# Patient Record
Sex: FEMALE | Race: WHITE | NOT HISPANIC OR LATINO | Employment: OTHER | URBAN - METROPOLITAN AREA
[De-identification: names, ages, dates, MRNs, and addresses within clinical notes are randomized per-mention and may not be internally consistent; named-entity substitution may affect disease eponyms.]

---

## 2017-06-29 ENCOUNTER — APPOINTMENT (OUTPATIENT)
Dept: GENERAL RADIOLOGY | Facility: HOSPITAL | Age: 67
End: 2017-06-29

## 2017-06-29 ENCOUNTER — APPOINTMENT (OUTPATIENT)
Dept: CT IMAGING | Facility: HOSPITAL | Age: 67
End: 2017-06-29

## 2017-06-29 ENCOUNTER — ANESTHESIA (OUTPATIENT)
Dept: PERIOP | Facility: HOSPITAL | Age: 67
End: 2017-06-29

## 2017-06-29 ENCOUNTER — HOSPITAL ENCOUNTER (OUTPATIENT)
Facility: HOSPITAL | Age: 67
Setting detail: OBSERVATION
LOS: 1 days | Discharge: HOME | End: 2017-06-30
Attending: EMERGENCY MEDICINE | Admitting: INTERNAL MEDICINE

## 2017-06-29 ENCOUNTER — ANESTHESIA EVENT (OUTPATIENT)
Dept: PERIOP | Facility: HOSPITAL | Age: 67
End: 2017-06-29

## 2017-06-29 DIAGNOSIS — K57.30 DIVERTICULOSIS OF LARGE INTESTINE WITHOUT HEMORRHAGE: Primary | ICD-10-CM

## 2017-06-29 DIAGNOSIS — R52 INTRACTABLE PAIN: ICD-10-CM

## 2017-06-29 DIAGNOSIS — N20.1 LEFT URETERAL STONE: ICD-10-CM

## 2017-06-29 PROBLEM — K57.32 DIVERTICULITIS LARGE INTESTINE: Status: ACTIVE | Noted: 2017-06-29

## 2017-06-29 PROBLEM — K57.92 DIVERTICULITIS: Status: ACTIVE | Noted: 2017-06-29

## 2017-06-29 PROBLEM — N20.0 KIDNEY STONE ON LEFT SIDE: Status: ACTIVE | Noted: 2017-06-29

## 2017-06-29 PROBLEM — N13.30 HYDRONEPHROSIS, LEFT: Status: ACTIVE | Noted: 2017-06-29

## 2017-06-29 LAB
ALBUMIN SERPL-MCNC: 3.6 G/DL (ref 3.5–5.2)
ALBUMIN/GLOB SERPL: 1.6 G/DL
ALP SERPL-CCNC: 121 U/L (ref 39–117)
ALT SERPL W P-5'-P-CCNC: 10 U/L (ref 1–33)
ANION GAP SERPL CALCULATED.3IONS-SCNC: 12.3 MMOL/L
ANION GAP SERPL CALCULATED.3IONS-SCNC: 13.8 MMOL/L
AST SERPL-CCNC: 14 U/L (ref 1–32)
BASOPHILS # BLD AUTO: 0.01 10*3/MM3 (ref 0–0.2)
BASOPHILS # BLD AUTO: 0.01 10*3/MM3 (ref 0–0.2)
BASOPHILS NFR BLD AUTO: 0.1 % (ref 0–1.5)
BASOPHILS NFR BLD AUTO: 0.1 % (ref 0–1.5)
BILIRUB SERPL-MCNC: 0.4 MG/DL (ref 0.1–1.2)
BILIRUB UR QL STRIP: NEGATIVE
BUN BLD-MCNC: 10 MG/DL (ref 8–23)
BUN BLD-MCNC: 9 MG/DL (ref 8–23)
BUN/CREAT SERPL: 11 (ref 7–25)
BUN/CREAT SERPL: 15.9 (ref 7–25)
CALCIUM SPEC-SCNC: 8 MG/DL (ref 8.6–10.5)
CALCIUM SPEC-SCNC: 8.5 MG/DL (ref 8.6–10.5)
CHLORIDE SERPL-SCNC: 103 MMOL/L (ref 98–107)
CHLORIDE SERPL-SCNC: 109 MMOL/L (ref 98–107)
CLARITY UR: CLEAR
CO2 SERPL-SCNC: 20.2 MMOL/L (ref 22–29)
CO2 SERPL-SCNC: 22.7 MMOL/L (ref 22–29)
COLOR UR: YELLOW
CREAT BLD-MCNC: 0.63 MG/DL (ref 0.57–1)
CREAT BLD-MCNC: 0.82 MG/DL (ref 0.57–1)
DEPRECATED RDW RBC AUTO: 43.5 FL (ref 37–54)
DEPRECATED RDW RBC AUTO: 43.8 FL (ref 37–54)
EOSINOPHIL # BLD AUTO: 0 10*3/MM3 (ref 0–0.7)
EOSINOPHIL # BLD AUTO: 0.06 10*3/MM3 (ref 0–0.7)
EOSINOPHIL NFR BLD AUTO: 0 % (ref 0.3–6.2)
EOSINOPHIL NFR BLD AUTO: 0.6 % (ref 0.3–6.2)
ERYTHROCYTE [DISTWIDTH] IN BLOOD BY AUTOMATED COUNT: 13 % (ref 11.7–13)
ERYTHROCYTE [DISTWIDTH] IN BLOOD BY AUTOMATED COUNT: 13.1 % (ref 11.7–13)
GFR SERPL CREATININE-BSD FRML MDRD: 69 ML/MIN/1.73
GFR SERPL CREATININE-BSD FRML MDRD: 94 ML/MIN/1.73
GLOBULIN UR ELPH-MCNC: 2.2 GM/DL
GLUCOSE BLD-MCNC: 134 MG/DL (ref 65–99)
GLUCOSE BLD-MCNC: 169 MG/DL (ref 65–99)
GLUCOSE BLDC GLUCOMTR-MCNC: 134 MG/DL (ref 70–130)
GLUCOSE UR STRIP-MCNC: NEGATIVE MG/DL
HCT VFR BLD AUTO: 39.9 % (ref 35.6–45.5)
HCT VFR BLD AUTO: 41.4 % (ref 35.6–45.5)
HGB BLD-MCNC: 13.2 G/DL (ref 11.9–15.5)
HGB BLD-MCNC: 13.5 G/DL (ref 11.9–15.5)
HGB UR QL STRIP.AUTO: NEGATIVE
HOLD SPECIMEN: NORMAL
HOLD SPECIMEN: NORMAL
IMM GRANULOCYTES # BLD: 0 10*3/MM3 (ref 0–0.03)
IMM GRANULOCYTES # BLD: 0 10*3/MM3 (ref 0–0.03)
IMM GRANULOCYTES NFR BLD: 0 % (ref 0–0.5)
IMM GRANULOCYTES NFR BLD: 0 % (ref 0–0.5)
KETONES UR QL STRIP: ABNORMAL
LEUKOCYTE ESTERASE UR QL STRIP.AUTO: NEGATIVE
LIPASE SERPL-CCNC: 27 U/L (ref 13–60)
LYMPHOCYTES # BLD AUTO: 0.41 10*3/MM3 (ref 0.9–4.8)
LYMPHOCYTES # BLD AUTO: 1.21 10*3/MM3 (ref 0.9–4.8)
LYMPHOCYTES NFR BLD AUTO: 11.5 % (ref 19.6–45.3)
LYMPHOCYTES NFR BLD AUTO: 4.5 % (ref 19.6–45.3)
MCH RBC QN AUTO: 30 PG (ref 26.9–32)
MCH RBC QN AUTO: 30.1 PG (ref 26.9–32)
MCHC RBC AUTO-ENTMCNC: 32.6 G/DL (ref 32.4–36.3)
MCHC RBC AUTO-ENTMCNC: 33.1 G/DL (ref 32.4–36.3)
MCV RBC AUTO: 90.9 FL (ref 80.5–98.2)
MCV RBC AUTO: 92 FL (ref 80.5–98.2)
MONOCYTES # BLD AUTO: 0.2 10*3/MM3 (ref 0.2–1.2)
MONOCYTES # BLD AUTO: 0.37 10*3/MM3 (ref 0.2–1.2)
MONOCYTES NFR BLD AUTO: 2.2 % (ref 5–12)
MONOCYTES NFR BLD AUTO: 3.5 % (ref 5–12)
NEUTROPHILS # BLD AUTO: 8.49 10*3/MM3 (ref 1.9–8.1)
NEUTROPHILS # BLD AUTO: 8.91 10*3/MM3 (ref 1.9–8.1)
NEUTROPHILS NFR BLD AUTO: 84.3 % (ref 42.7–76)
NEUTROPHILS NFR BLD AUTO: 93.2 % (ref 42.7–76)
NITRITE UR QL STRIP: NEGATIVE
PH UR STRIP.AUTO: 7 [PH] (ref 5–8)
PLATELET # BLD AUTO: 208 10*3/MM3 (ref 140–500)
PLATELET # BLD AUTO: 246 10*3/MM3 (ref 140–500)
PMV BLD AUTO: 10.2 FL (ref 6–12)
PMV BLD AUTO: 9.8 FL (ref 6–12)
POTASSIUM BLD-SCNC: 3.5 MMOL/L (ref 3.5–5.2)
POTASSIUM BLD-SCNC: 4 MMOL/L (ref 3.5–5.2)
PROT SERPL-MCNC: 5.8 G/DL (ref 6–8.5)
PROT UR QL STRIP: NEGATIVE
RBC # BLD AUTO: 4.39 10*6/MM3 (ref 3.9–5.2)
RBC # BLD AUTO: 4.5 10*6/MM3 (ref 3.9–5.2)
SODIUM BLD-SCNC: 138 MMOL/L (ref 136–145)
SODIUM BLD-SCNC: 143 MMOL/L (ref 136–145)
SP GR UR STRIP: 1.01 (ref 1–1.03)
UROBILINOGEN UR QL STRIP: ABNORMAL
WBC NRBC COR # BLD: 10.56 10*3/MM3 (ref 4.5–10.7)
WBC NRBC COR # BLD: 9.11 10*3/MM3 (ref 4.5–10.7)
WHOLE BLOOD HOLD SPECIMEN: NORMAL
WHOLE BLOOD HOLD SPECIMEN: NORMAL

## 2017-06-29 PROCEDURE — 25010000002 MORPHINE PER 10 MG: Performed by: INTERNAL MEDICINE

## 2017-06-29 PROCEDURE — 25010000002 PROPOFOL 10 MG/ML EMULSION: Performed by: ANESTHESIOLOGY

## 2017-06-29 PROCEDURE — G0378 HOSPITAL OBSERVATION PER HR: HCPCS

## 2017-06-29 PROCEDURE — 96376 TX/PRO/DX INJ SAME DRUG ADON: CPT

## 2017-06-29 PROCEDURE — 25010000002 MIDAZOLAM PER 1 MG: Performed by: ANESTHESIOLOGY

## 2017-06-29 PROCEDURE — 25010000002 PIPERACILLIN SOD-TAZOBACTAM PER 1 G: Performed by: EMERGENCY MEDICINE

## 2017-06-29 PROCEDURE — 82962 GLUCOSE BLOOD TEST: CPT

## 2017-06-29 PROCEDURE — 25010000002 HYDROMORPHONE PER 4 MG: Performed by: EMERGENCY MEDICINE

## 2017-06-29 PROCEDURE — 85025 COMPLETE CBC W/AUTO DIFF WBC: CPT | Performed by: UROLOGY

## 2017-06-29 PROCEDURE — 81003 URINALYSIS AUTO W/O SCOPE: CPT | Performed by: EMERGENCY MEDICINE

## 2017-06-29 PROCEDURE — 85025 COMPLETE CBC W/AUTO DIFF WBC: CPT | Performed by: EMERGENCY MEDICINE

## 2017-06-29 PROCEDURE — 99284 EMERGENCY DEPT VISIT MOD MDM: CPT

## 2017-06-29 PROCEDURE — 25010000002 DEXAMETHASONE PER 1 MG: Performed by: NURSE ANESTHETIST, CERTIFIED REGISTERED

## 2017-06-29 PROCEDURE — 96365 THER/PROPH/DIAG IV INF INIT: CPT

## 2017-06-29 PROCEDURE — 96361 HYDRATE IV INFUSION ADD-ON: CPT

## 2017-06-29 PROCEDURE — 80053 COMPREHEN METABOLIC PANEL: CPT | Performed by: EMERGENCY MEDICINE

## 2017-06-29 PROCEDURE — 25010000002 ONDANSETRON PER 1 MG: Performed by: ANESTHESIOLOGY

## 2017-06-29 PROCEDURE — 25010000002 FENTANYL CITRATE (PF) 100 MCG/2ML SOLUTION: Performed by: ANESTHESIOLOGY

## 2017-06-29 PROCEDURE — 74176 CT ABD & PELVIS W/O CONTRAST: CPT

## 2017-06-29 PROCEDURE — 36415 COLL VENOUS BLD VENIPUNCTURE: CPT

## 2017-06-29 PROCEDURE — 25010000002 PROMETHAZINE PER 50 MG: Performed by: ANESTHESIOLOGY

## 2017-06-29 PROCEDURE — 83690 ASSAY OF LIPASE: CPT | Performed by: EMERGENCY MEDICINE

## 2017-06-29 PROCEDURE — C1758 CATHETER, URETERAL: HCPCS | Performed by: UROLOGY

## 2017-06-29 PROCEDURE — 96375 TX/PRO/DX INJ NEW DRUG ADDON: CPT

## 2017-06-29 PROCEDURE — 82360 CALCULUS ASSAY QUANT: CPT | Performed by: UROLOGY

## 2017-06-29 PROCEDURE — 25010000002 LEVOFLOXACIN PER 250 MG: Performed by: INTERNAL MEDICINE

## 2017-06-29 PROCEDURE — 25010000002 MORPHINE PER 10 MG: Performed by: EMERGENCY MEDICINE

## 2017-06-29 PROCEDURE — 74420 UROGRAPHY RTRGR +-KUB: CPT

## 2017-06-29 PROCEDURE — 0 IOTHALAMATE 60 % SOLUTION: Performed by: UROLOGY

## 2017-06-29 PROCEDURE — C1769 GUIDE WIRE: HCPCS | Performed by: UROLOGY

## 2017-06-29 PROCEDURE — C2617 STENT, NON-COR, TEM W/O DEL: HCPCS | Performed by: UROLOGY

## 2017-06-29 PROCEDURE — 25010000002 ONDANSETRON PER 1 MG: Performed by: EMERGENCY MEDICINE

## 2017-06-29 PROCEDURE — 94799 UNLISTED PULMONARY SVC/PX: CPT

## 2017-06-29 DEVICE — URETERAL STENT
Type: IMPLANTABLE DEVICE | Status: FUNCTIONAL
Brand: CONTOUR™

## 2017-06-29 RX ORDER — DEXAMETHASONE SODIUM PHOSPHATE 10 MG/ML
INJECTION INTRAMUSCULAR; INTRAVENOUS AS NEEDED
Status: DISCONTINUED | OUTPATIENT
Start: 2017-06-29 | End: 2017-06-29 | Stop reason: SURG

## 2017-06-29 RX ORDER — HYDROMORPHONE HYDROCHLORIDE 1 MG/ML
0.5 INJECTION, SOLUTION INTRAMUSCULAR; INTRAVENOUS; SUBCUTANEOUS ONCE
Status: COMPLETED | OUTPATIENT
Start: 2017-06-29 | End: 2017-06-29

## 2017-06-29 RX ORDER — LEVOFLOXACIN 5 MG/ML
750 INJECTION, SOLUTION INTRAVENOUS EVERY 24 HOURS
Status: DISCONTINUED | OUTPATIENT
Start: 2017-06-29 | End: 2017-06-30

## 2017-06-29 RX ORDER — ONDANSETRON 2 MG/ML
4 INJECTION INTRAMUSCULAR; INTRAVENOUS ONCE AS NEEDED
Status: COMPLETED | OUTPATIENT
Start: 2017-06-29 | End: 2017-06-29

## 2017-06-29 RX ORDER — MONTELUKAST SODIUM 10 MG/1
10 TABLET ORAL NIGHTLY
Status: DISCONTINUED | OUTPATIENT
Start: 2017-06-29 | End: 2017-06-30 | Stop reason: HOSPADM

## 2017-06-29 RX ORDER — SODIUM CHLORIDE 0.9 % (FLUSH) 0.9 %
1-10 SYRINGE (ML) INJECTION AS NEEDED
Status: DISCONTINUED | OUTPATIENT
Start: 2017-06-29 | End: 2017-06-29 | Stop reason: HOSPADM

## 2017-06-29 RX ORDER — FAMOTIDINE 10 MG/ML
20 INJECTION, SOLUTION INTRAVENOUS ONCE
Status: COMPLETED | OUTPATIENT
Start: 2017-06-29 | End: 2017-06-29

## 2017-06-29 RX ORDER — HYDROCODONE BITARTRATE AND ACETAMINOPHEN 7.5; 325 MG/1; MG/1
1 TABLET ORAL EVERY 6 HOURS PRN
Status: DISCONTINUED | OUTPATIENT
Start: 2017-06-29 | End: 2017-06-30 | Stop reason: HOSPADM

## 2017-06-29 RX ORDER — SODIUM CHLORIDE 9 MG/ML
100 INJECTION, SOLUTION INTRAVENOUS CONTINUOUS
Status: DISCONTINUED | OUTPATIENT
Start: 2017-06-29 | End: 2017-06-30

## 2017-06-29 RX ORDER — PHENAZOPYRIDINE HYDROCHLORIDE 200 MG/1
200 TABLET, FILM COATED ORAL
Status: DISCONTINUED | OUTPATIENT
Start: 2017-06-29 | End: 2017-06-30 | Stop reason: HOSPADM

## 2017-06-29 RX ORDER — MAGNESIUM HYDROXIDE 1200 MG/15ML
LIQUID ORAL AS NEEDED
Status: DISCONTINUED | OUTPATIENT
Start: 2017-06-29 | End: 2017-06-29 | Stop reason: HOSPADM

## 2017-06-29 RX ORDER — EPHEDRINE SULFATE 50 MG/ML
5 INJECTION, SOLUTION INTRAVENOUS ONCE AS NEEDED
Status: DISCONTINUED | OUTPATIENT
Start: 2017-06-29 | End: 2017-06-29 | Stop reason: HOSPADM

## 2017-06-29 RX ORDER — LEVOTHYROXINE SODIUM 112 UG/1
112 TABLET ORAL EVERY MORNING
Status: DISCONTINUED | OUTPATIENT
Start: 2017-06-30 | End: 2017-06-29

## 2017-06-29 RX ORDER — FLUMAZENIL 0.1 MG/ML
0.2 INJECTION INTRAVENOUS AS NEEDED
Status: DISCONTINUED | OUTPATIENT
Start: 2017-06-29 | End: 2017-06-29 | Stop reason: HOSPADM

## 2017-06-29 RX ORDER — SODIUM CHLORIDE, SODIUM LACTATE, POTASSIUM CHLORIDE, CALCIUM CHLORIDE 600; 310; 30; 20 MG/100ML; MG/100ML; MG/100ML; MG/100ML
125 INJECTION, SOLUTION INTRAVENOUS CONTINUOUS
Status: DISCONTINUED | OUTPATIENT
Start: 2017-06-29 | End: 2017-06-30

## 2017-06-29 RX ORDER — FENTANYL CITRATE 50 UG/ML
INJECTION, SOLUTION INTRAMUSCULAR; INTRAVENOUS AS NEEDED
Status: DISCONTINUED | OUTPATIENT
Start: 2017-06-29 | End: 2017-06-29 | Stop reason: SURG

## 2017-06-29 RX ORDER — MIDAZOLAM HYDROCHLORIDE 1 MG/ML
1 INJECTION INTRAMUSCULAR; INTRAVENOUS
Status: DISCONTINUED | OUTPATIENT
Start: 2017-06-29 | End: 2017-06-29 | Stop reason: HOSPADM

## 2017-06-29 RX ORDER — OXYCODONE AND ACETAMINOPHEN 7.5; 325 MG/1; MG/1
1 TABLET ORAL ONCE AS NEEDED
Status: DISCONTINUED | OUTPATIENT
Start: 2017-06-29 | End: 2017-06-29 | Stop reason: HOSPADM

## 2017-06-29 RX ORDER — MORPHINE SULFATE 2 MG/ML
2 INJECTION, SOLUTION INTRAMUSCULAR; INTRAVENOUS ONCE
Status: COMPLETED | OUTPATIENT
Start: 2017-06-29 | End: 2017-06-29

## 2017-06-29 RX ORDER — MIDAZOLAM HYDROCHLORIDE 1 MG/ML
2 INJECTION INTRAMUSCULAR; INTRAVENOUS
Status: DISCONTINUED | OUTPATIENT
Start: 2017-06-29 | End: 2017-06-29 | Stop reason: HOSPADM

## 2017-06-29 RX ORDER — DOCUSATE SODIUM 100 MG/1
100 CAPSULE, LIQUID FILLED ORAL 2 TIMES DAILY
Status: DISCONTINUED | OUTPATIENT
Start: 2017-06-29 | End: 2017-06-30 | Stop reason: HOSPADM

## 2017-06-29 RX ORDER — CETIRIZINE HYDROCHLORIDE 10 MG/1
10 TABLET ORAL DAILY
COMMUNITY
End: 2022-04-19

## 2017-06-29 RX ORDER — HYDROCODONE BITARTRATE AND ACETAMINOPHEN 7.5; 325 MG/1; MG/1
1 TABLET ORAL ONCE AS NEEDED
Status: DISCONTINUED | OUTPATIENT
Start: 2017-06-29 | End: 2017-06-29 | Stop reason: HOSPADM

## 2017-06-29 RX ORDER — MORPHINE SULFATE 2 MG/ML
1 INJECTION, SOLUTION INTRAMUSCULAR; INTRAVENOUS
Status: DISCONTINUED | OUTPATIENT
Start: 2017-06-29 | End: 2017-06-30

## 2017-06-29 RX ORDER — SODIUM CHLORIDE 0.9 % (FLUSH) 0.9 %
1-10 SYRINGE (ML) INJECTION AS NEEDED
Status: DISCONTINUED | OUTPATIENT
Start: 2017-06-29 | End: 2017-06-30 | Stop reason: HOSPADM

## 2017-06-29 RX ORDER — SODIUM CHLORIDE, SODIUM LACTATE, POTASSIUM CHLORIDE, CALCIUM CHLORIDE 600; 310; 30; 20 MG/100ML; MG/100ML; MG/100ML; MG/100ML
9 INJECTION, SOLUTION INTRAVENOUS CONTINUOUS
Status: DISCONTINUED | OUTPATIENT
Start: 2017-06-29 | End: 2017-06-29

## 2017-06-29 RX ORDER — ONDANSETRON 2 MG/ML
4 INJECTION INTRAMUSCULAR; INTRAVENOUS ONCE
Status: COMPLETED | OUTPATIENT
Start: 2017-06-29 | End: 2017-06-29

## 2017-06-29 RX ORDER — DIPHENHYDRAMINE HYDROCHLORIDE 50 MG/ML
12.5 INJECTION INTRAMUSCULAR; INTRAVENOUS
Status: DISCONTINUED | OUTPATIENT
Start: 2017-06-29 | End: 2017-06-29 | Stop reason: HOSPADM

## 2017-06-29 RX ORDER — NALOXONE HCL 0.4 MG/ML
0.2 VIAL (ML) INJECTION AS NEEDED
Status: DISCONTINUED | OUTPATIENT
Start: 2017-06-29 | End: 2017-06-29 | Stop reason: HOSPADM

## 2017-06-29 RX ORDER — HYDRALAZINE HYDROCHLORIDE 20 MG/ML
5 INJECTION INTRAMUSCULAR; INTRAVENOUS
Status: DISCONTINUED | OUTPATIENT
Start: 2017-06-29 | End: 2017-06-29 | Stop reason: HOSPADM

## 2017-06-29 RX ORDER — SODIUM CHLORIDE 0.9 % (FLUSH) 0.9 %
10 SYRINGE (ML) INJECTION AS NEEDED
Status: DISCONTINUED | OUTPATIENT
Start: 2017-06-29 | End: 2017-06-30 | Stop reason: HOSPADM

## 2017-06-29 RX ORDER — LIDOCAINE HYDROCHLORIDE 20 MG/ML
INJECTION, SOLUTION INFILTRATION; PERINEURAL AS NEEDED
Status: DISCONTINUED | OUTPATIENT
Start: 2017-06-29 | End: 2017-06-29 | Stop reason: SURG

## 2017-06-29 RX ORDER — PROMETHAZINE HYDROCHLORIDE 25 MG/1
12.5 TABLET ORAL ONCE AS NEEDED
Status: DISCONTINUED | OUTPATIENT
Start: 2017-06-29 | End: 2017-06-29 | Stop reason: HOSPADM

## 2017-06-29 RX ORDER — LEVOTHYROXINE SODIUM 88 UG/1
88 TABLET ORAL
Status: DISCONTINUED | OUTPATIENT
Start: 2017-06-30 | End: 2017-06-30 | Stop reason: HOSPADM

## 2017-06-29 RX ORDER — PROMETHAZINE HYDROCHLORIDE 25 MG/ML
12.5 INJECTION, SOLUTION INTRAMUSCULAR; INTRAVENOUS ONCE AS NEEDED
Status: COMPLETED | OUTPATIENT
Start: 2017-06-29 | End: 2017-06-29

## 2017-06-29 RX ORDER — PROMETHAZINE HYDROCHLORIDE 25 MG/1
25 TABLET ORAL ONCE AS NEEDED
Status: COMPLETED | OUTPATIENT
Start: 2017-06-29 | End: 2017-06-29

## 2017-06-29 RX ORDER — SODIUM CHLORIDE 9 MG/ML
125 INJECTION, SOLUTION INTRAVENOUS CONTINUOUS
Status: DISCONTINUED | OUTPATIENT
Start: 2017-06-29 | End: 2017-06-29

## 2017-06-29 RX ORDER — PROPOFOL 10 MG/ML
VIAL (ML) INTRAVENOUS AS NEEDED
Status: DISCONTINUED | OUTPATIENT
Start: 2017-06-29 | End: 2017-06-29 | Stop reason: SURG

## 2017-06-29 RX ORDER — ATROPA BELLADONNA AND OPIUM 16.2; 6 MG/1; MG/1
SUPPOSITORY RECTAL AS NEEDED
Status: DISCONTINUED | OUTPATIENT
Start: 2017-06-29 | End: 2017-06-29 | Stop reason: HOSPADM

## 2017-06-29 RX ORDER — PANTOPRAZOLE SODIUM 40 MG/10ML
40 INJECTION, POWDER, LYOPHILIZED, FOR SOLUTION INTRAVENOUS ONCE
Status: DISCONTINUED | OUTPATIENT
Start: 2017-06-29 | End: 2017-06-29 | Stop reason: RX

## 2017-06-29 RX ORDER — FENTANYL CITRATE 50 UG/ML
50 INJECTION, SOLUTION INTRAMUSCULAR; INTRAVENOUS
Status: DISCONTINUED | OUTPATIENT
Start: 2017-06-29 | End: 2017-06-29 | Stop reason: HOSPADM

## 2017-06-29 RX ORDER — PROMETHAZINE HYDROCHLORIDE 25 MG/1
25 SUPPOSITORY RECTAL ONCE AS NEEDED
Status: COMPLETED | OUTPATIENT
Start: 2017-06-29 | End: 2017-06-29

## 2017-06-29 RX ORDER — LABETALOL HYDROCHLORIDE 5 MG/ML
5 INJECTION, SOLUTION INTRAVENOUS
Status: DISCONTINUED | OUTPATIENT
Start: 2017-06-29 | End: 2017-06-29 | Stop reason: HOSPADM

## 2017-06-29 RX ORDER — HYDROMORPHONE HYDROCHLORIDE 1 MG/ML
0.5 INJECTION, SOLUTION INTRAMUSCULAR; INTRAVENOUS; SUBCUTANEOUS
Status: DISCONTINUED | OUTPATIENT
Start: 2017-06-29 | End: 2017-06-29 | Stop reason: HOSPADM

## 2017-06-29 RX ADMIN — MORPHINE SULFATE 1 MG: 2 INJECTION, SOLUTION INTRAMUSCULAR; INTRAVENOUS at 23:09

## 2017-06-29 RX ADMIN — SODIUM CHLORIDE 1000 ML: 9 INJECTION, SOLUTION INTRAVENOUS at 10:31

## 2017-06-29 RX ADMIN — PROMETHAZINE HYDROCHLORIDE 12.5 MG: 25 INJECTION INTRAMUSCULAR; INTRAVENOUS at 17:29

## 2017-06-29 RX ADMIN — METRONIDAZOLE 500 MG: 500 INJECTION, SOLUTION INTRAVENOUS at 17:12

## 2017-06-29 RX ADMIN — DEXAMETHASONE SODIUM PHOSPHATE 8 MG: 10 INJECTION INTRAMUSCULAR; INTRAVENOUS at 16:55

## 2017-06-29 RX ADMIN — ONDANSETRON 4 MG: 2 INJECTION INTRAMUSCULAR; INTRAVENOUS at 17:29

## 2017-06-29 RX ADMIN — SODIUM CHLORIDE, POTASSIUM CHLORIDE, SODIUM LACTATE AND CALCIUM CHLORIDE: 600; 310; 30; 20 INJECTION, SOLUTION INTRAVENOUS at 16:45

## 2017-06-29 RX ADMIN — LEVOFLOXACIN 750 MG: 5 INJECTION, SOLUTION INTRAVENOUS at 16:10

## 2017-06-29 RX ADMIN — MONTELUKAST 10 MG: 10 TABLET, FILM COATED ORAL at 22:51

## 2017-06-29 RX ADMIN — DOCUSATE SODIUM 100 MG: 100 CAPSULE, LIQUID FILLED ORAL at 22:51

## 2017-06-29 RX ADMIN — FENTANYL CITRATE 50 MCG: 50 INJECTION INTRAMUSCULAR; INTRAVENOUS at 16:51

## 2017-06-29 RX ADMIN — MORPHINE SULFATE 1 MG: 2 INJECTION, SOLUTION INTRAMUSCULAR; INTRAVENOUS at 15:25

## 2017-06-29 RX ADMIN — LIDOCAINE HYDROCHLORIDE 50 MG: 20 INJECTION, SOLUTION INFILTRATION; PERINEURAL at 16:52

## 2017-06-29 RX ADMIN — PHENAZOPYRIDINE HYDROCHLORIDE 200 MG: 200 TABLET ORAL at 22:50

## 2017-06-29 RX ADMIN — TAZOBACTAM SODIUM AND PIPERACILLIN SODIUM 4.5 G: 500; 4 INJECTION, SOLUTION INTRAVENOUS at 13:18

## 2017-06-29 RX ADMIN — FENTANYL CITRATE 25 MCG: 50 INJECTION, SOLUTION INTRAMUSCULAR; INTRAVENOUS at 16:35

## 2017-06-29 RX ADMIN — MORPHINE SULFATE 2 MG: 2 INJECTION, SOLUTION INTRAMUSCULAR; INTRAVENOUS at 10:31

## 2017-06-29 RX ADMIN — ONDANSETRON 4 MG: 2 INJECTION INTRAMUSCULAR; INTRAVENOUS at 10:31

## 2017-06-29 RX ADMIN — MIDAZOLAM 1 MG: 1 INJECTION INTRAMUSCULAR; INTRAVENOUS at 16:35

## 2017-06-29 RX ADMIN — PROPOFOL 150 MG: 10 INJECTION, EMULSION INTRAVENOUS at 16:50

## 2017-06-29 RX ADMIN — HYDROMORPHONE HYDROCHLORIDE 1 MG: 1 INJECTION, SOLUTION INTRAMUSCULAR; INTRAVENOUS; SUBCUTANEOUS at 13:18

## 2017-06-29 RX ADMIN — FAMOTIDINE 20 MG: 10 INJECTION, SOLUTION INTRAVENOUS at 16:31

## 2017-06-29 RX ADMIN — METRONIDAZOLE 500 MG: 500 INJECTION, SOLUTION INTRAVENOUS at 22:50

## 2017-06-29 RX ADMIN — SODIUM CHLORIDE, POTASSIUM CHLORIDE, SODIUM LACTATE AND CALCIUM CHLORIDE 125 ML/HR: 600; 310; 30; 20 INJECTION, SOLUTION INTRAVENOUS at 17:52

## 2017-06-29 RX ADMIN — HYDROMORPHONE HYDROCHLORIDE 0.5 MG: 1 INJECTION, SOLUTION INTRAMUSCULAR; INTRAVENOUS; SUBCUTANEOUS at 11:20

## 2017-06-29 RX ADMIN — SODIUM CHLORIDE 100 ML/HR: 9 INJECTION, SOLUTION INTRAVENOUS at 15:06

## 2017-06-29 RX ADMIN — SODIUM CHLORIDE, POTASSIUM CHLORIDE, SODIUM LACTATE AND CALCIUM CHLORIDE 9 ML/HR: 600; 310; 30; 20 INJECTION, SOLUTION INTRAVENOUS at 16:24

## 2017-06-29 RX ADMIN — MORPHINE SULFATE 1 MG: 2 INJECTION, SOLUTION INTRAMUSCULAR; INTRAVENOUS at 20:57

## 2017-06-29 RX ADMIN — SODIUM CHLORIDE 125 ML/HR: 9 INJECTION, SOLUTION INTRAVENOUS at 11:21

## 2017-06-29 NOTE — ANESTHESIA PROCEDURE NOTES
Airway  Urgency: elective      General Information and Staff    Patient location during procedure: OR  Anesthesiologist: CELIO TOHMAS    Indications and Patient Condition  Indications for airway management: airway protection    Preoxygenated: yes  Mask difficulty assessment: 1 - vent by mask    Final Airway Details  Final airway type: supraglottic airway      Successful airway: classic  Size 4    Number of attempts at approach: 1    Additional Comments  LMA inserted with ease

## 2017-06-29 NOTE — ANESTHESIA POSTPROCEDURE EVALUATION
Patient: Sara Gary    Procedure Summary     Date Anesthesia Start Anesthesia Stop Room / Location    06/29/17 9500 172  [facility] / [facility]       Procedure Diagnosis Surgeon Provider    CYSTOSCOPY LEFT STENT INSERTION & STONE REMOVAL (Left ) No diagnosis on file. MD Michael Hahn MD          Anesthesia Type: general  Last vitals  /87 (06/29/17 1810)    Temp 37 °C (98.6 °F) (06/29/17 1810)    Pulse 68 (06/29/17 1810)   Resp 12 (06/29/17 1810)    SpO2 96 % (06/29/17 1810)      Post Anesthesia Care and Evaluation    Patient location during evaluation: PACU  Patient participation: complete - patient participated  Level of consciousness: awake  Pain management: adequate  Airway patency: patent  Anesthetic complications: No anesthetic complications    Cardiovascular status: acceptable  Respiratory status: acceptable  Hydration status: acceptable

## 2017-06-29 NOTE — ANESTHESIA PREPROCEDURE EVALUATION
Anesthesia Evaluation     Patient summary reviewed and Nursing notes reviewed          Airway   Mallampati: II  no difficulty expected  Dental - normal exam     Pulmonary - negative pulmonary ROS    breath sounds clear to auscultation  Cardiovascular     ECG reviewed  Rhythm: regular  Rate: normal    (+) hypertension,       Neuro/Psych- negative ROS  GI/Hepatic/Renal/Endo    (+)  hiatal hernia,     Musculoskeletal (-) negative ROS    Abdominal    Substance History - negative use     OB/GYN negative ob/gyn ROS         Other                                        Anesthesia Plan    ASA 2     general     intravenous induction     Conclusions  This is a negative electrocardiographic stress test.  This was a negative echocardiographic stress test.  Global left ventricular wall motion and contractility are within normal  limits.  Abnormal left ventricular diastolic function is observed.  There is mild to moderate aortic regurgitation.   The calculated aortic regurgitation pressure half-time is 470 msec.   The right ventricular systolic pressure is calculated at 26 mmHg.      Measurements

## 2017-06-30 ENCOUNTER — HOSPITAL ENCOUNTER (EMERGENCY)
Facility: HOSPITAL | Age: 67
Discharge: HOME | End: 2017-06-30
Attending: EMERGENCY MEDICINE | Admitting: EMERGENCY MEDICINE

## 2017-06-30 ENCOUNTER — APPOINTMENT (OUTPATIENT)
Dept: GENERAL RADIOLOGY | Facility: HOSPITAL | Age: 67
End: 2017-06-30

## 2017-06-30 VITALS
HEIGHT: 69 IN | SYSTOLIC BLOOD PRESSURE: 143 MMHG | HEART RATE: 54 BPM | RESPIRATION RATE: 18 BRPM | OXYGEN SATURATION: 95 % | WEIGHT: 150 LBS | BODY MASS INDEX: 22.22 KG/M2 | DIASTOLIC BLOOD PRESSURE: 72 MMHG | TEMPERATURE: 98.8 F

## 2017-06-30 VITALS
WEIGHT: 152.13 LBS | OXYGEN SATURATION: 97 % | BODY MASS INDEX: 22.53 KG/M2 | SYSTOLIC BLOOD PRESSURE: 124 MMHG | TEMPERATURE: 98.5 F | RESPIRATION RATE: 14 BRPM | HEIGHT: 69 IN | DIASTOLIC BLOOD PRESSURE: 67 MMHG | HEART RATE: 60 BPM

## 2017-06-30 DIAGNOSIS — K57.32 DIVERTICULITIS OF LARGE INTESTINE WITHOUT PERFORATION OR ABSCESS WITHOUT BLEEDING: Primary | ICD-10-CM

## 2017-06-30 DIAGNOSIS — N20.1 LEFT URETERAL STONE: ICD-10-CM

## 2017-06-30 LAB
ALBUMIN SERPL-MCNC: 3.8 G/DL (ref 3.5–5.2)
ALBUMIN/GLOB SERPL: 1.3 G/DL
ALP SERPL-CCNC: 118 U/L (ref 39–117)
ALT SERPL W P-5'-P-CCNC: 7 U/L (ref 1–33)
ANION GAP SERPL CALCULATED.3IONS-SCNC: 12.4 MMOL/L
ANION GAP SERPL CALCULATED.3IONS-SCNC: 9.2 MMOL/L
AST SERPL-CCNC: 13 U/L (ref 1–32)
BACTERIA UR QL AUTO: ABNORMAL /HPF
BASOPHILS # BLD AUTO: 0 10*3/MM3 (ref 0–0.2)
BASOPHILS # BLD AUTO: 0.02 10*3/MM3 (ref 0–0.2)
BASOPHILS NFR BLD AUTO: 0 % (ref 0–1.5)
BASOPHILS NFR BLD AUTO: 0.3 % (ref 0–1.5)
BILIRUB SERPL-MCNC: 0.3 MG/DL (ref 0.1–1.2)
BILIRUB UR QL STRIP: NEGATIVE
BUN BLD-MCNC: 15 MG/DL (ref 8–23)
BUN BLD-MCNC: 9 MG/DL (ref 8–23)
BUN/CREAT SERPL: 12 (ref 7–25)
BUN/CREAT SERPL: 16.9 (ref 7–25)
CALCIUM SPEC-SCNC: 8.4 MG/DL (ref 8.6–10.5)
CALCIUM SPEC-SCNC: 8.7 MG/DL (ref 8.6–10.5)
CHLORIDE SERPL-SCNC: 102 MMOL/L (ref 98–107)
CHLORIDE SERPL-SCNC: 103 MMOL/L (ref 98–107)
CLARITY UR: ABNORMAL
CO2 SERPL-SCNC: 21.6 MMOL/L (ref 22–29)
CO2 SERPL-SCNC: 26.8 MMOL/L (ref 22–29)
COLOR UR: ABNORMAL
CREAT BLD-MCNC: 0.75 MG/DL (ref 0.57–1)
CREAT BLD-MCNC: 0.89 MG/DL (ref 0.57–1)
DEPRECATED RDW RBC AUTO: 44 FL (ref 37–54)
DEPRECATED RDW RBC AUTO: 44.7 FL (ref 37–54)
EOSINOPHIL # BLD AUTO: 0 10*3/MM3 (ref 0–0.7)
EOSINOPHIL # BLD AUTO: 0.08 10*3/MM3 (ref 0–0.7)
EOSINOPHIL NFR BLD AUTO: 0 % (ref 0.3–6.2)
EOSINOPHIL NFR BLD AUTO: 1 % (ref 0.3–6.2)
ERYTHROCYTE [DISTWIDTH] IN BLOOD BY AUTOMATED COUNT: 13 % (ref 11.7–13)
ERYTHROCYTE [DISTWIDTH] IN BLOOD BY AUTOMATED COUNT: 13.1 % (ref 11.7–13)
GFR SERPL CREATININE-BSD FRML MDRD: 63 ML/MIN/1.73
GFR SERPL CREATININE-BSD FRML MDRD: 77 ML/MIN/1.73
GLOBULIN UR ELPH-MCNC: 2.9 GM/DL
GLUCOSE BLD-MCNC: 102 MG/DL (ref 65–99)
GLUCOSE BLD-MCNC: 118 MG/DL (ref 65–99)
GLUCOSE UR STRIP-MCNC: NEGATIVE MG/DL
HCT VFR BLD AUTO: 36.4 % (ref 35.6–45.5)
HCT VFR BLD AUTO: 38.8 % (ref 35.6–45.5)
HGB BLD-MCNC: 11.6 G/DL (ref 11.9–15.5)
HGB BLD-MCNC: 12.5 G/DL (ref 11.9–15.5)
HGB UR QL STRIP.AUTO: ABNORMAL
HYALINE CASTS UR QL AUTO: ABNORMAL /LPF
IMM GRANULOCYTES # BLD: 0.02 10*3/MM3 (ref 0–0.03)
IMM GRANULOCYTES # BLD: 0.02 10*3/MM3 (ref 0–0.03)
IMM GRANULOCYTES NFR BLD: 0.3 % (ref 0–0.5)
IMM GRANULOCYTES NFR BLD: 0.3 % (ref 0–0.5)
KETONES UR QL STRIP: NEGATIVE
LEUKOCYTE ESTERASE UR QL STRIP.AUTO: ABNORMAL
LIPASE SERPL-CCNC: 37 U/L (ref 13–60)
LYMPHOCYTES # BLD AUTO: 0.93 10*3/MM3 (ref 0.9–4.8)
LYMPHOCYTES # BLD AUTO: 1.71 10*3/MM3 (ref 0.9–4.8)
LYMPHOCYTES NFR BLD AUTO: 13 % (ref 19.6–45.3)
LYMPHOCYTES NFR BLD AUTO: 21.9 % (ref 19.6–45.3)
MCH RBC QN AUTO: 29.7 PG (ref 26.9–32)
MCH RBC QN AUTO: 30.1 PG (ref 26.9–32)
MCHC RBC AUTO-ENTMCNC: 31.9 G/DL (ref 32.4–36.3)
MCHC RBC AUTO-ENTMCNC: 32.2 G/DL (ref 32.4–36.3)
MCV RBC AUTO: 93.1 FL (ref 80.5–98.2)
MCV RBC AUTO: 93.5 FL (ref 80.5–98.2)
MONOCYTES # BLD AUTO: 0.47 10*3/MM3 (ref 0.2–1.2)
MONOCYTES # BLD AUTO: 0.61 10*3/MM3 (ref 0.2–1.2)
MONOCYTES NFR BLD AUTO: 6.6 % (ref 5–12)
MONOCYTES NFR BLD AUTO: 7.8 % (ref 5–12)
NEUTROPHILS # BLD AUTO: 5.38 10*3/MM3 (ref 1.9–8.1)
NEUTROPHILS # BLD AUTO: 5.75 10*3/MM3 (ref 1.9–8.1)
NEUTROPHILS NFR BLD AUTO: 68.7 % (ref 42.7–76)
NEUTROPHILS NFR BLD AUTO: 80.1 % (ref 42.7–76)
NITRITE UR QL STRIP: POSITIVE
PH UR STRIP.AUTO: 5.5 [PH] (ref 5–8)
PLATELET # BLD AUTO: 195 10*3/MM3 (ref 140–500)
PLATELET # BLD AUTO: 203 10*3/MM3 (ref 140–500)
PMV BLD AUTO: 10.1 FL (ref 6–12)
PMV BLD AUTO: 10.3 FL (ref 6–12)
POTASSIUM BLD-SCNC: 3.8 MMOL/L (ref 3.5–5.2)
POTASSIUM BLD-SCNC: 4.4 MMOL/L (ref 3.5–5.2)
PROT SERPL-MCNC: 6.7 G/DL (ref 6–8.5)
PROT UR QL STRIP: ABNORMAL
RBC # BLD AUTO: 3.91 10*6/MM3 (ref 3.9–5.2)
RBC # BLD AUTO: 4.15 10*6/MM3 (ref 3.9–5.2)
RBC # UR: ABNORMAL /HPF
REF LAB TEST METHOD: ABNORMAL
SODIUM BLD-SCNC: 137 MMOL/L (ref 136–145)
SODIUM BLD-SCNC: 138 MMOL/L (ref 136–145)
SP GR UR STRIP: 1.01 (ref 1–1.03)
SQUAMOUS #/AREA URNS HPF: ABNORMAL /HPF
UROBILINOGEN UR QL STRIP: ABNORMAL
WBC NRBC COR # BLD: 7.17 10*3/MM3 (ref 4.5–10.7)
WBC NRBC COR # BLD: 7.82 10*3/MM3 (ref 4.5–10.7)
WBC UR QL AUTO: ABNORMAL /HPF

## 2017-06-30 PROCEDURE — 85025 COMPLETE CBC W/AUTO DIFF WBC: CPT | Performed by: EMERGENCY MEDICINE

## 2017-06-30 PROCEDURE — G0378 HOSPITAL OBSERVATION PER HR: HCPCS

## 2017-06-30 PROCEDURE — 87086 URINE CULTURE/COLONY COUNT: CPT | Performed by: EMERGENCY MEDICINE

## 2017-06-30 PROCEDURE — 85025 COMPLETE CBC W/AUTO DIFF WBC: CPT | Performed by: UROLOGY

## 2017-06-30 PROCEDURE — 80053 COMPREHEN METABOLIC PANEL: CPT | Performed by: UROLOGY

## 2017-06-30 PROCEDURE — 74022 RADEX COMPL AQT ABD SERIES: CPT

## 2017-06-30 PROCEDURE — 96360 HYDRATION IV INFUSION INIT: CPT

## 2017-06-30 PROCEDURE — 81001 URINALYSIS AUTO W/SCOPE: CPT | Performed by: EMERGENCY MEDICINE

## 2017-06-30 PROCEDURE — 83690 ASSAY OF LIPASE: CPT | Performed by: EMERGENCY MEDICINE

## 2017-06-30 PROCEDURE — 99283 EMERGENCY DEPT VISIT LOW MDM: CPT

## 2017-06-30 RX ORDER — TRAMADOL HYDROCHLORIDE 50 MG/1
50 TABLET ORAL EVERY 6 HOURS PRN
Qty: 15 TABLET | Refills: 0 | Status: SHIPPED | OUTPATIENT
Start: 2017-06-30 | End: 2017-07-05

## 2017-06-30 RX ORDER — METRONIDAZOLE 500 MG/1
500 TABLET ORAL EVERY 8 HOURS SCHEDULED
Qty: 27 TABLET | Refills: 0 | Status: SHIPPED | OUTPATIENT
Start: 2017-06-30 | End: 2017-07-09

## 2017-06-30 RX ORDER — PANTOPRAZOLE SODIUM 40 MG/1
40 TABLET, DELAYED RELEASE ORAL DAILY
Qty: 30 TABLET | Refills: 1 | Status: SHIPPED | OUTPATIENT
Start: 2017-06-30 | End: 2018-04-05 | Stop reason: ALTCHOICE

## 2017-06-30 RX ORDER — LEVOFLOXACIN 750 MG/1
750 TABLET ORAL EVERY 24 HOURS
Status: DISCONTINUED | OUTPATIENT
Start: 2017-06-30 | End: 2017-06-30 | Stop reason: HOSPADM

## 2017-06-30 RX ORDER — HYDROCODONE BITARTRATE AND ACETAMINOPHEN 7.5; 325 MG/1; MG/1
1 TABLET ORAL EVERY 6 HOURS PRN
Qty: 15 TABLET | Refills: 0 | Status: SHIPPED | OUTPATIENT
Start: 2017-06-30 | End: 2017-06-30 | Stop reason: HOSPADM

## 2017-06-30 RX ORDER — LEVOFLOXACIN 750 MG/1
750 TABLET ORAL EVERY 24 HOURS
Qty: 9 TABLET | Refills: 0 | Status: SHIPPED | OUTPATIENT
Start: 2017-06-30 | End: 2018-04-05 | Stop reason: ALTCHOICE

## 2017-06-30 RX ORDER — METRONIDAZOLE 500 MG/1
500 TABLET ORAL EVERY 8 HOURS SCHEDULED
Status: DISCONTINUED | OUTPATIENT
Start: 2017-06-30 | End: 2017-06-30 | Stop reason: HOSPADM

## 2017-06-30 RX ORDER — PANTOPRAZOLE SODIUM 40 MG/1
40 TABLET, DELAYED RELEASE ORAL
Status: DISCONTINUED | OUTPATIENT
Start: 2017-06-30 | End: 2017-06-30 | Stop reason: HOSPADM

## 2017-06-30 RX ORDER — TRAMADOL HYDROCHLORIDE 50 MG/1
50 TABLET ORAL EVERY 6 HOURS PRN
Status: DISCONTINUED | OUTPATIENT
Start: 2017-06-30 | End: 2017-06-30 | Stop reason: HOSPADM

## 2017-06-30 RX ORDER — ONDANSETRON 4 MG/1
4 TABLET, FILM COATED ORAL EVERY 6 HOURS
Qty: 12 TABLET | Refills: 0 | Status: SHIPPED | OUTPATIENT
Start: 2017-06-30 | End: 2018-04-05 | Stop reason: ALTCHOICE

## 2017-06-30 RX ORDER — PHENAZOPYRIDINE HYDROCHLORIDE 200 MG/1
200 TABLET, FILM COATED ORAL
Qty: 60 TABLET | Refills: 0 | Status: SHIPPED | OUTPATIENT
Start: 2017-06-30 | End: 2018-04-05 | Stop reason: ALTCHOICE

## 2017-06-30 RX ADMIN — PANTOPRAZOLE SODIUM 40 MG: 40 TABLET, DELAYED RELEASE ORAL at 09:59

## 2017-06-30 RX ADMIN — HYDROCODONE BITARTRATE AND ACETAMINOPHEN 1 TABLET: 7.5; 325 TABLET ORAL at 10:27

## 2017-06-30 RX ADMIN — PHENAZOPYRIDINE HYDROCHLORIDE 200 MG: 200 TABLET ORAL at 12:49

## 2017-06-30 RX ADMIN — SODIUM CHLORIDE, POTASSIUM CHLORIDE, SODIUM LACTATE AND CALCIUM CHLORIDE 125 ML/HR: 600; 310; 30; 20 INJECTION, SOLUTION INTRAVENOUS at 02:55

## 2017-06-30 RX ADMIN — SODIUM CHLORIDE 500 ML: 9 INJECTION, SOLUTION INTRAVENOUS at 22:03

## 2017-06-30 RX ADMIN — LEVOTHYROXINE SODIUM 88 MCG: 88 TABLET ORAL at 06:16

## 2017-06-30 RX ADMIN — DOCUSATE SODIUM 100 MG: 100 CAPSULE, LIQUID FILLED ORAL at 09:53

## 2017-06-30 RX ADMIN — SERTRALINE 50 MG: 50 TABLET, FILM COATED ORAL at 09:54

## 2017-06-30 RX ADMIN — TRAMADOL HYDROCHLORIDE 50 MG: 50 TABLET, FILM COATED ORAL at 12:49

## 2017-06-30 RX ADMIN — METRONIDAZOLE 500 MG: 500 INJECTION, SOLUTION INTRAVENOUS at 06:16

## 2017-06-30 RX ADMIN — PHENAZOPYRIDINE HYDROCHLORIDE 200 MG: 200 TABLET ORAL at 09:53

## 2017-07-01 NOTE — ED TRIAGE NOTES
PT D/C FROM HOSPITAL TODAY AFTER KIDNEY STONE REMOVAL AND DIVERTICULITIS, PT REPORTS NAUSEA, DIARRHEA AND DIAPHORESIS SINCE BEING HOME

## 2017-07-01 NOTE — ED PROVIDER NOTES
" EMERGENCY DEPARTMENT ENCOUNTER    CHIEF COMPLAINT  Chief Complaint: diarrhea  History given by: pt  History limited by: none  Room Number: 23/23  PMD: Augie Mishra MD      HPI:  Pt is a 66 y.o. female w/ h/o GERD, hypothyroidism and hiatal hernia who presents after being discharged earlier today following renal stone removal. She states she felt fine in the afternoon and was able to eat and  her prescriptions, but upon returning home, she experienced diarrhea and \"clammy\" hands. She expresses concern for exacerbation of her infection. She also c/o dysuria and states she experienced N/V and cough yesterday but not today. Pt has been taking flagyl and levaquin.   Hospitalized yesterday for Stone and Uncomplicated Diverticulitis and DC'd today.       Duration:  Since earlier today  Onset: gradual  Timing: constant  Intensity/Severity: moderate  Progression: unchanged  Associated Symptoms: \"clammy\" hands, dysuria  Aggravating Factors: none stated  Alleviating Factors: none stated  Previous Episodes: Pt was previously admitted for renal stone removal on 06/29/17  Treatment before arrival: Pt has been taking flagyl and levaquin    PAST MEDICAL HISTORY  Active Ambulatory Problems     Diagnosis Date Noted   • Diverticulosis of large intestine without hemorrhage 06/29/2017   • Diverticulitis large intestine 06/29/2017   • Kidney stone on left side 06/29/2017   • Hydronephrosis, left 06/29/2017   • Diverticulitis 06/29/2017     Resolved Ambulatory Problems     Diagnosis Date Noted   • No Resolved Ambulatory Problems     Past Medical History:   Diagnosis Date   • Depression    • Diverticulitis    • Environmental allergies    • Hypertension    • Kidney stone        PAST SURGICAL HISTORY  Past Surgical History:   Procedure Laterality Date   • CYSTOSCOPY BLADDER STONE LITHOTRIPSY     • CYSTOSCOPY W/ URETERAL STENT PLACEMENT Left 6/29/2017    Procedure: CYSTOSCOPY LEFT STENT INSERTION & STONE REMOVAL;  Surgeon: Raheem VIZCAINO" "MD Rigoberto;  Location: [facility] OR;  Service:        FAMILY HISTORY  History reviewed. No pertinent family history.    SOCIAL HISTORY  Social History     Social History   • Marital status:      Spouse name: N/A   • Number of children: N/A   • Years of education: N/A     Occupational History   • Not on file.     Social History Main Topics   • Smoking status: Never Smoker   • Smokeless tobacco: Not on file   • Alcohol use No   • Drug use: Not on file   • Sexual activity: Not on file     Other Topics Concern   • Not on file     Social History Narrative   • No narrative on file       ALLERGIES  Statins and Sulfa antibiotics    REVIEW OF SYSTEMS  Review of Systems   Constitutional: Positive for diaphoresis (\"clammy\" hands). Negative for fever.   HENT: Negative for sore throat.    Eyes: Negative.    Respiratory: Negative for cough and shortness of breath.    Cardiovascular: Negative for chest pain.   Gastrointestinal: Positive for diarrhea. Negative for abdominal pain and vomiting.   Genitourinary: Positive for dysuria.   Musculoskeletal: Negative for neck pain.   Skin: Negative for rash.   Allergic/Immunologic: Negative.    Neurological: Negative for weakness, numbness and headaches.   Hematological: Negative.    Psychiatric/Behavioral: Negative.    All other systems reviewed and are negative.      PHYSICAL EXAM  ED Triage Vitals   Temp Heart Rate Resp BP SpO2   06/30/17 2021 06/30/17 2021 06/30/17 2021 06/30/17 2021 06/30/17 2021   98.8 °F (37.1 °C) 84 16 157/76 95 %      Temp src Heart Rate Source Patient Position BP Location FiO2 (%)   06/30/17 2021 -- -- -- --   Tympanic           Physical Exam   Constitutional: She is oriented to person, place, and time and well-developed, well-nourished, and in no distress. No distress.   HENT:   Head: Normocephalic and atraumatic.   Eyes: EOM are normal. Pupils are equal, round, and reactive to light.   Neck: Normal range of motion. Neck supple.   Cardiovascular: " Normal rate, regular rhythm and normal heart sounds.    Pulmonary/Chest: Effort normal and breath sounds normal. No respiratory distress.   Abdominal: Soft. She exhibits no mass. There is tenderness (diffuse). There is no rebound and no guarding.   Musculoskeletal: Normal range of motion. She exhibits no edema.   Neurological: She is alert and oriented to person, place, and time. She has normal sensation and normal strength. No cranial nerve deficit.   Skin: Skin is warm and dry. No rash noted.   Psychiatric: Mood and affect normal.   Nursing note and vitals reviewed.      LAB RESULTS  Lab Results (last 24 hours)     Procedure Component Value Units Date/Time    CBC & Differential [[id number]] Collected:  06/30/17 0558    Specimen:  Blood Updated:  06/30/17 0621    Narrative:       The following orders were created for panel order CBC & Differential.  Procedure                               Abnormality         Status                     ---------                               -----------         ------                     CBC Auto Differential[[id number]]        Abnormal            Final result                 Please view results for these tests on the individual orders.    Basic Metabolic Panel [[id number]]  (Abnormal) Collected:  06/30/17 0558    Specimen:  Blood Updated:  06/30/17 0645     Glucose 118 (H) mg/dL      BUN 9 mg/dL      Creatinine 0.75 mg/dL      Sodium 137 mmol/L      Potassium 4.4 mmol/L      Chloride 103 mmol/L      CO2 21.6 (L) mmol/L      Calcium 8.4 (L) mg/dL      eGFR Non African Amer 77 mL/min/1.73      BUN/Creatinine Ratio 12.0     Anion Gap 12.4 mmol/L     Narrative:       GFR Normal >60  Chronic Kidney Disease <60  Kidney Failure <15    CBC Auto Differential [[id number]]  (Abnormal) Collected:  06/30/17 0558    Specimen:  Blood Updated:  06/30/17 0621     WBC 7.17 10*3/mm3      RBC 3.91 10*6/mm3      Hemoglobin 11.6 (L) g/dL      Hematocrit 36.4 %      MCV 93.1 fL      MCH 29.7 pg      MCHC  31.9 (L) g/dL      RDW 13.0 %      RDW-SD 44.0 fl      MPV 10.1 fL      Platelets 195 10*3/mm3      Neutrophil % 80.1 (H) %      Lymphocyte % 13.0 (L) %      Monocyte % 6.6 %      Eosinophil % 0.0 (L) %      Basophil % 0.0 %      Immature Grans % 0.3 %      Neutrophils, Absolute 5.75 10*3/mm3      Lymphocytes, Absolute 0.93 10*3/mm3      Monocytes, Absolute 0.47 10*3/mm3      Eosinophils, Absolute 0.00 10*3/mm3      Basophils, Absolute 0.00 10*3/mm3      Immature Grans, Absolute 0.02 10*3/mm3     Urinalysis With / Culture If Indicated [[id number]]  (Abnormal) Collected:  06/30/17 2157    Specimen:  Urine from Urine, Clean Catch Updated:  06/30/17 2211     Color, UA Orange (A)      Any Substance that causes an abnormal urine color can alter the accuracy of the chemical reactions.        Appearance, UA Cloudy (A)     pH, UA 5.5     Specific Gravity, UA 1.011     Glucose, UA Negative     Ketones, UA Negative     Bilirubin, UA Negative     Blood, UA Large (3+) (A)     Protein, UA 30 mg/dL (1+) (A)     Leuk Esterase, UA Large (3+) (A)     Nitrite, UA Positive (A)     Urobilinogen, UA 1.0 E.U./dL    Urinalysis, Microscopic Only [[id number]]  (Abnormal) Collected:  06/30/17 2157    Specimen:  Urine from Urine, Clean Catch Updated:  06/30/17 2217     RBC, UA 21-30 (A) /HPF      WBC, UA 21-30 (A) /HPF      Bacteria, UA 2+ (A) /HPF      Squamous Epithelial Cells, UA 3-6 (A) /HPF      Hyaline Casts, UA 3-6 /LPF      Methodology Manual Light Microscopy    Urine Culture [[id number]] Collected:  06/30/17 2157    Specimen:  Urine from Urine, Clean Catch Updated:  06/30/17 2209    CBC & Differential [[id number]] Collected:  06/30/17 2202    Specimen:  Blood Updated:  06/30/17 2228    Narrative:       The following orders were created for panel order CBC & Differential.  Procedure                               Abnormality         Status                     ---------                               -----------         ------                      CBC Auto Differential[[id number]]        Abnormal            Final result                 Please view results for these tests on the individual orders.    Comprehensive Metabolic Panel [[id number]]  (Abnormal) Collected:  06/30/17 2202    Specimen:  Blood Updated:  06/30/17 2242     Glucose 102 (H) mg/dL      BUN 15 mg/dL      Creatinine 0.89 mg/dL      Sodium 138 mmol/L      Potassium 3.8 mmol/L      Chloride 102 mmol/L      CO2 26.8 mmol/L      Calcium 8.7 mg/dL      Total Protein 6.7 g/dL      Albumin 3.80 g/dL      ALT (SGPT) 7 U/L      AST (SGOT) 13 U/L      Alkaline Phosphatase 118 (H) U/L      Total Bilirubin 0.3 mg/dL      eGFR Non African Amer 63 mL/min/1.73      Globulin 2.9 gm/dL      A/G Ratio 1.3 g/dL      BUN/Creatinine Ratio 16.9     Anion Gap 9.2 mmol/L     Lipase [[id number]]  (Normal) Collected:  06/30/17 2202    Specimen:  Blood Updated:  06/30/17 2233     Lipase 37 U/L     CBC Auto Differential [[id number]]  (Abnormal) Collected:  06/30/17 2202    Specimen:  Blood Updated:  06/30/17 2228     WBC 7.82 10*3/mm3      RBC 4.15 10*6/mm3      Hemoglobin 12.5 g/dL      Hematocrit 38.8 %      MCV 93.5 fL      MCH 30.1 pg      MCHC 32.2 (L) g/dL      RDW 13.1 (H) %      RDW-SD 44.7 fl      MPV 10.3 fL      Platelets 203 10*3/mm3      Neutrophil % 68.7 %      Lymphocyte % 21.9 %      Monocyte % 7.8 %      Eosinophil % 1.0 %      Basophil % 0.3 %      Immature Grans % 0.3 %      Neutrophils, Absolute 5.38 10*3/mm3      Lymphocytes, Absolute 1.71 10*3/mm3      Monocytes, Absolute 0.61 10*3/mm3      Eosinophils, Absolute 0.08 10*3/mm3      Basophils, Absolute 0.02 10*3/mm3      Immature Grans, Absolute 0.02 10*3/mm3           I ordered the above labs and reviewed the results    RADIOLOGY  XR Abdomen 2 View With Chest 1 View   Preliminary Result   1.  Possible opacity in the left lung base may be infiltrate given   patient's history. Clinical correlation is recommended.   2.  No acute findings in the abdomen  and pelvis.                       I ordered the above noted radiological studies. Interpreted by radiologist. Reviewed by me in PACS.       PROCEDURES  Procedures      PROGRESS AND CONSULTS  ED Course   2108: Ordered CBC, lipase, UA, CMP and XR Abd w/ Chest for further evaluation.     2304: Discussed patient's case with Dr. Santiago, hospitalist, including concerns regarding pt's history, lab and radiology results. He believes pt is cleared to go home and f/u with out-patient.     2308: Discussed unremarkable radiology results which confirms proper placement of stents and unremarkable lab results. Offered to contact home health to monitor pt and she agreed. I will discharge pt home. Pt understands and agrees with the plan. All questions answered.      MEDICAL DECISION MAKING  Results were reviewed/discussed with the patient and they were also made aware of online access. Pt also made aware that some labs, such as cultures, will not be resulted during ER visit and follow up with PMD is necessary.     MDM  Number of Diagnoses or Management Options     Amount and/or Complexity of Data Reviewed  Clinical lab tests: ordered and reviewed (Labs NAD)  Tests in the radiology section of CPT®: ordered and reviewed (XR Abd w/ Chest: Possible opacity in L lung base, otherwise NAD)  Decide to obtain previous medical records or to obtain history from someone other than the patient: yes  Review and summarize past medical records: yes (Diverticulitis and L renal stone were revealed on last visit 06/29/17. She had a stent placed by urology. Sent home on [place] and Levaquin. )  Independent visualization of images, tracings, or specimens: yes    Patient Progress  Patient progress: stable         DIAGNOSIS  Final diagnoses:   Diverticulitis of large intestine without perforation or abscess without bleeding   Left ureteral stone removal with stent placement.       DISPOSITION  DISCHARGE    Patient discharged in stable  condition.    Reviewed implications of results, diagnosis, meds, responsibility to follow up, warning signs and symptoms of possible worsening, potential complications and reasons to return to ER, including new or worsening sx.    Patient/Family voiced understanding of above instructions.    Discussed plan for discharge, as there is no emergent indication for admission.  Pt/family is agreeable and understands need for follow up and repeat testing.  Pt is aware that discharge does not mean that nothing is wrong but it indicates no emergency is present that requires admission and they must continue care with follow-up as given below or physician of their choice.     FOLLOW-UP  No follow-up provider specified.       Medication List      Notice     No changes were made to your prescriptions during this visit.            Latest Documented Vital Signs:  As of 11:13 PM  BP- 157/76 HR- 84 Temp- 98.8 °F (37.1 °C) (Tympanic) O2 sat- 96%    --  Documentation assistance provided by vincent Ch for Dr. Salinas.  Information recorded by the scribe was done at my direction and has been verified and validated by me.         Dorina Ch  06/30/17 9789       Rayo Salinas MD  06/30/17 0721

## 2017-07-01 NOTE — ED NOTES
Pt released this afternoon from hospital. Went home and began to feel worse. C/O of diarrhea and headache. Pt states she is urinating (on pyridium).      Brittany Chau RN  06/30/17 2031

## 2017-07-01 NOTE — NURSING NOTE
Long discussion with pt re:  Stent placement. Educational materials given to pt, verbalized understanding. Reviewed pts previous discharged medications. Ana Lilia Hdez RN

## 2017-07-02 LAB — BACTERIA SPEC AEROBE CULT: NO GROWTH

## 2017-07-05 ENCOUNTER — APPOINTMENT (OUTPATIENT)
Dept: CT IMAGING | Facility: HOSPITAL | Age: 67
End: 2017-07-05

## 2017-07-05 ENCOUNTER — HOSPITAL ENCOUNTER (EMERGENCY)
Facility: HOSPITAL | Age: 67
Discharge: HOME | End: 2017-07-05
Attending: EMERGENCY MEDICINE | Admitting: EMERGENCY MEDICINE

## 2017-07-05 ENCOUNTER — APPOINTMENT (OUTPATIENT)
Dept: GENERAL RADIOLOGY | Facility: HOSPITAL | Age: 67
End: 2017-07-05
Attending: EMERGENCY MEDICINE

## 2017-07-05 VITALS
WEIGHT: 151 LBS | SYSTOLIC BLOOD PRESSURE: 123 MMHG | HEART RATE: 57 BPM | OXYGEN SATURATION: 99 % | DIASTOLIC BLOOD PRESSURE: 74 MMHG | RESPIRATION RATE: 18 BRPM | TEMPERATURE: 97.5 F | BODY MASS INDEX: 22.36 KG/M2 | HEIGHT: 69 IN

## 2017-07-05 DIAGNOSIS — K57.32 DIVERTICULITIS OF LARGE INTESTINE WITHOUT PERFORATION OR ABSCESS WITHOUT BLEEDING: ICD-10-CM

## 2017-07-05 DIAGNOSIS — N13.4 HYDROURETER, LEFT: Primary | ICD-10-CM

## 2017-07-05 DIAGNOSIS — R11.0 NAUSEA: ICD-10-CM

## 2017-07-05 LAB
ALBUMIN SERPL-MCNC: 4.2 G/DL (ref 3.5–5.2)
ALBUMIN/GLOB SERPL: 1.5 G/DL
ALP SERPL-CCNC: 124 U/L (ref 39–117)
ALT SERPL W P-5'-P-CCNC: 12 U/L (ref 1–33)
ANION GAP SERPL CALCULATED.3IONS-SCNC: 14.2 MMOL/L
AST SERPL-CCNC: 20 U/L (ref 1–32)
BACTERIA UR QL AUTO: ABNORMAL /HPF
BASOPHILS # BLD AUTO: 0.02 10*3/MM3 (ref 0–0.2)
BASOPHILS NFR BLD AUTO: 0.3 % (ref 0–1.5)
BILIRUB SERPL-MCNC: 0.3 MG/DL (ref 0.1–1.2)
BILIRUB UR QL STRIP: NEGATIVE
BUN BLD-MCNC: 14 MG/DL (ref 8–23)
BUN/CREAT SERPL: 14.9 (ref 7–25)
CALCIUM SPEC-SCNC: 9.3 MG/DL (ref 8.6–10.5)
CHLORIDE SERPL-SCNC: 101 MMOL/L (ref 98–107)
CLARITY UR: ABNORMAL
CO2 SERPL-SCNC: 25.8 MMOL/L (ref 22–29)
COLOR UR: ABNORMAL
CREAT BLD-MCNC: 0.94 MG/DL (ref 0.57–1)
DEPRECATED RDW RBC AUTO: 42 FL (ref 37–54)
EOSINOPHIL # BLD AUTO: 0.2 10*3/MM3 (ref 0–0.7)
EOSINOPHIL NFR BLD AUTO: 2.7 % (ref 0.3–6.2)
ERYTHROCYTE [DISTWIDTH] IN BLOOD BY AUTOMATED COUNT: 12.9 % (ref 11.7–13)
GFR SERPL CREATININE-BSD FRML MDRD: 59 ML/MIN/1.73
GLOBULIN UR ELPH-MCNC: 2.8 GM/DL
GLUCOSE BLD-MCNC: 120 MG/DL (ref 65–99)
GLUCOSE UR STRIP-MCNC: NEGATIVE MG/DL
HCT VFR BLD AUTO: 39.7 % (ref 35.6–45.5)
HGB BLD-MCNC: 13.2 G/DL (ref 11.9–15.5)
HGB UR QL STRIP.AUTO: ABNORMAL
HYALINE CASTS UR QL AUTO: ABNORMAL /LPF
IMM GRANULOCYTES # BLD: 0.02 10*3/MM3 (ref 0–0.03)
IMM GRANULOCYTES NFR BLD: 0.3 % (ref 0–0.5)
KETONES UR QL STRIP: ABNORMAL
LEUKOCYTE ESTERASE UR QL STRIP.AUTO: ABNORMAL
LYMPHOCYTES # BLD AUTO: 1.21 10*3/MM3 (ref 0.9–4.8)
LYMPHOCYTES NFR BLD AUTO: 16.2 % (ref 19.6–45.3)
MCH RBC QN AUTO: 29.6 PG (ref 26.9–32)
MCHC RBC AUTO-ENTMCNC: 33.2 G/DL (ref 32.4–36.3)
MCV RBC AUTO: 89 FL (ref 80.5–98.2)
MONOCYTES # BLD AUTO: 0.58 10*3/MM3 (ref 0.2–1.2)
MONOCYTES NFR BLD AUTO: 7.8 % (ref 5–12)
NEUTROPHILS # BLD AUTO: 5.43 10*3/MM3 (ref 1.9–8.1)
NEUTROPHILS NFR BLD AUTO: 72.7 % (ref 42.7–76)
NITRITE UR QL STRIP: POSITIVE
PH UR STRIP.AUTO: 5.5 [PH] (ref 5–8)
PLATELET # BLD AUTO: 268 10*3/MM3 (ref 140–500)
PMV BLD AUTO: 10.2 FL (ref 6–12)
POTASSIUM BLD-SCNC: 3.5 MMOL/L (ref 3.5–5.2)
PROCALCITONIN SERPL-MCNC: 0.05 NG/ML (ref 0.1–0.25)
PROT SERPL-MCNC: 7 G/DL (ref 6–8.5)
PROT UR QL STRIP: NEGATIVE
RBC # BLD AUTO: 4.46 10*6/MM3 (ref 3.9–5.2)
RBC # UR: ABNORMAL /HPF
REF LAB TEST METHOD: ABNORMAL
SODIUM BLD-SCNC: 141 MMOL/L (ref 136–145)
SP GR UR STRIP: 1.01 (ref 1–1.03)
SQUAMOUS #/AREA URNS HPF: ABNORMAL /HPF
UROBILINOGEN UR QL STRIP: ABNORMAL
WBC NRBC COR # BLD: 7.46 10*3/MM3 (ref 4.5–10.7)
WBC UR QL AUTO: ABNORMAL /HPF

## 2017-07-05 PROCEDURE — 80053 COMPREHEN METABOLIC PANEL: CPT | Performed by: EMERGENCY MEDICINE

## 2017-07-05 PROCEDURE — 96361 HYDRATE IV INFUSION ADD-ON: CPT

## 2017-07-05 PROCEDURE — 96374 THER/PROPH/DIAG INJ IV PUSH: CPT

## 2017-07-05 PROCEDURE — 81001 URINALYSIS AUTO W/SCOPE: CPT | Performed by: EMERGENCY MEDICINE

## 2017-07-05 PROCEDURE — 85025 COMPLETE CBC W/AUTO DIFF WBC: CPT | Performed by: EMERGENCY MEDICINE

## 2017-07-05 PROCEDURE — 84145 PROCALCITONIN (PCT): CPT | Performed by: EMERGENCY MEDICINE

## 2017-07-05 PROCEDURE — 87086 URINE CULTURE/COLONY COUNT: CPT | Performed by: EMERGENCY MEDICINE

## 2017-07-05 PROCEDURE — 0 IOPAMIDOL 61 % SOLUTION: Performed by: EMERGENCY MEDICINE

## 2017-07-05 PROCEDURE — 99284 EMERGENCY DEPT VISIT MOD MDM: CPT

## 2017-07-05 PROCEDURE — 25010000002 ONDANSETRON PER 1 MG: Performed by: EMERGENCY MEDICINE

## 2017-07-05 PROCEDURE — 71020 HC CHEST PA AND LATERAL: CPT

## 2017-07-05 PROCEDURE — 74177 CT ABD & PELVIS W/CONTRAST: CPT

## 2017-07-05 RX ORDER — TRAMADOL HYDROCHLORIDE 50 MG/1
50 TABLET ORAL EVERY 6 HOURS PRN
Qty: 15 TABLET | Refills: 0 | Status: SHIPPED | OUTPATIENT
Start: 2017-07-05 | End: 2017-07-15

## 2017-07-05 RX ORDER — TAMSULOSIN HYDROCHLORIDE 0.4 MG/1
1 CAPSULE ORAL NIGHTLY
COMMUNITY
End: 2018-04-05 | Stop reason: ALTCHOICE

## 2017-07-05 RX ORDER — ONDANSETRON 2 MG/ML
4 INJECTION INTRAMUSCULAR; INTRAVENOUS ONCE
Status: COMPLETED | OUTPATIENT
Start: 2017-07-05 | End: 2017-07-05

## 2017-07-05 RX ADMIN — IOPAMIDOL 85 ML: 612 INJECTION, SOLUTION INTRAVENOUS at 10:42

## 2017-07-05 RX ADMIN — ONDANSETRON 4 MG: 2 INJECTION INTRAMUSCULAR; INTRAVENOUS at 09:19

## 2017-07-05 RX ADMIN — SODIUM CHLORIDE 500 ML: 9 INJECTION, SOLUTION INTRAVENOUS at 09:19

## 2017-07-05 NOTE — ED TRIAGE NOTES
Seen here last week for kidney stone, also diagnosed with diverticulitis, is on antibiotics, had basket retrieval for kidney stone also, states still having severe nausea

## 2017-07-05 NOTE — ED PROVIDER NOTES
EMERGENCY DEPARTMENT ENCOUNTER    CHIEF COMPLAINT  Chief Complaint: abd pain  History given by: pt  History limited by: none  Room Number: 32/32  PMD: Augie Mishra MD      HPI:  Pt is a 66 y.o. female who presents on advisement of her GI complaining of intermittent LLQ abd pain for the past 2 days. Pt also c/o nausea and states she hasn't eaten well recently, decreased urinary volume, and chills. Pt denies cough, SOA, vomiting, diarrhea. Pt states that her stent was removed 2 days ago. Pt is currently taking levaquin and flagyl.     Duration:  2 days  Onset: gradual   Timing: intermittent   Location: abd  Radiation: none stated   Quality: pain  Intensity/Severity: moderate   Progression: currently reduced   Associated Symptoms: nausea, lowered PO intake, decreased urinary volume, chills  Aggravating Factors: none stated   Alleviating Factors: none stated   Previous Episodes: none stated   Treatment before arrival: levaquin and flagyl    PAST MEDICAL HISTORY  Active Ambulatory Problems     Diagnosis Date Noted   • Diverticulosis of large intestine without hemorrhage 06/29/2017   • Diverticulitis large intestine 06/29/2017   • Kidney stone on left side 06/29/2017   • Hydronephrosis, left 06/29/2017   • Diverticulitis 06/29/2017     Resolved Ambulatory Problems     Diagnosis Date Noted   • No Resolved Ambulatory Problems     Past Medical History:   Diagnosis Date   • Depression    • Diverticulitis    • Environmental allergies    • Hypertension    • Kidney stone        PAST SURGICAL HISTORY  Past Surgical History:   Procedure Laterality Date   • CYSTOSCOPY BLADDER STONE LITHOTRIPSY     • CYSTOSCOPY W/ URETERAL STENT PLACEMENT Left 6/29/2017    Procedure: CYSTOSCOPY LEFT STENT INSERTION & STONE REMOVAL;  Surgeon: Raheem Franklin MD;  Location: [facility];  Service:        FAMILY HISTORY  History reviewed. No pertinent family history.    SOCIAL HISTORY  Social History     Social History   • Marital status:       Spouse name: N/A   • Number of children: N/A   • Years of education: N/A     Occupational History   • Not on file.     Social History Main Topics   • Smoking status: Never Smoker   • Smokeless tobacco: Not on file   • Alcohol use No   • Drug use: Defer   • Sexual activity: Not on file     Other Topics Concern   • Not on file     Social History Narrative   • No narrative on file       ALLERGIES  Statins and Sulfa antibiotics    REVIEW OF SYSTEMS  Review of Systems   Constitutional: Positive for appetite change (lowered PO intake) and chills. Negative for fever.   HENT: Negative for sore throat.    Eyes: Negative.    Respiratory: Negative for cough and shortness of breath.    Cardiovascular: Negative for chest pain.   Gastrointestinal: Positive for abdominal pain (llq) and nausea. Negative for diarrhea and vomiting.   Genitourinary: Negative for dysuria.   Musculoskeletal: Negative for neck pain.   Skin: Negative for rash.   Allergic/Immunologic: Negative.    Neurological: Negative for weakness, numbness and headaches.   Hematological: Negative.    Psychiatric/Behavioral: Negative.    All other systems reviewed and are negative.      PHYSICAL EXAM  ED Triage Vitals   Temp Heart Rate Resp BP SpO2   07/05/17 0822 07/05/17 0822 07/05/17 0822 07/05/17 0831 07/05/17 0822   97.2 °F (36.2 °C) 65 16 164/73 95 %      Temp src Heart Rate Source Patient Position BP Location FiO2 (%)   07/05/17 0822 07/05/17 0822 07/05/17 0831 -- --   Tympanic Monitor Sitting         Physical Exam   Constitutional: She is oriented to person, place, and time and well-developed, well-nourished, and in no distress. No distress.   HENT:   Head: Normocephalic and atraumatic.   Eyes: EOM are normal. Pupils are equal, round, and reactive to light.   Neck: Normal range of motion. Neck supple.   Cardiovascular: Normal rate, regular rhythm and normal heart sounds.    Pulmonary/Chest: Effort normal and breath sounds normal. No respiratory distress.    Abdominal: Soft. There is tenderness (non-localized). There is no rebound and no guarding.   Musculoskeletal: Normal range of motion. She exhibits no edema.   Neurological: She is alert and oriented to person, place, and time. She has normal sensation and normal strength.   Skin: Skin is warm and dry. No rash noted.   Psychiatric: Mood and affect normal.   Nursing note and vitals reviewed.      LAB RESULTS  Lab Results (last 24 hours)     Procedure Component Value Units Date/Time    CBC & Differential [[id number]] Collected:  07/05/17 0917    Specimen:  Blood Updated:  07/05/17 0946    Narrative:       The following orders were created for panel order CBC & Differential.  Procedure                               Abnormality         Status                     ---------                               -----------         ------                     CBC Auto Differential[[id number]]        Abnormal            Final result                 Please view results for these tests on the individual orders.    Comprehensive Metabolic Panel [[id number]]  (Abnormal) Collected:  07/05/17 0917    Specimen:  Blood Updated:  07/05/17 1023     Glucose 120 (H) mg/dL      BUN 14 mg/dL      Creatinine 0.94 mg/dL      Sodium 141 mmol/L      Potassium 3.5 mmol/L      Chloride 101 mmol/L      CO2 25.8 mmol/L      Calcium 9.3 mg/dL      Total Protein 7.0 g/dL      Albumin 4.20 g/dL      ALT (SGPT) 12 U/L      AST (SGOT) 20 U/L      Alkaline Phosphatase 124 (H) U/L      Total Bilirubin 0.3 mg/dL      eGFR Non African Amer 59 (L) mL/min/1.73      Globulin 2.8 gm/dL      A/G Ratio 1.5 g/dL      BUN/Creatinine Ratio 14.9     Anion Gap 14.2 mmol/L     Procalcitonin [[id number]]  (Abnormal) Collected:  07/05/17 0917    Specimen:  Blood Updated:  07/05/17 1028     Procalcitonin 0.05 (L) ng/mL     Narrative:       As a Marker for Sepsis (Non-Neonates):   1. <0.5 ng/mL represents a low risk of severe sepsis and/or septic shock.  1. >2 ng/mL represents a  "high risk of severe sepsis and/or septic shock.    As a Marker for Lower Respiratory Tract Infections that require antibiotic therapy:  PCT on Admission     Antibiotic Therapy             6-12 Hrs later  > 0.5                Strongly Recommended            >0.25 - <0.5         Recommended  0.1 - 0.25           Discouraged                   Remeasure/reassess PCT  <0.1                 Strongly Discouraged          Remeasure/reassess PCT      As 28 day mortality risk marker: \"Change in Procalcitonin Result\" (> 80 % or <=80 %) if Day 0 (or Day 1) and Day 4 values are available. Refer to http://[email or web address] Thingspct-calculator.com/   Change in PCT <=80 %   A decrease of PCT levels below or equal to 80 % defines a positive change in PCT test result representing a higher risk for 28-day all-cause mortality of patients diagnosed with severe sepsis or septic shock.  Change in PCT > 80 %   A decrease of PCT levels of more than 80 % defines a negative change in PCT result representing a lower risk for 28-day all-cause mortality of patients diagnosed with severe sepsis or septic shock.                Urinalysis With / Culture If Indicated [[id number]]  (Abnormal) Collected:  07/05/17 0917    Specimen:  Urine from Urine, Clean Catch Updated:  07/05/17 0943     Color, UA Orange (A)      Any Substance that causes an abnormal urine color can alter the accuracy of the chemical reactions.        Appearance, UA Cloudy (A)     pH, UA 5.5     Specific Gravity, UA 1.015     Glucose, UA Negative     Ketones, UA Trace (A)     Bilirubin, UA Negative     Blood, UA Small (1+) (A)     Protein, UA Negative     Leuk Esterase, UA Moderate (2+) (A)     Nitrite, UA Positive (A)     Urobilinogen, UA 1.0 E.U./dL    CBC Auto Differential [[id number]]  (Abnormal) Collected:  07/05/17 0917    Specimen:  Blood Updated:  07/05/17 0946     WBC 7.46 10*3/mm3      RBC 4.46 10*6/mm3      Hemoglobin 13.2 g/dL      Hematocrit 39.7 %      MCV 89.0 fL      MCH 29.6 pg  "     MCHC 33.2 g/dL      RDW 12.9 %      RDW-SD 42.0 fl      MPV 10.2 fL      Platelets 268 10*3/mm3      Neutrophil % 72.7 %      Lymphocyte % 16.2 (L) %      Monocyte % 7.8 %      Eosinophil % 2.7 %      Basophil % 0.3 %      Immature Grans % 0.3 %      Neutrophils, Absolute 5.43 10*3/mm3      Lymphocytes, Absolute 1.21 10*3/mm3      Monocytes, Absolute 0.58 10*3/mm3      Eosinophils, Absolute 0.20 10*3/mm3      Basophils, Absolute 0.02 10*3/mm3      Immature Grans, Absolute 0.02 10*3/mm3     Urinalysis, Microscopic Only [[id number]]  (Abnormal) Collected:  07/05/17 0917    Specimen:  Urine from Urine, Clean Catch Updated:  07/05/17 0953     RBC, UA 31-50 (A) /HPF      WBC, UA 21-30 (A) /HPF      Bacteria, UA Trace (A) /HPF      Squamous Epithelial Cells, UA 0-2 /HPF      Hyaline Casts, UA 0-2 /LPF      Methodology Manual Light Microscopy    Urine Culture [[id number]] Collected:  07/05/17 0917    Specimen:  Urine from Urine, Clean Catch Updated:  07/05/17 0935          I ordered the above labs and reviewed the results    RADIOLOGY  CT Abdomen Pelvis With Contrast   Final Result   1. Soft tissue density seen within the most distal left ureter that may   represent intraluminal clot and/or residual concretions. This is causing   mild to moderate left hydroureteronephrosis. Mild urothelial thickening   throughout the course of the left ureter is likely reactive. Urology   consult is recommended.   2. Colonic diverticulosis.       These findings were discussed with Dr. Rayo Salinas telephone at 11:00   AM and 07/05/2017       This report was finalized on 7/5/2017 11:14 AM by Dr. Simon Briceno MD.          XR Chest 2 View   Final Result   The lungs are well-expanded and appear free of acute infiltrates. There  are no pleural effusions. The heart is normal in size. Stigmata of  previous coronary artery bypass procedure are noted.        I ordered the above noted radiological studies. Interpreted by radiologist.  Discussed with radiologist (Dr. Briceno). Reviewed by me in PACS.       PROCEDURES  Procedures      PROGRESS AND CONSULTS  ED Course   0906  Ordered labs, CXR, and CT abd/pel for further evaluation, IV fluids for hydration, and zofran for nausea.   0908  Ordered bladder scan for further evaluation.  0926  As per the RN, the pt has 172cc of urine after urinating.    1058  Received a call from Dr. Rodriguez and discussed pt's case. Dr. Rodriguez described the pt's CT abd/pel with findings of a soft tissue density in the L distal ureter that is causing hydroureter.   1100  Ordered consult to urology  1112  Received a call from Dr. Santiago and discussed pt's case. Dr. Santiago stated that the pt's CT changes are due to stent removal, and should clear up, and to culture the pt's urine and continue her antibiotics pending the urine culture.  1118  Rechecked pt, who is resting comfortably. Discussed the pt's CT findings of no diverticulitis and a soft tissue swelling in her ureter. Plan to d/c the pt with pain and anti-nausea meds. Pt understands and agrees with the plan, and all questions were answered.     MEDICAL DECISION MAKING  Results were reviewed/discussed with the patient and they were also made aware of online access. Pt also made aware that some labs, such as cultures, will not be resulted during ER visit and follow up with PMD is necessary.     MDM  Number of Diagnoses or Management Options     Amount and/or Complexity of Data Reviewed  Clinical lab tests: ordered and reviewed (RBC U 31-50, WBC U 21-30, Bacteria U: trace, GFR 59)  Tests in the radiology section of CPT®: ordered and reviewed (CT abd/pel: soft tissue density in the L distal ureter that is causing hydroureter. CXR: negative)  Discussion of test results with the performing providers: yes (Dr. Rodriguez, Dr. Santiago)  Decide to obtain previous medical records or to obtain history from someone other than the patient: yes (Pt hospitalized on June 29 for  diverticulitis and L kidney stone. Pt had a stent placed that same day, and was sent home. )  Review and summarize past medical records: yes  Discuss the patient with other providers: yes    Patient Progress  Patient progress: stable         DIAGNOSIS  Final diagnoses:   Hydroureter, left from recent Stent and Stone removal   Nausea   Diverticulitis of large intestine without perforation or abscess without bleeding now resolved       DISPOSITION  DISCHARGE    Patient discharged in stable condition.    Reviewed implications of results, diagnosis, meds, responsibility to follow up, warning signs and symptoms of possible worsening, potential complications and reasons to return to ER.    Patient/Family voiced understanding of above instructions.    Discussed plan for discharge, as there is no emergent indication for admission.  Pt/family is agreeable and understands need for follow up and repeat testing.  Pt is aware that discharge does not mean that nothing is wrong but it indicates no emergency is present that requires admission and they must continue care with follow-up as given below or physician of their choice.     FOLLOW-UP  Augie Mishra MD  [address] [id number] [phone]    Schedule an appointment as soon as possible for a visit  If symptoms worsen    Raheem Franklin MD  [address] [facility] [id number] [phone]    Schedule an appointment as soon as possible for a visit           Medication List      Stop          metroNIDAZOLE 500 MG tablet   Commonly known as:  FLAGYL       ondansetron 4 MG tablet   Commonly known as:  ZOFRAN               Latest Documented Vital Signs:  As of 11:24 AM  BP- 133/68 HR- 59 Temp- 97.2 °F (36.2 °C) (Tympanic) O2 sat- 96%    --  Documentation assistance provided by vincent Coreas for Dr. Hernandez.  Information recorded by the vincent was done at my direction and has been verified and validated by me.     Taj Coreas  07/05/17  1124       Rayo Salinas MD  07/05/17 8624

## 2017-07-05 NOTE — ED NOTES
Pt reports dx with diverticulitis and kidney stones Thursday, had stent placed on left ureter. Pt reports pain and nausea still present, when called GI dr was told to come to ed for further eval. Pt reports pain in llq and left flank. Pt states last bm was 6/30/2017.     Avani Lee RN  07/05/17 0829

## 2017-07-07 LAB — BACTERIA SPEC AEROBE CULT: NO GROWTH

## 2017-07-13 LAB
CA PHOS CRY STONE QL IR: 7 %
COLOR STONE: NORMAL
COM CRY STONE QL IR: 93 %
COMPN STONE: NORMAL
Lab: NORMAL
Lab: NORMAL
NIDUS STONE QL: NORMAL
PATH REPORT.COMMENTS IMP SPEC: NORMAL
SIZE STONE: NORMAL MM
WT STONE: 33.9 MG

## 2018-04-05 ENCOUNTER — OFFICE VISIT (OUTPATIENT)
Dept: CARDIOLOGY | Facility: CLINIC | Age: 68
End: 2018-04-05

## 2018-04-05 VITALS
BODY MASS INDEX: 23.48 KG/M2 | DIASTOLIC BLOOD PRESSURE: 83 MMHG | SYSTOLIC BLOOD PRESSURE: 151 MMHG | HEART RATE: 52 BPM | WEIGHT: 159 LBS

## 2018-04-05 DIAGNOSIS — R94.31 ABNORMAL EKG: ICD-10-CM

## 2018-04-05 DIAGNOSIS — I10 ESSENTIAL HYPERTENSION: Primary | ICD-10-CM

## 2018-04-05 PROCEDURE — 99204 OFFICE O/P NEW MOD 45 MIN: CPT | Performed by: INTERNAL MEDICINE

## 2018-04-05 PROCEDURE — 93000 ELECTROCARDIOGRAM COMPLETE: CPT | Performed by: INTERNAL MEDICINE

## 2018-04-05 NOTE — PROGRESS NOTES
Subjective:        CC  ASD REPAIR IN 97  STRONG F/H  FEELING WK     Sara Gary is a 67 y.o. female who  Here for the establishment of care as well as cardiac evaluation as the patient has been feeling weak mild-to-moderate in intensity intermittent gradual in onset and gradually worsening over the several months worse with exertion and better with rest    Patient also has a very strong family history for atherosclerosis and patient had ASD repair 1997    Past Medical History:   Diagnosis Date   • Depression    • Diverticulitis    • Environmental allergies    • Hypertension    • Kidney stone     reports that she has never smoked. She does not have any smokeless tobacco history on file. Drug use questions deferred to the physician. She reports that she does not drink alcohol.History reviewed. No pertinent family history.     Review of Systems  Constitutional: No wt loss, fever   Gastrointestinal: No nausea , abdominal pain  Behavioral/Psych: No insomnia or anxiety   Cardiovascular ----positive for Weak. All other systems reviewed and are negative    Objective:       Physical Exam             Physical Exam  /83   Pulse 52   Wt 72.1 kg (159 lb)   BMI 23.48 kg/m²     General appearance: NAD, conversant   Eyes: anicteric sclerae, moist conjunctivae; no lid-lag; PERRLA   HENT: Atraumatic; oropharynx clear with moist mucous membranes and no mucosal ulcerations;  normal hard and soft palate   Neck: Trachea midline; FROM, supple, no thyromegaly or lymphadenopathy   Lungs: CTA, with normal respiratory effort and no intercostal retractions   CV: S1-S2 regular, no murmurs, no rub, no gallop   Abdomen: Soft, non-tender; no masses or HSM   Extremities: No peripheral edema or extremity lymphadenopathy  Skin: Normal temperature, turgor and texture; no rash, ulcers or subcutaneous nodules   Psych: Appropriate affect, alert and oriented to person, place and time           Cardiographics  @  ECG 12 Lead  Date/Time:  4/5/2018 12:33 PM  Performed by: KEVON MCCANN  Authorized by: KEVON MCCANN   Comparison: compared with previous ECG   Similar to previous ECG  Rhythm: sinus rhythm  Clinical impression: non-specific ECG            Echocardiogram:     Imaging  Chest x-ray: not done     Lab Review   not applicable       Current Outpatient Prescriptions:   •  cetirizine (zyrTEC) 10 MG tablet, Take 10 mg by mouth Daily., Disp: , Rfl:   •  levothyroxine (SYNTHROID) 88 MCG tablet, TAKE ONE TABLET BY MOUTH DAILY, Disp: , Rfl:   •  montelukast (SINGULAIR) 10 MG tablet, Take 10 mg by mouth every night., Disp: , Rfl:   •  sertraline (ZOLOFT) 50 MG tablet, Take 50 mg by mouth daily., Disp: , Rfl:         Assessment:      No diagnosis found.    Patient Active Problem List   Diagnosis   • Diverticulosis of large intestine without hemorrhage   • Diverticulitis large intestine   • Kidney stone on left side   • Hydronephrosis, left   • Diverticulitis         Plan:          1. Essential hypertension  Blood pressure under control  - ECG 12 Lead  - Stress Test With Myocardial Perfusion One Day  - Adult Transthoracic Echo Complete W/ Cont if Necessary Per Protocol; Future  - Comprehensive Metabolic Panel  - Lipid Panel  - Thyroid Panel With TSH  - CBC & Differential  - CBC Auto Differential    2. Abnormal EKG  Considering the patient's symptoms as well as clinical situation and  EKG findings, along with cardiac risk factors, ischemic workup is necessary to rule out ischemic cardiomyopathy, stress induced arrhythmias, and functional capacity for diagnosis as well as prognostic consideration    Considering patient's medical condition as well as the risk factors, patient will require echocardiogram for further evaluation for the LV function, four-chamber evaluation, including the pressures, valvular function and  pericardial disease and pericardial effusion    - Stress Test With Myocardial Perfusion One Day  - Adult Transthoracic Echo  Complete W/ Cont if Necessary Per Protocol; Future  - Comprehensive Metabolic Panel  - Lipid Panel  - Thyroid Panel With TSH  - CBC & Differential  - CBC Auto Differential      STRESS CARDIOLYTE, ECHO, FLP, CMP, TSH    ADD ZIAC 5/6.25 MG PO DAILY    SEE US 2-4 WKS    Pros and cons of ASA in primary and secondary prevention of CAD has been discussed.  Risks of bleeding and other possible side effects have been discussed, Diff advantages and disadvantages of 325 vs 81  Mg of ASA were discussed, at this stage it has been recommended to start ASA 81 mg /day       Counseling was given to Sara Gary for the following topics:  risk factor reductions, prognosis and risks and benefits of treatment options .       SMOKING COUNSELING:    Counseling given: Not Answered      .  EMR Dragon/Transcription disclaimer:   Much of this encounter note is an electronic transcription/translation of spoken language to printed text. The electronic translation of spoken language may permit erroneous, or at times, nonsensical words or phrases to be inadvertently transcribed; Although I have reviewed the note for such errors, some may still exist.

## 2018-04-06 RX ORDER — BISOPROLOL FUMARATE AND HYDROCHLOROTHIAZIDE 5; 6.25 MG/1; MG/1
1 TABLET ORAL DAILY
Qty: 30 TABLET | Refills: 5 | Status: SHIPPED | OUTPATIENT
Start: 2018-04-06 | End: 2018-05-03 | Stop reason: SDUPTHER

## 2018-04-09 ENCOUNTER — TELEPHONE (OUTPATIENT)
Dept: URGENT CARE | Facility: CLINIC | Age: 68
End: 2018-04-09

## 2018-04-12 ENCOUNTER — TELEPHONE (OUTPATIENT)
Dept: URGENT CARE | Facility: CLINIC | Age: 68
End: 2018-04-12

## 2018-04-12 DIAGNOSIS — N39.0 UTI (URINARY TRACT INFECTION), UNCOMPLICATED: Primary | ICD-10-CM

## 2018-04-12 RX ORDER — DOXYCYCLINE HYCLATE 100 MG/1
CAPSULE ORAL
Qty: 14 CAPSULE | Refills: 0 | Status: SHIPPED | OUTPATIENT
Start: 2018-04-12 | End: 2018-05-03

## 2018-04-12 NOTE — TELEPHONE ENCOUNTER
D/w pt.  Informed re C&S results; sx persist; tolerates doxy; does not tolerated cipro, sulfa.  P - dc amoxil; doxy 100 #14; pcp prn.

## 2018-04-13 ENCOUNTER — HOSPITAL ENCOUNTER (OUTPATIENT)
Dept: CARDIOLOGY | Facility: HOSPITAL | Age: 68
Discharge: HOME | End: 2018-04-13

## 2018-04-13 ENCOUNTER — HOSPITAL ENCOUNTER (OUTPATIENT)
Dept: CARDIOLOGY | Facility: HOSPITAL | Age: 68
Discharge: HOME | End: 2018-04-13
Attending: INTERNAL MEDICINE | Admitting: INTERNAL MEDICINE

## 2018-04-13 DIAGNOSIS — I10 ESSENTIAL HYPERTENSION: ICD-10-CM

## 2018-04-13 DIAGNOSIS — R94.31 ABNORMAL EKG: ICD-10-CM

## 2018-04-13 LAB
ALBUMIN SERPL-MCNC: 4 G/DL (ref 3.5–5.2)
ALBUMIN/GLOB SERPL: 1.5 G/DL
ALP SERPL-CCNC: 144 U/L (ref 39–117)
ALT SERPL W P-5'-P-CCNC: 15 U/L (ref 1–33)
ANION GAP SERPL CALCULATED.3IONS-SCNC: 12.5 MMOL/L
AST SERPL-CCNC: 17 U/L (ref 1–32)
BASOPHILS # BLD AUTO: 0.02 10*3/MM3 (ref 0–0.2)
BASOPHILS NFR BLD AUTO: 0.5 % (ref 0–1.5)
BILIRUB SERPL-MCNC: 0.4 MG/DL (ref 0.1–1.2)
BUN BLD-MCNC: 11 MG/DL (ref 8–23)
BUN/CREAT SERPL: 12.6 (ref 7–25)
CALCIUM SPEC-SCNC: 8.8 MG/DL (ref 8.6–10.5)
CHLORIDE SERPL-SCNC: 103 MMOL/L (ref 98–107)
CHOLEST SERPL-MCNC: 241 MG/DL (ref 0–200)
CO2 SERPL-SCNC: 26.5 MMOL/L (ref 22–29)
CREAT BLD-MCNC: 0.87 MG/DL (ref 0.57–1)
DEPRECATED RDW RBC AUTO: 45.1 FL (ref 37–54)
EOSINOPHIL # BLD AUTO: 0.23 10*3/MM3 (ref 0–0.7)
EOSINOPHIL NFR BLD AUTO: 5.2 % (ref 0.3–6.2)
ERYTHROCYTE [DISTWIDTH] IN BLOOD BY AUTOMATED COUNT: 12.8 % (ref 11.7–13)
GFR SERPL CREATININE-BSD FRML MDRD: 65 ML/MIN/1.73
GLOBULIN UR ELPH-MCNC: 2.7 GM/DL
GLUCOSE BLD-MCNC: 92 MG/DL (ref 65–99)
HCT VFR BLD AUTO: 41.7 % (ref 35.6–45.5)
HDLC SERPL-MCNC: 76 MG/DL (ref 40–60)
HGB BLD-MCNC: 12.9 G/DL (ref 11.9–15.5)
IMM GRANULOCYTES # BLD: 0 10*3/MM3 (ref 0–0.03)
IMM GRANULOCYTES NFR BLD: 0 % (ref 0–0.5)
LDLC SERPL CALC-MCNC: 147 MG/DL (ref 0–100)
LDLC/HDLC SERPL: 1.93 {RATIO}
LYMPHOCYTES # BLD AUTO: 1.43 10*3/MM3 (ref 0.9–4.8)
LYMPHOCYTES NFR BLD AUTO: 32.3 % (ref 19.6–45.3)
MCH RBC QN AUTO: 29.7 PG (ref 26.9–32)
MCHC RBC AUTO-ENTMCNC: 30.9 G/DL (ref 32.4–36.3)
MCV RBC AUTO: 95.9 FL (ref 80.5–98.2)
MONOCYTES # BLD AUTO: 0.3 10*3/MM3 (ref 0.2–1.2)
MONOCYTES NFR BLD AUTO: 6.8 % (ref 5–12)
NEUTROPHILS # BLD AUTO: 2.45 10*3/MM3 (ref 1.9–8.1)
NEUTROPHILS NFR BLD AUTO: 55.2 % (ref 42.7–76)
PLATELET # BLD AUTO: 260 10*3/MM3 (ref 140–500)
PMV BLD AUTO: 10.8 FL (ref 6–12)
POTASSIUM BLD-SCNC: 4.7 MMOL/L (ref 3.5–5.2)
PROT SERPL-MCNC: 6.7 G/DL (ref 6–8.5)
RBC # BLD AUTO: 4.35 10*6/MM3 (ref 3.9–5.2)
SODIUM BLD-SCNC: 142 MMOL/L (ref 136–145)
T-UPTAKE NFR SERPL: 1.16 TBI (ref 0.8–1.3)
T4 SERPL-MCNC: 6.2 MCG/DL (ref 4.5–11.7)
TRIGL SERPL-MCNC: 92 MG/DL (ref 0–150)
TSH SERPL DL<=0.05 MIU/L-ACNC: 2.56 MIU/ML (ref 0.27–4.2)
VLDLC SERPL-MCNC: 18.4 MG/DL (ref 5–40)
WBC NRBC COR # BLD: 4.43 10*3/MM3 (ref 4.5–10.7)

## 2018-04-13 PROCEDURE — 84479 ASSAY OF THYROID (T3 OR T4): CPT | Performed by: INTERNAL MEDICINE

## 2018-04-13 PROCEDURE — 84443 ASSAY THYROID STIM HORMONE: CPT | Performed by: INTERNAL MEDICINE

## 2018-04-13 PROCEDURE — 36415 COLL VENOUS BLD VENIPUNCTURE: CPT | Performed by: INTERNAL MEDICINE

## 2018-04-13 PROCEDURE — 85025 COMPLETE CBC W/AUTO DIFF WBC: CPT | Performed by: INTERNAL MEDICINE

## 2018-04-13 PROCEDURE — 80061 LIPID PANEL: CPT | Performed by: INTERNAL MEDICINE

## 2018-04-13 PROCEDURE — 93306 TTE W/DOPPLER COMPLETE: CPT | Performed by: INTERNAL MEDICINE

## 2018-04-13 PROCEDURE — 80053 COMPREHEN METABOLIC PANEL: CPT | Performed by: INTERNAL MEDICINE

## 2018-04-13 PROCEDURE — 93306 TTE W/DOPPLER COMPLETE: CPT

## 2018-04-13 PROCEDURE — 84436 ASSAY OF TOTAL THYROXINE: CPT | Performed by: INTERNAL MEDICINE

## 2018-04-16 LAB
AORTIC DIMENSIONLESS INDEX: 0.8 (DI)
BH CV ECHO MEAS - ACS: 2.1 CM
BH CV ECHO MEAS - AI DEC SLOPE: 202 CM/SEC^2
BH CV ECHO MEAS - AI MAX PG: 94.5 MMHG
BH CV ECHO MEAS - AI MAX VEL: 486 CM/SEC
BH CV ECHO MEAS - AI P1/2T: 704.7 MSEC
BH CV ECHO MEAS - AO MAX PG (FULL): 1.9 MMHG
BH CV ECHO MEAS - AO MAX PG: 8.6 MMHG
BH CV ECHO MEAS - AO MEAN PG (FULL): 2 MMHG
BH CV ECHO MEAS - AO MEAN PG: 5 MMHG
BH CV ECHO MEAS - AO ROOT AREA (BSA CORRECTED): 1.9
BH CV ECHO MEAS - AO ROOT AREA: 9.6 CM^2
BH CV ECHO MEAS - AO ROOT DIAM: 3.5 CM
BH CV ECHO MEAS - AO V2 MAX: 147 CM/SEC
BH CV ECHO MEAS - AO V2 MEAN: 105 CM/SEC
BH CV ECHO MEAS - AO V2 VTI: 38 CM
BH CV ECHO MEAS - ASC AORTA: 3.4 CM
BH CV ECHO MEAS - AVA(I,A): 2.4 CM^2
BH CV ECHO MEAS - AVA(I,D): 2.4 CM^2
BH CV ECHO MEAS - AVA(V,A): 2.5 CM^2
BH CV ECHO MEAS - AVA(V,D): 2.5 CM^2
BH CV ECHO MEAS - BSA(HAYCOCK): 1.9 M^2
BH CV ECHO MEAS - BSA: 1.9 M^2
BH CV ECHO MEAS - BZI_BMI: 23.2 KILOGRAMS/M^2
BH CV ECHO MEAS - BZI_METRIC_HEIGHT: 175.3 CM
BH CV ECHO MEAS - BZI_METRIC_WEIGHT: 71.2 KG
BH CV ECHO MEAS - CONTRAST EF (2CH): 71.8 ML/M^2
BH CV ECHO MEAS - CONTRAST EF 4CH: 66.3 ML/M^2
BH CV ECHO MEAS - EDV(CUBED): 157.5 ML
BH CV ECHO MEAS - EDV(MOD-SP2): 78 ML
BH CV ECHO MEAS - EDV(MOD-SP4): 92 ML
BH CV ECHO MEAS - EDV(TEICH): 141.3 ML
BH CV ECHO MEAS - EF(CUBED): 70.4 %
BH CV ECHO MEAS - EF(MOD-SP2): 71.8 %
BH CV ECHO MEAS - EF(MOD-SP4): 66.3 %
BH CV ECHO MEAS - EF(TEICH): 61.5 %
BH CV ECHO MEAS - ESV(CUBED): 46.7 ML
BH CV ECHO MEAS - ESV(MOD-SP2): 22 ML
BH CV ECHO MEAS - ESV(MOD-SP4): 31 ML
BH CV ECHO MEAS - ESV(TEICH): 54.4 ML
BH CV ECHO MEAS - FS: 33.3 %
BH CV ECHO MEAS - IVS/LVPW: 0.82
BH CV ECHO MEAS - IVSD: 0.9 CM
BH CV ECHO MEAS - LAT PEAK E' VEL: 6 CM/SEC
BH CV ECHO MEAS - LV DIASTOLIC VOL/BSA (35-75): 49.4 ML/M^2
BH CV ECHO MEAS - LV MASS(C)D: 206.7 GRAMS
BH CV ECHO MEAS - LV MASS(C)DI: 110.9 GRAMS/M^2
BH CV ECHO MEAS - LV MAX PG: 6.8 MMHG
BH CV ECHO MEAS - LV MEAN PG: 3 MMHG
BH CV ECHO MEAS - LV SYSTOLIC VOL/BSA (12-30): 16.6 ML/M^2
BH CV ECHO MEAS - LV V1 MAX: 130 CM/SEC
BH CV ECHO MEAS - LV V1 MEAN: 78.1 CM/SEC
BH CV ECHO MEAS - LV V1 VTI: 31.8 CM
BH CV ECHO MEAS - LVIDD: 5.4 CM
BH CV ECHO MEAS - LVIDS: 3.6 CM
BH CV ECHO MEAS - LVLD AP2: 7.2 CM
BH CV ECHO MEAS - LVLD AP4: 7.5 CM
BH CV ECHO MEAS - LVLS AP2: 5 CM
BH CV ECHO MEAS - LVLS AP4: 5.8 CM
BH CV ECHO MEAS - LVOT AREA (M): 2.8 CM^2
BH CV ECHO MEAS - LVOT AREA: 2.8 CM^2
BH CV ECHO MEAS - LVOT DIAM: 1.9 CM
BH CV ECHO MEAS - LVPWD: 1.1 CM
BH CV ECHO MEAS - MED PEAK E' VEL: 5 CM/SEC
BH CV ECHO MEAS - MR MAX PG: 124.1 MMHG
BH CV ECHO MEAS - MR MAX VEL: 557 CM/SEC
BH CV ECHO MEAS - MV A DUR: 1.7 SEC
BH CV ECHO MEAS - MV A MAX VEL: 73.7 CM/SEC
BH CV ECHO MEAS - MV DEC SLOPE: 220 CM/SEC^2
BH CV ECHO MEAS - MV DEC TIME: 0.26 SEC
BH CV ECHO MEAS - MV E MAX VEL: 41.2 CM/SEC
BH CV ECHO MEAS - MV E/A: 0.56
BH CV ECHO MEAS - MV MAX PG: 3.1 MMHG
BH CV ECHO MEAS - MV MEAN PG: 1 MMHG
BH CV ECHO MEAS - MV P1/2T MAX VEL: 80.4 CM/SEC
BH CV ECHO MEAS - MV P1/2T: 107 MSEC
BH CV ECHO MEAS - MV V2 MAX: 88.4 CM/SEC
BH CV ECHO MEAS - MV V2 MEAN: 54.6 CM/SEC
BH CV ECHO MEAS - MV V2 VTI: 43.4 CM
BH CV ECHO MEAS - MVA P1/2T LCG: 2.7 CM^2
BH CV ECHO MEAS - MVA(P1/2T): 2.1 CM^2
BH CV ECHO MEAS - MVA(VTI): 2.1 CM^2
BH CV ECHO MEAS - PA ACC TIME: 0.08 SEC
BH CV ECHO MEAS - PA MAX PG (FULL): 1.4 MMHG
BH CV ECHO MEAS - PA MAX PG: 2.5 MMHG
BH CV ECHO MEAS - PA PR(ACCEL): 44.4 MMHG
BH CV ECHO MEAS - PA V2 MAX: 78.7 CM/SEC
BH CV ECHO MEAS - PULM A REVS DUR: 1.5 SEC
BH CV ECHO MEAS - PULM A REVS VEL: 37.6 CM/SEC
BH CV ECHO MEAS - PULM DIAS VEL: 37.6 CM/SEC
BH CV ECHO MEAS - PULM S/D: 1.8
BH CV ECHO MEAS - PULM SYS VEL: 66.7 CM/SEC
BH CV ECHO MEAS - PVA(V,A): 3 CM^2
BH CV ECHO MEAS - PVA(V,D): 3 CM^2
BH CV ECHO MEAS - QP/QS: 0.71
BH CV ECHO MEAS - RAP SYSTOLE: 3 MMHG
BH CV ECHO MEAS - RV MAX PG: 1.1 MMHG
BH CV ECHO MEAS - RV MEAN PG: 1 MMHG
BH CV ECHO MEAS - RV V1 MAX: 52.9 CM/SEC
BH CV ECHO MEAS - RV V1 MEAN: 35.4 CM/SEC
BH CV ECHO MEAS - RV V1 VTI: 14.1 CM
BH CV ECHO MEAS - RVOT AREA: 4.5 CM^2
BH CV ECHO MEAS - RVOT DIAM: 2.4 CM
BH CV ECHO MEAS - RVSP: 23 MMHG
BH CV ECHO MEAS - SI(AO): 196.2 ML/M^2
BH CV ECHO MEAS - SI(CUBED): 59.5 ML/M^2
BH CV ECHO MEAS - SI(LVOT): 48.4 ML/M^2
BH CV ECHO MEAS - SI(MOD-SP2): 30 ML/M^2
BH CV ECHO MEAS - SI(MOD-SP4): 32.7 ML/M^2
BH CV ECHO MEAS - SI(TEICH): 46.6 ML/M^2
BH CV ECHO MEAS - SV(AO): 365.6 ML
BH CV ECHO MEAS - SV(CUBED): 110.8 ML
BH CV ECHO MEAS - SV(LVOT): 90.2 ML
BH CV ECHO MEAS - SV(MOD-SP2): 56 ML
BH CV ECHO MEAS - SV(MOD-SP4): 61 ML
BH CV ECHO MEAS - SV(RVOT): 63.8 ML
BH CV ECHO MEAS - SV(TEICH): 86.9 ML
BH CV ECHO MEAS - TAPSE (>1.6): 1.8 CM2
BH CV ECHO MEAS - TR MAX VEL: 223 CM/SEC
BH CV VAS BP RIGHT ARM: NORMAL MMHG
BH CV XLRA - RV BASE: 3.3 CM
BH CV XLRA - TDI S': 10 CM/SEC
E/E' RATIO: 7.5
LEFT ATRIUM VOLUME INDEX: 32.8 ML/M2
MAXIMAL PREDICTED HEART RATE: 151 BPM
STRESS TARGET HR: 128 BPM

## 2018-04-26 ENCOUNTER — HOSPITAL ENCOUNTER (OUTPATIENT)
Dept: CARDIOLOGY | Facility: HOSPITAL | Age: 68
Discharge: HOME | End: 2018-04-26
Attending: INTERNAL MEDICINE

## 2018-04-26 VITALS
DIASTOLIC BLOOD PRESSURE: 71 MMHG | HEIGHT: 69 IN | WEIGHT: 157 LBS | HEART RATE: 45 BPM | RESPIRATION RATE: 18 BRPM | OXYGEN SATURATION: 96 % | BODY MASS INDEX: 23.25 KG/M2 | SYSTOLIC BLOOD PRESSURE: 124 MMHG

## 2018-04-26 PROCEDURE — 93018 CV STRESS TEST I&R ONLY: CPT | Performed by: INTERNAL MEDICINE

## 2018-04-26 PROCEDURE — 25010000002 REGADENOSON 0.4 MG/5ML SOLUTION: Performed by: INTERNAL MEDICINE

## 2018-04-26 PROCEDURE — 93017 CV STRESS TEST TRACING ONLY: CPT

## 2018-04-26 PROCEDURE — 78452 HT MUSCLE IMAGE SPECT MULT: CPT | Performed by: INTERNAL MEDICINE

## 2018-04-26 PROCEDURE — 93016 CV STRESS TEST SUPVJ ONLY: CPT | Performed by: INTERNAL MEDICINE

## 2018-04-26 PROCEDURE — 78452 HT MUSCLE IMAGE SPECT MULT: CPT

## 2018-04-26 PROCEDURE — A9500 TC99M SESTAMIBI: HCPCS | Performed by: INTERNAL MEDICINE

## 2018-04-26 PROCEDURE — 0 TECHNETIUM SESTAMIBI: Performed by: INTERNAL MEDICINE

## 2018-04-26 RX ADMIN — TECHNETIUM TC 99M SESTAMIBI 1 DOSE: 1 INJECTION INTRAVENOUS at 10:19

## 2018-04-26 RX ADMIN — TECHNETIUM TC 99M SESTAMIBI 1 DOSE: 1 INJECTION INTRAVENOUS at 07:42

## 2018-04-26 RX ADMIN — REGADENOSON 0.4 MG: 0.08 INJECTION, SOLUTION INTRAVENOUS at 10:19

## 2018-04-30 LAB
BH CV STRESS BP STAGE 1: NORMAL
BH CV STRESS DURATION MIN STAGE 1: 3
BH CV STRESS DURATION MIN STAGE 2: 1
BH CV STRESS DURATION SEC STAGE 1: 0
BH CV STRESS DURATION SEC STAGE 2: 16
BH CV STRESS GRADE STAGE 1: 10
BH CV STRESS GRADE STAGE 2: 12
BH CV STRESS HR STAGE 1: 76
BH CV STRESS HR STAGE 2: 85
BH CV STRESS METS STAGE 1: 4.6
BH CV STRESS METS STAGE 2: 6.1
BH CV STRESS PROTOCOL 1: NORMAL
BH CV STRESS PROTOCOL 2 BP STAGE 1: NORMAL
BH CV STRESS PROTOCOL 2 COMMENTS STAGE 1: NORMAL
BH CV STRESS PROTOCOL 2 DOSE REGADENOSON STAGE 1: 0.4
BH CV STRESS PROTOCOL 2 DURATION MIN STAGE 1: 1
BH CV STRESS PROTOCOL 2 DURATION SEC STAGE 1: 39
BH CV STRESS PROTOCOL 2 HR STAGE 1: 83
BH CV STRESS PROTOCOL 2 STAGE 1: 1
BH CV STRESS PROTOCOL 2: NORMAL
BH CV STRESS RECOVERY BP: NORMAL MMHG
BH CV STRESS RECOVERY HR: 60 BPM
BH CV STRESS RECOVERY O2: 96 %
BH CV STRESS SPEED STAGE 1: 1.7
BH CV STRESS SPEED STAGE 2: 2.5
BH CV STRESS STAGE 1: 1
BH CV STRESS STAGE 2: 2
LV EF NUC BP: 63 %
MAXIMAL PREDICTED HEART RATE: 151 BPM
PERCENT MAX PREDICTED HR: 56.29 %
STRESS BASELINE BP: NORMAL MMHG
STRESS BASELINE HR: 45 BPM
STRESS O2 SAT REST: 96 %
STRESS PERCENT HR: 66 %
STRESS POST ESTIMATED WORKLOAD: 6.1 METS
STRESS POST EXERCISE DUR MIN: 5 MIN
STRESS POST EXERCISE DUR SEC: 55 SEC
STRESS POST PEAK BP: NORMAL MMHG
STRESS POST PEAK HR: 85 BPM
STRESS TARGET HR: 128 BPM

## 2018-05-03 ENCOUNTER — OFFICE VISIT (OUTPATIENT)
Dept: CARDIOLOGY | Facility: CLINIC | Age: 68
End: 2018-05-03

## 2018-05-03 VITALS
SYSTOLIC BLOOD PRESSURE: 137 MMHG | WEIGHT: 158 LBS | HEART RATE: 45 BPM | DIASTOLIC BLOOD PRESSURE: 79 MMHG | BODY MASS INDEX: 23.4 KG/M2 | HEIGHT: 69 IN

## 2018-05-03 DIAGNOSIS — Q21.10 RESIDUAL ASD (ATRIAL SEPTAL DEFECT) FOLLOWING REPAIR: Primary | ICD-10-CM

## 2018-05-03 PROCEDURE — 99212 OFFICE O/P EST SF 10 MIN: CPT | Performed by: INTERNAL MEDICINE

## 2018-05-03 RX ORDER — BISOPROLOL FUMARATE AND HYDROCHLOROTHIAZIDE 5; 6.25 MG/1; MG/1
1 TABLET ORAL DAILY
Qty: 90 TABLET | Refills: 3 | Status: SHIPPED | OUTPATIENT
Start: 2018-05-03 | End: 2018-07-19

## 2018-05-03 NOTE — PROGRESS NOTES
" Subjective:       Sara Gary is a 67 y.o. female who here for follow up    CC  Follow-up after the stress test; blood workup  HPI  67-year-old female with a history of the ASD repair here for the follow-up denies any chest pains or tightness in chest no heaviness or the pressure sensation     Problem List Items Addressed This Visit        Cardiovascular and Mediastinum    Residual ASD (atrial septal defect) following repair - Primary      Other Visit Diagnoses    None.       .    The following portions of the patient's history were reviewed and updated as appropriate: allergies, current medications, past family history, past medical history, past social history, past surgical history and problem list.    Past Medical History:   Diagnosis Date   • Depression    • Diverticulitis    • Environmental allergies    • Hypertension    • Kidney stone     hx of 4 stones    reports that she has never smoked. She has never used smokeless tobacco. She reports that she does not drink alcohol or use drugs.  Family History   Problem Relation Age of Onset   • Heart disease Mother    • Hypertension Mother    • Heart disease Father    • Hypertension Father        Review of Systems  Constitutional: No wt loss, fever, fatigue  Gastrointestinal: No nausea, abdominal pain  Behavioral/Psych: No insomnia or anxiety   Cardiovascular No chest pains or tightness in chest  Objective:       Physical Exam             Physical Exam  /79   Pulse (!) 45   Ht 175.3 cm (69\")   Wt 71.7 kg (158 lb)   BMI 23.33 kg/m²     General appearance: NAD, conversant   Eyes: anicteric sclerae, moist conjunctivae; no lid-lag; PERRLA   HENT: Atraumatic; oropharynx clear with moist mucous membranes and no mucosal ulcerations;  normal hard and soft palate   Neck: Trachea midline; FROM, supple, no thyromegaly or lymphadenopathy   Lungs: CTA, with normal respiratory effort and no intercostal retractions   CV: S1-S2 regular, no murmurs, no rub, no gallop "   Abdomen: Soft, non-tender; no masses or HSM   Extremities: No peripheral edema or extremity lymphadenopathy  Skin: Normal temperature, turgor and texture; no rash, ulcers or subcutaneous nodules   Psych: Appropriate affect, alert and oriented to person, place and time           Cardiographics  @Procedures    Echocardiogram:        Current Outpatient Prescriptions:   •  bisoprolol-hydrochlorothiazide (ZIAC) 5-6.25 MG per tablet, Take 1 tablet by mouth Daily., Disp: 30 tablet, Rfl: 5  •  cetirizine (zyrTEC) 10 MG tablet, Take 10 mg by mouth Daily., Disp: , Rfl:   •  levothyroxine (SYNTHROID) 88 MCG tablet, TAKE ONE TABLET BY MOUTH DAILY, Disp: , Rfl:   •  montelukast (SINGULAIR) 10 MG tablet, Take 10 mg by mouth every night., Disp: , Rfl:   •  sertraline (ZOLOFT) 50 MG tablet, Take 50 mg by mouth daily., Disp: , Rfl:   •  phenazopyridine (PYRIDIUM) 200 MG tablet, One tablet 3 x's a day as needed for symptoms, Disp: 10 tablet, Rfl: 0   Assessment:        Patient Active Problem List   Diagnosis   • Diverticulosis of large intestine without hemorrhage   • Diverticulitis large intestine   • Kidney stone on left side   • Hydronephrosis, left   • Diverticulitis     Interpretation Summary        · Findings consistent with an equivocal ECG stress test.  · Left ventricular ejection fraction is normal (Calculated EF = 63%).  · Myocardial perfusion imaging indicates a normal myocardial perfusion study with no evidence of ischemia.  · Impressions are consistent with a low risk study.  · Very small lateral wall defect can not be ruled out     Interpretation Summary     · Right ventricular cavity is mildly dilated.  · Left atrial cavity size is mild-to-moderately dilated.  · Moderate aortic valve regurgitation is present.  · Mild-to-moderate mitral valve regurgitation is present  · Mild to moderate tricuspid valve regurgitation is present.  · Calculated EF = 66.3%.  · There is no evidence of pericardial effusion.        Total  Cholesterol 0 - 200 mg/dL 241     Triglycerides 0 - 150 mg/dL 92    HDL Cholesterol 40 - 60 mg/dL 76     LDL Cholesterol  0 - 100 mg/dL 147     VLDL Cholesterol 5 - 40 mg/dL 18.4    LDL/HDL Ratio  1.93      TSH 0.270 - 4.200 mIU/mL 2.560    T Uptake 0.80 - 1.30 TBI 1.16    T4, Total 4.50 - 11.70 mcg/dL 6.20      Glucose 65 - 99 mg/dL 92    BUN 8 - 23 mg/dL 11    Creatinine 0.57 - 1.00 mg/dL 0.87    Sodium 136 - 145 mmol/L 142    Potassium 3.5 - 5.2 mmol/L 4.7    Chloride 98 - 107 mmol/L 103    CO2 22.0 - 29.0 mmol/L 26.5    Calcium 8.6 - 10.5 mg/dL 8.8    Total Protein 6.0 - 8.5 g/dL 6.7    Albumin 3.50 - 5.20 g/dL 4.00    ALT (SGPT) 1 - 33 U/L 15    AST (SGOT) 1 - 32 U/L 17    Alkaline Phosphatase 39 - 117 U/L 144     Total Bilirubin 0.1 - 1.2 mg/dL 0.4    eGFR Non African Amer >60 mL/min/1.73 65    Globulin gm/dL 2.7    A/G Ratio g/dL 1.5    BUN/Creatinine Ratio 7.0 - 25.0 12.6    Anion Gap mmol/L 12.5                Plan:            ICD-10-CM ICD-9-CM   1. Residual ASD (atrial septal defect) following repair Q21.1 745.5     1. Residual ASD (atrial septal defect) following repair  Workup has been negative so far       Alk phophate high , recheck in 1 month with pcp, has been high in [facility] for 1 yr    See in 6 month  COUNSELING:    Sara Gilliland was given to patient for the following topics: diagnostic results, risk factor reductions, impressions, risks and benefits of treatment options and importance of treatment compliance .       SMOKING COUNSELING:    Counseling given: Not Answered      EMR Dragon/Transcription disclaimer:   Much of this encounter note is an electronic transcription/translation of spoken language to printed text. The electronic translation of spoken language may permit erroneous, or at times, nonsensical words or phrases to be inadvertently transcribed; Although I have reviewed the note for such errors, some may still exist.

## 2018-05-11 PROBLEM — Q21.10 RESIDUAL ASD (ATRIAL SEPTAL DEFECT) FOLLOWING REPAIR: Status: ACTIVE | Noted: 2018-05-11

## 2018-05-23 ENCOUNTER — TELEPHONE (OUTPATIENT)
Dept: CARDIOLOGY | Facility: CLINIC | Age: 68
End: 2018-05-23

## 2018-05-23 NOTE — TELEPHONE ENCOUNTER
Per Dr Mehreen haro to reduce Ziac 5/6.25 mg to half dose daily    Called l/m with instructions and call back info

## 2018-05-30 ENCOUNTER — OFFICE (OUTPATIENT)
Dept: URBAN - METROPOLITAN AREA CLINIC 75 | Facility: CLINIC | Age: 68
End: 2018-05-30

## 2018-05-30 VITALS
SYSTOLIC BLOOD PRESSURE: 116 MMHG | HEART RATE: 82 BPM | DIASTOLIC BLOOD PRESSURE: 74 MMHG | HEIGHT: 69 IN | WEIGHT: 161 LBS

## 2018-05-30 DIAGNOSIS — Z86.010 PERSONAL HISTORY OF COLONIC POLYPS: ICD-10-CM

## 2018-05-30 DIAGNOSIS — K21.9 GASTRO-ESOPHAGEAL REFLUX DISEASE WITHOUT ESOPHAGITIS: ICD-10-CM

## 2018-05-30 DIAGNOSIS — K30 FUNCTIONAL DYSPEPSIA: ICD-10-CM

## 2018-05-30 PROCEDURE — 99202 OFFICE O/P NEW SF 15 MIN: CPT

## 2018-05-30 RX ORDER — RANITIDINE 300 MG/1
300 TABLET ORAL
Qty: 30 | Refills: 3 | Status: ACTIVE
Start: 2018-05-30

## 2018-06-27 ENCOUNTER — TELEPHONE (OUTPATIENT)
Dept: CARDIOLOGY | Facility: CLINIC | Age: 68
End: 2018-06-27

## 2018-06-27 ENCOUNTER — OFFICE (OUTPATIENT)
Dept: URBAN - METROPOLITAN AREA CLINIC 75 | Facility: CLINIC | Age: 68
End: 2018-06-27
Payer: MEDICAID

## 2018-06-27 VITALS
HEART RATE: 44 BPM | HEIGHT: 69 IN | WEIGHT: 161 LBS | DIASTOLIC BLOOD PRESSURE: 76 MMHG | SYSTOLIC BLOOD PRESSURE: 142 MMHG

## 2018-06-27 DIAGNOSIS — K21.9 GASTRO-ESOPHAGEAL REFLUX DISEASE WITHOUT ESOPHAGITIS: ICD-10-CM

## 2018-06-27 DIAGNOSIS — R00.1 BRADYCARDIA: Primary | ICD-10-CM

## 2018-06-27 DIAGNOSIS — K30 FUNCTIONAL DYSPEPSIA: ICD-10-CM

## 2018-06-27 DIAGNOSIS — Z86.010 PERSONAL HISTORY OF COLONIC POLYPS: ICD-10-CM

## 2018-06-27 DIAGNOSIS — R53.83 OTHER FATIGUE: ICD-10-CM

## 2018-06-27 PROCEDURE — 99214 OFFICE O/P EST MOD 30 MIN: CPT | Performed by: NURSE PRACTITIONER

## 2018-06-27 NOTE — TELEPHONE ENCOUNTER
Pt stopped by office states her HR was 44 at [facility] office and wanted to inform Dr BETH.  I informed pt Dr BETH is out of office until Monday and to check HR and BP when she gets home and we would discuss with Libby MARIEE

## 2018-06-30 RX ORDER — HYDROCHLOROTHIAZIDE 12.5 MG/1
12.5 CAPSULE, GELATIN COATED ORAL DAILY
Qty: 30 CAPSULE | Refills: 1 | Status: SHIPPED | OUTPATIENT
Start: 2018-06-30 | End: 2018-07-03 | Stop reason: ALTCHOICE

## 2018-06-30 RX ORDER — AMLODIPINE BESYLATE 5 MG/1
5 TABLET ORAL DAILY
Qty: 30 TABLET | Refills: 1 | Status: SHIPPED | OUTPATIENT
Start: 2018-06-30 | End: 2018-08-22 | Stop reason: SDUPTHER

## 2018-06-30 NOTE — TELEPHONE ENCOUNTER
06/30/18  Sara TRAMMELL Abdirahman  1950    Home Phone [phone]   Work Phone [phone]   Mobile [phone]     Ms. Gary called. Her BP today and yesterday have been 190s/90s with HR 50-60. She is currently not taking any medication for BP. Michelle sent in orders for 12.5mg HCTZ and 5mg amlodipine daily. I called Ms. Gary with these instructions/ RBV. She will keep follow-up appointment on 7/5/18.    Brittany VIZCAINO RN

## 2018-07-03 ENCOUNTER — TELEPHONE (OUTPATIENT)
Dept: CARDIOLOGY | Facility: CLINIC | Age: 68
End: 2018-07-03

## 2018-07-03 RX ORDER — HYDROCHLOROTHIAZIDE 12.5 MG/1
TABLET ORAL
Qty: 30 TABLET | Refills: 1 | Status: SHIPPED | OUTPATIENT
Start: 2018-07-03 | End: 2018-10-18 | Stop reason: SDUPTHER

## 2018-07-03 NOTE — TELEPHONE ENCOUNTER
JANELL MONAE PUT HER HCTZ 12.5 SHE SAYS IT IS TOO STRONG - WOULD LIKE TO JUST TAKE 6.25     [phone]  [phone]

## 2018-07-12 ENCOUNTER — OFFICE VISIT (OUTPATIENT)
Dept: CARDIOLOGY | Facility: CLINIC | Age: 68
End: 2018-07-12

## 2018-07-12 ENCOUNTER — HOSPITAL ENCOUNTER (OUTPATIENT)
Dept: CARDIOLOGY | Facility: HOSPITAL | Age: 68
Discharge: HOME | End: 2018-07-12
Admitting: INTERNAL MEDICINE

## 2018-07-12 VITALS
HEIGHT: 69 IN | WEIGHT: 156 LBS | BODY MASS INDEX: 23.11 KG/M2 | DIASTOLIC BLOOD PRESSURE: 71 MMHG | SYSTOLIC BLOOD PRESSURE: 108 MMHG | HEART RATE: 59 BPM

## 2018-07-12 DIAGNOSIS — I10 ESSENTIAL HYPERTENSION: ICD-10-CM

## 2018-07-12 DIAGNOSIS — R74.8 ELEVATED LIVER ENZYMES: Primary | ICD-10-CM

## 2018-07-12 DIAGNOSIS — Q21.10 RESIDUAL ASD (ATRIAL SEPTAL DEFECT) FOLLOWING REPAIR: ICD-10-CM

## 2018-07-12 LAB
ALBUMIN SERPL-MCNC: 4.3 G/DL (ref 3.5–5.2)
ALBUMIN/GLOB SERPL: 1.5 G/DL
ALP SERPL-CCNC: 120 U/L (ref 39–117)
ALT SERPL W P-5'-P-CCNC: 7 U/L (ref 1–33)
ANION GAP SERPL CALCULATED.3IONS-SCNC: 15.6 MMOL/L
AST SERPL-CCNC: 9 U/L (ref 1–32)
BILIRUB SERPL-MCNC: 0.4 MG/DL (ref 0.1–1.2)
BUN BLD-MCNC: 11 MG/DL (ref 8–23)
BUN/CREAT SERPL: 12.9 (ref 7–25)
CALCIUM SPEC-SCNC: 9 MG/DL (ref 8.6–10.5)
CHLORIDE SERPL-SCNC: 101 MMOL/L (ref 98–107)
CO2 SERPL-SCNC: 23.4 MMOL/L (ref 22–29)
CREAT BLD-MCNC: 0.85 MG/DL (ref 0.57–1)
GFR SERPL CREATININE-BSD FRML MDRD: 66 ML/MIN/1.73
GLOBULIN UR ELPH-MCNC: 2.8 GM/DL
GLUCOSE BLD-MCNC: 147 MG/DL (ref 65–99)
POTASSIUM BLD-SCNC: 4.2 MMOL/L (ref 3.5–5.2)
PROT SERPL-MCNC: 7.1 G/DL (ref 6–8.5)
SODIUM BLD-SCNC: 140 MMOL/L (ref 136–145)

## 2018-07-12 PROCEDURE — 80053 COMPREHEN METABOLIC PANEL: CPT | Performed by: INTERNAL MEDICINE

## 2018-07-12 PROCEDURE — 99213 OFFICE O/P EST LOW 20 MIN: CPT | Performed by: INTERNAL MEDICINE

## 2018-07-12 NOTE — PROGRESS NOTES
" Subjective:       Sara Gary is a 67 y.o. female who here for follow up    CC  FOLLOW UP AFTER HOLTER  HPI  67-year-old female with known history of elevated liver enzymes as well as benign essential arterial hypertension, ASD repair, here for the follow-up after the Holter monitor    Patient denies any chest pains or tightness in chest no heaviness with a pressure sensation     Problem List Items Addressed This Visit        Cardiovascular and Mediastinum    Residual ASD (atrial septal defect) following repair    Essential hypertension       Other    Elevated liver enzymes - Primary    Relevant Orders    Comprehensive Metabolic Panel (Completed)        .    The following portions of the patient's history were reviewed and updated as appropriate: allergies, current medications, past family history, past medical history, past social history, past surgical history and problem list.    Past Medical History:   Diagnosis Date   • Depression    • Diverticulitis    • Environmental allergies    • Hypertension    • Kidney stone     hx of 4 stones    reports that she has never smoked. She has never used smokeless tobacco. She reports that she does not drink alcohol or use drugs.  Family History   Problem Relation Age of Onset   • Heart disease Mother    • Hypertension Mother    • Heart disease Father    • Hypertension Father        Review of Systems  Constitutional: No wt loss, fever, fatigue  Gastrointestinal: No nausea, abdominal pain  Behavioral/Psych: No insomnia or anxiety   Cardiovascular No chest pains or tightness in chest  Objective:                 Physical Exam  /71 (BP Location: Left arm, Patient Position: Sitting)   Pulse 59   Ht 175.3 cm (69\")   Wt 70.8 kg (156 lb)   BMI 23.04 kg/m²     General appearance: NAD, conversant   Eyes: anicteric sclerae, moist conjunctivae; no lid-lag; PERRLA   HENT: Atraumatic; oropharynx clear with moist mucous membranes and no mucosal ulcerations;  normal hard and " soft palate   Neck: Trachea midline; FROM, supple, no thyromegaly or lymphadenopathy   Lungs: CTA, with normal respiratory effort and no intercostal retractions   CV: S1-S2 regular, no murmurs, no rub, no gallop   Abdomen: Soft, non-tender; no masses or HSM   Extremities: No peripheral edema or extremity lymphadenopathy  Skin: Normal temperature, turgor and texture; no rash, ulcers or subcutaneous nodules   Psych: Appropriate affect, alert and oriented to person, place and time           Cardiographics  @Procedures    Echocardiogram:        Current Outpatient Prescriptions:   •  amLODIPine (NORVASC) 5 MG tablet, Take 1 tablet by mouth Daily., Disp: 30 tablet, Rfl: 1  •  cetirizine (zyrTEC) 10 MG tablet, Take 10 mg by mouth Daily., Disp: , Rfl:   •  hydrochlorothiazide (HYDRODIURIL) 12.5 MG tablet, 1/2 tab (6.25 mg ) daily (Patient taking differently: Take 12.5 mg by mouth. One tab every other day), Disp: 30 tablet, Rfl: 1  •  levothyroxine (SYNTHROID) 88 MCG tablet, TAKE ONE TABLET BY MOUTH DAILY, Disp: , Rfl:   •  montelukast (SINGULAIR) 10 MG tablet, Take 10 mg by mouth every night., Disp: , Rfl:   •  sertraline (ZOLOFT) 50 MG tablet, Take 50 mg by mouth daily., Disp: , Rfl:   •  bisoprolol-hydrochlorothiazide (ZIAC) 5-6.25 MG per tablet, Take 1 tablet by mouth Daily., Disp: 90 tablet, Rfl: 3   Assessment:        Patient Active Problem List   Diagnosis   • Diverticulosis of large intestine without hemorrhage   • Diverticulitis large intestine   • Kidney stone on left side   • Hydronephrosis, left   • Diverticulitis   • Residual ASD (atrial septal defect) following repair               Plan:            ICD-10-CM ICD-9-CM   1. Elevated liver enzymes R74.8 790.5   2. Essential hypertension I10 401.9   3. Residual ASD (atrial septal defect) following repair Q21.1 745.5     1. Elevated liver enzymes  Repeat the blood test  - Comprehensive Metabolic Panel    2. Essential hypertension  Blood pressure under control    3.  Residual ASD (atrial septal defect) following repair  Follow-up conservatively       HIGH ALK PHOS    WILL REPEAT CMP    SEE IN 1 WK  COUNSELING:    Sara Gilliland was given to patient for the following topics: diagnostic results, risk factor reductions, impressions, risks and benefits of treatment options and importance of treatment compliance .       SMOKING COUNSELING:    Counseling given: Not Answered      EMR Dragon/Transcription disclaimer:   Much of this encounter note is an electronic transcription/translation of spoken language to printed text. The electronic translation of spoken language may permit erroneous, or at times, nonsensical words or phrases to be inadvertently transcribed; Although I have reviewed the note for such errors, some may still exist.

## 2018-07-19 ENCOUNTER — OFFICE VISIT (OUTPATIENT)
Dept: CARDIOLOGY | Facility: CLINIC | Age: 68
End: 2018-07-19

## 2018-07-19 VITALS
BODY MASS INDEX: 22.81 KG/M2 | WEIGHT: 154 LBS | HEART RATE: 60 BPM | DIASTOLIC BLOOD PRESSURE: 73 MMHG | HEIGHT: 69 IN | SYSTOLIC BLOOD PRESSURE: 113 MMHG

## 2018-07-19 DIAGNOSIS — K57.32 DIVERTICULITIS OF LARGE INTESTINE, UNSPECIFIED BLEEDING STATUS, UNSPECIFIED COMPLICATION STATUS: Primary | ICD-10-CM

## 2018-07-19 DIAGNOSIS — I10 ESSENTIAL HYPERTENSION: ICD-10-CM

## 2018-07-19 DIAGNOSIS — R10.9 STOMACH PAIN: ICD-10-CM

## 2018-07-19 PROCEDURE — 99212 OFFICE O/P EST SF 10 MIN: CPT | Performed by: INTERNAL MEDICINE

## 2018-07-25 PROBLEM — I10 ESSENTIAL HYPERTENSION: Status: ACTIVE | Noted: 2018-07-25

## 2018-07-25 PROBLEM — R74.8 ELEVATED LIVER ENZYMES: Status: ACTIVE | Noted: 2018-07-25

## 2018-08-22 RX ORDER — LEVOTHYROXINE SODIUM 88 UG/1
TABLET ORAL
COMMUNITY
Start: 2018-07-31 | End: 2022-04-19

## 2018-08-22 RX ORDER — NITROFURANTOIN 25; 75 MG/1; MG/1
100 CAPSULE ORAL
COMMUNITY
End: 2022-04-19

## 2018-08-22 RX ORDER — AMLODIPINE BESYLATE 5 MG/1
5 TABLET ORAL DAILY
Qty: 30 TABLET | Refills: 11 | Status: SHIPPED | OUTPATIENT
Start: 2018-08-22 | End: 2022-04-19

## 2018-10-18 RX ORDER — HYDROCHLOROTHIAZIDE 12.5 MG/1
TABLET ORAL
Qty: 45 TABLET | Refills: 1 | Status: SHIPPED | OUTPATIENT
Start: 2018-10-18 | End: 2018-12-19 | Stop reason: SDUPTHER

## 2018-11-02 ENCOUNTER — TELEPHONE (OUTPATIENT)
Dept: CARDIOLOGY | Facility: CLINIC | Age: 68
End: 2018-11-02

## 2018-11-02 NOTE — TELEPHONE ENCOUNTER
----- Message from Ema Lima sent at 11/1/2018 12:04 PM EDT -----  Regarding: meds  Contact: [phone]  Pt is taking norvasc and thinks she is getting dizzy spells from it.  Wanst to know if she can take at night

## 2018-11-02 NOTE — TELEPHONE ENCOUNTER
PER DR MCCANN, PT MAY TAKE NORVASC 2.5 (1/2 TABLET) AND SEE IF SYMPTOMS IMPROVE.    SW PT. SHE IS AWARE AND VERBALIZED UNDERSTANDING.

## 2018-11-30 ENCOUNTER — TELEPHONE (OUTPATIENT)
Dept: CARDIOLOGY | Facility: CLINIC | Age: 68
End: 2018-11-30

## 2018-12-19 RX ORDER — HYDROCHLOROTHIAZIDE 12.5 MG/1
TABLET ORAL
Qty: 45 TABLET | Refills: 2 | Status: SHIPPED | OUTPATIENT
Start: 2018-12-19 | End: 2022-04-19

## 2022-04-19 ENCOUNTER — HOSPITAL ENCOUNTER (OUTPATIENT)
Dept: CT IMAGING | Facility: HOSPITAL | Age: 72
Discharge: HOME | End: 2022-04-19

## 2022-04-19 ENCOUNTER — OFFICE VISIT (OUTPATIENT)
Dept: GASTROENTEROLOGY | Facility: CLINIC | Age: 72
End: 2022-04-19

## 2022-04-19 ENCOUNTER — LAB (OUTPATIENT)
Dept: LAB | Facility: HOSPITAL | Age: 72
End: 2022-04-19

## 2022-04-19 VITALS
HEART RATE: 72 BPM | OXYGEN SATURATION: 98 % | BODY MASS INDEX: 22.39 KG/M2 | TEMPERATURE: 98 F | SYSTOLIC BLOOD PRESSURE: 134 MMHG | DIASTOLIC BLOOD PRESSURE: 72 MMHG | WEIGHT: 151.2 LBS | HEIGHT: 69 IN

## 2022-04-19 DIAGNOSIS — R10.31 RIGHT LOWER QUADRANT ABDOMINAL PAIN: Primary | ICD-10-CM

## 2022-04-19 DIAGNOSIS — R10.31 RIGHT LOWER QUADRANT ABDOMINAL PAIN: ICD-10-CM

## 2022-04-19 DIAGNOSIS — R91.1 PULMONARY NODULE: Primary | ICD-10-CM

## 2022-04-19 DIAGNOSIS — R14.0 BLOATING: ICD-10-CM

## 2022-04-19 DIAGNOSIS — R10.32 LEFT LOWER QUADRANT ABDOMINAL PAIN: ICD-10-CM

## 2022-04-19 DIAGNOSIS — K59.00 CONSTIPATION, UNSPECIFIED CONSTIPATION TYPE: ICD-10-CM

## 2022-04-19 DIAGNOSIS — Z86.010 HISTORY OF COLON POLYPS: ICD-10-CM

## 2022-04-19 LAB
ALBUMIN SERPL-MCNC: 4.2 G/DL (ref 3.5–5.2)
ALBUMIN/GLOB SERPL: 1.8 G/DL
ALP SERPL-CCNC: 149 U/L (ref 39–117)
ALT SERPL W P-5'-P-CCNC: 8 U/L (ref 1–33)
ANION GAP SERPL CALCULATED.3IONS-SCNC: 9 MMOL/L (ref 5–15)
AST SERPL-CCNC: 12 U/L (ref 1–32)
BACTERIA UR QL AUTO: ABNORMAL /HPF
BASOPHILS # BLD AUTO: 0.02 10*3/MM3 (ref 0–0.2)
BASOPHILS NFR BLD AUTO: 0.5 % (ref 0–1.5)
BILIRUB SERPL-MCNC: 0.3 MG/DL (ref 0–1.2)
BILIRUB UR QL STRIP: NEGATIVE
BUN SERPL-MCNC: 12 MG/DL (ref 8–23)
BUN/CREAT SERPL: 16.7 (ref 7–25)
CALCIUM SPEC-SCNC: 9.6 MG/DL (ref 8.6–10.5)
CHLORIDE SERPL-SCNC: 105 MMOL/L (ref 98–107)
CLARITY UR: CLEAR
CO2 SERPL-SCNC: 26 MMOL/L (ref 22–29)
COD CRY URNS QL: ABNORMAL /HPF
COLOR UR: YELLOW
CREAT SERPL-MCNC: 0.72 MG/DL (ref 0.57–1)
DEPRECATED RDW RBC AUTO: 40.1 FL (ref 37–54)
EGFRCR SERPLBLD CKD-EPI 2021: 88.4 ML/MIN/1.73
EOSINOPHIL # BLD AUTO: 0.15 10*3/MM3 (ref 0–0.4)
EOSINOPHIL NFR BLD AUTO: 3.6 % (ref 0.3–6.2)
ERYTHROCYTE [DISTWIDTH] IN BLOOD BY AUTOMATED COUNT: 12.2 % (ref 12.3–15.4)
GLOBULIN UR ELPH-MCNC: 2.4 GM/DL
GLUCOSE SERPL-MCNC: 93 MG/DL (ref 65–99)
GLUCOSE UR STRIP-MCNC: NEGATIVE MG/DL
HCT VFR BLD AUTO: 37.6 % (ref 34–46.6)
HGB BLD-MCNC: 12.1 G/DL (ref 12–15.9)
HGB UR QL STRIP.AUTO: NEGATIVE
HYALINE CASTS UR QL AUTO: ABNORMAL /LPF
IMM GRANULOCYTES # BLD AUTO: 0 10*3/MM3 (ref 0–0.05)
IMM GRANULOCYTES NFR BLD AUTO: 0 % (ref 0–0.5)
KETONES UR QL STRIP: NEGATIVE
LEUKOCYTE ESTERASE UR QL STRIP.AUTO: ABNORMAL
LYMPHOCYTES # BLD AUTO: 1.26 10*3/MM3 (ref 0.7–3.1)
LYMPHOCYTES NFR BLD AUTO: 30.3 % (ref 19.6–45.3)
MCH RBC QN AUTO: 29.1 PG (ref 26.6–33)
MCHC RBC AUTO-ENTMCNC: 32.2 G/DL (ref 31.5–35.7)
MCV RBC AUTO: 90.4 FL (ref 79–97)
MONOCYTES # BLD AUTO: 0.28 10*3/MM3 (ref 0.1–0.9)
MONOCYTES NFR BLD AUTO: 6.7 % (ref 5–12)
MUCOUS THREADS URNS QL MICRO: ABNORMAL /HPF
NEUTROPHILS NFR BLD AUTO: 2.45 10*3/MM3 (ref 1.7–7)
NEUTROPHILS NFR BLD AUTO: 58.9 % (ref 42.7–76)
NITRITE UR QL STRIP: NEGATIVE
NRBC BLD AUTO-RTO: 0 /100 WBC (ref 0–0.2)
PH UR STRIP.AUTO: 6 [PH] (ref 5–8)
PLATELET # BLD AUTO: 250 10*3/MM3 (ref 140–450)
PMV BLD AUTO: 9 FL (ref 6–12)
POTASSIUM SERPL-SCNC: 4.4 MMOL/L (ref 3.5–5.2)
PROT SERPL-MCNC: 6.6 G/DL (ref 6–8.5)
PROT UR QL STRIP: NEGATIVE
RBC # BLD AUTO: 4.16 10*6/MM3 (ref 3.77–5.28)
RBC # UR STRIP: ABNORMAL /HPF
REF LAB TEST METHOD: ABNORMAL
SODIUM SERPL-SCNC: 140 MMOL/L (ref 136–145)
SP GR UR STRIP: 1.02 (ref 1–1.03)
SQUAMOUS #/AREA URNS HPF: ABNORMAL /HPF
UROBILINOGEN UR QL STRIP: ABNORMAL
WBC # UR STRIP: ABNORMAL /HPF
WBC NRBC COR # BLD: 4.16 10*3/MM3 (ref 3.4–10.8)

## 2022-04-19 PROCEDURE — 99203 OFFICE O/P NEW LOW 30 MIN: CPT | Performed by: NURSE PRACTITIONER

## 2022-04-19 PROCEDURE — 74177 CT ABD & PELVIS W/CONTRAST: CPT

## 2022-04-19 PROCEDURE — 81001 URINALYSIS AUTO W/SCOPE: CPT | Performed by: NURSE PRACTITIONER

## 2022-04-19 PROCEDURE — 80053 COMPREHEN METABOLIC PANEL: CPT | Performed by: NURSE PRACTITIONER

## 2022-04-19 PROCEDURE — 25010000002 IOPAMIDOL 61 % SOLUTION: Performed by: NURSE PRACTITIONER

## 2022-04-19 PROCEDURE — 36415 COLL VENOUS BLD VENIPUNCTURE: CPT | Performed by: NURSE PRACTITIONER

## 2022-04-19 PROCEDURE — 82565 ASSAY OF CREATININE: CPT

## 2022-04-19 PROCEDURE — 85025 COMPLETE CBC W/AUTO DIFF WBC: CPT | Performed by: NURSE PRACTITIONER

## 2022-04-19 PROCEDURE — 0 DIATRIZOATE MEGLUMINE & SODIUM PER 1 ML: Performed by: NURSE PRACTITIONER

## 2022-04-19 RX ORDER — SUMATRIPTAN 100 MG/1
TABLET, FILM COATED ORAL
COMMUNITY
Start: 2022-03-16

## 2022-04-19 RX ORDER — LIOTHYRONINE SODIUM 5 UG/1
TABLET ORAL
COMMUNITY
Start: 2022-02-05 | End: 2023-03-20 | Stop reason: SDUPTHER

## 2022-04-19 RX ORDER — LORATADINE 10 MG/1
10 TABLET ORAL DAILY
COMMUNITY
End: 2023-02-08

## 2022-04-19 RX ORDER — LEVOTHYROXINE SODIUM 75 MCG
TABLET ORAL
COMMUNITY
Start: 2022-02-24 | End: 2023-03-09 | Stop reason: SDUPTHER

## 2022-04-19 RX ORDER — AMOXICILLIN AND CLAVULANATE POTASSIUM 875; 125 MG/1; MG/1
1 TABLET, FILM COATED ORAL 2 TIMES DAILY
Qty: 20 TABLET | Refills: 0 | Status: SHIPPED | OUTPATIENT
Start: 2022-04-19 | End: 2022-04-29

## 2022-04-19 RX ORDER — SERTRALINE HYDROCHLORIDE 100 MG/1
TABLET, FILM COATED ORAL
COMMUNITY
End: 2022-04-19

## 2022-04-19 RX ORDER — BUSPIRONE HYDROCHLORIDE 10 MG/1
TABLET ORAL
COMMUNITY
Start: 2022-03-10

## 2022-04-19 RX ORDER — FAMOTIDINE 40 MG/1
TABLET, FILM COATED ORAL
COMMUNITY
Start: 2022-04-08

## 2022-04-19 RX ORDER — VALACYCLOVIR HYDROCHLORIDE 1 G/1
TABLET, FILM COATED ORAL
COMMUNITY
Start: 2022-02-05 | End: 2023-02-27

## 2022-04-19 RX ADMIN — DIATRIZOATE MEGLUMINE AND DIATRIZOATE SODIUM 30 ML: 660; 100 LIQUID ORAL; RECTAL at 11:00

## 2022-04-19 RX ADMIN — IOPAMIDOL 85 ML: 612 INJECTION, SOLUTION INTRAVENOUS at 12:06

## 2022-04-19 NOTE — PROGRESS NOTES
"Chief Complaint   Patient presents with   • Abdominal Pain         History of Present Illness  Patient is a 71-year-old female who presents today for evaluation.  Patient today with concerns about abdominal pain this began around 1 weeks ago.  Pain is located to the lower abdomen and described as a cramping sensation.  This is a new problem.  It has been associated with abdominal bloating and constipation.  She denies any heartburn, reflux, nausea, or vomiting.    She started eating prunes which has helped with the constipation and she has had improvement in abdominal pain since her bowels have been moving more regularly.  She denies any blood in the stool.    She was a previous patient of Dr. Rosario, last colonoscopy was 5 to 6 years ago.  She has a history of polyps and was due for colonoscopy in 2021.  Also with past history of diverticulitis.  She has had a hysterectomy and kidney stones.    She is currently visiting here from [place] but does have plans to move back to the area.     Result Review :           Vital Signs:   /72   Pulse 72   Temp 98 °F (36.7 °C)   Ht 175.3 cm (69\")   Wt 68.6 kg (151 lb 3.2 oz)   SpO2 98%   BMI 22.33 kg/m²     Body mass index is 22.33 kg/m².     Physical Exam  Vitals reviewed.   Constitutional:       General: She is not in acute distress.     Appearance: She is well-developed.   HENT:      Head: Normocephalic and atraumatic.   Pulmonary:      Effort: Pulmonary effort is normal. No respiratory distress.   Abdominal:      General: Abdomen is flat. Bowel sounds are normal. There is no distension.      Tenderness: There is abdominal tenderness in the right lower quadrant, suprapubic area and left lower quadrant.   Skin:     General: Skin is dry.      Coloration: Skin is not pale.   Neurological:      Mental Status: She is alert and oriented to person, place, and time.   Psychiatric:         Thought Content: Thought content normal.           Assessment and Plan  "   Diagnoses and all orders for this visit:    1. Right lower quadrant abdominal pain (Primary)  -     CBC & Differential  -     Comprehensive Metabolic Panel  -     Urinalysis With Culture If Indicated - Urine, Clean Catch  -     CT Abdomen Pelvis With Contrast; Future    2. Left lower quadrant abdominal pain  -     CBC & Differential  -     Comprehensive Metabolic Panel  -     Urinalysis With Culture If Indicated - Urine, Clean Catch  -     CT Abdomen Pelvis With Contrast; Future    3. Constipation, unspecified constipation type  -     CBC & Differential  -     Comprehensive Metabolic Panel  -     Urinalysis With Culture If Indicated - Urine, Clean Catch  -     CT Abdomen Pelvis With Contrast; Future    4. Bloating  -     CBC & Differential  -     Comprehensive Metabolic Panel  -     Urinalysis With Culture If Indicated - Urine, Clean Catch  -     CT Abdomen Pelvis With Contrast; Future    5. History of colon polyps         Discussion  Patient presents today for evaluation of abdominal pain.  This is a new problem.  Recommended CT scan for further evaluation, evaluate for any evidence of diverticulitis or obstructive etiology that could be contributing to her symptoms.  We will check lab work and urinalysis as well today.  She is due for colonoscopy for surveillance of polyps, patient declined to schedule today.  Instructed her to notify the office when she would like to proceed with this and we can arrange.          Follow Up   Return if symptoms worsen or fail to improve.    Patient Instructions   Check lab work today and schedule CT scan for further evaluation of symptoms.    Colonoscopy is recommended for surveillance of polyps.  Please notify the office if you would like to proceed with this evaluation and we can get this scheduled.

## 2022-04-19 NOTE — PATIENT INSTRUCTIONS
Check lab work today and schedule CT scan for further evaluation of symptoms.    Colonoscopy is recommended for surveillance of polyps.  Please notify the office if you would like to proceed with this evaluation and we can get this scheduled.

## 2022-04-20 ENCOUNTER — TELEPHONE (OUTPATIENT)
Dept: GASTROENTEROLOGY | Facility: CLINIC | Age: 72
End: 2022-04-20

## 2022-04-20 LAB — CREAT BLDA-MCNC: 0.7 MG/DL (ref 0.6–1.3)

## 2022-04-22 ENCOUNTER — TELEPHONE (OUTPATIENT)
Dept: GASTROENTEROLOGY | Facility: CLINIC | Age: 72
End: 2022-04-22

## 2022-04-22 NOTE — TELEPHONE ENCOUNTER
Patient was prescribed Augmentin this past Tuesday.  Is having severe diarrhea after starting RX.  Wonders if she needs something else sent in.

## 2022-04-22 NOTE — TELEPHONE ENCOUNTER
Diarrhea can be a side effect of the Augmentin.  If she feels she is unable to tolerate it, we could change to ciprofloxacin and metronidazole.  Please let me know if she feels she needs to change treatment and which pharmacy she would like to use if so.

## 2022-05-09 ENCOUNTER — TELEPHONE (OUTPATIENT)
Dept: GASTROENTEROLOGY | Facility: CLINIC | Age: 72
End: 2022-05-09

## 2022-05-09 NOTE — TELEPHONE ENCOUNTER
Patient called requesting some records be faxed to an out of state hospital prior to a procedure she's having done.  No answer.  Left message to call the office back.

## 2022-05-10 ENCOUNTER — TELEPHONE (OUTPATIENT)
Dept: GASTROENTEROLOGY | Facility: CLINIC | Age: 72
End: 2022-05-10

## 2022-05-10 ENCOUNTER — APPOINTMENT (OUTPATIENT)
Dept: CT IMAGING | Facility: HOSPITAL | Age: 72
End: 2022-05-10

## 2022-12-19 ENCOUNTER — TELEPHONE (OUTPATIENT)
Dept: GASTROENTEROLOGY | Facility: CLINIC | Age: 72
End: 2022-12-19

## 2022-12-19 NOTE — TELEPHONE ENCOUNTER
Patient has had lower abdominal pain, started this morning. She explained the pain to be right above pubic area from one side to the other.   She has been lifting heavy boxes due to moving back to [place].   She took ibuprofen for the pain she is having and it helped a lot.  She takes Pepcid 2x a day. She stated that GERD issues are under control with medication. She ate pizza yesterday.   She is wondering if the pizza could be causing her to have a flare of diverticulitis and wants to know if it is ok to take ibuprofen and wants to know if she needs  Antibx? Thank you

## 2022-12-19 NOTE — TELEPHONE ENCOUNTER
Called pt to make an appt per KP. Pt is in the process of getting insurance straightened out. Recently moved back to [place]. Pt would feel better coming in after insurance is completed to avoid having to pay copay , low income. Thank you

## 2023-01-31 ENCOUNTER — TRANSCRIBE ORDERS (OUTPATIENT)
Dept: ADMINISTRATIVE | Facility: HOSPITAL | Age: 73
End: 2023-01-31
Payer: MEDICAID

## 2023-01-31 DIAGNOSIS — Z78.0 MENOPAUSE: ICD-10-CM

## 2023-01-31 DIAGNOSIS — Z12.31 VISIT FOR SCREENING MAMMOGRAM: Primary | ICD-10-CM

## 2023-02-20 ENCOUNTER — OFFICE VISIT (OUTPATIENT)
Dept: ORTHOPEDIC SURGERY | Facility: CLINIC | Age: 73
End: 2023-02-20
Payer: MEDICARE

## 2023-02-20 VITALS — TEMPERATURE: 98 F | BODY MASS INDEX: 21.79 KG/M2 | WEIGHT: 147.1 LBS | HEIGHT: 69 IN

## 2023-02-20 DIAGNOSIS — G89.29 CHRONIC RIGHT SHOULDER PAIN: ICD-10-CM

## 2023-02-20 DIAGNOSIS — M75.51 SCAPULOTHORACIC BURSITIS OF RIGHT SHOULDER: ICD-10-CM

## 2023-02-20 DIAGNOSIS — M25.511 RIGHT SHOULDER PAIN, UNSPECIFIED CHRONICITY: Primary | ICD-10-CM

## 2023-02-20 DIAGNOSIS — M25.511 CHRONIC RIGHT SHOULDER PAIN: ICD-10-CM

## 2023-02-20 PROCEDURE — 99203 OFFICE O/P NEW LOW 30 MIN: CPT | Performed by: ORTHOPAEDIC SURGERY

## 2023-02-20 PROCEDURE — 73030 X-RAY EXAM OF SHOULDER: CPT | Performed by: ORTHOPAEDIC SURGERY

## 2023-02-20 NOTE — PROGRESS NOTES
"  Patient: Sara Gary    YOB: 1950    Medical Record Number: [id number]    Chief Complaints:  Right shoulder pain    History of Present Illness:     72 y.o. female patient who presents for her right shoulder.  This has been an issue for her for the last 10 months or so.  She does not recall any specific inciting event or factor although she did recently moved here from [place].  It sounds like her symptoms started before the move though.  Her pain is mild, 2-3 out of 10 and aching.  Localizes the pain to the back of her shoulder adjacent to the scapula.  She has tried rest, icing, heat and Tylenol all of which seem to help a little bit.  Symptoms are worse with standing and walking.  Denies any numbness, tingling or weakness.  Denies any shooting pain down the arm.  Denies any neck pain.    Allergies   Allergen Reactions   • Sulfamethoxazole-Trimethoprim Hives   • Statins GI Intolerance     Patient \"felt sick\"   • Sulfa Antibiotics Hives     Home Medications:      Current Outpatient Medications:   •  busPIRone (BUSPAR) 10 MG tablet, , Disp: , Rfl:   •  famotidine (PEPCID) 40 MG tablet, , Disp: , Rfl:   •  liothyronine (CYTOMEL) 5 MCG tablet, , Disp: , Rfl:   •  sertraline (ZOLOFT) 50 MG tablet, Take 50 mg by mouth daily., Disp: , Rfl:   •  SUMAtriptan (IMITREX) 100 MG tablet, , Disp: , Rfl:   •  Synthroid 75 MCG tablet, , Disp: , Rfl:   •  valACYclovir (VALTREX) 1000 MG tablet, , Disp: , Rfl:   •  Ventolin  (90 Base) MCG/ACT inhaler, , Disp: , Rfl:     Past Medical History:   Diagnosis Date   • Depression    • Diverticulitis    • Environmental allergies    • History of colon polyps    • Hypertension    • Kidney stone     hx of 4 stones       Past Surgical History:   Procedure Laterality Date   • APPENDECTOMY     • ASD REPAIR, SECUNDUM     • CATARACT EXTRACTION     • COLONOSCOPY     • CYSTOSCOPY BLADDER STONE LITHOTRIPSY     • CYSTOSCOPY W/ URETERAL STENT PLACEMENT Left 06/29/2017    " "Procedure: CYSTOSCOPY LEFT STENT INSERTION & STONE REMOVAL;  Surgeon: Raheem Franklin MD;  Location: [facility] MAIN OR;  Service:    • HYSTERECTOMY     • NEPHRECTOMY RADICAL     • THYROIDECTOMY         Social History     Occupational History   • Not on file   Tobacco Use   • Smoking status: Never   • Smokeless tobacco: Never   Vaping Use   • Vaping Use: Never used   Substance and Sexual Activity   • Alcohol use: No   • Drug use: No   • Sexual activity: Not on file      Social History     Social History Narrative   • Not on file       Family History   Problem Relation Age of Onset   • Heart disease Mother    • Hypertension Mother    • Heart disease Father    • Hypertension Father    • Colon cancer Neg Hx    • Colon polyps Neg Hx        Review of Systems:      Constitutional: Denies fever, shaking or chills   Eyes: Denies change in visual acuity   HEENT: Denies nasal congestion or sore throat   Respiratory: Denies cough or shortness of breath   Cardiovascular: Denies chest pain or edema  Endocrine: Denies tremors, palpitations, intolerance of heat or cold, polyuria, polydipsia.  GI: Denies abdominal pain, nausea, vomiting, bloody stools or diarrhea  : Denies frequency, urgency, incontinence, retention, or nocturia.  Musculoskeletal: Denies numbness, tingling or loss of motor function except as above  Integument: Denies rash, lesion or ulceration   Neurologic: Denies headache or focal weakness, deficits  Heme: Denies spontaneous or excessive bleeding, epistaxis, hematuria, melena, fatigue, enlarged or tender lymph nodes.      All other pertinent positives and negatives as noted above in HPI.    Physical Exam: 72 y.o. female    Vitals:    02/20/23 1518   Temp: 98 °F (36.7 °C)   Weight: 66.7 kg (147 lb 1.6 oz)   Height: 175.3 cm (69\")     General:  Patient is awake and alert.  Appears in no acute distress or discomfort.    Psych:  Affect and demeanor are appropriate.    Cardiovascular:  Regular rate and " rhythm.    Lungs:  Good chest expansion.  Breathing unlabored.    Lymph:  No palpable masses or adenopathy in the affected extremity    Neck: Skin is benign.  No tenderness.  Negative Spurling's to the right.    Extremities:  Right shoulder:  Skin is benign.  She appears to have slight winging of the right scapula on exam.  No other gross abnormalities noted.  No palpable masses or adenopathy.  No effusion.  Mild to moderate tenderness adjacent to the medial border extending to the subscapular bursa.  Full symmetric motion.  No instability.  Negative Neer, Santiago, Speed's, Yergason's, active compression and O'Briens maneuvers.  Good strength with forward flexion, resisted internal and external rotation, and resisted abduction.  Sensation is intact.  Brisk capillary refill in the fingers with good skin turgor.         Radiology:   AP, scapular Y, and axillary views of the right shoulder are ordered by myself and reviewed to evaluate the patient's complaint.  No comparison films are immediately available.  The x-rays show no obvious acute abnormalities, lesions, masses, significant degenerative changes, or other concerning findings.  The acromiohumeral interval is normal.  Glenoid version appears normal as well.    Assessment/Plan:  Right subscapular bursitis and scapular winging    We talked about her suspected diagnosis and the options.  I offered her an injection but she declined.  She would like to try the therapy as a starting point.  I gave her a referral.  I will have her follow-up with me in about 6 weeks if no better.  She is in agreement with that plan.    Jose Dove MD    02/20/2023    CC to Catherine Kolb APRN

## 2023-02-27 ENCOUNTER — OFFICE VISIT (OUTPATIENT)
Dept: CARDIOLOGY | Facility: CLINIC | Age: 73
End: 2023-02-27
Payer: MEDICARE

## 2023-02-27 VITALS
WEIGHT: 145 LBS | HEART RATE: 61 BPM | SYSTOLIC BLOOD PRESSURE: 155 MMHG | BODY MASS INDEX: 21.48 KG/M2 | HEIGHT: 69 IN | DIASTOLIC BLOOD PRESSURE: 81 MMHG

## 2023-02-27 DIAGNOSIS — I10 ESSENTIAL HYPERTENSION: ICD-10-CM

## 2023-02-27 DIAGNOSIS — Q21.10 RESIDUAL ASD (ATRIAL SEPTAL DEFECT) FOLLOWING REPAIR: ICD-10-CM

## 2023-02-27 DIAGNOSIS — I34.1 MVP (MITRAL VALVE PROLAPSE): ICD-10-CM

## 2023-02-27 DIAGNOSIS — R94.31 ABNORMAL ELECTROCARDIOGRAM: Primary | ICD-10-CM

## 2023-02-27 PROCEDURE — 93000 ELECTROCARDIOGRAM COMPLETE: CPT | Performed by: INTERNAL MEDICINE

## 2023-02-27 PROCEDURE — 99204 OFFICE O/P NEW MOD 45 MIN: CPT | Performed by: INTERNAL MEDICINE

## 2023-02-27 NOTE — PROGRESS NOTES
"Re-Establish Care    Subjective:        [facility]  Cardiology Consult Note    Patient Identification:  Name: Sara Gary  Age: 72 y.o.  Sex: female  :  1950  MRN: [id number]             CC  restablish   MVP  ABNORMAL EKG  BORDERLINE HTN  PAIN IN SHOULDER BLADE  AS D REPAIR 30 YRS AGO    History of Present Illness:   72-year-old female here for the reestablishment of the care last seen many years ago has abnormal EKG mitral valve prolapse complaining of the pain in the shoulder area    Patient had ASD repair 30 years ago    Comorbid cardiac risk factors:     Past Medical History:  Past Medical History:   Diagnosis Date   • Depression    • Diverticulitis    • Environmental allergies    • History of colon polyps    • Hypertension    • Kidney stone     hx of 4 stones     Past Surgical History:  Past Surgical History:   Procedure Laterality Date   • APPENDECTOMY     • ASD REPAIR, SECUNDUM     • CATARACT EXTRACTION     • COLONOSCOPY     • CYSTOSCOPY BLADDER STONE LITHOTRIPSY     • CYSTOSCOPY W/ URETERAL STENT PLACEMENT Left 2017    Procedure: CYSTOSCOPY LEFT STENT INSERTION & STONE REMOVAL;  Surgeon: Raheem Franklin MD;  Location: [facility];  Service:    • HYSTERECTOMY     • NEPHRECTOMY RADICAL     • THYROIDECTOMY        Allergies:  Allergies   Allergen Reactions   • Sulfamethoxazole-Trimethoprim Hives   • Statins GI Intolerance     Patient \"felt sick\"   • Sulfa Antibiotics Hives     Home Meds:  (Not in a hospital admission)    Current Meds:     Current Outpatient Medications:   •  busPIRone (BUSPAR) 10 MG tablet, , Disp: , Rfl:   •  famotidine (PEPCID) 40 MG tablet, , Disp: , Rfl:   •  liothyronine (CYTOMEL) 5 MCG tablet, , Disp: , Rfl:   •  sertraline (ZOLOFT) 50 MG tablet, Take 50 mg by mouth daily., Disp: , Rfl:   •  SUMAtriptan (IMITREX) 100 MG tablet, , Disp: , Rfl:   •  Synthroid 75 MCG tablet, , Disp: , Rfl:   •  Ventolin  (90 Base) MCG/ACT inhaler, , Disp: , Rfl: "   Social History:   Social History     Tobacco Use   • Smoking status: Never   • Smokeless tobacco: Never   Substance Use Topics   • Alcohol use: No      Family History:  Family History   Problem Relation Age of Onset   • Heart disease Mother    • Hypertension Mother    • Heart disease Father    • Hypertension Father    • Colon cancer Neg Hx    • Colon polyps Neg Hx         Review of Systems    Constitutional: No weakness,fatigue, fever, rigors, chills   Eyes: No vision changes, eye pain   ENT/oropharynx: No difficulty swallowing, sore throat, epistaxis, changes in hearing   Cardiovascular: No chest pain, chest tightness, palpitations, paroxysmal nocturnal dyspnea, orthopnea, diaphoresis, dizziness / syncopal episode   Respiratory: No shortness of breath, dyspnea on exertion, cough, wheezing hemoptysis   Gastrointestinal: No abdominal pain, nausea, vomiting, diarrhea, bloody stools   Genitourinary: No hematuria, dysuria   Neurological: No headache, tremors, numbness,  one-sided weakness    Musculoskeletal: No cramps, myalgias,  joint pain, joint swelling   Integument: No rash, edema           Constitutional:  Heart Rate:  [61] 61  BP: (155)/(81) 155/81    Physical Exam   General:  Appears in no acute distress  Eyes: PERTL,  HEENT:  No JVD. Thyroid not visibly enlarged. No mucosal pallor or cyanosis  Respiratory: Respirations regular and unlabored at rest. BBS with good air entry in all fields. No crackles, rubs or wheezes auscultated  Cardiovascular: S1S2 Regular rate and rhythm. No murmur, rub or gallop auscultated. No carotid bruits. DP/PT pulses    . No pretibial pitting edema  Gastrointestinal: Abdomen soft, flat, non tender. Bowel sounds present. No hepatosplenomegaly. No ascites  Musculoskeletal: MEHTA x4. No abnormal movements  Extremities: No digital clubbing or cyanosis  Skin: Color pink. Skin warm and dry to touch. No rashes  No xanthoma  Neuro: AAO x3 CN II-XII grossly intact            ECG 12  Lead    Date/Time: 2/27/2023 12:45 PM  Performed by: Maricarmen Verde MD  Authorized by: Maricarmen Verde MD   Comparison: compared with previous ECG   Similar to previous ECG  Rhythm: sinus rhythm  ST Flattening: all    Clinical impression: non-specific ECG                Cardiographics  ECG:     Telemetry:    Echocardiogram:     Imaging  Chest X-ray:     Lab Review               @[email or web address]@              Assessment:/ Recommendations / Plan:   Patient Active Problem List   Diagnosis   • Diverticulosis of large intestine without hemorrhage   • Diverticulitis large intestine   • Kidney stone on left side   • Hydronephrosis, left   • Diverticulitis   • Residual ASD (atrial septal defect) following repair   • Elevated liver enzymes   • Essential hypertension   • Abnormal electrocardiogram   • MVP (mitral valve prolapse)                    ICD-10-CM ICD-9-CM   1. Abnormal electrocardiogram  R94.31 794.31   2. Essential hypertension  I10 401.9   3. Residual ASD (atrial septal defect) following repair  Q21.10 745.5   4. MVP (mitral valve prolapse)  I34.1 424.0     1. Essential hypertension  Blood pressure under control  - Stress Test With Myocardial Perfusion One Day  - Adult Transthoracic Echo Complete W/ Cont if Necessary Per Protocol  - CBC & Differential  - Comprehensive Metabolic Panel  - Lipid Panel  - TSH  - Vitamin D 1,25 Dihydroxy    2. Residual ASD (atrial septal defect) following repair  Considering patient's medical condition as well as the risk factors, patient will require echocardiogram for further evaluation for the LV function, four-chamber evaluation, including the pressures, valvular function and  pericardial disease and pericardial effusion    - Stress Test With Myocardial Perfusion One Day  - Adult Transthoracic Echo Complete W/ Cont if Necessary Per Protocol  - CBC & Differential  - Comprehensive Metabolic Panel  - Lipid Panel  - TSH  - Vitamin D 1,25 Dihydroxy    3. Abnormal  electrocardiogram  Considering the patient's symptoms as well as clinical situation and  EKG findings, along with cardiac risk factors, ischemic workup is necessary to rule out ischemic cardiomyopathy, stress induced arrhythmias, and functional capacity for diagnosis as well as prognostic consideration    - Stress Test With Myocardial Perfusion One Day  - Adult Transthoracic Echo Complete W/ Cont if Necessary Per Protocol  - CBC & Differential  - Comprehensive Metabolic Panel  - Lipid Panel  - TSH  - Vitamin D 1,25 Dihydroxy    4. MVP (mitral valve prolapse)  Considering patient's medical condition as well as the risk factors, patient will require echocardiogram for further evaluation for the LV function, four-chamber evaluation, including the pressures, valvular function and  pericardial disease and pericardial effusion    - Stress Test With Myocardial Perfusion One Day  - Adult Transthoracic Echo Complete W/ Cont if Necessary Per Protocol  - CBC & Differential  - Comprehensive Metabolic Panel  - Lipid Panel  - TSH  - Vitamin D 1,25 Dihydroxy     WALKING KELSEY, ECHO  CBC, CMP, VITD, FLP, TSH    Labs/tests ordered for am      Maricarmen Verde MD  2/28/2023, 09:11 EST      EMR Dragon/Transcription:   Dictated utilizing Dragon dictation

## 2023-02-28 PROBLEM — R94.31 ABNORMAL ELECTROCARDIOGRAM: Status: ACTIVE | Noted: 2023-02-28

## 2023-02-28 PROBLEM — I34.1 MVP (MITRAL VALVE PROLAPSE): Status: ACTIVE | Noted: 2023-02-28

## 2023-03-01 ENCOUNTER — HOSPITAL ENCOUNTER (OUTPATIENT)
Dept: MAMMOGRAPHY | Facility: HOSPITAL | Age: 73
Discharge: HOME | End: 2023-03-01
Admitting: OBSTETRICS & GYNECOLOGY
Payer: MEDICARE

## 2023-03-01 ENCOUNTER — OFFICE VISIT (OUTPATIENT)
Dept: GASTROENTEROLOGY | Facility: CLINIC | Age: 73
End: 2023-03-01
Payer: MEDICARE

## 2023-03-01 VITALS
OXYGEN SATURATION: 97 % | SYSTOLIC BLOOD PRESSURE: 126 MMHG | WEIGHT: 146 LBS | DIASTOLIC BLOOD PRESSURE: 68 MMHG | BODY MASS INDEX: 21.62 KG/M2 | TEMPERATURE: 97 F | HEIGHT: 69 IN | HEART RATE: 70 BPM

## 2023-03-01 DIAGNOSIS — K57.90 DIVERTICULOSIS: Primary | Chronic | ICD-10-CM

## 2023-03-01 DIAGNOSIS — Z86.010 HX OF ADENOMATOUS COLONIC POLYPS: ICD-10-CM

## 2023-03-01 DIAGNOSIS — M25.511 CHRONIC RIGHT SHOULDER PAIN: ICD-10-CM

## 2023-03-01 DIAGNOSIS — Z87.19 HISTORY OF DIVERTICULITIS: ICD-10-CM

## 2023-03-01 DIAGNOSIS — Z12.11 COLON CANCER SCREENING: ICD-10-CM

## 2023-03-01 DIAGNOSIS — Z12.31 VISIT FOR SCREENING MAMMOGRAM: ICD-10-CM

## 2023-03-01 DIAGNOSIS — G89.29 CHRONIC RIGHT SHOULDER PAIN: ICD-10-CM

## 2023-03-01 DIAGNOSIS — K21.9 GASTROESOPHAGEAL REFLUX DISEASE, UNSPECIFIED WHETHER ESOPHAGITIS PRESENT: Chronic | ICD-10-CM

## 2023-03-01 PROCEDURE — 99214 OFFICE O/P EST MOD 30 MIN: CPT | Performed by: NURSE PRACTITIONER

## 2023-03-01 PROCEDURE — 77063 BREAST TOMOSYNTHESIS BI: CPT

## 2023-03-01 PROCEDURE — 77067 SCR MAMMO BI INCL CAD: CPT

## 2023-03-01 NOTE — PATIENT INSTRUCTIONS
For colon cancer screening due to your history of colon polyps we recommend a colonoscopy. Please let us know if you would like to schedule this procedure.     2.  For GERD, continue famotidine.     3.  For excess gas we recommend that you follow the low FODMAP diet to help isolate specific foods that could be contributing to symptoms.     4. Additionally for excess gas we have recommend use of a daily probiotic such as [employer] or its generic equivalent. This can be purchased OTC at your local grocery or pharmacy.     5.  Follow up in office in 3 months for reassessment of symptoms.

## 2023-03-01 NOTE — PROGRESS NOTES
Chief Complaint   Patient presents with   • Follow-up     Diverticulosis, history of diverticulitis, excess gas, GERD, polyps         History of Present Illness  72-year-old female presents the office today for suspected diverticulitis flare. She just recently moved back to [place].  Last colonoscopy was performed on 7/22/2016 revealing 3 colon polyps and diverticulosis.  Polyps were identified as tubular adenomas.    She reports that she had an episode of diverticulitis May 2022. CT scan was negative for diverticulitis. She was having lower quadrant abdominal pain. This is not her first episode. She is careful with her diet. She eats everything in moderation. She reports that she did follow with gastroenterology in [place]. She reports having a negative ColoGuard in 2021. She reports having regular daily BMs. She denies any rectal bleeding.     She reports a history of gas. She has tried the low FODMAP diet in the past with good results-especially if she is really careful with what she eats. She reports gas up into her shoulder blades. Gas is forceful at times. She reports being tested for SIBO multiple times and has been positive. She has taken Xifaxan in the past and was placed on a different course of therapy. She cannot identify any specific foods that contribute to symptoms. She does not a daily probiotic.     History of GERD and takes famotidine 20 mg daily. PPIs worsen symptoms. She states that since moving back to [place] she has had to user her inhaler due to the air quality. As long as she takes her medication symptoms are generally well controlled. She denies and nausea, vomiting, or dysphagia.     She reports pain right under her right shoulder blade. She has not tried PT yet, but plans to. She has not had her gallbladder worked up. Pain is constant. She describes the pain as a dull pain. Movement will worsen symptoms. Eating does not worsen the pain.     Review of Systems   Constitutional:  "Negative for fever and unexpected weight change.   HENT: Negative for trouble swallowing.    Cardiovascular: Negative for chest pain.   Gastrointestinal: Positive for abdominal pain. Negative for abdominal distention, anal bleeding, blood in stool, constipation, diarrhea, nausea, rectal pain and vomiting.      Result Review :         Office Visit with Basia Moore APRN (04/19/2022)  SCANNED - COLONOSCOPY (07/22/2016)  ENDOSCOPY, INT (07/22/2016)  SCANNED PATHOLOGY (07/22/2016)  CT Abdomen Pelvis With Contrast (05/18/2022 10:31)    Vital Signs:   /68   Pulse 70   Temp 97 °F (36.1 °C)   Ht 175.3 cm (69\")   Wt 66.2 kg (146 lb)   SpO2 97%   BMI 21.56 kg/m²     Body mass index is 21.56 kg/m².     Physical Exam  Vitals reviewed.   Constitutional:       Appearance: Normal appearance.   Pulmonary:      Effort: Pulmonary effort is normal. No respiratory distress.   Abdominal:      General: Abdomen is flat. Bowel sounds are normal. There is no distension.      Palpations: Abdomen is soft. There is no mass.      Tenderness: There is no abdominal tenderness. There is no guarding.   Musculoskeletal:         General: Normal range of motion.   Skin:     General: Skin is warm and dry.   Neurological:      General: No focal deficit present.      Mental Status: She is alert and oriented to person, place, and time.   Psychiatric:         Mood and Affect: Mood normal.         Behavior: Behavior normal.         Thought Content: Thought content normal.         Judgment: Judgment normal.       Assessment and Plan    Diagnoses and all orders for this visit:    1. Diverticulosis (Primary)    2. History of diverticulitis    3. Gastroesophageal reflux disease, unspecified whether esophagitis present    4. Hx of adenomatous colonic polyps    5. Colon cancer screening    6. Chronic right shoulder pain  -     NM HIDA SCAN WITH PHARMACOLOGICAL INTERVENTION; Future           Patient Instructions   1. For colon cancer screening due " to your history of colon polyps we recommend a colonoscopy. Please let us know if you would like to schedule this procedure.     2.  For GERD, continue famotidine.     3.  For excess gas we recommend that you follow the low FODMAP diet to help isolate specific foods that could be contributing to symptoms.     4. Additionally for excess gas we have recommend use of a daily probiotic such as [employer] or its generic equivalent. This can be purchased OTC at your local grocery or pharmacy.     5.  Follow up in office in 3 months for reassessment of symptoms.      Discussion:    For GERD, patient to continue famotidine.    For excess gas we recommend that she follow the low FODMAP diet and avoid specific foods that seem to be contributing to her symptoms.  We have also recommended use of a daily probiotic.  Patient is past due for her colonoscopy.  She did have a negative Cologuard in 2021.  However, she has had adenomatous colon polyps in the past and diverticulitis, and colonoscopy was recommended.  Patient defers at this time and states that she would like to think about it.  We will plan for office follow-up in 3 months for reassessment of symptoms, or sooner should symptoms worsen or fail to improve.  Patient verbalized understanding of above plan of care and is in agreement.  All questions answered and support provided.     EMR Dragon/Transcription Disclaimer:  This document has been Dictated utilizing Dragon dictation.

## 2023-03-03 ENCOUNTER — PATIENT ROUNDING (BHMG ONLY) (OUTPATIENT)
Dept: CARDIOLOGY | Facility: CLINIC | Age: 73
End: 2023-03-03
Payer: MEDICARE

## 2023-03-04 NOTE — PROGRESS NOTES
March 4, 2023    Hello, may I speak with Sara Gary?    My name is       I am  with [facility]OLOG[facility]  [facility] [facility] [address] [phone].    Before we get started may I verify your date of birth? 1950    I am calling to officially welcome you to our practice and ask about your recent visit. Is this a good time to talk?     Tell me about your visit with us. What things went well?         We're always looking for ways to make our patients' experiences even better. Do you have recommendations on ways we may improve?     Overall were you satisfied with your first visit to our practice?        I appreciate you taking the time to speak with me today. Is there anything else I can do for you?       Thank you, and have a great day.      
today

## 2023-03-06 ENCOUNTER — OFFICE VISIT (OUTPATIENT)
Dept: INTERNAL MEDICINE | Facility: CLINIC | Age: 73
End: 2023-03-06
Payer: MEDICARE

## 2023-03-06 VITALS
BODY MASS INDEX: 21.77 KG/M2 | WEIGHT: 147 LBS | OXYGEN SATURATION: 97 % | TEMPERATURE: 98 F | HEART RATE: 59 BPM | HEIGHT: 69 IN | DIASTOLIC BLOOD PRESSURE: 84 MMHG | SYSTOLIC BLOOD PRESSURE: 110 MMHG

## 2023-03-06 DIAGNOSIS — F41.9 ANXIETY AND DEPRESSION: Chronic | ICD-10-CM

## 2023-03-06 DIAGNOSIS — E78.2 MIXED HYPERLIPIDEMIA: Chronic | ICD-10-CM

## 2023-03-06 DIAGNOSIS — K57.30 DIVERTICULOSIS OF LARGE INTESTINE WITHOUT HEMORRHAGE: Chronic | ICD-10-CM

## 2023-03-06 DIAGNOSIS — Z76.89 ENCOUNTER TO ESTABLISH CARE: Primary | ICD-10-CM

## 2023-03-06 DIAGNOSIS — F32.A ANXIETY AND DEPRESSION: Chronic | ICD-10-CM

## 2023-03-06 DIAGNOSIS — Q21.10 RESIDUAL ASD (ATRIAL SEPTAL DEFECT) FOLLOWING REPAIR: Chronic | ICD-10-CM

## 2023-03-06 DIAGNOSIS — I34.1 MVP (MITRAL VALVE PROLAPSE): Chronic | ICD-10-CM

## 2023-03-06 DIAGNOSIS — K21.9 GASTROESOPHAGEAL REFLUX DISEASE WITHOUT ESOPHAGITIS: Chronic | ICD-10-CM

## 2023-03-06 DIAGNOSIS — I10 ESSENTIAL HYPERTENSION: Chronic | ICD-10-CM

## 2023-03-06 DIAGNOSIS — R73.03 PREDIABETES: Chronic | ICD-10-CM

## 2023-03-06 DIAGNOSIS — M25.50 MULTIPLE JOINT PAIN: Chronic | ICD-10-CM

## 2023-03-06 DIAGNOSIS — E03.8 OTHER SPECIFIED HYPOTHYROIDISM: Chronic | ICD-10-CM

## 2023-03-06 PROBLEM — K57.92 DIVERTICULITIS: Status: RESOLVED | Noted: 2017-06-29 | Resolved: 2023-03-06

## 2023-03-06 PROBLEM — N20.0 KIDNEY STONE ON LEFT SIDE: Status: RESOLVED | Noted: 2017-06-29 | Resolved: 2023-03-06

## 2023-03-06 PROBLEM — K57.32 DIVERTICULITIS LARGE INTESTINE: Status: RESOLVED | Noted: 2017-06-29 | Resolved: 2023-03-06

## 2023-03-06 PROCEDURE — 99214 OFFICE O/P EST MOD 30 MIN: CPT | Performed by: NURSE PRACTITIONER

## 2023-03-06 RX ORDER — ESTRADIOL 0.1 MG/G
CREAM VAGINAL
COMMUNITY
Start: 2023-03-03

## 2023-03-06 NOTE — PROGRESS NOTES
"Chief Complaint  Hypothyroidism    Subjective        Sara Gary presents to [facility]  History of Present Illness  This is a 73 y/o female presenting to office to establish care and chronic care management. Patient had been living in [place] since fall 2023. Patient currently lives alone. Patient does have adult children.     Patient continues with active lifestyle.     Denies any tobacco. Patient denies alcohol use.     Patient reports she has been suffering from joint pain upon waking in the morning. Reports she is following with orthopedic for right scapula pain. Patient is working on starting physical therapy. Patient reports she has hx of joint pain in her left shoulder, right shoulder, b/l hips. Patient reports she was previously dx with fibromyalgia in 2000. Patient reports she keeps active.    Patient follows with Dr. Verde-- hx of residual ASD following repair, HTN, MVP.     Continues following with GI for hx of diverticulosis.     Hx anxiety-- continues on zoloft QHS; using buspar PRN-- very sparingly. Reports mood overall stable.     Following with Dr. James for gynecology needs-- UTD with mammogram. Dexa scan scheduled. Patient is repeating pap smear.     Objective   Vital Signs:  /84 (BP Location: Left arm, Patient Position: Sitting, Cuff Size: Adult)   Pulse 59   Temp 98 °F (36.7 °C) (Infrared)   Ht 175.3 cm (69\")   Wt 66.7 kg (147 lb)   SpO2 97%   BMI 21.71 kg/m²   Estimated body mass index is 21.71 kg/m² as calculated from the following:    Height as of this encounter: 175.3 cm (69\").    Weight as of this encounter: 66.7 kg (147 lb).       BMI is within normal parameters. No other follow-up for BMI required.      Physical Exam  Constitutional:       Appearance: Normal appearance.   HENT:      Head: Normocephalic and atraumatic.      Right Ear: External ear normal.      Left Ear: External ear normal.   Eyes:      Conjunctiva/sclera: " Conjunctivae normal.      Pupils: Pupils are equal, round, and reactive to light.   Neck:      Vascular: No carotid bruit.   Cardiovascular:      Rate and Rhythm: Normal rate and regular rhythm.      Pulses: Normal pulses.      Heart sounds: Murmur heard.    Diastolic murmur is present with a grade of 1/4.    No friction rub. No gallop.   Pulmonary:      Effort: Pulmonary effort is normal. No respiratory distress.      Breath sounds: Normal breath sounds. No stridor. No wheezing, rhonchi or rales.   Musculoskeletal:      Cervical back: Normal range of motion and neck supple.      Comments: Multiple joint tenderness including b/l shoulders, hips   Neurological:      General: No focal deficit present.      Mental Status: She is alert and oriented to person, place, and time. Mental status is at baseline.   Psychiatric:         Mood and Affect: Mood normal.         Thought Content: Thought content normal.         Judgment: Judgment normal.        Result Review :  The following data was reviewed by: ALEJANDRO Miller on 03/06/2023:  Common labs    Common Labs 4/19/22 4/19/22 4/19/22    1115 1115 1120   Glucose  93    BUN  12    Creatinine  0.72 0.70   Sodium  140    Potassium  4.4    Chloride  105    Calcium  9.6    Albumin  4.20    Total Bilirubin  0.3    Alkaline Phosphatase  149 (A)    AST (SGOT)  12    ALT (SGPT)  8    WBC 4.16     Hemoglobin 12.1     Hematocrit 37.6     Platelets 250     (A) Abnormal value       Comments are available for some flowsheets but are not being displayed.                        Assessment and Plan   Diagnoses and all orders for this visit:    1. Encounter to establish care (Primary)    2. Essential hypertension  Assessment & Plan:  Hypertension is improving with lifestyle modifications.  Continue current treatment regimen.  Blood pressure will be reassessed at the next regular appointment.    Orders:  -     CBC & Differential  -     Comprehensive metabolic panel    3. Residual ASD  (atrial septal defect) following repair  Assessment & Plan:  Following with cardiology.   Stable.       4. MVP (mitral valve prolapse)  Assessment & Plan:  Following with cardiology.   Stable.       5. Multiple joint pain  Assessment & Plan:  Worsening  Lab work today to r/o reactive/autoimmune arthritis.       Orders:  -     AKI With / DsDNA, RNP, Sjogrens A / B, Smith  -     Rheumatoid Factor, Quant    6. Diverticulosis of large intestine without hemorrhage  Assessment & Plan:  Following with GI.   Stable.       7. Other specified hypothyroidism  Assessment & Plan:  Continues on cytomel and synthroid.   Hx thyroidectomy.       Orders:  -     T3, free  -     T4, free  -     TSH    8. Anxiety and depression  Assessment & Plan:  Patient's depression is recurrent and is moderate without psychosis. Their depression is currently in full remission and the condition is improving with treatment. This will be reassessed at the next regular appointment. F/U as described:patient will continue current medication therapy.      9. Prediabetes  -     Hemoglobin A1c    10. Gastroesophageal reflux disease without esophagitis  Assessment & Plan:  Continues on Pepcid.   Improving with adding extra dose.       11. Mixed hyperlipidemia  Assessment & Plan:  Lipid abnormalities are unchanged.  Nutritional counseling was provided.  Lipids will be reassessed in 6 months.    Orders:  -     Lipid panel           Follow Up   Return in about 6 months (around 9/6/2023) for Medicare Wellness.  Patient was given instructions and counseling regarding her condition or for health maintenance advice. Please see specific information pulled into the AVS if appropriate.

## 2023-03-07 LAB
ALBUMIN SERPL-MCNC: 4.5 G/DL (ref 3.5–5.2)
ALBUMIN/GLOB SERPL: 2 G/DL
ALP SERPL-CCNC: 169 U/L (ref 39–117)
ALT SERPL-CCNC: 11 U/L (ref 1–33)
ANA SER QL: NEGATIVE
AST SERPL-CCNC: 17 U/L (ref 1–32)
BASOPHILS # BLD AUTO: 0.03 10*3/MM3 (ref 0–0.2)
BASOPHILS NFR BLD AUTO: 0.6 % (ref 0–1.5)
BILIRUB SERPL-MCNC: 0.4 MG/DL (ref 0–1.2)
BUN SERPL-MCNC: 10 MG/DL (ref 8–23)
BUN/CREAT SERPL: 13.5 (ref 7–25)
CALCIUM SERPL-MCNC: 9.4 MG/DL (ref 8.6–10.5)
CHLORIDE SERPL-SCNC: 105 MMOL/L (ref 98–107)
CHOLEST SERPL-MCNC: 258 MG/DL (ref 0–200)
CO2 SERPL-SCNC: 27 MMOL/L (ref 22–29)
CREAT SERPL-MCNC: 0.74 MG/DL (ref 0.57–1)
EGFRCR SERPLBLD CKD-EPI 2021: 85 ML/MIN/1.73
EOSINOPHIL # BLD AUTO: 0.18 10*3/MM3 (ref 0–0.4)
EOSINOPHIL NFR BLD AUTO: 3.9 % (ref 0.3–6.2)
ERYTHROCYTE [DISTWIDTH] IN BLOOD BY AUTOMATED COUNT: 12.4 % (ref 12.3–15.4)
GLOBULIN SER CALC-MCNC: 2.2 GM/DL
GLUCOSE SERPL-MCNC: 95 MG/DL (ref 65–99)
HBA1C MFR BLD: 5.5 % (ref 4.8–5.6)
HCT VFR BLD AUTO: 37.6 % (ref 34–46.6)
HDLC SERPL-MCNC: 89 MG/DL (ref 40–60)
HGB BLD-MCNC: 12.9 G/DL (ref 12–15.9)
IMM GRANULOCYTES # BLD AUTO: 0.01 10*3/MM3 (ref 0–0.05)
IMM GRANULOCYTES NFR BLD AUTO: 0.2 % (ref 0–0.5)
LDLC SERPL CALC-MCNC: 154 MG/DL (ref 0–100)
LYMPHOCYTES # BLD AUTO: 1.58 10*3/MM3 (ref 0.7–3.1)
LYMPHOCYTES NFR BLD AUTO: 34.1 % (ref 19.6–45.3)
MCH RBC QN AUTO: 30.4 PG (ref 26.6–33)
MCHC RBC AUTO-ENTMCNC: 34.3 G/DL (ref 31.5–35.7)
MCV RBC AUTO: 88.5 FL (ref 79–97)
MONOCYTES # BLD AUTO: 0.28 10*3/MM3 (ref 0.1–0.9)
MONOCYTES NFR BLD AUTO: 6 % (ref 5–12)
NEUTROPHILS # BLD AUTO: 2.56 10*3/MM3 (ref 1.7–7)
NEUTROPHILS NFR BLD AUTO: 55.2 % (ref 42.7–76)
NRBC BLD AUTO-RTO: 0.2 /100 WBC (ref 0–0.2)
PLATELET # BLD AUTO: 247 10*3/MM3 (ref 140–450)
POTASSIUM SERPL-SCNC: 4.5 MMOL/L (ref 3.5–5.2)
PROT SERPL-MCNC: 6.7 G/DL (ref 6–8.5)
RBC # BLD AUTO: 4.25 10*6/MM3 (ref 3.77–5.28)
RHEUMATOID FACT SERPL-ACNC: <10 IU/ML
SODIUM SERPL-SCNC: 140 MMOL/L (ref 136–145)
T3FREE SERPL-MCNC: 2.5 PG/ML (ref 2–4.4)
T4 FREE SERPL-MCNC: 1.05 NG/DL (ref 0.93–1.7)
TRIGL SERPL-MCNC: 91 MG/DL (ref 0–150)
TSH SERPL DL<=0.005 MIU/L-ACNC: 0.59 UIU/ML (ref 0.27–4.2)
VLDLC SERPL CALC-MCNC: 15 MG/DL (ref 5–40)
WBC # BLD AUTO: 4.64 10*3/MM3 (ref 3.4–10.8)

## 2023-03-08 ENCOUNTER — TELEPHONE (OUTPATIENT)
Dept: GASTROENTEROLOGY | Facility: CLINIC | Age: 73
End: 2023-03-08
Payer: MEDICARE

## 2023-03-08 NOTE — TELEPHONE ENCOUNTER
Discussed recommendations and options with patient. Patient would like to first move forward with the ultrasound. Thank you. EL

## 2023-03-08 NOTE — TELEPHONE ENCOUNTER
Patient called with concerns regarding the HIDA scan. Patient reports that at office visit a CT scan was discussed prior to her getting HIDA scan. Patient would prefer to do something less invasive prior to HIDA scan. Please advise. EL

## 2023-03-09 ENCOUNTER — PATIENT ROUNDING (BHMG ONLY) (OUTPATIENT)
Dept: INTERNAL MEDICINE | Facility: CLINIC | Age: 73
End: 2023-03-09
Payer: MEDICARE

## 2023-03-09 ENCOUNTER — TELEPHONE (OUTPATIENT)
Dept: INTERNAL MEDICINE | Facility: CLINIC | Age: 73
End: 2023-03-09
Payer: MEDICARE

## 2023-03-09 DIAGNOSIS — Q21.10 RESIDUAL ASD (ATRIAL SEPTAL DEFECT) FOLLOWING REPAIR: Primary | ICD-10-CM

## 2023-03-09 DIAGNOSIS — I34.1 MVP (MITRAL VALVE PROLAPSE): ICD-10-CM

## 2023-03-09 RX ORDER — LEVOTHYROXINE SODIUM 75 MCG
75 TABLET ORAL DAILY
Qty: 90 TABLET | Refills: 1 | Status: SHIPPED | OUTPATIENT
Start: 2023-03-09

## 2023-03-09 NOTE — PROGRESS NOTES
Please let patient know--  Cholesterol still mildly elevated-- LDL at 154; goal under 100; recommend low sat fat/low refined carb diet and exercise as tolerated.   CMP stable; liver/kidney function normal  A1C normal  All Thyroid levels normal  AKI and RA factor negative-- most likely a combination of fibromyalgia and osteoarthritis-- recommend tylenol arthritis and voltaren gel topically (both OTC)  CBC normal

## 2023-03-09 NOTE — TELEPHONE ENCOUNTER
Caller: Sara Gary    Relationship: Self    Best call back number:  [phone]  What is the best time to reach you: ANY     Who are you requesting to speak with (clinical staff, provider,  specific staff member): CLINICAL STAFF         What was the call regarding: PATIENT WOULD LIKE A CALL BACK REGARDING HER LABS.  SHE IS OUT OF Synthroid 75 MCG tablet AND DOESN'T WANT TO ASK FOR A REFILL IF THE DOSE OF THE MEDICATION IS GOING TO CHANGE.  THE CURRENT MEDICATION DOESN'T HAVE ANY REFILLS     [facility] [id number] - [place], [place] - [facility] [address]  - [phone]  - [phone] FX      Do you require a callback: YES

## 2023-03-10 ENCOUNTER — HOSPITAL ENCOUNTER (OUTPATIENT)
Dept: PHYSICAL THERAPY | Facility: HOSPITAL | Age: 73
Setting detail: THERAPIES SERIES
Discharge: HOME | End: 2023-03-10
Payer: MEDICARE

## 2023-03-10 DIAGNOSIS — G89.29 CHRONIC SCAPULAR PAIN: ICD-10-CM

## 2023-03-10 DIAGNOSIS — M89.8X1 CHRONIC SCAPULAR PAIN: ICD-10-CM

## 2023-03-10 DIAGNOSIS — M25.511 CHRONIC RIGHT SHOULDER PAIN: Primary | ICD-10-CM

## 2023-03-10 DIAGNOSIS — G89.29 CHRONIC RIGHT SHOULDER PAIN: Primary | ICD-10-CM

## 2023-03-10 PROCEDURE — 97161 PT EVAL LOW COMPLEX 20 MIN: CPT

## 2023-03-10 PROCEDURE — 97110 THERAPEUTIC EXERCISES: CPT

## 2023-03-10 NOTE — TELEPHONE ENCOUNTER
Patient advised on lab results    Patient mentioned she just found out she has a heart murmer. Pt also requested to be referred to a female cardiologist

## 2023-03-10 NOTE — THERAPY EVALUATION
Outpatient Physical Therapy Ortho Initial Evaluation  [facility]     Patient Name: Sara Gary  : 1950  MRN: [id number]  Today's Date: 3/10/2023      Visit Date: 03/10/2023    Patient Active Problem List   Diagnosis   • Diverticulosis of large intestine without hemorrhage   • Hydronephrosis, left   • Residual ASD (atrial septal defect) following repair   • Elevated liver enzymes   • Essential hypertension   • Abnormal electrocardiogram   • MVP (mitral valve prolapse)   • Multiple joint pain   • Other specified hypothyroidism   • Anxiety and depression   • Gastroesophageal reflux disease without esophagitis   • Mixed hyperlipidemia   • Prediabetes        Past Medical History:   Diagnosis Date   • Depression    • Diverticulitis    • Environmental allergies    • History of colon polyps    • Hypertension    • Kidney stone     hx of 4 stones        Past Surgical History:   Procedure Laterality Date   • APPENDECTOMY     • ASD REPAIR, SECUNDUM     • CATARACT EXTRACTION     • COLONOSCOPY     • CYSTOSCOPY BLADDER STONE LITHOTRIPSY     • CYSTOSCOPY W/ URETERAL STENT PLACEMENT Left 2017    Procedure: CYSTOSCOPY LEFT STENT INSERTION & STONE REMOVAL;  Surgeon: Raheem Franklin MD;  Location: [facility];  Service:    • HYSTERECTOMY     • NEPHRECTOMY RADICAL     • THYROIDECTOMY     • UPPER GASTROINTESTINAL ENDOSCOPY         Visit Dx:     ICD-10-CM ICD-9-CM   1. Chronic right shoulder pain  M25.511 719.41    G89.29 338.29   2. Chronic scapular pain  M89.8X1 733.90    G89.29 338.29          Patient History     Row Name 03/10/23 1300             History    Chief Complaint Pain  -LB      Type of Pain Shoulder pain  -LB      Date Current Problem(s) Began 22  -LB      Brief Description of Current Complaint Pt states pain is behind her R shoulder blade. She moved here to [place] in November and pain was present 3 months prior to that. She states she was packing up to move to [place] but otherwise no obvious  injury. She states this was a stressful time in her life for various reasons. She states she was very isolated in [place] due to COVID. She states she is very active and loves moving her furniture around her home. She states now her scapula hurts severely with activities such as grocery lifting. She uses ice/heat and both help. She states pain is worse with lifting/using UE. She states she wakes up feeling poorly and is trying to get herself to a better place. She is R handed.  -LB      Patient/Caregiver Goals Return to prior level of function;Relieve pain;Know what to do to help the symptoms;Improve strength  -LB      Hand Dominance right-handed  -LB      Occupation/sports/leisure activities Pt does not work, enjoys moving furniture around.  -LB      Patient seeing anyone else for problem(s)? yes  -LB      How has patient tried to help current problem? avoiding lifting  -LB      What clinical tests have you had for this problem? X-ray  -LB      Results of Clinical Tests negative  -LB      History of Previous Related Injuries chronic pain  -LB         Pain     Pain Location Shoulder  -LB      Pain at Present 6  -LB      Pain at Best 0  -LB      Pain at Worst 9  -LB      Pain Frequency Intermittent  -LB      Pain Description Sharp;Stabbing;Aching  -LB      What Performance Factors Make the Current Problem(s) WORSE? lifting  -LB      What Performance Factors Make the Current Problem(s) BETTER? heat/ice, avoiding using RUE  -LB      Tolerance Time- Standing standing for inc time aggravates  -LB      Tolerance Time- Sitting no issue  -LB      Tolerance Time- Walking inc time aggravates  -LB      Tolerance Time- Lying no issue  -LB      Is your sleep disturbed? No  -LB      What position do you sleep in? Supine  -LB      Difficulties at work? pt does not work  -LB      Difficulties with ADL's? inc pain with lifting  -LB      Difficulties with recreational activities? unable to move furniture  -LB         Fall Risk Assessment     Any falls in the past year: No  -LB         Services    Prior Rehab/Home Health Experiences No  -LB      Are you currently receiving Home Health services No  -LB      Do you plan to receive Home Health services in the near future No  -LB         Daily Activities    Primary Language English  -LB      Pt Participated in POC and Goals Yes  -LB         Safety    Are you being hurt, hit, or frightened by anyone at home or in your life? No  -LB      Are you being neglected by a caregiver No  -LB      Have you had any of the following issues with N/A  -LB            User Key  (r) = Recorded By, (t) = Taken By, (c) = Cosigned By    Initials Name Provider Type    LB Nimo Caputo, PT Physical Therapist                 PT Ortho     Row Name 03/10/23 1300       Subjective Comments    Subjective Comments It is really hurting today. I lifted a heavy grocery bag before I came here.  -LB       Subjective Pain    Able to rate subjective pain? yes  -LB    Pre-Treatment Pain Level 6  -LB       Posture/Observations    Posture/Observations Comments inc thoracic kyphosis, forward head  -LB       Myotomal Screen- Upper Quarter Clearing    Shoulder flexion (C5) Bilateral:;4+ (Good +)  -LB    Elbow flexion/wrist extension (C6) Bilateral:;4+ (Good +)  -LB    Elbow extension/wrist flexion (C7) Bilateral:;4+ (Good +)  -LB     WNL  -LB       Cervical/Shoulder ROM Screen    Cervical flexion Normal  -LB    Cervical extension Impaired  dec; no change inc symptoms  -LB    Cervical lateral flexion Impaired  dec B; no change in symptoms  -LB    Cervical rotation Normal  -LB    Shoulder elevation  Normal  -LB       Special Tests/Palpation    Special Tests/Palpation --  trigger point/taut band along medial scapular border, rhomboid; middle trap- reproduces symptoms with palpation  -LB       Thoracic Accessory Motions    Pa glide- Middle thoracic Hypomobile  -LB    Pa glide- Lower thoracic Hypomobile  -LB       Scapular Special Tests    Scapular  Retraction Test (Scapular Dyskinesia) Negative  -LB    Scapular Position Test (Instability) Negative  -LB       Shoulder Girdle Palpation    Middle Trap Right:;Trigger point;Tender;Guarded/taut  -LB    Rhomboid Right:;Tender;Guarded/taut;Trigger point  -LB       General ROM    GENERAL ROM COMMENTS BUE WFL  -LB       MMT (Manual Muscle Testing)    General MMT Comments B ER strength 4-/5  -LB       Sensation    Sensation WNL? WNL  -LB       Upper Extremity Flexibility    Upper Trapezius Bilateral:;Moderately limited  -LB    Levator Scapula Bilateral:;Moderately limited  -LB    Pect Minor Bilateral:;Moderately limited  -LB    Pect Major Bilateral:;Moderately limited  -LB       Gait/Stairs (Locomotion)    [place] Level (Gait) independent  -LB          User Key  (r) = Recorded By, (t) = Taken By, (c) = Cosigned By    Initials Name Provider Type    Nimo Vázquez, PT Physical Therapist                            Therapy Education  Education Details: issued MED[place]: [id number], discussed need for controlled breathing with full expansion of rib cage, discussed serratus, trigger point, scapular mechanics, posture, pain related to stress/anxiety  Given: HEP, Mobility training, Symptoms/condition management, Posture/body mechanics, Pain management  Program: New  How Provided: Verbal, Demonstration, Written  Provided to: Patient  Level of Understanding: Teach back education performed, Demonstrated, Verbalized      PT OP Goals     Row Name 03/10/23 1400          PT Short Term Goals    STG Date to Achieve 03/24/23  -LB     STG 1 Pt will demonstrate understanding and compliance with initial HEP.  -LB     STG 1 Progress New  -LB     STG 2 Pt will demonstrate ability to perform appropriate diaphragmatic breathing with full lateral and A/P expansion in controlled manner.  -LB     STG 2 Progress New  -LB     STG 3 Pt will verbalize reduced intensity of stabbing pain in R medial scapular region by 50%.  -LB     STG 3 Progress  New  -LB        Long Term Goals    LTG Date to Achieve 04/09/23  -LB     LTG 1 Pt will report pain at worse dec from 9/10 to 5/10 or better in last 2 weeks.  -LB     LTG 1 Progress New  -LB     LTG 2 Pt will tolerate lifting 10# item without inc in R periscapular pain.  -LB     LTG 2 Progress New  -LB     LTG 3 Pt will demonstrate understanding of advanced HEP to allow continued management of condition.  -LB     LTG 3 Progress New  -LB        Time Calculation    PT Goal Re-Cert Due Date 06/08/23  -LB           User Key  (r) = Recorded By, (t) = Taken By, (c) = Cosigned By    Initials Name Provider Type    Nimo Vázquez, PT Physical Therapist                 PT Assessment/Plan     Row Name 03/10/23 1403          PT Assessment    Functional Limitations Limitations in functional capacity and performance;Performance in leisure activities;Limitation in home management;Limitations in community activities;Performance in self-care ADL  -LB     Impairments Impaired flexibility;Joint mobility;Muscle strength;Locomotion;Pain;Poor body mechanics;Posture;Range of motion;Sensation  -LB     Assessment Comments Pt is 72 y.o. female referred to outpatient physical therapy for evaluation and treatment of  evolving  right periscapular pain that is worsening in nature and began >6 months ago insidiously.  Patient presents with inc thoracic kyphosis, trigger point in rhomboid/middle trap, dec serratus anterior strength, altered breathing mechanics. PMHx consistent with fibromyalgia, ASD repair, asthma, anxiety. Personal factors affecting her care include stressful/anxiety ridden last several years, hobby of moving furniture, kyphotic posture. Pt demonstrates signs and symptoms  consistent with referring diagnosis.  Pt scored 61% disability on the Quick DASH. Pt is limited in their ability to participate in lifting, moving furniture, household chores. She will benefit from continued skilled PT services to address functional deficits.  Thank you for this referral.  -LB     Rehab Potential Good  -LB     Patient/caregiver participated in establishment of treatment plan and goals Yes  -LB     Patient would benefit from skilled therapy intervention Yes  -LB        PT Plan    PT Frequency 1x/week;2x/week  -LB     Predicted Duration of Therapy Intervention (PT) 6-8 visits  -LB     Planned CPT's? PT EVAL LOW COMPLEXITY: 11992;PT RE-EVAL: 28569;PT THER PROC EA 15 MIN: 79686;PT THER ACT EA 15 MIN: 40030;PT MANUAL THERAPY EA 15 MIN: 75716;PT NEUROMUSC RE-EDUCATION EA 15 MIN: 98710;PT SELF CARE/HOME MGMT/TRAIN EA 15: 47723  -LB     PT Plan Comments consider T spine PA, supine on foam roller, SB rollout, thread the needle, D2 PNF flexion, push up plus, STM to periscapular tissue, SL scapular clock, HA  -LB           User Key  (r) = Recorded By, (t) = Taken By, (c) = Cosigned By    Initials Name Provider Type    Nimo Vázquez, PT Physical Therapist                   OP Exercises     Row Name 03/10/23 1300             Subjective Comments    Subjective Comments It is really hurting today. I lifted a heavy grocery bag before I came here.  -LB         Subjective Pain    Able to rate subjective pain? yes  -LB      Pre-Treatment Pain Level 6  -LB         Total Minutes    79837 - PT Therapeutic Exercise Minutes 15  -LB         Exercise 1    Exercise Name 1 scapular retraction  -LB      Reps 1 10  -LB      Time 1 5  -LB         Exercise 2    Exercise Name 2 crossbody adduction  -LB      Reps 2 3  -LB      Time 2 20  -LB         Exercise 3    Exercise Name 3 SAP  -LB      Sets 3 2  -LB      Reps 3 10  -LB         Exercise 4    Exercise Name 4 SL upper trunk rotation  -LB      Reps 4 10  -LB      Time 4 each  -LB         Exercise 5    Exercise Name 5 HL diaphragmatic breathing  -LB      Sets 5 2  -LB      Reps 5 10  -LB      Additional Comments hands on belly;hands on lateral rib cage  -LB            User Key  (r) = Recorded By, (t) = Taken By, (c) = Cosigned By     Initials Name Provider Type    Nimo Vázquez, PT Physical Therapist                              Outcome Measure Options: Quick DASH  Quick DASH  Open a tight or new jar.: Mild Difficulty  Do heavy household chores (e.g., wash walls, wash floors): Unable  Carry a shopping bag or briefcase: Moderate Difficulty  Wash your back: Unable  Use a knife to cut food: No Difficulty  Recreational activities in which you take some force or impact through your arm, should or hand (e.g. golf, hammering, tennis, etc.): Unable  During the past week, to what extent has your arm, shoulder, or hand problem interfered with your normal social activites with family, friends, neighbors or groups?: Moderately  During the past week, were you limited in your work or other regular daily activities as a result of your arm, shoulder or hand problem?: Very limited  Arm, Shoulder, or hand pain: Severe  Tingling (pins and needles) in your arm, shoulder, or hand: Moderate  During the past week, how much difficulty have you had sleeping because of the pain in your arm, shoulder or hand?: Moderate Difficiculty  Number of Questions Answered: 11  Quick DASH Score: 61.36         Time Calculation:     Start Time: 1315  Stop Time: 1400  Time Calculation (min): 45 min  Total Timed Code Minutes- PT: 15 minute(s)  Timed Charges  91149 - PT Therapeutic Exercise Minutes: 15  Total Minutes  Timed Charges Total Minutes: 15   Total Minutes: 15     Therapy Charges for Today     Code Description Service Date Service Provider Modifiers Qty    56262294412 HC PT THER PROC EA 15 MIN 3/10/2023 Nimo Caputo, PT GP 1    33810948790 HC PT EVAL LOW COMPLEXITY 2 3/10/2023 Nimo Caputo, PT GP 1          PT G-Codes  Outcome Measure Options: Quick DASH  Quick DASH Score: 61.36         Nimo Caputo PT  3/10/2023

## 2023-03-13 NOTE — TELEPHONE ENCOUNTER
LVM. Advised referral placed for new cardiologist     Okay for hub to read:   Cardiologist referred to: Jacqui Ruffin MD  Address: [address] [place] [address]  Phone #: [phone]    Patient should hear from the cardiologist office to get scheduled in the next week

## 2023-03-16 ENCOUNTER — HOSPITAL ENCOUNTER (OUTPATIENT)
Dept: PHYSICAL THERAPY | Facility: HOSPITAL | Age: 73
Setting detail: THERAPIES SERIES
Discharge: HOME | End: 2023-03-16
Payer: MEDICARE

## 2023-03-16 DIAGNOSIS — G89.29 CHRONIC SCAPULAR PAIN: ICD-10-CM

## 2023-03-16 DIAGNOSIS — G89.29 CHRONIC RIGHT SHOULDER PAIN: Primary | ICD-10-CM

## 2023-03-16 DIAGNOSIS — M89.8X1 CHRONIC SCAPULAR PAIN: ICD-10-CM

## 2023-03-16 DIAGNOSIS — M25.511 CHRONIC RIGHT SHOULDER PAIN: Primary | ICD-10-CM

## 2023-03-16 PROCEDURE — 97140 MANUAL THERAPY 1/> REGIONS: CPT

## 2023-03-16 PROCEDURE — 97110 THERAPEUTIC EXERCISES: CPT

## 2023-03-16 NOTE — THERAPY TREATMENT NOTE
Outpatient Physical Therapy Ortho Treatment Note  [facility]     Patient Name: Sara Gary  : 1950  MRN: [id number]  Today's Date: 3/16/2023      Visit Date: 2023    Visit Dx:    ICD-10-CM ICD-9-CM   1. Chronic right shoulder pain  M25.511 719.41    G89.29 338.29   2. Chronic scapular pain  M89.8X1 733.90    G89.29 338.29       Patient Active Problem List   Diagnosis   • Diverticulosis of large intestine without hemorrhage   • Hydronephrosis, left   • Residual ASD (atrial septal defect) following repair   • Elevated liver enzymes   • Essential hypertension   • Abnormal electrocardiogram   • MVP (mitral valve prolapse)   • Multiple joint pain   • Other specified hypothyroidism   • Anxiety and depression   • Gastroesophageal reflux disease without esophagitis   • Mixed hyperlipidemia   • Prediabetes        Past Medical History:   Diagnosis Date   • Depression    • Diverticulitis    • Environmental allergies    • History of colon polyps    • Hypertension    • Kidney stone     hx of 4 stones        Past Surgical History:   Procedure Laterality Date   • APPENDECTOMY     • ASD REPAIR, SECUNDUM     • CATARACT EXTRACTION     • COLONOSCOPY     • CYSTOSCOPY BLADDER STONE LITHOTRIPSY     • CYSTOSCOPY W/ URETERAL STENT PLACEMENT Left 2017    Procedure: CYSTOSCOPY LEFT STENT INSERTION & STONE REMOVAL;  Surgeon: Raheem Franklin MD;  Location: [facility];  Service:    • HYSTERECTOMY     • NEPHRECTOMY RADICAL     • THYROIDECTOMY     • UPPER GASTROINTESTINAL ENDOSCOPY                          PT Assessment/Plan     Row Name 23 1425          PT Assessment    Assessment Comments Pt returns for initial follow up after evaluation reporting compliance with HEP. Pt reporting increased pain with walking and believes symptoms also provoked by stress. Added several exercises this date including HA, alt UE flex and SAP over noodle as well as stm to periscapular tissue, UT, levator and pec minor. Pt  seeking counceling for anxiety and discussed importance of posture on condition as she demonstrates UT activation at rest throughout appointment.  -CN        PT Plan    PT Plan Comments Assess response to added exercsies, consider SB roll out, thread the needle, D2 PNF flex, push up plus next visit.  -CN           User Key  (r) = Recorded By, (t) = Taken By, (c) = Cosigned By    Initials Name Provider Type    Lupe Ngo, PT Physical Therapist                   OP Exercises     Row Name 03/16/23 1400 03/16/23 1300          Subjective Comments    Subjective Comments It is feeling ok today. It feels like it is worse when I walk.  -CN --        Subjective Pain    Able to rate subjective pain? yes  -CN --     Pre-Treatment Pain Level 0  -CN --        Total Minutes    45615 - PT Therapeutic Exercise Minutes 28  -CN --     45051 - PT Manual Therapy Minutes -- 12  -CN        Exercise 1    Exercise Name 1 UBE  -CN --     Cueing 1 Verbal;Demo  -CN --     Time 1 4 min  -CN --        Exercise 2    Exercise Name 2 crossbody adduction  -CN --     Reps 2 3  -CN --     Time 2 20  -CN --        Exercise 3    Exercise Name 3 Push up plus  -CN --     Cueing 3 --  -CN --     Reps 3 --  -CN --     Additional Comments next visit  -CN --        Exercise 4    Exercise Name 4 SL upper trunk rotation  -CN --     Reps 4 10  -CN --     Time 4 each  -CN --        Exercise 6    Exercise Name 6 Supine over noodle: SAP, alt flex, scap squeeze, HA  -CN --     Cueing 6 Verbal;Demo  -CN --     Reps 6 10 ea  -CN --     Time 6 2#, YTB  -CN --           User Key  (r) = Recorded By, (t) = Taken By, (c) = Cosigned By    Initials Name Provider Type    Lupe Ngo, PT Physical Therapist                         Manual Rx (last 36 hours)     Manual Treatments     Row Name 03/16/23 1300             Total Minutes    67033 - PT Manual Therapy Minutes 12  -CN         Manual Rx 1    Manual Rx 1 Location periscapular stm, UT, levator  in L SLing; pec minor stm in supine  -CN            User Key  (r) = Recorded By, (t) = Taken By, (c) = Cosigned By    Initials Name Provider Type    Lupe Ngo, BREN Physical Therapist                 PT OP Goals     Row Name 03/16/23 1400          PT Short Term Goals    STG Date to Achieve 03/24/23  -CN     STG 1 Pt will demonstrate understanding and compliance with initial HEP.  -CN     STG 1 Progress Ongoing  -CN     STG 1 Progress Comments Compliant with current program, added several exercies today.  -CN     STG 2 Pt will demonstrate ability to perform appropriate diaphragmatic breathing with full lateral and A/P expansion in controlled manner.  -CN     STG 2 Progress Ongoing  -CN     STG 3 Pt will verbalize reduced intensity of stabbing pain in R medial scapular region by 50%.  -CN     STG 3 Progress Ongoing  -CN        Long Term Goals    LTG Date to Achieve 04/09/23  -CN     LTG 1 Pt will report pain at worse dec from 9/10 to 5/10 or better in last 2 weeks.  -CN     LTG 1 Progress Ongoing  -CN     LTG 2 Pt will tolerate lifting 10# item without inc in R periscapular pain.  -CN     LTG 2 Progress Ongoing  -CN     LTG 3 Pt will demonstrate understanding of advanced HEP to allow continued management of condition.  -CN     LTG 3 Progress Ongoing  -CN           User Key  (r) = Recorded By, (t) = Taken By, (c) = Cosigned By    Initials Name Provider Type    Lupe Ngo, BREN Physical Therapist                Therapy Education  Given: HEP, Mobility training, Symptoms/condition management, Posture/body mechanics, Pain management  Program: Reinforced  How Provided: Verbal, Demonstration, Written  Provided to: Patient  Level of Understanding: Teach back education performed, Demonstrated, Verbalized              Time Calculation:   Start Time: 1355  Stop Time: 1435  Time Calculation (min): 40 min  Timed Charges  79106 - PT Therapeutic Exercise Minutes: 28  81658 - PT Manual Therapy Minutes:  12  Total Minutes  Timed Charges Total Minutes: 28   Total Minutes: 28  Therapy Charges for Today     Code Description Service Date Service Provider Modifiers Qty    48294259509 HC PT THER PROC EA 15 MIN 3/16/2023 Lupe Denise, PT GP 2    29091091485 HC PT MANUAL THERAPY EA 15 MIN 3/16/2023 Lupe Denise, PT GP 1                    Lupe Denise, PT  3/16/2023

## 2023-03-18 ENCOUNTER — HOSPITAL ENCOUNTER (OUTPATIENT)
Dept: BONE DENSITY | Facility: HOSPITAL | Age: 73
Discharge: HOME | End: 2023-03-18
Admitting: OBSTETRICS & GYNECOLOGY
Payer: MEDICARE

## 2023-03-18 DIAGNOSIS — Z78.0 MENOPAUSE: ICD-10-CM

## 2023-03-18 PROCEDURE — 77080 DXA BONE DENSITY AXIAL: CPT

## 2023-03-20 DIAGNOSIS — E03.8 OTHER SPECIFIED HYPOTHYROIDISM: Primary | ICD-10-CM

## 2023-03-20 RX ORDER — LIOTHYRONINE SODIUM 5 UG/1
5 TABLET ORAL DAILY
Qty: 90 TABLET | Refills: 0 | Status: SHIPPED | OUTPATIENT
Start: 2023-03-20

## 2023-03-20 NOTE — TELEPHONE ENCOUNTER
Caller: Sara Gary    Relationship: Self    Best call back number:  [phone]  Requested Prescriptions:   Requested Prescriptions     Pending Prescriptions Disp Refills   • liothyronine (CYTOMEL) 5 MCG tablet          Pharmacy where request should be sent: [facility] [id number] - [place], [place] - [facility] [address] [facility]  - [phone]  - [phone] FX     Additional details provided by patient: NEEDS NEW PRESCRIPTION    Does the patient have less than a 3 day supply:  [] Yes  [] No    Would you like a call back once the refill request has been completed: [] Yes [x] No    If the office needs to give you a call back, can they leave a voicemail: [] Yes [x] No    Terrell Infante   03/20/23 14:14 EDT

## 2023-03-22 ENCOUNTER — HOSPITAL ENCOUNTER (OUTPATIENT)
Dept: CARDIOLOGY | Facility: HOSPITAL | Age: 73
Discharge: HOME | End: 2023-03-22
Payer: MEDICARE

## 2023-03-22 VITALS
WEIGHT: 147.05 LBS | DIASTOLIC BLOOD PRESSURE: 85 MMHG | HEIGHT: 69 IN | SYSTOLIC BLOOD PRESSURE: 145 MMHG | BODY MASS INDEX: 21.78 KG/M2

## 2023-03-22 LAB
ALBUMIN SERPL-MCNC: 4 G/DL (ref 3.5–5.2)
ALBUMIN/GLOB SERPL: 1.5 G/DL
ALP SERPL-CCNC: 155 U/L (ref 39–117)
ALT SERPL W P-5'-P-CCNC: 8 U/L (ref 1–33)
ANION GAP SERPL CALCULATED.3IONS-SCNC: 7.9 MMOL/L (ref 5–15)
AST SERPL-CCNC: 16 U/L (ref 1–32)
BASOPHILS # BLD AUTO: 0.03 10*3/MM3 (ref 0–0.2)
BASOPHILS NFR BLD AUTO: 0.6 % (ref 0–1.5)
BILIRUB SERPL-MCNC: 0.4 MG/DL (ref 0–1.2)
BUN SERPL-MCNC: 10 MG/DL (ref 8–23)
BUN/CREAT SERPL: 12.8 (ref 7–25)
CALCIUM SPEC-SCNC: 9.4 MG/DL (ref 8.6–10.5)
CHLORIDE SERPL-SCNC: 106 MMOL/L (ref 98–107)
CHOLEST SERPL-MCNC: 220 MG/DL (ref 0–200)
CO2 SERPL-SCNC: 27.1 MMOL/L (ref 22–29)
CREAT SERPL-MCNC: 0.78 MG/DL (ref 0.57–1)
DEPRECATED RDW RBC AUTO: 39 FL (ref 37–54)
EGFRCR SERPLBLD CKD-EPI 2021: 79.8 ML/MIN/1.73
EOSINOPHIL # BLD AUTO: 0.22 10*3/MM3 (ref 0–0.4)
EOSINOPHIL NFR BLD AUTO: 4.7 % (ref 0.3–6.2)
ERYTHROCYTE [DISTWIDTH] IN BLOOD BY AUTOMATED COUNT: 12.1 % (ref 12.3–15.4)
GLOBULIN UR ELPH-MCNC: 2.6 GM/DL
GLUCOSE SERPL-MCNC: 105 MG/DL (ref 65–99)
HCT VFR BLD AUTO: 37.8 % (ref 34–46.6)
HDLC SERPL-MCNC: 64 MG/DL (ref 40–60)
HGB BLD-MCNC: 12.5 G/DL (ref 12–15.9)
IMM GRANULOCYTES # BLD AUTO: 0.02 10*3/MM3 (ref 0–0.05)
IMM GRANULOCYTES NFR BLD AUTO: 0.4 % (ref 0–0.5)
LDLC SERPL CALC-MCNC: 136 MG/DL (ref 0–100)
LDLC/HDLC SERPL: 2.08 {RATIO}
LYMPHOCYTES # BLD AUTO: 1.29 10*3/MM3 (ref 0.7–3.1)
LYMPHOCYTES NFR BLD AUTO: 27.7 % (ref 19.6–45.3)
MCH RBC QN AUTO: 29.4 PG (ref 26.6–33)
MCHC RBC AUTO-ENTMCNC: 33.1 G/DL (ref 31.5–35.7)
MCV RBC AUTO: 88.9 FL (ref 79–97)
MONOCYTES # BLD AUTO: 0.3 10*3/MM3 (ref 0.1–0.9)
MONOCYTES NFR BLD AUTO: 6.4 % (ref 5–12)
NEUTROPHILS NFR BLD AUTO: 2.8 10*3/MM3 (ref 1.7–7)
NEUTROPHILS NFR BLD AUTO: 60.2 % (ref 42.7–76)
NRBC BLD AUTO-RTO: 0 /100 WBC (ref 0–0.2)
PLATELET # BLD AUTO: 262 10*3/MM3 (ref 140–450)
PMV BLD AUTO: 10.3 FL (ref 6–12)
POTASSIUM SERPL-SCNC: 4.4 MMOL/L (ref 3.5–5.2)
PROT SERPL-MCNC: 6.6 G/DL (ref 6–8.5)
RBC # BLD AUTO: 4.25 10*6/MM3 (ref 3.77–5.28)
SODIUM SERPL-SCNC: 141 MMOL/L (ref 136–145)
TRIGL SERPL-MCNC: 116 MG/DL (ref 0–150)
TSH SERPL DL<=0.05 MIU/L-ACNC: 0.77 UIU/ML (ref 0.27–4.2)
VLDLC SERPL-MCNC: 20 MG/DL (ref 5–40)
WBC NRBC COR # BLD: 4.66 10*3/MM3 (ref 3.4–10.8)

## 2023-03-22 PROCEDURE — 85025 COMPLETE CBC W/AUTO DIFF WBC: CPT | Performed by: INTERNAL MEDICINE

## 2023-03-22 PROCEDURE — 93306 TTE W/DOPPLER COMPLETE: CPT | Performed by: INTERNAL MEDICINE

## 2023-03-22 PROCEDURE — 93306 TTE W/DOPPLER COMPLETE: CPT

## 2023-03-22 PROCEDURE — 80061 LIPID PANEL: CPT | Performed by: INTERNAL MEDICINE

## 2023-03-22 PROCEDURE — 84443 ASSAY THYROID STIM HORMONE: CPT | Performed by: INTERNAL MEDICINE

## 2023-03-22 PROCEDURE — 36415 COLL VENOUS BLD VENIPUNCTURE: CPT | Performed by: INTERNAL MEDICINE

## 2023-03-22 PROCEDURE — 82652 VIT D 1 25-DIHYDROXY: CPT | Performed by: INTERNAL MEDICINE

## 2023-03-22 PROCEDURE — 80053 COMPREHEN METABOLIC PANEL: CPT | Performed by: INTERNAL MEDICINE

## 2023-03-23 ENCOUNTER — OFFICE VISIT (OUTPATIENT)
Dept: CARDIOLOGY | Facility: CLINIC | Age: 73
End: 2023-03-23
Payer: MEDICARE

## 2023-03-23 VITALS
HEART RATE: 64 BPM | WEIGHT: 147 LBS | BODY MASS INDEX: 21.77 KG/M2 | HEIGHT: 69 IN | SYSTOLIC BLOOD PRESSURE: 151 MMHG | DIASTOLIC BLOOD PRESSURE: 87 MMHG

## 2023-03-23 DIAGNOSIS — I34.1 MVP (MITRAL VALVE PROLAPSE): ICD-10-CM

## 2023-03-23 DIAGNOSIS — I10 ESSENTIAL HYPERTENSION: ICD-10-CM

## 2023-03-23 DIAGNOSIS — Q21.10 RESIDUAL ASD (ATRIAL SEPTAL DEFECT) FOLLOWING REPAIR: ICD-10-CM

## 2023-03-23 DIAGNOSIS — R94.31 ABNORMAL ELECTROCARDIOGRAM: ICD-10-CM

## 2023-03-23 DIAGNOSIS — R74.8 ELEVATED LIVER ENZYMES: Primary | ICD-10-CM

## 2023-03-23 LAB
AORTIC DIMENSIONLESS INDEX: 1 (DI)
BH CV ECHO MEAS - AI P1/2T: 832.2 MSEC
BH CV ECHO MEAS - AO MAX PG: 6.9 MMHG
BH CV ECHO MEAS - AO MEAN PG: 3.9 MMHG
BH CV ECHO MEAS - AO ROOT DIAM: 3.6 CM
BH CV ECHO MEAS - AO V2 MAX: 130.9 CM/SEC
BH CV ECHO MEAS - AO V2 VTI: 26.8 CM
BH CV ECHO MEAS - AVA(I,D): 3.5 CM2
BH CV ECHO MEAS - EDV(CUBED): 153.8 ML
BH CV ECHO MEAS - EDV(MOD-SP2): 80 ML
BH CV ECHO MEAS - EDV(MOD-SP4): 83 ML
BH CV ECHO MEAS - EF(MOD-BP): 61.1 %
BH CV ECHO MEAS - EF(MOD-SP2): 60 %
BH CV ECHO MEAS - EF(MOD-SP4): 60.2 %
BH CV ECHO MEAS - ESV(CUBED): 44.2 ML
BH CV ECHO MEAS - ESV(MOD-SP2): 32 ML
BH CV ECHO MEAS - ESV(MOD-SP4): 33 ML
BH CV ECHO MEAS - FS: 34 %
BH CV ECHO MEAS - IVS/LVPW: 1.08 CM
BH CV ECHO MEAS - IVSD: 0.92 CM
BH CV ECHO MEAS - LAT PEAK E' VEL: 8.5 CM/SEC
BH CV ECHO MEAS - LV DIASTOLIC VOL/BSA (35-75): 45.8 CM2
BH CV ECHO MEAS - LV MASS(C)D: 174.7 GRAMS
BH CV ECHO MEAS - LV MAX PG: 8 MMHG
BH CV ECHO MEAS - LV MEAN PG: 3.9 MMHG
BH CV ECHO MEAS - LV SYSTOLIC VOL/BSA (12-30): 18.2 CM2
BH CV ECHO MEAS - LV V1 MAX: 141 CM/SEC
BH CV ECHO MEAS - LV V1 VTI: 28.4 CM
BH CV ECHO MEAS - LVIDD: 5.4 CM
BH CV ECHO MEAS - LVIDS: 3.5 CM
BH CV ECHO MEAS - LVOT AREA: 3.3 CM2
BH CV ECHO MEAS - LVOT DIAM: 2.05 CM
BH CV ECHO MEAS - LVPWD: 0.85 CM
BH CV ECHO MEAS - MED PEAK E' VEL: 2.9 CM/SEC
BH CV ECHO MEAS - MR MAX PG: 103.1 MMHG
BH CV ECHO MEAS - MR MAX VEL: 507.7 CM/SEC
BH CV ECHO MEAS - MV A DUR: 0.11 SEC
BH CV ECHO MEAS - MV A MAX VEL: 68.9 CM/SEC
BH CV ECHO MEAS - MV DEC SLOPE: 304.6 CM/SEC2
BH CV ECHO MEAS - MV DEC TIME: 327 MSEC
BH CV ECHO MEAS - MV E MAX VEL: 28.6 CM/SEC
BH CV ECHO MEAS - MV E/A: 0.42
BH CV ECHO MEAS - MV MAX PG: 3.5 MMHG
BH CV ECHO MEAS - MV MEAN PG: 1.41 MMHG
BH CV ECHO MEAS - MV P1/2T: 69 MSEC
BH CV ECHO MEAS - MV V2 VTI: 25.8 CM
BH CV ECHO MEAS - MVA(P1/2T): 3.2 CM2
BH CV ECHO MEAS - MVA(VTI): 3.6 CM2
BH CV ECHO MEAS - PA ACC TIME: 0.11 SEC
BH CV ECHO MEAS - PA PR(ACCEL): 29.1 MMHG
BH CV ECHO MEAS - PA V2 MAX: 73.8 CM/SEC
BH CV ECHO MEAS - RAP SYSTOLE: 3 MMHG
BH CV ECHO MEAS - RV MAX PG: 1.31 MMHG
BH CV ECHO MEAS - RV V1 MAX: 57.2 CM/SEC
BH CV ECHO MEAS - RV V1 VTI: 14 CM
BH CV ECHO MEAS - RVSP: 26 MMHG
BH CV ECHO MEAS - SI(MOD-SP2): 26.5 ML/M2
BH CV ECHO MEAS - SI(MOD-SP4): 27.6 ML/M2
BH CV ECHO MEAS - SV(LVOT): 93.6 ML
BH CV ECHO MEAS - SV(MOD-SP2): 48 ML
BH CV ECHO MEAS - SV(MOD-SP4): 50 ML
BH CV ECHO MEAS - TAPSE (>1.6): 1.46 CM
BH CV ECHO MEAS - TR MAX PG: 23 MMHG
BH CV ECHO MEAS - TR MAX VEL: 239.8 CM/SEC
BH CV ECHO MEASUREMENTS AVERAGE E/E' RATIO: 5.02
BH CV XLRA - RV BASE: 3.5 CM
BH CV XLRA - RV LENGTH: 8 CM
BH CV XLRA - RV MID: 2.6 CM
BH CV XLRA - TDI S': 8.9 CM/SEC
LEFT ATRIUM VOLUME INDEX: 40.8 ML/M2
MAXIMAL PREDICTED HEART RATE: 146 BPM
STRESS TARGET HR: 124 BPM

## 2023-03-23 PROCEDURE — 99214 OFFICE O/P EST MOD 30 MIN: CPT | Performed by: INTERNAL MEDICINE

## 2023-03-23 PROCEDURE — 3079F DIAST BP 80-89 MM HG: CPT | Performed by: INTERNAL MEDICINE

## 2023-03-23 PROCEDURE — 3077F SYST BP >= 140 MM HG: CPT | Performed by: INTERNAL MEDICINE

## 2023-03-23 NOTE — PROGRESS NOTES
Stress test and echo results    Subjective:        Sara Gary is a 72 y.o. female who here for follow up    CC  Hypertension mitral valve prolapse  HPI  72-year-old female with hypertension and mitral valve prolapse here for the follow-up with no complaints of chest pain tightness heaviness or the pressure sensation     Problems Addressed this Visit        Cardiac and Vasculature    Residual ASD (atrial septal defect) following repair    Relevant Orders    Comprehensive Metabolic Panel    Essential hypertension    Abnormal electrocardiogram    MVP (mitral valve prolapse)       Gastrointestinal Abdominal     Elevated liver enzymes - Primary    Relevant Orders    Lipid Panel   Diagnoses       Codes Comments    Elevated liver enzymes    -  Primary ICD-10-CM: R74.8  ICD-9-CM: 790.5     Essential hypertension     ICD-10-CM: I10  ICD-9-CM: 401.9     Abnormal electrocardiogram     ICD-10-CM: R94.31  ICD-9-CM: 794.31     MVP (mitral valve prolapse)     ICD-10-CM: I34.1  ICD-9-CM: 424.0     Residual ASD (atrial septal defect) following repair     ICD-10-CM: Q21.10  ICD-9-CM: 745.5         .    The following portions of the patient's history were reviewed and updated as appropriate: allergies, current medications, past family history, past medical history, past social history, past surgical history and problem list.    Past Medical History:   Diagnosis Date   • Depression    • Diverticulitis    • Environmental allergies    • History of colon polyps    • Hypertension    • Kidney stone     hx of 4 stones     reports that she has never smoked. She has never used smokeless tobacco. She reports that she does not drink alcohol and does not use drugs.   Family History   Problem Relation Age of Onset   • Heart disease Mother    • Hypertension Mother    • Heart disease Father    • Hypertension Father    • Colon cancer Neg Hx    • Colon polyps Neg Hx    • Crohn's disease Neg Hx    • Irritable bowel syndrome Neg Hx    • Ulcerative  "colitis Neg Hx        Review of Systems  Constitutional: No wt loss, fever, fatigue  Gastrointestinal: No nausea, abdominal pain  Behavioral/Psych: No insomnia or anxiety   Cardiovascular no chest pains or tightness in the chest  Objective:       Physical Exam           Physical Exam  /87   Pulse 64   Ht 175.3 cm (69.02\")   Wt 66.7 kg (147 lb)   BMI 21.70 kg/m²     General appearance: No acute changes   Eyes: Sclerae conjunctivae normal, pupils reactive   HENT: Atraumatic; oropharynx clear with moist mucous membranes and no mucosal ulcerations;  Neck: Trachea midline; NECK, supple, no thyromegaly or lymphadenopathy   Lungs: Normal size and shape, normal breath sounds, equal distribution of air, no rales and rhonchi   CV: S1-S2 regular, no murmurs, no rub, no gallop   Abdomen: Soft, nontender; no masses , no abnormal abdominal sounds   Extremities: No deformity , normal color , no peripheral edema   Skin: Normal temperature, turgor and texture; no rash, ulcers  Psych: Appropriate affect, alert and oriented to person, place and time           Procedures      Echocardiogram:        Current Outpatient Medications:   •  busPIRone (BUSPAR) 10 MG tablet, , Disp: , Rfl:   •  estradiol (ESTRACE) 0.1 MG/GM vaginal cream, , Disp: , Rfl:   •  famotidine (PEPCID) 40 MG tablet, , Disp: , Rfl:   •  liothyronine (CYTOMEL) 5 MCG tablet, Take 1 tablet by mouth Daily., Disp: 90 tablet, Rfl: 0  •  sertraline (ZOLOFT) 50 MG tablet, Take 1 tablet by mouth Daily., Disp: , Rfl:   •  SUMAtriptan (IMITREX) 100 MG tablet, , Disp: , Rfl:   •  Synthroid 75 MCG tablet, Take 1 tablet by mouth Daily., Disp: 90 tablet, Rfl: 1  •  Ventolin  (90 Base) MCG/ACT inhaler, , Disp: , Rfl:    Assessment:        Patient Active Problem List   Diagnosis   • Diverticulosis of large intestine without hemorrhage   • Hydronephrosis, left   • Residual ASD (atrial septal defect) following repair   • Elevated liver enzymes   • Essential hypertension "   • Abnormal electrocardiogram   • MVP (mitral valve prolapse)   • Multiple joint pain   • Other specified hypothyroidism   • Anxiety and depression   • Gastroesophageal reflux disease without esophagitis   • Mixed hyperlipidemia   • Prediabetes      Notes            Component  Ref Range & Units 1 d ago  (3/22/23) 2 wk ago  (3/6/23) 4 yr ago  (6/14/18) 4 yr ago  (4/13/18) 7 yr ago  (4/20/15) 8 yr ago  (1/2/15) 8 yr ago  (12/19/14)   TSH  0.270 - 4.200 uIU/mL 0.767                Result Notes     1 HM Topic         Component  Ref Range & Units 1 d ago 2 wk ago 4 yr ago 8 yr ago   Total Cholesterol  0 - 200 mg/dL 220 High   258 High  CM  241 High   193 CM    Triglycerides  0 - 150 mg/dL 116  91  92  92 CM    HDL Cholesterol  40 - 60 mg/dL 64 High   89 High   76 High   67 High  CM    LDL Cholesterol   0 - 100 mg/dL 136 High            Glucose  65 - 99 mg/dL 105 High   95   93  147 High   92     BUN  8 - 23 mg/dL 10  10   12  11  11     Creatinine  0.57 - 1.00 mg/dL 0.78  0.74  0.70 R, CM  0.72  0.85  0.87  0.7 R    Sodium  136 - 145 mmol/L 141  140   140  140  142     Potassium  3.5 - 5.2 mmol/L 4.4  4.5   4.4  4.2  4.7     Chloride  98 - 107 mmol/L 106  105   105  101  103     CO2  22.0 - 29.0 mmol/L 27.1    26.0  23.4  26.5     Calcium  8.6 - 10.5 mg/dL 9.4  9.4   9.6  9.0  8.8     Total Protein  6.0 - 8.5 g/dL 6.6  6.7   6.6  7.1  6.7     Albumin  3.5 - 5.2 g/dL 4.0  4.5   4.20 R  4.30 R  4.00 R     ALT (SGPT)  1 - 33 U/L 8  11   8  7  15     AST (SGOT)  1 - 32 U/L 16  17   12  9  17     Alkaline Phosphatase  39 - 117 U/L 155 High   169 High    149 High   120 High   144 High      Total Bilirubin  0.0 - 1.2 mg/dL 0.4  0.4   0.3  0.4 R  0.4 R     Globulin  gm/dL 2.6            Range & Units 1 d ago  (3/22/23) 2 wk ago  (3/6/23) 11 mo ago  (4/19/22) 4 yr ago  (4/13/18) 5 yr ago  (7/5/17) 5 yr ago  (6/30/17) 5 yr ago  (6/30/17)   WBC  3.40 - 10.80 10*3/mm3 4.66  4.64  4.16  4.43 Low  R  7.46 R  7.82 R  7.17 R     RBC  3.77 - 5.28 10*6/mm3 4.25  4.25  4.16  4.35 R  4.46 R  4.15 R  3.91 R    Hemoglobin  12.0 - 15.9 g/dL 12.5  12.9  12.1  12.9 R  13.2 R  12.5 R  11.6 Low  R    Hematocrit  34.0 - 46.6 % 37.8  37.6  37.6  41.7 R  39.7 R  38.8 R  36.4 R    MCV  79.0 - 97.0 fL 88.9  88.5  90.4  95.9 R  89.0 R  93.5 R  93.1 R    MCH  26.6 - 33.0 pg 29.4  30.4  29.1  29.7 R  29.6 R  30.1 R  29.7 R    MCHC  31.5 - 35.7 g/dL 33.1  34.3  32.2  30.9 Low  R  33.2 R  32.2 Low  R  31.9 Low  R    RDW  12.3 - 15.4 % 12.1 Low   12.4  12.2 Low   12.8 R  12.9 R  13.1 High  R  13.0 R    RDW-SD  37.0 - 54.0 fl 39.0   40.1  45.1  42.0  44.7  44.0    MPV  6.0 - 12.0 fL 10.3   9.0  10.8  10.2  10.3  10.1    Platelets  140 - 450 10*3/mm3 262  247  250  260 R  268 R  203 R  195 R    Neutrophil %  42.7 - 76.0 % 60.2  55.2  58.9  55.2  72.7  68.7  80.1 High     Lymphocyte %  19.6 - 45.3 % 27.7                      Plan:            ICD-10-CM ICD-9-CM   1. Elevated liver enzymes  R74.8 790.5   2. Essential hypertension  I10 401.9   3. Abnormal electrocardiogram  R94.31 794.31   4. MVP (mitral valve prolapse)  I34.1 424.0   5. Residual ASD (atrial septal defect) following repair  Q21.10 745.5     1. Essential hypertension  Blood pressure under control    2. Abnormal electrocardiogram  No angina pectoris    3. MVP (mitral valve prolapse)  Stable    4. Residual ASD (atrial septal defect) following repair    - Comprehensive Metabolic Panel; Future    5. Elevated liver enzymes  Continue current treatment  - Lipid Panel; Future       High chol    Pt will decide when ready for chol  1 yr  COUNSELING:    Sara Gilliland was given to patient for the following topics: diagnostic results, risk factor reductions, impressions, risks and benefits of treatment options and importance of treatment compliance .       SMOKING COUNSELING:        Dictated using Dragon dictation

## 2023-03-24 ENCOUNTER — APPOINTMENT (OUTPATIENT)
Dept: PHYSICAL THERAPY | Facility: HOSPITAL | Age: 73
End: 2023-03-24
Payer: MEDICARE

## 2023-03-25 LAB — 1,25(OH)2D SERPL-MCNC: 55.2 PG/ML (ref 24.8–81.5)

## 2023-03-29 ENCOUNTER — HOSPITAL ENCOUNTER (OUTPATIENT)
Dept: PHYSICAL THERAPY | Facility: HOSPITAL | Age: 73
Setting detail: THERAPIES SERIES
Discharge: HOME | End: 2023-03-29
Payer: MEDICARE

## 2023-03-29 DIAGNOSIS — G89.29 CHRONIC SCAPULAR PAIN: ICD-10-CM

## 2023-03-29 DIAGNOSIS — G89.29 CHRONIC RIGHT SHOULDER PAIN: Primary | ICD-10-CM

## 2023-03-29 DIAGNOSIS — M25.511 CHRONIC RIGHT SHOULDER PAIN: Primary | ICD-10-CM

## 2023-03-29 DIAGNOSIS — M89.8X1 CHRONIC SCAPULAR PAIN: ICD-10-CM

## 2023-03-29 PROCEDURE — 97110 THERAPEUTIC EXERCISES: CPT

## 2023-03-29 PROCEDURE — 97140 MANUAL THERAPY 1/> REGIONS: CPT

## 2023-03-29 PROCEDURE — 97530 THERAPEUTIC ACTIVITIES: CPT

## 2023-03-29 NOTE — THERAPY TREATMENT NOTE
Outpatient Physical Therapy Ortho Treatment Note  [facility]     Patient Name: Sara Gary  : 1950  MRN: [id number]  Today's Date: 3/29/2023      Visit Date: 2023    Visit Dx:    ICD-10-CM ICD-9-CM   1. Chronic right shoulder pain  M25.511 719.41    G89.29 338.29   2. Chronic scapular pain  M89.8X1 733.90    G89.29 338.29       Patient Active Problem List   Diagnosis   • Diverticulosis of large intestine without hemorrhage   • Hydronephrosis, left   • Residual ASD (atrial septal defect) following repair   • Elevated liver enzymes   • Essential hypertension   • Abnormal electrocardiogram   • MVP (mitral valve prolapse)   • Multiple joint pain   • Other specified hypothyroidism   • Anxiety and depression   • Gastroesophageal reflux disease without esophagitis   • Mixed hyperlipidemia   • Prediabetes        Past Medical History:   Diagnosis Date   • Depression    • Diverticulitis    • Environmental allergies    • History of colon polyps    • Hypertension    • Kidney stone     hx of 4 stones        Past Surgical History:   Procedure Laterality Date   • APPENDECTOMY     • ASD REPAIR, SECUNDUM     • CATARACT EXTRACTION     • COLONOSCOPY     • CYSTOSCOPY BLADDER STONE LITHOTRIPSY     • CYSTOSCOPY W/ URETERAL STENT PLACEMENT Left 2017    Procedure: CYSTOSCOPY LEFT STENT INSERTION & STONE REMOVAL;  Surgeon: Raheem Franklin MD;  Location: [facility];  Service:    • HYSTERECTOMY     • NEPHRECTOMY RADICAL     • THYROIDECTOMY     • UPPER GASTROINTESTINAL ENDOSCOPY                          PT Assessment/Plan     Row Name 23 1200          PT Assessment    Assessment Comments Pt returns today with significantly improved pain following manual interventions at previous session. Continued manual with addition of thoracic PA. Pt continues to demo overall poor body awareness and paradoxical breathing. Demos poor ribcage and back mobility with breathing, tends to demo shallow/UT breathing  patterns. Trial of breathing in child's pose to work on back expansion during breathing with tactile cues throughout. Pt also reports hx of dizziness and vertigo, anticipate need for Vestibular PT after completing treatment for scapular pain.  -CC        PT Plan    PT Plan Comments Have pt get order for vestibular PT to schedule. Review 360 breathing with focus on back expansion, cont thoracic mobility over noodle  -CC           User Key  (r) = Recorded By, (t) = Taken By, (c) = Cosigned By    Initials Name Provider Type    CC Ruby Fox PT Physical Therapist                   OP Exercises     Row Name 03/29/23 1200             Total Minutes    13321 - PT Therapeutic Exercise Minutes 13  -CC      84336 - PT Therapeutic Activity Minutes 10  -CC      33961 - PT Manual Therapy Minutes 15  -CC         Exercise 1    Exercise Name 1 UBE  -CC      Cueing 1 Verbal;Demo  -CC      Time 1 4 min  -CC         Exercise 4    Exercise Name 4 SL upper trunk rotation  -CC      Cueing 4 Tactile  -CC      Reps 4 10  -CC      Time 4 each  -CC         Exercise 5    Exercise Name 5  breathing  -CC      Cueing 5 Tactile;Verbal  -CC      Reps 5 5 PPT to work on back mobility and positioning first, then breathing with cues for 360 expansion, then transitioned into R UE over head with breathing in HL to minimize UT compensation  -CC      Time 5 10min  -CC      Additional Comments pt tends to hinge at mid/lower tspine with breathing and difficulty with back expansion and getting back flat into table. Poor lumbo/pelvic awareness and tends to bulge at TA when cueing for PPT.  -CC         Exercise 7    Exercise Name 7 child's pose 360 breathing  -CC      Cueing 7 Verbal;Tactile  -CC      Reps 7 10 fwd, 3 lat ea side  -CC      Additional Comments to work on back expansion with breathing  -CC            User Key  (r) = Recorded By, (t) = Taken By, (c) = Cosigned By    Initials Name Provider Type    Ruby Damon, PT  Physical Therapist                         Manual Rx (last 36 hours)     Manual Treatments     Row Name 03/29/23 1200             Total Minutes    53083 - PT Manual Therapy Minutes 15  -CC         Manual Rx 1    Manual Rx 1 Location periscapular stm, UT, levator in L SLing  -CC         Manual Rx 2    Manual Rx 2 Location prone upper and mid thoracic PA  -CC            User Key  (r) = Recorded By, (t) = Taken By, (c) = Cosigned By    Initials Name Provider Type    CC Ruby Fxo, PT Physical Therapist                                   Time Calculation:   Start Time: 1153  Stop Time: 1231  Time Calculation (min): 38 min  Total Timed Code Minutes- PT: 38 minute(s)  Timed Charges  86498 - PT Therapeutic Exercise Minutes: 13  81895 - PT Manual Therapy Minutes: 15  18952 - PT Therapeutic Activity Minutes: 10  Total Minutes  Timed Charges Total Minutes: 38   Total Minutes: 38  Therapy Charges for Today     Code Description Service Date Service Provider Modifiers Qty    36240727926 HC PT THER PROC EA 15 MIN 3/29/2023 Ruby Fox, PT GP 1    96274366417 HC PT THERAPEUTIC ACT EA 15 MIN 3/29/2023 Ruby Fox, PT GP 1    15967789719 HC PT MANUAL THERAPY EA 15 MIN 3/29/2023 Ruby Fox, PT GP 1                    Ruby Fox PT  3/29/2023

## 2023-03-30 NOTE — TELEPHONE ENCOUNTER
Caller: Sara Gary    Relationship: Self    Best call back number: [phone]    Requested Prescriptions:   Requested Prescriptions     Pending Prescriptions Disp Refills   • sertraline (ZOLOFT) 50 MG tablet       Sig: Take 1 tablet by mouth Daily.        Pharmacy where request should be sent: [facility] [id number] [place] [address] AT [facility] [address]  - [phone]  - [phone] FX     Last office visit with prescribing clinician: 3/6/2023   Last telemedicine visit with prescribing clinician: 9/6/2023   Next office visit with prescribing clinician: 9/6/2023     Additional details provided by patient: HAS MORE THAN 3 DAYS LEFT.     Does the patient have less than a 3 day supply:  [] Yes  [x] No    Would you like a call back once the refill request has been completed: [] Yes [x] No    If the office needs to give you a call back, can they leave a voicemail: [] Yes [x] No    Kerri Dockery, SOHA   03/30/23 15:37 EDT

## 2023-04-05 ENCOUNTER — APPOINTMENT (OUTPATIENT)
Dept: PHYSICAL THERAPY | Facility: HOSPITAL | Age: 73
End: 2023-04-05
Payer: MEDICARE

## 2023-04-12 ENCOUNTER — APPOINTMENT (OUTPATIENT)
Dept: PHYSICAL THERAPY | Facility: HOSPITAL | Age: 73
End: 2023-04-12
Payer: MEDICARE

## 2023-04-19 ENCOUNTER — HOSPITAL ENCOUNTER (OUTPATIENT)
Dept: PHYSICAL THERAPY | Facility: HOSPITAL | Age: 73
Setting detail: THERAPIES SERIES
Discharge: HOME | End: 2023-04-19
Payer: MEDICARE

## 2023-04-19 DIAGNOSIS — M25.511 CHRONIC RIGHT SHOULDER PAIN: Primary | ICD-10-CM

## 2023-04-19 DIAGNOSIS — G89.29 CHRONIC RIGHT SHOULDER PAIN: Primary | ICD-10-CM

## 2023-04-19 DIAGNOSIS — M89.8X1 CHRONIC SCAPULAR PAIN: ICD-10-CM

## 2023-04-19 DIAGNOSIS — G89.29 CHRONIC SCAPULAR PAIN: ICD-10-CM

## 2023-04-19 PROCEDURE — 97110 THERAPEUTIC EXERCISES: CPT

## 2023-04-20 NOTE — THERAPY PROGRESS REPORT/RE-CERT
Outpatient Physical Therapy Ortho Progress Note  [facility]     Patient Name: Sara Gary  : 1950  MRN: [id number]  Today's Date: 2023      Visit Date: 2023    Patient Active Problem List   Diagnosis   • Diverticulosis of large intestine without hemorrhage   • Hydronephrosis, left   • Residual ASD (atrial septal defect) following repair   • Elevated liver enzymes   • Essential hypertension   • Abnormal electrocardiogram   • MVP (mitral valve prolapse)   • Multiple joint pain   • Other specified hypothyroidism   • Anxiety and depression   • Gastroesophageal reflux disease without esophagitis   • Mixed hyperlipidemia   • Prediabetes        Past Medical History:   Diagnosis Date   • Depression    • Diverticulitis    • Environmental allergies    • History of colon polyps    • Hypertension    • Kidney stone     hx of 4 stones        Past Surgical History:   Procedure Laterality Date   • APPENDECTOMY     • ASD REPAIR, SECUNDUM     • CATARACT EXTRACTION     • COLONOSCOPY     • CYSTOSCOPY BLADDER STONE LITHOTRIPSY     • CYSTOSCOPY W/ URETERAL STENT PLACEMENT Left 2017    Procedure: CYSTOSCOPY LEFT STENT INSERTION & STONE REMOVAL;  Surgeon: Raheem Franklin MD;  Location: [facility];  Service:    • HYSTERECTOMY     • NEPHRECTOMY RADICAL     • THYROIDECTOMY     • UPPER GASTROINTESTINAL ENDOSCOPY         Visit Dx:     ICD-10-CM ICD-9-CM   1. Chronic right shoulder pain  M25.511 719.41    G89.29 338.29   2. Chronic scapular pain  M89.8X1 733.90    G89.29 338.29                             Therapy Education  Education Details: reviewed posture, use of scapular setting, thoracic mobility, dizziness/vestibular rehab  Given: Symptoms/condition management, HEP, Mobility training  Program: Reinforced  How Provided: Verbal  Provided to: Patient  Level of Understanding: Verbalized      PT OP Goals     Row Name 23 1300          PT Short Term Goals    STG Date to Achieve 23  -LB      STG 1 Pt will demonstrate understanding and compliance with initial HEP.  -LB     STG 1 Progress Partially Met  -LB     STG 1 Progress Comments Intermittent compliance.  -LB     STG 2 Pt will demonstrate ability to perform appropriate diaphragmatic breathing with full lateral and A/P expansion in controlled manner.  -LB     STG 2 Progress Partially Met  -LB     STG 2 Progress Comments Pt improving A/P expansion, lateral requires cuing.  -LB     STG 3 Pt will verbalize reduced intensity of stabbing pain in R medial scapular region by 50%.  -LB     STG 3 Progress Met  -LB        Long Term Goals    LTG Date to Achieve 04/09/23  -LB     LTG 1 Pt will report pain at worse dec from 9/10 to 5/10 or better in last 2 weeks.  -LB     LTG 1 Progress Met  -LB     LTG 2 Pt will tolerate lifting 10# item without inc in R periscapular pain.  -LB     LTG 2 Progress Met  -LB     LTG 2 Progress Comments Pt states pain is now triggered by anxiety/stress rather than lifting.  -LB     LTG 3 Pt will demonstrate understanding of advanced HEP to allow continued management of condition.  -LB     LTG 3 Progress Ongoing  -LB     LTG 3 Progress Comments Progressed today.  -LB           User Key  (r) = Recorded By, (t) = Taken By, (c) = Cosigned By    Initials Name Provider Type    Nimo Vázquez, PT Physical Therapist                 PT Assessment/Plan     Row Name 04/20/23 0627          PT Assessment    Functional Limitations Limitations in functional capacity and performance;Performance in leisure activities;Limitation in home management;Limitations in community activities  -LB     Impairments Impaired flexibility;Joint mobility;Muscle strength;Locomotion;Pain;Poor body mechanics;Posture;Sensation  -LB     Assessment Comments Pt has participated in 4 skilled PT sessions to address R periscapular pain. She has met or partially met 3/3 STGs. She has met 2/3 LTGs. She dionte periscapular pain > 3/10 in last 2 weeks. She states she feels pain  is triggered by stress rather than lifting at this point. Pt demonstrates full AROM BUE. She is intermittently compliant with HEP. Progression of exercises today limited by pt dizziness with transfer from sitting to supine. Pt states she has been dizzy intermittently for 50 years since labarynthitis in the 1970s. Pt requires max VCs for most exercises and demonstrates hypermobility of B hip/ankle joints. She remains limited in thoracic ROM and demonstrates inc thoracic kyphosis with dec scapular retraction unless cued. Pt remains appropriate for continued skilled PT services to address UE strength deficits, altered breathing mechanics, and dec thoracic ROM.  -LB     Rehab Potential Good  -LB     Patient/caregiver participated in establishment of treatment plan and goals Yes  -LB     Patient would benefit from skilled therapy intervention Yes  -LB        PT Plan    PT Frequency 1x/week  -LB     Predicted Duration of Therapy Intervention (PT) 2-4 visits  -LB     Planned CPT's? PT EVAL LOW COMPLEXITY: 06227;PT RE-EVAL: 83934;PT THER PROC EA 15 MIN: 20122;PT THER ACT EA 15 MIN: 50477;PT MANUAL THERAPY EA 15 MIN: 36708;PT SELF CARE/HOME MGMT/TRAIN EA 15: 05648  -LB     PT Plan Comments vestibular order?, continue to strength periscapular muscles, coordinate with breathing, consider thoracic extension over horizontal noodle  -LB           User Key  (r) = Recorded By, (t) = Taken By, (c) = Cosigned By    Initials Name Provider Type    Nimo Vázquez, PT Physical Therapist                   OP Exercises     Row Name 04/19/23 1300             Subjective Comments    Subjective Comments I am doing ok. I just feel kind of icky. I have been more dizzy and I get really cold at 4pm every day. I am feeling pretty off balance when i walk on steps etc.  -LB         Subjective Pain    Able to rate subjective pain? yes  -LB      Pre-Treatment Pain Level 0  -LB      Subjective Pain Comment I really feel much bettter.  -LB          Total Minutes    56000 - PT Therapeutic Exercise Minutes 40  -LB         Exercise 1    Exercise Name 1 pulleys for warm up  -LB      Cueing 1 Verbal;Demo  -LB      Reps 1 15  -LB      Time 1 --  -LB         Exercise 2    Exercise Name 2 crossbody adduction  -LB      Reps 2 3  -LB      Time 2 20  -LB         Exercise 3    Exercise Name 3 Push up plus  -LB      Reps 3 10  -LB      Time 3 max VCs and demo for form  -LB         Exercise 4    Exercise Name 4 held due to dizziness  -LB      Reps 4 10  -LB      Time 4 each  -LB         Exercise 5    Exercise Name 5 B flexion with tband  -LB      Reps 5 15  -LB      Time 5 RTB  -LB         Exercise 6    Exercise Name 6 Supine over noodle: SAP, alt flex, scap squeeze, HA  -LB      Cueing 6 Verbal;Demo  -LB      Sets 6 2  -LB      Reps 6 10 ea  -LB      Time 6 2#,RTB  -LB         Exercise 7    Exercise Name 7 levi pose with 360 breathing  -LB      Reps 7 10 breaths fwd position  -LB            User Key  (r) = Recorded By, (t) = Taken By, (c) = Cosigned By    Initials Name Provider Type    Nimo Vázquez, PT Physical Therapist                                        Time Calculation:     Start Time: 1315  Stop Time: 1400  Time Calculation (min): 45 min  Total Timed Code Minutes- PT: 40 minute(s)  Timed Charges  24902 - PT Therapeutic Exercise Minutes: 40  Total Minutes  Timed Charges Total Minutes: 40   Total Minutes: 40     Therapy Charges for Today     Code Description Service Date Service Provider Modifiers Qty    16757719511 HC PT THER PROC EA 15 MIN 4/19/2023 Nimo Caputo, PT GP 3                    Nimo Caputo PT  4/20/2023

## 2023-04-21 ENCOUNTER — OFFICE VISIT (OUTPATIENT)
Dept: INTERNAL MEDICINE | Facility: CLINIC | Age: 73
End: 2023-04-21
Payer: MEDICARE

## 2023-04-21 ENCOUNTER — TELEPHONE (OUTPATIENT)
Dept: INTERNAL MEDICINE | Facility: CLINIC | Age: 73
End: 2023-04-21

## 2023-04-21 VITALS
HEART RATE: 59 BPM | DIASTOLIC BLOOD PRESSURE: 70 MMHG | BODY MASS INDEX: 21.59 KG/M2 | OXYGEN SATURATION: 98 % | WEIGHT: 145.8 LBS | SYSTOLIC BLOOD PRESSURE: 124 MMHG | HEIGHT: 69 IN

## 2023-04-21 DIAGNOSIS — R42 VERTIGO: Chronic | ICD-10-CM

## 2023-04-21 DIAGNOSIS — J30.1 SEASONAL ALLERGIC RHINITIS DUE TO POLLEN: Chronic | ICD-10-CM

## 2023-04-21 DIAGNOSIS — L85.3 DRY SKIN DERMATITIS: Primary | Chronic | ICD-10-CM

## 2023-04-21 RX ORDER — NITROFURANTOIN MACROCRYSTALS 100 MG/1
CAPSULE ORAL
COMMUNITY
Start: 2023-03-31

## 2023-04-21 RX ORDER — MECLIZINE HYDROCHLORIDE 25 MG/1
25 TABLET ORAL 3 TIMES DAILY PRN
Qty: 60 TABLET | Refills: 1 | Status: SHIPPED | OUTPATIENT
Start: 2023-04-21

## 2023-04-21 RX ORDER — CYCLOSPORINE 0.5 MG/ML
EMULSION OPHTHALMIC
COMMUNITY
Start: 2023-04-17

## 2023-04-21 RX ORDER — CETIRIZINE HYDROCHLORIDE 10 MG/1
10 TABLET ORAL DAILY
Qty: 90 TABLET | Refills: 0 | Status: SHIPPED | OUTPATIENT
Start: 2023-04-21 | End: 2023-05-01

## 2023-04-21 NOTE — TELEPHONE ENCOUNTER
Pharmacy Name:  [facility] [id number] - [place], [place] - [address] AT [facility] [address] & [address] - [phone] [facility] [phone] FX (Pharmacy)    Pharmacy representative name: JOVAN    Pharmacy representative phone number: [phone]    What medication are you calling in regards to: ZYRTEC    What question does the pharmacy have: PHARMACY STATES THE DIRECTIONS SAY TO TO TAKE FOR 10 DAYS BUT QTY IS FOR 90 PILLS AND PHARMACY NEEDS TO KNOW IF THE PROVIDER WANTS TO TAKE OUT THE FOR 10 DAYS IN THE DIRECTIONS OR IF THEY ONLY SHOULD GIVE THE PATIENT 10 PILLS     Who is the provider that prescribed the medication: ALY KLINE    Additional notes: PLEASE CONTACT PHARMACY REGARDING THIS ASAP

## 2023-04-21 NOTE — PROGRESS NOTES
"Chief Complaint  Foreign Body in Skin, back itching , Herpes Zoster (/), Fibromyalgia, and Allergies    Subjective        Sara Gary presents to [facility]  History of Present Illness  This is a 73 y/o female presenting to office for complaints of recent rash to posterior back. Patient reports she is having pruritis. No evidence of blisters to back. Patient reports she has noticed more itching over the past 3 days. Reports it is hard to moisturize back due to living by herself.     Patient continues with PT on right shoulder. Patient reports she has hx of severe labyrinthitis. Patient reports she started having some dizziness after being on her back at PT. Patient reports struggle with vertigo symptoms since. Patient reports she is going to receive vestibular treatment.     Objective   Vital Signs:  /70 (BP Location: Left arm, Patient Position: Sitting, Cuff Size: Adult)   Pulse 59   Ht 175.3 cm (69\")   Wt 66.1 kg (145 lb 12.8 oz)   SpO2 98%   BMI 21.53 kg/m²   Estimated body mass index is 21.53 kg/m² as calculated from the following:    Height as of this encounter: 175.3 cm (69\").    Weight as of this encounter: 66.1 kg (145 lb 12.8 oz).       BMI is within normal parameters. No other follow-up for BMI required.      Physical Exam  Constitutional:       Appearance: Normal appearance.   HENT:      Head: Normocephalic and atraumatic.      Right Ear: Tympanic membrane, ear canal and external ear normal.      Left Ear: Tympanic membrane, ear canal and external ear normal.   Eyes:      Conjunctiva/sclera: Conjunctivae normal.      Pupils: Pupils are equal, round, and reactive to light.      Comments: +glasses   Cardiovascular:      Rate and Rhythm: Normal rate and regular rhythm.      Pulses: Normal pulses.      Heart sounds: Murmur heard.    Diastolic murmur is present with a grade of 1/4.    No friction rub. No gallop.   Pulmonary:      Effort: Pulmonary effort is " normal. No respiratory distress.      Breath sounds: Normal breath sounds. No stridor. No wheezing, rhonchi or rales.   Skin:     General: Skin is dry.      Capillary Refill: Capillary refill takes less than 2 seconds.      Findings: No erythema.      Comments: Dryness to upper back region   Neurological:      General: No focal deficit present.      Mental Status: She is alert and oriented to person, place, and time. Mental status is at baseline.   Psychiatric:         Mood and Affect: Mood normal.         Thought Content: Thought content normal.         Judgment: Judgment normal.        Result Review :  The following data was reviewed by: ALEJANDRO Miller on 04/21/2023:  Common labs        3/6/2023    12:26 3/22/2023    10:42   Common Labs   Glucose 95   105     BUN 10   10     Creatinine 0.74   0.78     Sodium 140   141     Potassium 4.5   4.4     Chloride 105   106     Calcium 9.4   9.4     Total Protein 6.7      Albumin 4.5   4.0     Total Bilirubin 0.4   0.4     Alkaline Phosphatase 169   155     AST (SGOT) 17   16     ALT (SGPT) 11   8     WBC 4.64   4.66     Hemoglobin 12.9   12.5     Hematocrit 37.6   37.8     Platelets 247   262     Total Cholesterol  220     Total Cholesterol 258      Triglycerides 91   116     HDL Cholesterol 89   64     LDL Cholesterol  154   136     Hemoglobin A1C 5.50                     Assessment and Plan   Diagnoses and all orders for this visit:    1. Dry skin dermatitis (Primary)  Assessment & Plan:  Recommend Aquaphor or gold bond moisturizer.   Recommended showering EOD.   Avoid harsh soaps.       2. Seasonal allergic rhinitis due to pollen  Assessment & Plan:  Zyrtec 10mg daily.   Flonase daily prn.     Orders:  -     cetirizine (zyrTEC) 10 MG tablet; Take 1 tablet by mouth Daily for 10 days.  Dispense: 90 tablet; Refill: 0    3. Vertigo  Assessment & Plan:  Meclizine PRN.   Continue with hydration.   Recommend vestibular tx with PT    Orders:  -     meclizine (ANTIVERT) 25  MG tablet; Take 1 tablet by mouth 3 (Three) Times a Day As Needed for Dizziness.  Dispense: 60 tablet; Refill: 1           Follow Up   Return for Next scheduled follow up 5/3/23.  Patient was given instructions and counseling regarding her condition or for health maintenance advice. Please see specific information pulled into the AVS if appropriate.

## 2023-04-28 ENCOUNTER — OFFICE VISIT (OUTPATIENT)
Dept: CARDIOLOGY | Facility: CLINIC | Age: 73
End: 2023-04-28
Payer: MEDICARE

## 2023-04-28 VITALS
HEART RATE: 65 BPM | HEIGHT: 69 IN | SYSTOLIC BLOOD PRESSURE: 132 MMHG | DIASTOLIC BLOOD PRESSURE: 78 MMHG | WEIGHT: 147 LBS | BODY MASS INDEX: 21.77 KG/M2

## 2023-04-28 DIAGNOSIS — R94.31 ABNORMAL ELECTROCARDIOGRAM (ECG) (EKG): ICD-10-CM

## 2023-04-28 DIAGNOSIS — Z87.74 H/O CONGENITAL ATRIAL SEPTAL DEFECT (ASD) REPAIR: Primary | ICD-10-CM

## 2023-04-28 NOTE — PROGRESS NOTES
Subjective:     Encounter Date:04/28/2023      Patient ID: Sara Gary is a 72 y.o. female.    Chief Complaint: Dyspnea  HPI:   This is a 72-year-old woman who presents for evaluation.  She has a history of remote surgical ASD repair in 1997, mild mitral valve prolapse.   She also has a history of depression/anxiety treated with BuSpar and Zoloft.  Since 2018 or so she has been complaining of dyspnea.  The dyspnea is constant with no worsening or alleviating factors.  She feels fatigued throughout the day.  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  She has had a pretty thorough work-up.  In 2018 she had a normal nuclear stress test.  She also had an echocardiogram at that time which showed mild aortic insufficiency and mitral valve prolapse.  She then moved to [place] and had some studies there as well, which she believes includes an echocardiogram.  In March 2023 her echocardiogram was repeated though no bubble study was performed to evaluate interatrial shunt, other than worsening left atrial enlargement there were no other changes.     Her parents both had AAA's but were smokers.  She is a non-smoker.    The following portions of the patient's history were reviewed and updated as appropriate: allergies, current medications, past family history, past medical history, past social history, past surgical history and problem list.     REVIEW OF SYSTEMS:   All systems reviewed.  Pertinent positives identified in HPI.  All other systems are negative.    Past Medical History:   Diagnosis Date   • Depression    • Diverticulitis    • Environmental allergies    • History of colon polyps    • Hypertension    • Kidney stone     hx of 4 stones       Family History   Problem Relation Age of Onset   • Heart disease Mother    • Hypertension Mother    • Heart disease Father    • Hypertension Father    • Colon cancer Neg Hx    •  "Colon polyps Neg Hx    • Crohn's disease Neg Hx    • Irritable bowel syndrome Neg Hx    • Ulcerative colitis Neg Hx        Social History     Socioeconomic History   • Marital status: Single   Tobacco Use   • Smoking status: Never   • Smokeless tobacco: Never   Vaping Use   • Vaping Use: Never used   Substance and Sexual Activity   • Alcohol use: No   • Drug use: No       Allergies   Allergen Reactions   • Sulfamethoxazole-Trimethoprim Hives   • Statins GI Intolerance     Patient \"felt sick\"   • Sulfa Antibiotics Hives       Past Surgical History:   Procedure Laterality Date   • APPENDECTOMY     • ASD REPAIR, SECUNDUM     • CATARACT EXTRACTION     • COLONOSCOPY     • CYSTOSCOPY BLADDER STONE LITHOTRIPSY     • CYSTOSCOPY W/ URETERAL STENT PLACEMENT Left 06/29/2017    Procedure: CYSTOSCOPY LEFT STENT INSERTION & STONE REMOVAL;  Surgeon: Raheem Franklin MD;  Location: [facility];  Service:    • HYSTERECTOMY     • NEPHRECTOMY RADICAL     • THYROIDECTOMY     • UPPER GASTROINTESTINAL ENDOSCOPY         Procedures       Objective:         PHYSICAL EXAM:  GEN: VSS, no distress,   Eyes: normal sclera, normal lids and lashes  HENT: moist mucus membranes,   Respiratory: CTAB, no rales or wheezes  CV: RRR, 2 out of 6 systolic murmur worsened with duration, +2 DP and 2+ carotid pulses b/l  GI: NABS, soft,  Nontender, nondistended  MSK: no edema, no scoliosis or kyphosis  Skin: no rash, warm, dry  Heme/Lymph: no bruising or bleeding  Psych: organized thought, normal behavior and affect  Neuro: Cranial nerves grossly intact, Alert and Oriented x 3.         Assessment:          Diagnosis Plan   1. H/O congenital atrial septal defect (ASD) repair  Adult Transthoracic Echo Limited W/ Cont if Necessary Per Protocol      2. Abnormal electrocardiogram (ECG) (EKG)  Adult Transthoracic Echo Limited W/ Cont if Necessary Per Protocol             Plan:       1.  Dyspnea and fatigue: Very vague symptoms that have been essentially " unchanged for the last 3 to 5 years.  Difficult to say that this is cardiac in origin.  She had a normal nuclear stress test in 2018.  Her echocardiogram between 2018 and 2023 has not changed substantially other than some worsening of her left atrial dilation.  The aortic insufficiency is mild by pressure half-time measurement.  Her heart she does not have obvious mitral regurgitation associated with the mitral valve prolapse.  We will get limited echo images with bubble study to assess her ASD repair.  Could consider a ROSINA in the future to further evaluate the mitral valve given the worsened left atrial size.  Her EKG is chronically abnormal with T wave inversions in 1 and aVL, I do not think she repeat needs repeat stress testing at this time  I would consider pulmonary referral and chest imaging, however it seems more to me that this may be a depression anxiety issue.    Amy, thank you very much for referring this kind patient to me. Please call me with any questions or concerns. I will see the patient again in the office in 6 months.         Jacqui Ruffin MD  04/28/23  [facility]    Outpatient Encounter Medications as of 4/28/2023   Medication Sig Dispense Refill   • busPIRone (BUSPAR) 10 MG tablet      • cetirizine (zyrTEC) 10 MG tablet Take 1 tablet by mouth Daily for 10 days. 90 tablet 0   • estradiol (ESTRACE) 0.1 MG/GM vaginal cream      • famotidine (PEPCID) 40 MG tablet      • liothyronine (CYTOMEL) 5 MCG tablet Take 1 tablet by mouth Daily. 90 tablet 0   • meclizine (ANTIVERT) 25 MG tablet Take 1 tablet by mouth 3 (Three) Times a Day As Needed for Dizziness. 60 tablet 1   • nitrofurantoin (MACRODANTIN) 100 MG capsule      • Restasis 0.05 % ophthalmic emulsion      • sertraline (ZOLOFT) 50 MG tablet Take 1 tablet by mouth Daily. 90 tablet 2   • SUMAtriptan (IMITREX) 100 MG tablet      • Synthroid 75 MCG tablet Take 1 tablet by mouth Daily. 90 tablet 1   • Ventolin  (90 Base)  MCG/ACT inhaler        No facility-administered encounter medications on file as of 4/28/2023.

## 2023-05-03 ENCOUNTER — OFFICE VISIT (OUTPATIENT)
Dept: INTERNAL MEDICINE | Facility: CLINIC | Age: 73
End: 2023-05-03
Payer: MEDICARE

## 2023-05-03 VITALS
WEIGHT: 146.4 LBS | SYSTOLIC BLOOD PRESSURE: 127 MMHG | HEART RATE: 77 BPM | OXYGEN SATURATION: 100 % | BODY MASS INDEX: 21.68 KG/M2 | DIASTOLIC BLOOD PRESSURE: 81 MMHG | HEIGHT: 69 IN

## 2023-05-03 DIAGNOSIS — R69 CHRONIC ILLNESS: Primary | ICD-10-CM

## 2023-05-03 NOTE — ASSESSMENT & PLAN NOTE
We discussed paperwork in office; paperwork filled out; will have collaborate MD to review and sign.

## 2023-05-03 NOTE — PROGRESS NOTES
"Chief Complaint  Chronic illness    Subjective        Sara Gary presents to [facility]  History of Present Illness  This is a 73 y/o female presenting to office for f/u with chronic conditions regarding disability exceptions for student loans. Patient has hx of open heart surgery, anxiety, ongoing hypothyroidism, fibromyalgia, and multiple joint pain. Patient reports she is unable to lift more than 5 lbs and has difficulty standing for prolonged periods of times. Patient is currently retired and cannot work. Reports overall her health conditions have worsened over the years and she is requesting paperwork to be filled out to exempt from student loan due to her chronic illnesses.     Objective   Vital Signs:  /81 (BP Location: Left arm, Patient Position: Sitting, Cuff Size: Adult)   Pulse 77   Ht 175.3 cm (69\")   Wt 66.4 kg (146 lb 6.4 oz)   SpO2 100%   BMI 21.62 kg/m²   Estimated body mass index is 21.62 kg/m² as calculated from the following:    Height as of this encounter: 175.3 cm (69\").    Weight as of this encounter: 66.4 kg (146 lb 6.4 oz).       BMI is within normal parameters. No other follow-up for BMI required.      Physical Exam  Constitutional:       Appearance: Normal appearance.   HENT:      Head: Normocephalic and atraumatic.      Right Ear: External ear normal.      Left Ear: External ear normal.   Eyes:      Conjunctiva/sclera: Conjunctivae normal.      Pupils: Pupils are equal, round, and reactive to light.      Comments: +glasses   Pulmonary:      Effort: Pulmonary effort is normal.   Musculoskeletal:         General: Tenderness present.      Cervical back: Normal range of motion and neck supple.      Comments: Multiple joint pain   Skin:     General: Skin is warm and dry.      Capillary Refill: Capillary refill takes less than 2 seconds.   Neurological:      Mental Status: She is alert and oriented to person, place, and time. Mental status is at " baseline.      Motor: Weakness present.   Psychiatric:         Mood and Affect: Mood is anxious.         Thought Content: Thought content normal.        Result Review :  The following data was reviewed by: ALEJANDRO Miller on 05/03/2023:  Common labs        3/6/2023    12:26 3/22/2023    10:42   Common Labs   Glucose 95   105     BUN 10   10     Creatinine 0.74   0.78     Sodium 140   141     Potassium 4.5   4.4     Chloride 105   106     Calcium 9.4   9.4     Total Protein 6.7      Albumin 4.5   4.0     Total Bilirubin 0.4   0.4     Alkaline Phosphatase 169   155     AST (SGOT) 17   16     ALT (SGPT) 11   8     WBC 4.64   4.66     Hemoglobin 12.9   12.5     Hematocrit 37.6   37.8     Platelets 247   262     Total Cholesterol  220     Total Cholesterol 258      Triglycerides 91   116     HDL Cholesterol 89   64     LDL Cholesterol  154   136     Hemoglobin A1C 5.50                     Assessment and Plan   Diagnoses and all orders for this visit:    1. Chronic illness (Primary)  Assessment & Plan:  We discussed paperwork in office; paperwork filled out; will have collaborate MD to review and sign.                Follow Up   Return for Next scheduled follow up 9/6/23.  Patient was given instructions and counseling regarding her condition or for health maintenance advice. Please see specific information pulled into the AVS if appropriate.

## 2023-05-31 DIAGNOSIS — G43.819 OTHER MIGRAINE WITHOUT STATUS MIGRAINOSUS, INTRACTABLE: Primary | ICD-10-CM

## 2023-05-31 RX ORDER — SUMATRIPTAN 100 MG/1
TABLET, FILM COATED ORAL
Status: CANCELLED | OUTPATIENT
Start: 2023-05-31

## 2023-05-31 RX ORDER — SUMATRIPTAN 100 MG/1
TABLET, FILM COATED ORAL
Qty: 30 TABLET | Refills: 1 | Status: SHIPPED | OUTPATIENT
Start: 2023-05-31

## 2023-05-31 NOTE — TELEPHONE ENCOUNTER
Can we please clarify this with the patient? Who had previously prescribed this for her? [place] which had recommended tylenol for abortive therapy.

## 2023-05-31 NOTE — TELEPHONE ENCOUNTER
Caller: Sara Gary    Relationship: Self    Best call back number: [phone]    Requested Prescriptions:   Requested Prescriptions     Pending Prescriptions Disp Refills   • SUMAtriptan (IMITREX) 100 MG tablet          Pharmacy where request should be sent: [facility] [id number] [place] [id number] [facility] [address] [facility] [address]  - [phone]  - [phone] FX     Last office visit with prescribing clinician: 5/3/2023   Last telemedicine visit with prescribing clinician: Visit date not found   Next office visit with prescribing clinician: 9/6/2023     Additional details provided by patient: STATES SHE JUST HAS ONE TABLET LEFT    PATIENT STATES THAT SHE WAS ORIGINALLY PRESCRIBED BY DR NANCY LINO IN [facility]     TAKE ONE TABLET BY MOUTH ON ONSET OF MIGRAINE     Does the patient have less than a 3 day supply:  [] Yes  [x] No    If the office needs to give you a call back, can they leave a voicemail: [x] Yes [] No    Mio Romero Rep   05/31/23 09:31 EDT

## 2023-05-31 NOTE — TELEPHONE ENCOUNTER
Called patient and states she takes this med for her migraine and it seem with the weather and her allergies they migraines have been more frequent. States she cuts them in half and only gets 9 to a box so she takes them sparingly. The that she had was from 2022. She states this med really helps when needed.

## 2023-05-31 NOTE — TELEPHONE ENCOUNTER
Rx Refill Note  Requested Prescriptions     Pending Prescriptions Disp Refills   • SUMAtriptan (IMITREX) 100 MG tablet        Last office visit with prescribing clinician: 5/3/2023   Last telemedicine visit with prescribing clinician: Visit date not found   Next office visit with prescribing clinician: 9/6/2023     Giselle Ibrahim MA  05/31/23, 10:12 EDT

## 2023-06-05 ENCOUNTER — OFFICE VISIT (OUTPATIENT)
Dept: INTERNAL MEDICINE | Facility: CLINIC | Age: 73
End: 2023-06-05
Payer: MEDICARE

## 2023-06-05 VITALS
WEIGHT: 144.6 LBS | SYSTOLIC BLOOD PRESSURE: 122 MMHG | DIASTOLIC BLOOD PRESSURE: 84 MMHG | BODY MASS INDEX: 21.42 KG/M2 | HEART RATE: 61 BPM | HEIGHT: 69 IN | OXYGEN SATURATION: 97 %

## 2023-06-05 DIAGNOSIS — J02.9 SORE THROAT: Primary | ICD-10-CM

## 2023-06-05 LAB
EXPIRATION DATE: NORMAL
EXPIRATION DATE: NORMAL
INTERNAL CONTROL: NORMAL
INTERNAL CONTROL: NORMAL
Lab: NORMAL
Lab: NORMAL
S PYO AG THROAT QL: NEGATIVE
SARS-COV-2 AG UPPER RESP QL IA.RAPID: NOT DETECTED

## 2023-06-05 PROCEDURE — 1159F MED LIST DOCD IN RCRD: CPT | Performed by: NURSE PRACTITIONER

## 2023-06-05 PROCEDURE — 1160F RVW MEDS BY RX/DR IN RCRD: CPT | Performed by: NURSE PRACTITIONER

## 2023-06-05 PROCEDURE — 3074F SYST BP LT 130 MM HG: CPT | Performed by: NURSE PRACTITIONER

## 2023-06-05 PROCEDURE — 3079F DIAST BP 80-89 MM HG: CPT | Performed by: NURSE PRACTITIONER

## 2023-06-05 PROCEDURE — 87426 SARSCOV CORONAVIRUS AG IA: CPT | Performed by: NURSE PRACTITIONER

## 2023-06-05 PROCEDURE — 99213 OFFICE O/P EST LOW 20 MIN: CPT | Performed by: NURSE PRACTITIONER

## 2023-06-05 PROCEDURE — 87880 STREP A ASSAY W/OPTIC: CPT | Performed by: NURSE PRACTITIONER

## 2023-06-05 NOTE — PROGRESS NOTES
"        Chief Complaint  Sore Throat     Subjective:      History of Present Illness {CC  Problem List  Visit  Diagnosis   Encounters  Notes  Medications  Labs  Result Review Imaging  Media :23}     Sara Gary is a patient of Catherine Kolb APRN and presents to [facility] for     Woke up this am: irritated throat   No fever, chills, cough.     Been outside over the wk end.    Grandson: +strep  (she took care of him last on Friday)     Allergies: flonase     I have reviewed patient's medical history, any new submitted information provided by patient or medical assistant and updated medical record.      Objective:      Physical Exam  Constitutional:       Appearance: Normal appearance.   HENT:      Mouth/Throat:      Mouth: Mucous membranes are dry.      Pharynx: Oropharynx is clear. No posterior oropharyngeal erythema.   Eyes:      Conjunctiva/sclera: Conjunctivae normal.   Cardiovascular:      Rate and Rhythm: Normal rate.      Pulses: Normal pulses.   Pulmonary:      Effort: Pulmonary effort is normal. No respiratory distress.      Breath sounds: Normal breath sounds.   Neurological:      Mental Status: She is oriented to person, place, and time.      Result Review  Data Reviewed:{ Labs  Result Review  Imaging  Med Tab  Media :23}              POCT SARS-CoV-2 Antigen GUERDA (06/05/2023 12:00 PM): negative   POCT rapid strep A (06/05/2023 12:00 PM): negative       Vital Signs:   /84 (BP Location: Left arm, Patient Position: Sitting, Cuff Size: Adult)   Pulse 61   Ht 175.3 cm (69\")   Wt 65.6 kg (144 lb 9.6 oz)   SpO2 97%   BMI 21.35 kg/m²         Requested Prescriptions      No prescriptions requested or ordered in this encounter       Routine medications provided by this office will also be refilled via pharmacy request.       Current Outpatient Medications:     busPIRone (BUSPAR) 10 MG tablet, , Disp: , Rfl:     estradiol (ESTRACE) 0.1 MG/GM vaginal cream, " , Disp: , Rfl:     famotidine (PEPCID) 40 MG tablet, , Disp: , Rfl:     liothyronine (CYTOMEL) 5 MCG tablet, Take 1 tablet by mouth Daily., Disp: 90 tablet, Rfl: 0    meclizine (ANTIVERT) 25 MG tablet, Take 1 tablet by mouth 3 (Three) Times a Day As Needed for Dizziness., Disp: 60 tablet, Rfl: 1    Restasis 0.05 % ophthalmic emulsion, , Disp: , Rfl:     sertraline (ZOLOFT) 50 MG tablet, Take 1 tablet by mouth Daily., Disp: 90 tablet, Rfl: 2    SUMAtriptan (IMITREX) 100 MG tablet, Take one tablet at onset of headache. May repeat dose one time in 2 hours if headache not relieved., Disp: 30 tablet, Rfl: 1    Synthroid 75 MCG tablet, Take 1 tablet by mouth Daily., Disp: 90 tablet, Rfl: 1    Ventolin  (90 Base) MCG/ACT inhaler, , Disp: , Rfl:     cetirizine (zyrTEC) 10 MG tablet, Take 1 tablet by mouth Daily for 10 days., Disp: 90 tablet, Rfl: 0     Assessment and Plan:      Assessment and Plan {CC Problem List  Visit Diagnosis  ROS  Review (Popup)  Health Maintenance  Quality  BestPractice  Medications  SmartSets  SnapShot Encounters  Media :23}     Problem List Items Addressed This Visit    None  Visit Diagnoses       Sore throat    -  Primary    Relevant Orders    POCT rapid strep A    POCT SARS-CoV-2 Antigen GUERDA          ? Secondary to allergies.   Strep negative in office today.   APAP if needed for pain.     Follow Up {Instructions Charge Capture  Follow-up Communications :23}     Return if symptoms worsen or fail to improve.    Follow up with PCP as scheduled.     Patient was given instructions and counseling regarding her condition or for health maintenance advice. Please see specific information pulled into the AVS if appropriate.    Dragon disclaimer:   Much of this encounter note is an electronic transcription/translation of spoken language to printed text. The electronic translation of spoken language may permit erroneous, or at times, nonsensical words or phrases to be inadvertently  transcribed; Although I have reviewed the note for such errors, some may still exist.     Additional Patient Counseling:       Patient Instructions   Warm salt water gargle three times a day as needed.

## 2023-06-14 ENCOUNTER — OFFICE VISIT (OUTPATIENT)
Dept: GASTROENTEROLOGY | Facility: CLINIC | Age: 73
End: 2023-06-14
Payer: MEDICARE

## 2023-06-14 VITALS
WEIGHT: 145.3 LBS | HEIGHT: 69 IN | OXYGEN SATURATION: 98 % | DIASTOLIC BLOOD PRESSURE: 68 MMHG | TEMPERATURE: 97.9 F | SYSTOLIC BLOOD PRESSURE: 128 MMHG | BODY MASS INDEX: 21.52 KG/M2 | HEART RATE: 71 BPM

## 2023-06-14 DIAGNOSIS — R74.8 ELEVATED ALKALINE PHOSPHATASE LEVEL: Chronic | ICD-10-CM

## 2023-06-14 DIAGNOSIS — K57.90 DIVERTICULOSIS: Primary | Chronic | ICD-10-CM

## 2023-06-14 DIAGNOSIS — Z87.19 HISTORY OF DIVERTICULITIS: ICD-10-CM

## 2023-06-14 DIAGNOSIS — D12.6 ADENOMATOUS POLYP OF COLON, UNSPECIFIED PART OF COLON: ICD-10-CM

## 2023-06-14 DIAGNOSIS — K21.9 GASTROESOPHAGEAL REFLUX DISEASE, UNSPECIFIED WHETHER ESOPHAGITIS PRESENT: Chronic | ICD-10-CM

## 2023-06-14 DIAGNOSIS — K63.89 SMALL INTESTINAL BACTERIAL OVERGROWTH (SIBO): Chronic | ICD-10-CM

## 2023-06-14 DIAGNOSIS — Z12.11 COLON CANCER SCREENING: ICD-10-CM

## 2023-06-14 PROCEDURE — 3074F SYST BP LT 130 MM HG: CPT | Performed by: NURSE PRACTITIONER

## 2023-06-14 PROCEDURE — 1160F RVW MEDS BY RX/DR IN RCRD: CPT | Performed by: NURSE PRACTITIONER

## 2023-06-14 PROCEDURE — 99214 OFFICE O/P EST MOD 30 MIN: CPT | Performed by: NURSE PRACTITIONER

## 2023-06-14 PROCEDURE — 1159F MED LIST DOCD IN RCRD: CPT | Performed by: NURSE PRACTITIONER

## 2023-06-14 PROCEDURE — 3078F DIAST BP <80 MM HG: CPT | Performed by: NURSE PRACTITIONER

## 2023-06-14 NOTE — PROGRESS NOTES
Chief Complaint   Patient presents with    Follow-up     History of diverticulosis and diverticulitis, SIBO, GERD, excess gas, elevated alkaline phosphatase         History of Present Illness  72-year-old female presents the office today for follow-up.  She was last seen in office on 3/1/2023.  She has a history of colon polyps, diverticulosis, diverticulitis.  Last colonoscopy was performed July 2016 and she was noted to have a total of 3 colon polyps, all of which were tubular adenomas.  She underwent Cologuard in 2021 which she reports was negative.  Colonoscopy was ordered at her last office visit but not yet performed. Patient does not want to proceed with colonoscopy.     She has had an episode of diverticulitis May 2022, but CT scan was negative.  She does watch her diet closely. She reports that she is taking angelMD and states that she is having regular Bms and is very pleased. She is also taking Tumeric which she feels has significantly reduced inflammation. Denies any rectal bleeding.     She has a history of GERD and takes famotidine 20 mg twice daily with good control of symptoms. She denies any nausea, vomiting, or dysphagia. She does report dry mouth. She drinks fluid frequently.     She was reporting some excess gas at her last office visit we recommend use of a daily probiotic and adherence to the low FODMAP diet. She reports a significant history of SIBO, initially diagnosed by Dr. Murrell. She is having severe flatulence. She feels that she may have a recurrence of SIBO. She repots that her insurance company has covered Xifaxan in the past for SIBO.     She has a history of elevated alkaline phosphatase at 155. Level has been elevated for 5+ years. She reports a history of osteopenia.     Review of Systems   Constitutional:  Negative for fever and unexpected weight change.   HENT:  Negative for trouble swallowing.    Cardiovascular:  Negative for chest pain.   Gastrointestinal:   "Negative for abdominal distention, abdominal pain, anal bleeding, blood in stool, constipation, diarrhea, nausea, rectal pain and vomiting.       Result Review :       Office Visit with Dina Baugh APRN (03/01/2023)   SCANNED - COLONOSCOPY (07/22/2016)   SCANNED PATHOLOGY (07/22/2016)   Comprehensive Metabolic Panel (03/22/2023 10:42)   Vital Signs:   /68   Pulse 71   Temp 97.9 °F (36.6 °C)   Ht 175.3 cm (69\")   Wt 65.9 kg (145 lb 4.8 oz)   SpO2 98%   BMI 21.46 kg/m²     Body mass index is 21.46 kg/m².     Physical Exam  Vitals reviewed.   Constitutional:       Appearance: Normal appearance.   Pulmonary:      Effort: Pulmonary effort is normal. No respiratory distress.   Abdominal:      General: Abdomen is flat. Bowel sounds are normal. There is no distension.      Palpations: Abdomen is soft. There is no mass.      Tenderness: There is no abdominal tenderness. There is no guarding.   Musculoskeletal:         General: Normal range of motion.   Skin:     General: Skin is warm and dry.   Neurological:      General: No focal deficit present.      Mental Status: She is alert and oriented to person, place, and time.   Psychiatric:         Mood and Affect: Mood normal.         Behavior: Behavior normal.         Thought Content: Thought content normal.         Judgment: Judgment normal.     Assessment and Plan    Diagnoses and all orders for this visit:    1. Diverticulosis (Primary)    2. Small intestinal bacterial overgrowth (SIBO)  Comments:  recurrent    3. History of diverticulitis  -     Cancel: Breath Hydrogen Test; Future  -     Breath Hydrogen Test; Future    4. Gastroesophageal reflux disease, unspecified whether esophagitis present    5. Adenomatous polyp of colon, unspecified part of colon    6. Colon cancer screening    7. Elevated alkaline phosphatase level  -     Hepatic Function Panel  -     Gamma GT  -     Mitochondrial Antibodies, M2  -     Alkaline Phosphatase, Isoenzymes; Future     "       Patient Instructions   For further evaluation of SIBO we will order hydrogen breath testing. You will be contacted to schedule this test and we will contact you with results.     2.  For further evaluation of your elevated alkaline phosphatase level we will order some additional lab work and contact you with results once available.     3.  For GERD, continue famotidine 20 mg twice daily.    4.  Continue daily probiotic.    5.  Next follow up visit based upon testing results       Discussion:    Patient has had a history of SIBO and Xifaxan has worked well in the past.  We will order hydrogen breath testing to assess for SIBO.  Patient states that her insurance company has covered Xifaxan for this in the past and we will plan to treat with Xifaxan accordingly if positive.    For further evaluation of elevated alkaline phosphatase level we we will obtain some additional lab work for further work-up.  I suspect that this is secondary to osteopenia.    For GERD, patient to continue famotidine 20 mg once daily.    Patient to continue daily probiotic as this is worked very well to help regulate her bowel habits.  Next office follow-up pending results of hydrogen breath testing and lab work.  Patient verbalized understanding of above plan of care and is in agreement.  All questions answered and support provided.    EMR Dragon/Transcription Disclaimer:  This document has been Dictated utilizing Dragon dictation.

## 2023-06-14 NOTE — PATIENT INSTRUCTIONS
For further evaluation of SIBO we will order hydrogen breath testing. You will be contacted to schedule this test and we will contact you with results.     2.  For further evaluation of your elevated alkaline phosphatase level we will order some additional lab work and contact you with results once available.     3.  For GERD, continue famotidine 20 mg twice daily.    4.  Continue daily probiotic.    5.  Next follow up visit based upon testing results

## 2023-06-15 LAB
ALBUMIN SERPL-MCNC: 4.5 G/DL (ref 3.5–5.2)
ALP SERPL-CCNC: 149 U/L (ref 39–117)
ALT SERPL-CCNC: 9 U/L (ref 1–33)
AST SERPL-CCNC: 17 U/L (ref 1–32)
BILIRUB DIRECT SERPL-MCNC: <0.2 MG/DL (ref 0–0.3)
BILIRUB SERPL-MCNC: 0.4 MG/DL (ref 0–1.2)
GGT SERPL-CCNC: 10 U/L (ref 5–36)
MITOCHONDRIA M2 IGG SER-ACNC: <20 UNITS (ref 0–20)
PROT SERPL-MCNC: 6.8 G/DL (ref 6–8.5)

## 2023-06-19 RX ORDER — LIOTHYRONINE SODIUM 5 UG/1
TABLET ORAL
Qty: 90 TABLET | Refills: 0 | Status: SHIPPED | OUTPATIENT
Start: 2023-06-19

## 2023-07-24 ENCOUNTER — TELEPHONE (OUTPATIENT)
Dept: GASTROENTEROLOGY | Facility: CLINIC | Age: 73
End: 2023-07-24

## 2023-07-24 ENCOUNTER — TELEPHONE (OUTPATIENT)
Dept: INTERNAL MEDICINE | Facility: CLINIC | Age: 73
End: 2023-07-24
Payer: MEDICARE

## 2023-07-24 NOTE — TELEPHONE ENCOUNTER
Patient is asking for Xifaxan. She stated that she has had SIBO in the past and she thinks she has it again. She has bloating, gas, and constipation. Thank you

## 2023-07-24 NOTE — TELEPHONE ENCOUNTER
Caller: Sara Gary    Relationship: Self    Best call back number: [phone]     What medication are you requesting: XIFAXIN    What are your current symptoms: BLOATING, CONSTIPATION, GAS    How long have you been experiencing symptoms: COUPLE OF MONTHS    Have you had these symptoms before:    [x] Yes  [] No    Have you been treated for these symptoms before:   [x] Yes  [] No    If a prescription is needed, what is your preferred pharmacy and phone number: [facility] [id number] - [place] [address] AT [facility]  - [phone]  - [phone] FX     Additional notes: PATIENT STATED SHE HAS BEEN DIAGNOSED WITH IRRITABLE BOWEL SYNDROME AND SMALL INTESTINAL BACTERIAL OVERGROWTH, AND HAS FOUND THIS MEDICATION HELPS BEST TO TREAT THESE SYMPTOMS.    PATIENT IS REQUESTING THIS MEDICATION BE PRESCRIBED FOR HER.

## 2023-07-24 NOTE — TELEPHONE ENCOUNTER
Caller: Sara Gary    Relationship: Self    Best call back number: [phone]    Requested Prescriptions:   Requested Prescriptions      No prescriptions requested or ordered in this encounter    [facility]     Pharmacy where request should be sent:  [facility] [id number] [place] [address] AT [address] & [address] - [phone]  - [phone]     Last office visit with prescribing clinician: 6/14/2023   Last telemedicine visit with prescribing clinician: Visit date not found   Next office visit with prescribing clinician: Visit date not found     Additional details provided by patient: HAVING A LOT OF BLOATING AND GAS.      Does the patient have less than a 3 day supply:  [] Yes  [x] No    Would you like a call back once the refill request has been completed: [] Yes [x] No    If the office needs to give you a call back, can they leave a voicemail: [] Yes [x] No    Mio Colon Rep   07/24/23 13:44 EDT

## 2023-07-27 ENCOUNTER — TELEPHONE (OUTPATIENT)
Dept: GASTROENTEROLOGY | Facility: CLINIC | Age: 73
End: 2023-07-27
Payer: MEDICARE

## 2023-07-27 NOTE — TELEPHONE ENCOUNTER
Patient had a CMP done last 7/14 ordered by her PCP, it shows Alkaline Phosphatase = 134 U/L.     Should she still proceed with Alkaline Phosphatase Isoenzyme test ordered by you last 6/16 ?

## 2023-08-24 ENCOUNTER — OFFICE VISIT (OUTPATIENT)
Dept: INTERNAL MEDICINE | Facility: CLINIC | Age: 73
End: 2023-08-24
Payer: MEDICARE

## 2023-08-24 VITALS
OXYGEN SATURATION: 99 % | HEART RATE: 56 BPM | DIASTOLIC BLOOD PRESSURE: 80 MMHG | WEIGHT: 144 LBS | HEIGHT: 69 IN | BODY MASS INDEX: 21.33 KG/M2 | SYSTOLIC BLOOD PRESSURE: 130 MMHG

## 2023-08-24 DIAGNOSIS — F41.9 ANXIETY AND DEPRESSION: ICD-10-CM

## 2023-08-24 DIAGNOSIS — Z00.00 HEALTHCARE MAINTENANCE: Primary | ICD-10-CM

## 2023-08-24 DIAGNOSIS — F32.A ANXIETY AND DEPRESSION: ICD-10-CM

## 2023-08-24 NOTE — PROGRESS NOTES
"Chief Complaint  Shoulder Pain, Anxiety, and Stress    Subjective        Sara Gary presents to [facility]  History of Present Illness  This is a 71 y/o female presenting to office for questions concerning covid 19 booster. Patient reports she is also having a lot of stress and anxiety r/t her current living situation and her daughter's current lifestyle practices. Patient recently new psychiatric provider-- patient reports she was prescribed new medications. Patient reports she was unable to stay awake on olanzapine. Patient reports she was also given lexapro which she is already on zoloft. Denies SI.     Objective   Vital Signs:  /80 (BP Location: Left arm, Patient Position: Sitting, Cuff Size: Adult)   Pulse 56   Ht 175.3 cm (69\")   Wt 65.3 kg (144 lb)   SpO2 99%   BMI 21.27 kg/mý   Estimated body mass index is 21.27 kg/mý as calculated from the following:    Height as of this encounter: 175.3 cm (69\").    Weight as of this encounter: 65.3 kg (144 lb).       BMI is within normal parameters. No other follow-up for BMI required.      Physical Exam  Constitutional:       Appearance: Normal appearance.   HENT:      Head: Normocephalic and atraumatic.      Right Ear: External ear normal.      Left Ear: External ear normal.      Nose: Nose normal.      Mouth/Throat:      Mouth: Mucous membranes are moist.      Pharynx: Oropharynx is clear.   Eyes:      Conjunctiva/sclera: Conjunctivae normal.      Pupils: Pupils are equal, round, and reactive to light.      Comments: +glasses   Pulmonary:      Effort: Pulmonary effort is normal.   Musculoskeletal:      Cervical back: Normal range of motion and neck supple.   Skin:     General: Skin is warm and dry.      Capillary Refill: Capillary refill takes less than 2 seconds.   Neurological:      General: No focal deficit present.      Mental Status: She is alert and oriented to person, place, and time. Mental status is at baseline. "   Psychiatric:         Mood and Affect: Mood is anxious.         Speech: Speech normal.         Behavior: Behavior normal. Behavior is cooperative.         Thought Content: Thought content normal.         Cognition and Memory: Cognition and memory normal.      Result Review :  The following data was reviewed by: ALEJANDRO Miller on 08/24/2023:  Common labs          3/22/2023    10:42 6/14/2023    14:34 7/14/2023    14:53   Common Labs   Glucose 105   88    BUN 10   9    Creatinine 0.78   0.79    Sodium 141   143    Potassium 4.4   4.4    Chloride 106   108    Calcium 9.4   9.5    Total Protein  6.8  6.4    Albumin 4.0  4.5  4.3    Total Bilirubin 0.4  0.4  0.4    Alkaline Phosphatase 155  149  134    AST (SGOT) 16  17  15    ALT (SGPT) 8  9  7    WBC 4.66   5.11    Hemoglobin 12.5   12.6    Hematocrit 37.8   37.3    Platelets 262   263    Total Cholesterol 220      Triglycerides 116      HDL Cholesterol 64      LDL Cholesterol  136                     Assessment and Plan   Diagnoses and all orders for this visit:    1. Healthcare maintenance (Primary)  Assessment & Plan:  Recommended covid 19 booster when it releases at end of September.       2. Anxiety and depression  Assessment & Plan:  Increase zoloft to 50mg   Continue on buspar  Advised NOT to take lexapro and zyprexa  F/u in 6 weeks     Orders:  -     Ambulatory Referral to Behavioral Health             Follow Up   Return in about 6 weeks (around 10/5/2023) for Recheck anxiety and depression.  Patient was given instructions and counseling regarding her condition or for health maintenance advice. Please see specific information pulled into the AVS if appropriate.

## 2023-08-24 NOTE — ASSESSMENT & PLAN NOTE
Increase zoloft to 50mg   Continue on buspar  Advised NOT to take lexapro and zyprexa  F/u in 6 weeks

## 2023-08-30 ENCOUNTER — OFFICE VISIT (OUTPATIENT)
Dept: INTERNAL MEDICINE | Facility: CLINIC | Age: 73
End: 2023-08-30
Payer: MEDICARE

## 2023-08-30 VITALS
SYSTOLIC BLOOD PRESSURE: 120 MMHG | WEIGHT: 144.6 LBS | HEART RATE: 62 BPM | BODY MASS INDEX: 21.42 KG/M2 | OXYGEN SATURATION: 98 % | DIASTOLIC BLOOD PRESSURE: 72 MMHG | HEIGHT: 69 IN

## 2023-08-30 DIAGNOSIS — R42 VERTIGO: Primary | ICD-10-CM

## 2023-08-30 DIAGNOSIS — H61.21 IMPACTED CERUMEN OF RIGHT EAR: ICD-10-CM

## 2023-08-30 PROBLEM — K58.9 ADAPTIVE COLITIS: Status: ACTIVE | Noted: 2023-08-30

## 2023-08-30 PROBLEM — N30.00 ACUTE RECURRENT CYSTITIS: Status: ACTIVE | Noted: 2019-11-12

## 2023-08-30 PROBLEM — J30.9 ALLERGIC RHINITIS: Status: ACTIVE | Noted: 2019-05-06

## 2023-08-30 PROBLEM — L57.0 AK (ACTINIC KERATOSIS): Status: ACTIVE | Noted: 2022-08-24

## 2023-08-30 PROBLEM — G43.109 ACUTE ONSET AURA MIGRAINE: Status: ACTIVE | Noted: 2023-08-30

## 2023-08-30 NOTE — PROGRESS NOTES
"        Chief Complaint  Tinnitus     Subjective:      History of Present Illness {CC  Problem List  Visit  Diagnosis   Encounters  Notes  Medications  Labs  Result Review Imaging  Media :23}     Sara Gary is a patient of Catherine Kolb APRN and presents to [facility] for     Hx tinnitus: states for the last 2 evenings she has had loud sound in right ear.  No N/V - change in vision, CP, SOA  Hx recurrent cerumen.    She has been on antivert.  She has not been seen by ENT    Looks like last week: anxiety/depression: increased zoloft to 50 mg and continue buspar.  Advised not to take lexaro or zyprexa.   Progress Notes by Catherine Kolb APRN (08/24/2023 1:15 PM)     I have reviewed patient's medical history, any new submitted information provided by patient or medical assistant and updated medical record.      Objective:      Physical Exam  HENT:      Right Ear: There is impacted cerumen.   Eyes:      Extraocular Movements:      Right eye: No nystagmus.      Left eye: No nystagmus.      Conjunctiva/sclera: Conjunctivae normal.   Neck:      Vascular: No carotid bruit.   Neurological:      General: No focal deficit present.      Mental Status: She is oriented to person, place, and time.      Result Review  Data Reviewed:{ Labs  Result Review  Imaging  Med Tab  Media :23}                Vital Signs:   /72 (BP Location: Left arm, Patient Position: Sitting, Cuff Size: Adult)   Pulse 62   Ht 175.3 cm (69\")   Wt 65.6 kg (144 lb 9.6 oz)   SpO2 98%   BMI 21.35 kg/mý   Ear Cerumen Removal    Date/Time: 8/30/2023 12:55 PM  Performed by: Matti Rausch III, NP-C  Authorized by: Matti Rausch III, NP-C   Consent: Verbal consent obtained.  Risks and benefits: risks, benefits and alternatives were discussed  Consent given by: patient  Patient identity confirmed: verbally with patient  Ceruminolytics applied: Ceruminolytics applied prior to " the procedure.  Location details: right ear  Patient tolerance: patient tolerated the procedure well with no immediate complications  Comments: Cleared: TM wnl   Procedure type: instrumentation, irrigation        Requested Prescriptions      No prescriptions requested or ordered in this encounter       Routine medications provided by this office will also be refilled via pharmacy request.       Current Outpatient Medications:     busPIRone (BUSPAR) 10 MG tablet, , Disp: , Rfl:     cetirizine (zyrTEC) 10 MG tablet, Take 1 tablet by mouth Daily for 10 days., Disp: 90 tablet, Rfl: 0    estradiol (ESTRACE) 0.1 MG/GM vaginal cream, , Disp: , Rfl:     famotidine (PEPCID) 40 MG tablet, , Disp: , Rfl:     liothyronine (CYTOMEL) 5 MCG tablet, TAKE ONE TABLET BY MOUTH DAILY, Disp: 90 tablet, Rfl: 0    meclizine (ANTIVERT) 25 MG tablet, Take 1 tablet by mouth 3 (Three) Times a Day As Needed for Dizziness., Disp: 60 tablet, Rfl: 1    Restasis 0.05 % ophthalmic emulsion, , Disp: , Rfl:     sertraline (ZOLOFT) 50 MG tablet, Take 1 tablet by mouth Daily., Disp: 90 tablet, Rfl: 2    SUMAtriptan (IMITREX) 100 MG tablet, Take one tablet at onset of headache. May repeat dose one time in 2 hours if headache not relieved., Disp: 30 tablet, Rfl: 1    Synthroid 75 MCG tablet, Take 1 tablet by mouth Daily., Disp: 90 tablet, Rfl: 1    Ventolin  (90 Base) MCG/ACT inhaler, , Disp: , Rfl:      Assessment and Plan:      Assessment and Plan {CC Problem List  Visit Diagnosis  ROS  Review (Popup)  [facility] Maintenance  Quality  BestPractice  Medications  SmartSets  SnapShot Encounters  Media :23}     Problem List Items Addressed This Visit          ENT    Vertigo - Primary    Relevant Orders    Ambulatory Referral to ENT (Otolaryngology)     Other Visit Diagnoses       Impacted cerumen of right ear        Cleared            Follow Up {Instructions Charge Capture  Follow-up Communications :23}     Return if symptoms worsen or  fail to improve.    Follow up with PCP as scheduled.     Patient was given instructions and counseling regarding her condition or for health maintenance advice. Please see specific information pulled into the AVS if appropriate.    Dragon disclaimer:   Much of this encounter note is an electronic transcription/translation of spoken language to printed text. The electronic translation of spoken language may permit erroneous, or at times, nonsensical words or phrases to be inadvertently transcribed; Although I have reviewed the note for such errors, some may still exist.     Additional Patient Counseling:       There are no Patient Instructions on file for this visit.

## 2023-09-01 ENCOUNTER — OFFICE VISIT (OUTPATIENT)
Dept: INTERNAL MEDICINE | Facility: CLINIC | Age: 73
End: 2023-09-01
Payer: MEDICARE

## 2023-09-01 VITALS
SYSTOLIC BLOOD PRESSURE: 158 MMHG | WEIGHT: 143.2 LBS | BODY MASS INDEX: 21.21 KG/M2 | HEIGHT: 69 IN | TEMPERATURE: 97.4 F | OXYGEN SATURATION: 98 % | HEART RATE: 69 BPM | DIASTOLIC BLOOD PRESSURE: 84 MMHG

## 2023-09-01 DIAGNOSIS — R51.9 ACUTE NONINTRACTABLE HEADACHE, UNSPECIFIED HEADACHE TYPE: Primary | ICD-10-CM

## 2023-09-01 LAB
EXPIRATION DATE: NORMAL
INTERNAL CONTROL: NORMAL
Lab: NORMAL
SARS-COV-2 AG UPPER RESP QL IA.RAPID: NOT DETECTED

## 2023-09-01 PROCEDURE — 99213 OFFICE O/P EST LOW 20 MIN: CPT

## 2023-09-01 PROCEDURE — 3077F SYST BP >= 140 MM HG: CPT

## 2023-09-01 PROCEDURE — 3079F DIAST BP 80-89 MM HG: CPT

## 2023-09-01 PROCEDURE — 87426 SARSCOV CORONAVIRUS AG IA: CPT

## 2023-09-01 NOTE — PROGRESS NOTES
Chief Complaint  Headache, Dizziness, and Eye Problem     Subjective:      History of Present Illness {CC  Problem List  Visit  Diagnosis   Encounters  Notes  Medications  Labs  Result Review Imaging  Media :23}     Sara Gary presents to [facility] for:      History of Present Illness       Pt has had a headache since Monday. Pt saw Jt and got her ears irrigated. Pt has a hx of migraines. PT states she took Imitrex  with no relief. Pt states Thursday night she felt terrible and took Imitrex again. Pt started getting dizzy today and took meclizine. Pt states she is drinking water frequently. PT also complains of right neck swelling. PT states she has taken COVID home tests and been negative. PT denies auras, nausea, vomiting. PT states she is really worried about this and has been very anxious. PT states the ringing in the ears is still present. PE denies ear fullness.   Pt also states she had trouble catching her breath while eating yesterday.   Pt denies falling or hitting her head, but states her balance was off.       I have reviewed patient's medical history, any new submitted information provided by patient or medical assistant and updated medical record.      Objective:      Physical Exam  Vitals reviewed.   Constitutional:       General: She is not in acute distress.     Appearance: Normal appearance. She is not ill-appearing, toxic-appearing or diaphoretic.   HENT:      Head: Normocephalic.      Right Ear: Tympanic membrane, ear canal and external ear normal. There is no impacted cerumen.      Left Ear: Tympanic membrane, ear canal and external ear normal. There is no impacted cerumen.      Nose: Nose normal. No congestion.      Mouth/Throat:      Mouth: Mucous membranes are moist. Mucous membranes are dry.      Pharynx: Oropharynx is clear. No oropharyngeal exudate or posterior oropharyngeal erythema.   Eyes:      General:         Right eye: No  "discharge.         Left eye: No discharge.      Extraocular Movements: Extraocular movements intact.      Conjunctiva/sclera: Conjunctivae normal.      Pupils: Pupils are equal, round, and reactive to light.   Cardiovascular:      Rate and Rhythm: Normal rate and regular rhythm.      Pulses: Normal pulses.      Heart sounds: Normal heart sounds.   Pulmonary:      Effort: Pulmonary effort is normal.      Breath sounds: Normal breath sounds.   Skin:     General: Skin is warm and dry.      Capillary Refill: Capillary refill takes less than 2 seconds.   Neurological:      General: No focal deficit present.      Mental Status: She is alert.      GCS: GCS eye subscore is 4. GCS verbal subscore is 5. GCS motor subscore is 6.      Cranial Nerves: No cranial nerve deficit, dysarthria or facial asymmetry.      Motor: No weakness, tremor or abnormal muscle tone.      Gait: Gait and tandem walk normal.   Psychiatric:         Mood and Affect: Mood normal.         Behavior: Behavior normal.      Result Review  Data Reviewed:{ Labs  Result Review  Imaging  Med Tab  Media :23}                Vital Signs:   /84 (BP Location: Left arm, Patient Position: Sitting, Cuff Size: Adult)   Pulse 69   Temp 97.4 °F (36.3 °C) (Oral)   Ht 175.3 cm (69\")   Wt 65 kg (143 lb 3.2 oz)   SpO2 98%   BMI 21.15 kg/m²         Requested Prescriptions      No prescriptions requested or ordered in this encounter       Routine medications provided by this office will also be refilled via pharmacy request.       Current Outpatient Medications:     busPIRone (BUSPAR) 10 MG tablet, , Disp: , Rfl:     cetirizine (zyrTEC) 10 MG tablet, Take 1 tablet by mouth Daily for 10 days., Disp: 90 tablet, Rfl: 0    estradiol (ESTRACE) 0.1 MG/GM vaginal cream, , Disp: , Rfl:     famotidine (PEPCID) 40 MG tablet, , Disp: , Rfl:     liothyronine (CYTOMEL) 5 MCG tablet, TAKE ONE TABLET BY MOUTH DAILY, Disp: 90 tablet, Rfl: 0    meclizine (ANTIVERT) 25 MG tablet, " Take 1 tablet by mouth 3 (Three) Times a Day As Needed for Dizziness., Disp: 60 tablet, Rfl: 1    Restasis 0.05 % ophthalmic emulsion, , Disp: , Rfl:     sertraline (ZOLOFT) 50 MG tablet, Take 1 tablet by mouth Daily., Disp: 90 tablet, Rfl: 2    SUMAtriptan (IMITREX) 100 MG tablet, Take one tablet at onset of headache. May repeat dose one time in 2 hours if headache not relieved., Disp: 30 tablet, Rfl: 1    Synthroid 75 MCG tablet, Take 1 tablet by mouth Daily., Disp: 90 tablet, Rfl: 1    Ventolin  (90 Base) MCG/ACT inhaler, , Disp: , Rfl:      Assessment and Plan:      Assessment and Plan {CC Problem List  Visit Diagnosis  ROS  Review (Popup)  Health Maintenance  Quality  BestPractice  Medications  SmartSets  SnapShot Encounters  Media :23}     Problem List Items Addressed This Visit    None  Visit Diagnoses       Acute nonintractable headache, unspecified headache type    -  Primary    Relevant Orders    POCT SARS-CoV-2 Antigen GUERDA (Completed)    CBC w AUTO Differential (Completed)    Comprehensive Metabolic Panel (Completed)          Educated patient on results of COVID.  We will get CBC and CMP today to determine if there is any infectious process or if patient is dehydrated.  Educated patient to be seen in the emergency room if worsening headache occurs, weakness, confusion or persistent balance issues.  Patient educated to continue seeing neurologist and to find a different ophthalmologist.  Patient verbalized understanding is comfortable with the plan of care.    Follow Up {Instructions Charge Capture  Follow-up Communications :23}     Return if symptoms worsen or fail to improve.      Patient was given instructions and counseling regarding her condition or for health maintenance advice. Please see specific information pulled into the AVS if appropriate.    Dragon disclaimer:   Much of this encounter note is an electronic transcription/translation of spoken language to printed text. The  electronic translation of spoken language may permit erroneous, or at times, nonsensical words or phrases to be inadvertently transcribed; Although I have reviewed the note for such errors, some may still exist.     Additional Patient Counseling:       There are no Patient Instructions on file for this visit.

## 2023-09-02 LAB
ALBUMIN SERPL-MCNC: 4.6 G/DL (ref 3.5–5.2)
ALBUMIN/GLOB SERPL: 1.9 G/DL
ALP SERPL-CCNC: 152 U/L (ref 39–117)
ALT SERPL-CCNC: 10 U/L (ref 1–33)
AST SERPL-CCNC: 15 U/L (ref 1–32)
BASOPHILS # BLD AUTO: 0.03 10*3/MM3 (ref 0–0.2)
BASOPHILS NFR BLD AUTO: 0.7 % (ref 0–1.5)
BILIRUB SERPL-MCNC: 0.5 MG/DL (ref 0–1.2)
BUN SERPL-MCNC: 10 MG/DL (ref 8–23)
BUN/CREAT SERPL: 13.7 (ref 7–25)
CALCIUM SERPL-MCNC: 9.4 MG/DL (ref 8.6–10.5)
CHLORIDE SERPL-SCNC: 105 MMOL/L (ref 98–107)
CO2 SERPL-SCNC: 26.4 MMOL/L (ref 22–29)
CREAT SERPL-MCNC: 0.73 MG/DL (ref 0.57–1)
EGFRCR SERPLBLD CKD-EPI 2021: 86.4 ML/MIN/1.73
EOSINOPHIL # BLD AUTO: 0.11 10*3/MM3 (ref 0–0.4)
EOSINOPHIL NFR BLD AUTO: 2.5 % (ref 0.3–6.2)
ERYTHROCYTE [DISTWIDTH] IN BLOOD BY AUTOMATED COUNT: 12.7 % (ref 12.3–15.4)
GLOBULIN SER CALC-MCNC: 2.4 GM/DL
GLUCOSE SERPL-MCNC: 99 MG/DL (ref 65–99)
HCT VFR BLD AUTO: 39.7 % (ref 34–46.6)
HGB BLD-MCNC: 13.5 G/DL (ref 12–15.9)
IMM GRANULOCYTES # BLD AUTO: 0.02 10*3/MM3 (ref 0–0.05)
IMM GRANULOCYTES NFR BLD AUTO: 0.4 % (ref 0–0.5)
LYMPHOCYTES # BLD AUTO: 1.15 10*3/MM3 (ref 0.7–3.1)
LYMPHOCYTES NFR BLD AUTO: 25.7 % (ref 19.6–45.3)
MCH RBC QN AUTO: 29.5 PG (ref 26.6–33)
MCHC RBC AUTO-ENTMCNC: 34 G/DL (ref 31.5–35.7)
MCV RBC AUTO: 86.7 FL (ref 79–97)
MONOCYTES # BLD AUTO: 0.33 10*3/MM3 (ref 0.1–0.9)
MONOCYTES NFR BLD AUTO: 7.4 % (ref 5–12)
NEUTROPHILS # BLD AUTO: 2.84 10*3/MM3 (ref 1.7–7)
NEUTROPHILS NFR BLD AUTO: 63.3 % (ref 42.7–76)
NRBC BLD AUTO-RTO: 0 /100 WBC (ref 0–0.2)
PLATELET # BLD AUTO: 268 10*3/MM3 (ref 140–450)
POTASSIUM SERPL-SCNC: 4.1 MMOL/L (ref 3.5–5.2)
PROT SERPL-MCNC: 7 G/DL (ref 6–8.5)
RBC # BLD AUTO: 4.58 10*6/MM3 (ref 3.77–5.28)
SODIUM SERPL-SCNC: 141 MMOL/L (ref 136–145)
WBC # BLD AUTO: 4.48 10*3/MM3 (ref 3.4–10.8)

## 2023-09-06 ENCOUNTER — OFFICE VISIT (OUTPATIENT)
Dept: INTERNAL MEDICINE | Facility: CLINIC | Age: 73
End: 2023-09-06
Payer: MEDICARE

## 2023-09-06 VITALS
WEIGHT: 143 LBS | BODY MASS INDEX: 21.18 KG/M2 | HEIGHT: 69 IN | DIASTOLIC BLOOD PRESSURE: 80 MMHG | OXYGEN SATURATION: 95 % | SYSTOLIC BLOOD PRESSURE: 118 MMHG | HEART RATE: 59 BPM

## 2023-09-06 DIAGNOSIS — G43.809 OTHER MIGRAINE WITHOUT STATUS MIGRAINOSUS, NOT INTRACTABLE: Chronic | ICD-10-CM

## 2023-09-06 DIAGNOSIS — J30.1 SEASONAL ALLERGIC RHINITIS DUE TO POLLEN: ICD-10-CM

## 2023-09-06 DIAGNOSIS — F41.9 ANXIETY AND DEPRESSION: Chronic | ICD-10-CM

## 2023-09-06 DIAGNOSIS — Q21.10 RESIDUAL ASD (ATRIAL SEPTAL DEFECT) FOLLOWING REPAIR: Chronic | ICD-10-CM

## 2023-09-06 DIAGNOSIS — Z00.00 MEDICARE ANNUAL WELLNESS VISIT, SUBSEQUENT: Primary | ICD-10-CM

## 2023-09-06 DIAGNOSIS — F32.A ANXIETY AND DEPRESSION: Chronic | ICD-10-CM

## 2023-09-06 DIAGNOSIS — I34.1 MVP (MITRAL VALVE PROLAPSE): Chronic | ICD-10-CM

## 2023-09-06 DIAGNOSIS — M25.511 ACUTE PAIN OF RIGHT SHOULDER: ICD-10-CM

## 2023-09-06 PROBLEM — R94.31 ABNORMAL ELECTROCARDIOGRAM: Status: RESOLVED | Noted: 2023-02-28 | Resolved: 2023-09-06

## 2023-09-06 PROBLEM — G43.909 MIGRAINE: Status: ACTIVE | Noted: 2023-08-30

## 2023-09-06 PROBLEM — I10 ESSENTIAL HYPERTENSION: Status: RESOLVED | Noted: 2018-07-25 | Resolved: 2023-09-06

## 2023-09-06 PROBLEM — M25.50 MULTIPLE JOINT PAIN: Status: RESOLVED | Noted: 2023-03-06 | Resolved: 2023-09-06

## 2023-09-06 RX ORDER — HYDROXYZINE HYDROCHLORIDE 25 MG/1
25 TABLET, FILM COATED ORAL 3 TIMES DAILY PRN
Qty: 10 TABLET | Refills: 0 | Status: SHIPPED | OUTPATIENT
Start: 2023-09-06

## 2023-09-06 NOTE — ASSESSMENT & PLAN NOTE
Will get MRI brain to evaluate for week long migraine last week  Labs reviewed  Okay to use triptan PRN

## 2023-09-06 NOTE — PROGRESS NOTES
The ABCs of the Annual Wellness Visit  Subsequent Medicare Wellness Visit    Subjective    Sara Gary is a 72 y.o. female who presents for a Subsequent Medicare Wellness Visit.    The following portions of the patient's history were reviewed and   updated as appropriate: allergies, current medications, past family history, past medical history, past social history, past surgical history, and problem list.    Compared to one year ago, the patient feels her physical   health is the same.    Compared to one year ago, the patient feels her mental   health is the same.    Recent Hospitalizations:  She was not admitted to the hospital during the last year.       Current Medical Providers:  Patient Care Team:  Catherine Kolb APRN as PCP - General (Internal Medicine)  Dina Baugh APRN as Nurse Practitioner (Gastroenterology)  Dr. Ruffin-- cardiology  Dr. Segura-- OBGYN        Outpatient Medications Prior to Visit   Medication Sig Dispense Refill    busPIRone (BUSPAR) 10 MG tablet       estradiol (ESTRACE) 0.1 MG/GM vaginal cream       famotidine (PEPCID) 40 MG tablet       liothyronine (CYTOMEL) 5 MCG tablet TAKE ONE TABLET BY MOUTH DAILY 90 tablet 0    meclizine (ANTIVERT) 25 MG tablet Take 1 tablet by mouth 3 (Three) Times a Day As Needed for Dizziness. 60 tablet 1    Restasis 0.05 % ophthalmic emulsion       sertraline (ZOLOFT) 50 MG tablet Take 1 tablet by mouth Daily. 90 tablet 2    SUMAtriptan (IMITREX) 100 MG tablet Take one tablet at onset of headache. May repeat dose one time in 2 hours if headache not relieved. 30 tablet 1    Synthroid 75 MCG tablet Take 1 tablet by mouth Daily. 90 tablet 1    Ventolin  (90 Base) MCG/ACT inhaler       cetirizine (zyrTEC) 10 MG tablet Take 1 tablet by mouth Daily for 10 days. 90 tablet 0     No facility-administered medications prior to visit.       No opioid medication identified on active medication list. I have reviewed chart for other potential  high risk  "medication/s and harmful drug interactions in the elderly.        Aspirin is not on active medication list.  Aspirin use is not indicated based on review of current medical condition/s. Risk of harm outweighs potential benefits.  .    Patient Active Problem List   Diagnosis    Diverticulosis of large intestine without hemorrhage    Hydronephrosis, left    Residual ASD (atrial septal defect) following repair    Elevated liver enzymes    MVP (mitral valve prolapse)    Other specified hypothyroidism    Anxiety and depression    Gastroesophageal reflux disease without esophagitis    Mixed hyperlipidemia    Prediabetes    Dry skin dermatitis    Seasonal allergic rhinitis due to pollen    Vertigo    Chronic illness    Healthcare maintenance    Migraine    Acute recurrent cystitis    Adaptive colitis    AK (actinic keratosis)    Allergic rhinitis    Acute pain of right shoulder     Advance Care Planning   Advance Care Planning     Advance Directive is not on file.  ACP discussion was declined by the patient. Patient does not have an advance directive, information provided.     Objective    Vitals:    09/06/23 1448   BP: 118/80   BP Location: Left arm   Patient Position: Sitting   Cuff Size: Adult   Pulse: 59   SpO2: 95%   Weight: 64.9 kg (143 lb)   Height: 175.3 cm (69\")     Estimated body mass index is 21.12 kg/m² as calculated from the following:    Height as of this encounter: 175.3 cm (69\").    Weight as of this encounter: 64.9 kg (143 lb).    BMI is within normal parameters. No other follow-up for BMI required.      Does the patient have evidence of cognitive impairment? No          HEALTH RISK ASSESSMENT    Smoking Status:  Social History     Tobacco Use   Smoking Status Never   Smokeless Tobacco Never     Alcohol Consumption:  Social History     Substance and Sexual Activity   Alcohol Use No     Fall Risk Screen:    STEADI Fall Risk Assessment was completed, and patient is at LOW risk for falls.Assessment completed " on:2023    Depression Screenin/6/2023     2:55 PM   PHQ-2/PHQ-9 Depression Screening   Little Interest or Pleasure in Doing Things 0-->not at all   Feeling Down, Depressed or Hopeless 0-->not at all   PHQ-9: Brief Depression Severity Measure Score 0       Health Habits and Functional and Cognitive Screenin/6/2023     2:55 PM   Functional & Cognitive Status   Do you have difficulty preparing food and eating? No   Do you have difficulty bathing yourself, getting dressed or grooming yourself? No   Do you have difficulty using the toilet? No   Do you have difficulty moving around from place to place? No   Do you have trouble with steps or getting out of a bed or a chair? No   Current Diet Well Balanced Diet   Dental Exam Up to date   Eye Exam Up to date   Exercise (times per week) 3 times per week   Current Exercises Include Walking   Do you need help using the phone?  No   Are you deaf or do you have serious difficulty hearing?  No   Do you need help to go to places out of walking distance? No   Do you need help shopping? No   Do you need help preparing meals?  No   Do you need help with housework?  No   Do you need help with laundry? No   Do you need help taking your medications? No   Do you need help managing money? No   Do you ever drive or ride in a car without wearing a seat belt? No   Who do you live with? Alone   If you need help, do you have trouble finding someone available to you? No   Do you have difficulty concentrating, remembering or making decisions? No       Age-appropriate Screening Schedule:  Refer to the list below for future screening recommendations based on patient's age, sex and/or medical conditions. Orders for these recommended tests are listed in the plan section. The patient has been provided with a written plan.    Health Maintenance   Topic Date Due    COVID-19 Vaccine (6 - Pfizer series) 2023    ZOSTER VACCINE (1 of 2) 2024 (Originally 1998)     "INFLUENZA VACCINE  10/01/2023    LIPID PANEL  03/22/2024    ANNUAL WELLNESS VISIT  09/06/2024    TDAP/TD VACCINES (2 - Td or Tdap) 09/26/2024    MAMMOGRAM  03/01/2025    DXA SCAN  03/18/2025    COLORECTAL CANCER SCREENING  07/22/2026    Pneumococcal Vaccine 65+  Completed    HEPATITIS C SCREENING  Discontinued                  CMS Preventative Services Quick Reference  Risk Factors Identified During Encounter  Immunizations Discussed/Encouraged: COVID19  Dental Screening Recommended  Vision Screening Recommended  The above risks/problems have been discussed with the patient.  Pertinent information has been shared with the patient in the After Visit Summary.  An After Visit Summary and PPPS were made available to the patient.    Follow Up:   Next Medicare Wellness visit to be scheduled in 1 year.       Additional E&M Note during same encounter follows:  Patient has multiple medical problems which are significant and separately identifiable that require additional work above and beyond the Medicare Wellness Visit.      Chief Complaint  Medicare Wellness-subsequent    Subjective        HPI  Sara Gary is also being seen today for complaints of vision change. Patient reports she has had seen 2 different ophthalmology specialists who reports found nothing remarkable on vision exam. Reports that she is experiencing intermittent periods of blurred vision when doing focused activities such as reading. Reports she did have recent increased migraine instances; continues on triptan PRN.     Reports intermittent right shoulder pain. Reports the pain will occur after being on feet for a while. Patient reports she previously went to PT, and would like to return. Reports pain is worsened during the later parts of the day. Denies any weakness.          Objective   Vital Signs:  /80 (BP Location: Left arm, Patient Position: Sitting, Cuff Size: Adult)   Pulse 59   Ht 175.3 cm (69\")   Wt 64.9 kg (143 lb)   SpO2 95%   " BMI 21.12 kg/m²     Physical Exam  Constitutional:       Appearance: Normal appearance.   HENT:      Head: Normocephalic and atraumatic.      Right Ear: External ear normal.      Left Ear: External ear normal.      Nose: Nose normal.      Mouth/Throat:      Mouth: Mucous membranes are moist.      Pharynx: Oropharynx is clear.   Eyes:      Conjunctiva/sclera: Conjunctivae normal.      Pupils: Pupils are equal, round, and reactive to light.      Comments: +glasses   Cardiovascular:      Rate and Rhythm: Normal rate and regular rhythm.      Pulses: Normal pulses.      Heart sounds: Normal heart sounds. No murmur heard.    No friction rub. No gallop.   Pulmonary:      Effort: Pulmonary effort is normal. No respiratory distress.      Breath sounds: Normal breath sounds. No stridor. No wheezing, rhonchi or rales.   Musculoskeletal:         General: Tenderness present.      Right shoulder: Tenderness present. Decreased range of motion.      Cervical back: Normal range of motion and neck supple.   Skin:     General: Skin is warm and dry.   Neurological:      General: No focal deficit present.      Mental Status: She is alert and oriented to person, place, and time. Mental status is at baseline.   Psychiatric:         Mood and Affect: Mood normal.         Thought Content: Thought content normal.         Judgment: Judgment normal.        The following data was reviewed by: ALEJANDRO Miller on 09/06/2023:  Common labs          6/14/2023    14:34 7/14/2023    14:53 9/1/2023    12:54   Common Labs   Glucose  88  99    BUN  9  10    Creatinine  0.79  0.73    Sodium  143  141    Potassium  4.4  4.1    Chloride  108  105    Calcium  9.5  9.4    Total Protein 6.8  6.4  7.0    Albumin 4.5  4.3  4.6    Total Bilirubin 0.4  0.4  0.5    Alkaline Phosphatase 149  134  152    AST (SGOT) 17  15  15    ALT (SGPT) 9  7  10    WBC  5.11  4.48    Hemoglobin  12.6  13.5    Hematocrit  37.3  39.7    Platelets  263  268                  Assessment and Plan   Diagnoses and all orders for this visit:    1. Medicare annual wellness visit, subsequent (Primary)    2. Other migraine without status migrainosus, not intractable  Assessment & Plan:  Will get MRI brain to evaluate for week long migraine last week  Labs reviewed  Okay to use triptan PRN      Orders:  -     MRI Brain With & Without Contrast; Future  -     hydrOXYzine (ATARAX) 25 MG tablet; Take 1 tablet by mouth 3 (Three) Times a Day As Needed for Itching.  Dispense: 10 tablet; Refill: 0    3. Anxiety and depression  Assessment & Plan:  Continues on zoloft and buspar.   Denies SI      4. Residual ASD (atrial septal defect) following repair  Assessment & Plan:  Following with cardiology.   Stable.          5. MVP (mitral valve prolapse)  Assessment & Plan:  Following with cardiology.   Stable.         6. Seasonal allergic rhinitis due to pollen  Assessment & Plan:  Continues on zyrtec PRN      7. Acute pain of right shoulder  Assessment & Plan:  Ok to use tylenol PRN  PT referral placed    Orders:  -     Cancel: Ambulatory Referral to Physical Therapy Evaluate and treat; Heat, Electrotherapy; Ultrasound, Moist heat; Cross Fiber, Soft Tissue Mobilizaton; Stretching, ROM, Strengthening  -     Ambulatory Referral to Physical Therapy Evaluate and treat; Heat, Electrotherapy; Ultrasound, Moist heat; Cross Fiber, Soft Tissue Mobilizaton; Stretching, ROM, Strengthening             Follow Up   Return for Next scheduled follow up 9/22/23.  Patient was given instructions and counseling regarding her condition or for health maintenance advice. Please see specific information pulled into the AVS if appropriate.

## 2023-09-13 ENCOUNTER — TELEPHONE (OUTPATIENT)
Dept: INTERNAL MEDICINE | Facility: CLINIC | Age: 73
End: 2023-09-13

## 2023-09-13 NOTE — TELEPHONE ENCOUNTER
Caller: Sara Gary    Relationship: Self    Best call back number: [phone]     Who are you requesting to speak with (clinical staff, provider,  specific staff member): CLINICAL    What was the call regarding: PATIENT STATES THAT SHE GOT A MESSAGE TO SCHEDULE WITH AN ENT, BUT CANNOT REMEMBER WHY SHE WAS SUPPOSED TO SCHEDULE THAT APPOINTMENT. REQUESTS CALL BACK TO DISCUSS FURTHER

## 2023-09-22 ENCOUNTER — OFFICE VISIT (OUTPATIENT)
Dept: INTERNAL MEDICINE | Facility: CLINIC | Age: 73
End: 2023-09-22
Payer: MEDICARE

## 2023-09-22 VITALS
DIASTOLIC BLOOD PRESSURE: 82 MMHG | HEIGHT: 69 IN | BODY MASS INDEX: 21.39 KG/M2 | WEIGHT: 144.4 LBS | HEART RATE: 74 BPM | OXYGEN SATURATION: 97 % | SYSTOLIC BLOOD PRESSURE: 128 MMHG

## 2023-09-22 DIAGNOSIS — F32.A ANXIETY AND DEPRESSION: Primary | ICD-10-CM

## 2023-09-22 DIAGNOSIS — E03.8 OTHER SPECIFIED HYPOTHYROIDISM: ICD-10-CM

## 2023-09-22 DIAGNOSIS — F41.9 ANXIETY AND DEPRESSION: Primary | ICD-10-CM

## 2023-09-22 PROCEDURE — 99213 OFFICE O/P EST LOW 20 MIN: CPT | Performed by: NURSE PRACTITIONER

## 2023-09-22 PROCEDURE — 1160F RVW MEDS BY RX/DR IN RCRD: CPT | Performed by: NURSE PRACTITIONER

## 2023-09-22 PROCEDURE — 1159F MED LIST DOCD IN RCRD: CPT | Performed by: NURSE PRACTITIONER

## 2023-09-22 RX ORDER — LEVOTHYROXINE SODIUM 75 MCG
75 TABLET ORAL DAILY
Qty: 90 TABLET | Refills: 1 | Status: SHIPPED | OUTPATIENT
Start: 2023-09-22

## 2023-09-22 RX ORDER — LIOTHYRONINE SODIUM 5 UG/1
5 TABLET ORAL DAILY
Qty: 90 TABLET | Refills: 1 | Status: SHIPPED | OUTPATIENT
Start: 2023-09-22

## 2023-09-22 RX ORDER — SERTRALINE HYDROCHLORIDE 100 MG/1
100 TABLET, FILM COATED ORAL DAILY
COMMUNITY

## 2023-09-22 NOTE — PROGRESS NOTES
"Chief Complaint  Anxiety, Depression, and Follow-up    Subjective        Sara Gary presents to [facility]  History of Present Illness  This is a 71 y/o female presenting to office for f/u with anxiety and depression. Reports taking zoloft 100mg daily; using buspar 10 mg daily. Reports she is doing well. Reports mood has improved. Denies any SI. Patient is working on finding new place this weekend.    Objective   Vital Signs:  /82 (BP Location: Left arm, Patient Position: Sitting, Cuff Size: Adult)   Pulse 74   Ht 175.3 cm (69\")   Wt 65.5 kg (144 lb 6.4 oz)   SpO2 97%   BMI 21.32 kg/m²   Estimated body mass index is 21.32 kg/m² as calculated from the following:    Height as of this encounter: 175.3 cm (69\").    Weight as of this encounter: 65.5 kg (144 lb 6.4 oz).       BMI is within normal parameters. No other follow-up for BMI required.      Physical Exam  Constitutional:       Appearance: Normal appearance.   HENT:      Head: Normocephalic and atraumatic.      Right Ear: External ear normal.      Left Ear: External ear normal.   Eyes:      Conjunctiva/sclera: Conjunctivae normal.      Pupils: Pupils are equal, round, and reactive to light.      Comments: +glasses   Pulmonary:      Effort: Pulmonary effort is normal.   Musculoskeletal:      Cervical back: Normal range of motion and neck supple.   Skin:     General: Skin is warm and dry.   Neurological:      General: No focal deficit present.      Mental Status: She is alert and oriented to person, place, and time. Mental status is at baseline.   Psychiatric:         Mood and Affect: Mood normal.         Thought Content: Thought content normal.         Judgment: Judgment normal.      Result Review :  The following data was reviewed by: ALEJANDRO Miller on 09/22/2023:  Common labs          6/14/2023    14:34 7/14/2023    14:53 9/1/2023    12:54   Common Labs   Glucose  88  99    BUN  9  10    Creatinine  0.79  0.73  "   Sodium  143  141    Potassium  4.4  4.1    Chloride  108  105    Calcium  9.5  9.4    Total Protein 6.8  6.4  7.0    Albumin 4.5  4.3  4.6    Total Bilirubin 0.4  0.4  0.5    Alkaline Phosphatase 149  134  152    AST (SGOT) 17  15  15    ALT (SGPT) 9  7  10    WBC  5.11  4.48    Hemoglobin  12.6  13.5    Hematocrit  37.3  39.7    Platelets  263  268                   Assessment and Plan   Diagnoses and all orders for this visit:    1. Anxiety and depression (Primary)  Assessment & Plan:  Patient's depression is recurrent and is moderate without psychosis. Their depression is currently in full remission and the condition is improving with treatment. This will be reassessed at the next regular appointment. F/U as described:patient will continue current medication therapy.      2. Other specified hypothyroidism  -     liothyronine (CYTOMEL) 5 MCG tablet; Take 1 tablet by mouth Daily.  Dispense: 90 tablet; Refill: 1  -     Synthroid 75 MCG tablet; Take 1 tablet by mouth Daily.  Dispense: 90 tablet; Refill: 1             Follow Up   Return in about 6 months (around 3/22/2024) for Recheck chronic conditions.  Patient was given instructions and counseling regarding her condition or for health maintenance advice. Please see specific information pulled into the AVS if appropriate.

## 2023-10-02 ENCOUNTER — HOSPITAL ENCOUNTER (OUTPATIENT)
Dept: PHYSICAL THERAPY | Facility: HOSPITAL | Age: 73
Discharge: HOME | End: 2023-10-02
Admitting: NURSE PRACTITIONER
Payer: MEDICARE

## 2023-10-02 DIAGNOSIS — M25.511 CHRONIC RIGHT SHOULDER PAIN: Primary | ICD-10-CM

## 2023-10-02 DIAGNOSIS — M89.8X1 CHRONIC SCAPULAR PAIN: ICD-10-CM

## 2023-10-02 DIAGNOSIS — G89.29 CHRONIC RIGHT SHOULDER PAIN: Primary | ICD-10-CM

## 2023-10-02 DIAGNOSIS — G89.29 CHRONIC SCAPULAR PAIN: ICD-10-CM

## 2023-10-02 PROCEDURE — 97161 PT EVAL LOW COMPLEX 20 MIN: CPT

## 2023-10-02 NOTE — THERAPY EVALUATION
Outpatient Physical Therapy Ortho Initial Evaluation  [facility]     Patient Name: Sara Gary  : 1950  MRN: [id number]  Today's Date: 10/2/2023      Visit Date: 10/02/2023    Patient Active Problem List   Diagnosis    Diverticulosis of large intestine without hemorrhage    Hydronephrosis, left    Residual ASD (atrial septal defect) following repair    Elevated liver enzymes    MVP (mitral valve prolapse)    Other specified hypothyroidism    Anxiety and depression    Gastroesophageal reflux disease without esophagitis    Mixed hyperlipidemia    Prediabetes    Dry skin dermatitis    Seasonal allergic rhinitis due to pollen    Vertigo    Chronic illness    Healthcare maintenance    Migraine    Acute recurrent cystitis    Adaptive colitis    AK (actinic keratosis)    Allergic rhinitis    Acute pain of right shoulder        Past Medical History:   Diagnosis Date    Depression     Diverticulitis     Environmental allergies     History of colon polyps     Hypertension     Kidney stone     hx of 4 stones        Past Surgical History:   Procedure Laterality Date    APPENDECTOMY      ASD REPAIR, SECUNDUM      CATARACT EXTRACTION      COLONOSCOPY      CYSTOSCOPY BLADDER STONE LITHOTRIPSY      CYSTOSCOPY W/ URETERAL STENT PLACEMENT Left 2017    Procedure: CYSTOSCOPY LEFT STENT INSERTION & STONE REMOVAL;  Surgeon: Raheem Franklin MD;  Location: [facility] OR;  Service:     HYSTERECTOMY      NEPHRECTOMY RADICAL      THYROIDECTOMY      UPPER GASTROINTESTINAL ENDOSCOPY         Visit Dx:     ICD-10-CM ICD-9-CM   1. Chronic right shoulder pain  M25.511 719.41    G89.29 338.29   2. Chronic scapular pain  M89.8X1 733.90    G89.29 338.29          Patient History       Row Name 10/02/23 1500             History    Brief Description of Current Complaint Pt returns to PT with reports of R shoulder blade pain. Pain began last year, treated for same issue in the clinic in the spring. Pt reports exhaustion with  "standing extended periods of time. Stress also increases symptoms. Pt is no longer performing HEP from the spring. Pt currently moving out of apartment, but does not have place to move. Pt states symptoms increase with stress/anxiety and is highly active. No recent imaging.  -CN      Hand Dominance right-handed  -CN      What clinical tests have you had for this problem? X-ray  -CN      Results of Clinical Tests negative  -CN         Pain     Pain Location Shoulder  -CN      Pain at Present 5  -CN      Pain at Best 0  -CN      Pain at Worst 9  -CN      Pain Description --  \"it just hurts\"  -CN      What Performance Factors Make the Current Problem(s) WORSE? Standing, walking, reaching  -CN      What Performance Factors Make the Current Problem(s) BETTER? heat  -CN      Difficulties at work? Retired  -CN      Difficulties with ADL's? Able to perform ADLs, however have to take rest breaks to heat.  -CN         Fall Risk Assessment    Any falls in the past year: No  -CN         Daily Activities    Primary Language English  -CN      Are you able to read No  -CN      Are you able to write No  -CN      How does patient learn best? Listening;Reading;Demonstration  -CN      Barriers to learning None  -CN      Pt Participated in POC and Goals Yes  -CN         Safety    Are you being hurt, hit, or frightened by anyone at home or in your life? No  -CN      Are you being neglected by a caregiver No  -CN                User Key  (r) = Recorded By, (t) = Taken By, (c) = Cosigned By      Initials Name Provider Type    CN Lupe Denise, PT Physical Therapist                     PT Ortho       Row Name 10/02/23 1500       Posture/Observations    Alignment Options Thoracic kyphosis;Rounded shoulders  -CN    Thoracic Kyphosis Moderate  -CN    Rounded Shoulders Moderate  -CN    Posture/Observations Comments --  -CN       Myotomal Screen- Upper Quarter Clearing    Shoulder flexion (C5) Bilateral:;4+ (Good +)  -CN    Elbow " flexion/wrist extension (C6) Bilateral:;4+ (Good +)  -CN    Elbow extension/wrist flexion (C7) Bilateral:;4+ (Good +)  -CN       Cervical/Shoulder ROM Screen    Cervical flexion Normal  -CN    Cervical extension Impaired  dec; no change inc symptoms  -CN    Cervical lateral flexion Impaired  R dec with pain  -CN    Cervical rotation Normal  -CN    Shoulder elevation  --  -CN       Special Tests/Palpation    Special Tests/Palpation --  trigger point/taut band along medial scapular border, rhomboid; middle trap- reproduces symptoms with palpation  -CN       Thoracic Accessory Motions    Pa glide- Middle thoracic Hypomobile  -CN    Pa glide- Lower thoracic Hypomobile  -CN       Shoulder Girdle Palpation    Middle Trap Right:;Trigger point;Tender;Guarded/taut  -CN    Rhomboid Right:;Tender;Guarded/taut;Trigger point  -CN       Sensation    Sensation WNL? --  -CN       Upper Extremity Flexibility    Upper Trapezius Bilateral:;Moderately limited  -CN    Levator Scapula Bilateral:;Moderately limited  -CN    Pect Minor Bilateral:;Moderately limited  -CN    Pect Major Bilateral:;Moderately limited  -CN       Gait/Stairs (Locomotion)    Ridgeway Level (Gait) --  -CN              User Key  (r) = Recorded By, (t) = Taken By, (c) = Cosigned By      Initials Name Provider Type    Lupe Ngo, PT Physical Therapist                                Therapy Education  Education Details: Access Code [email or web address]  Given: Symptoms/condition management, HEP, Mobility training  Program: New  How Provided: Verbal, Demonstration, Written  Provided to: Patient  Level of Understanding: Verbalized, Teach back education performed, Demonstrated      PT OP Goals       Row Name 10/02/23 1600          PT Short Term Goals    STG Date to Achieve 03/24/23  -CN     STG 1 Pt will demonstrate understanding and compliance with initial HEP.  -CN     STG 1 Progress New  -CN     STG 2 Pt will demonstrate ability to perform appropriate  diaphragmatic breathing with full lateral and A/P expansion in controlled manner.  -CN     STG 2 Progress New  -CN     STG 2 Progress Comments Increased chest/UT activation at this time.  -CN     STG 3 Pt will verbalize reduced intensity of stabbing pain in R medial scapular region by 50%.  -CN     STG 3 Progress New  -CN        Long Term Goals    LTG Date to Achieve 04/09/23  -CN     LTG 1 Pt will report pain at worse dec from 9/10 to 5/10 or better in last 2 weeks.  -CN     LTG 1 Progress New  -CN     LTG 2 Pt will tolerate lifting 10# item without inc in R periscapular pain.  -CN     LTG 2 Progress New  -CN     LTG 3 Pt will demonstrate understanding of advanced HEP to allow continued management of condition.  -CN     LTG 3 Progress New  -CN               User Key  (r) = Recorded By, (t) = Taken By, (c) = Cosigned By      Initials Name Provider Type    Lupe Ngo, PT Physical Therapist                     PT Assessment/Plan       Row Name 10/02/23 4739          PT Assessment    Functional Limitations Limitations in functional capacity and performance;Performance in leisure activities;Limitation in home management;Limitations in community activities  -CN     Impairments Impaired flexibility;Joint mobility;Muscle strength;Locomotion;Pain;Poor body mechanics;Posture;Sensation  -CN     Assessment Comments 72 y.o. female referred to outpatient physical therapy for evaluation and treatment of right scapular pain.  Patient presents with kypohtic posture, decreased thoracic mobility, pain with cervical ROM, tenderness and trigger points in middle trap, upper trap and rhombiod. Pt's signs and symptoms are consistent with referring diagnosis.  Pertinent comorbidities and personal factors that may affect progress include, but are not limited to, active lifestyle, kyphosis, chronicity of issue, stress.  Pt scored 36% on the quick DASH where 0% is no disability and is in stable clinical condition. Pt would  benefit from skilled PT to address functional deficits and return to PLOF.  -CN     Please refer to paper survey for additional self-reported information No  -CN     Rehab Potential Good  -CN     Patient/caregiver participated in establishment of treatment plan and goals Yes  -CN     Patient would benefit from skilled therapy intervention Yes  -CN        PT Plan    PT Frequency 1x/week  -CN     Predicted Duration of Therapy Intervention (PT) 4-6 visits  -CN     Planned CPT's? PT EVAL LOW COMPLEXITY: 96286;PT RE-EVAL: 06214;PT THER PROC EA 15 MIN: 73689;PT THER ACT EA 15 MIN: 22078;PT MANUAL THERAPY EA 15 MIN: 65810;PT NEUROMUSC RE-EDUCATION EA 15 MIN: 43082;PT AQUATIC THERAPY EA 15 MIN: 17816;PT SELF CARE/HOME MGMT/TRAIN EA 15: 37748;PT HOT OR COLD PACK TREAT MCARE  -CN     PT Plan Comments Assess response to evaluation. Consider manual to periscapular tissues, exercises over noodle next visit. Emphasize importance of HEP.  -CN               User Key  (r) = Recorded By, (t) = Taken By, (c) = Cosigned By      Initials Name Provider Type    Lupe Ngo, PT Physical Therapist                       OP Exercises       Row Name 10/02/23 1500             Exercise 1    Exercise Name 1 Post shoulder rolls  -CN      Cueing 1 Verbal;Demo  -CN      Reps 1 15  -CN         Exercise 4    Exercise Name 4 SL rotation  -CN      Cueing 4 Verbal;Demo  -CN      Reps 4 10  -CN      Time 4 each  -CN         Exercise 5    Exercise Name 5 HL diaphragmatic breathing  -CN      Sets 5 2  -CN      Reps 5 10  -CN         Exercise 8    Exercise Name 8 Scap squeeze  -CN      Cueing 8 Verbal;Demo  -CN      Reps 8 15  -CN                User Key  (r) = Recorded By, (t) = Taken By, (c) = Cosigned By      Initials Name Provider Type    Lupe Ngo, PT Physical Therapist                                  Outcome Measure Options: Quick DASH  Quick DASH  Open a tight or new jar.: Mild Difficulty  Do heavy household chores  (e.g., wash walls, wash floors): Mild Difficulty  Carry a shopping bag or briefcase: Mild Difficulty  Wash your back: Unable  Use a knife to cut food: No Difficulty  Recreational activities in which you take some force or impact through your arm, should or hand (e.g. golf, hammering, tennis, etc.): Moderate Difficulty  During the past week, to what extent has your arm, shoulder, or hand problem interfered with your normal social activites with family, friends, neighbors or groups?: Moderately  During the past week, were you limited in your work or other regular daily activities as a result of your arm, shoulder or hand problem?: Moderately Limited  Arm, Shoulder, or hand pain: Severe  Tingling (pins and needles) in your arm, shoulder, or hand: None  During the past week, how much difficulty have you had sleeping because of the pain in your arm, shoulder or hand?: No difficulty  Number of Questions Answered: 11  Quick DASH Score: 36.36         Time Calculation:     Start Time: 1532  Stop Time: 1615  Time Calculation (min): 43 min     Therapy Charges for Today       Code Description Service Date Service Provider Modifiers Qty    [id number]  PT EVAL LOW COMPLEXITY 3 10/2/2023 Lupe Denise, PT GP 1            PT G-Codes  Outcome Measure Options: Quick DASH  Quick DASH Score: 36.36         Lupe Denise PT  10/2/2023

## 2023-10-03 ENCOUNTER — OFFICE VISIT (OUTPATIENT)
Dept: INTERNAL MEDICINE | Facility: CLINIC | Age: 73
End: 2023-10-03
Payer: MEDICARE

## 2023-10-03 ENCOUNTER — NURSE TRIAGE (OUTPATIENT)
Dept: CALL CENTER | Facility: HOSPITAL | Age: 73
End: 2023-10-03
Payer: MEDICARE

## 2023-10-03 VITALS
BODY MASS INDEX: 21.56 KG/M2 | OXYGEN SATURATION: 96 % | HEIGHT: 69 IN | HEART RATE: 72 BPM | WEIGHT: 145.6 LBS | DIASTOLIC BLOOD PRESSURE: 73 MMHG | SYSTOLIC BLOOD PRESSURE: 118 MMHG

## 2023-10-03 DIAGNOSIS — R30.0 DYSURIA: Primary | ICD-10-CM

## 2023-10-03 PROCEDURE — 1159F MED LIST DOCD IN RCRD: CPT

## 2023-10-03 PROCEDURE — 1160F RVW MEDS BY RX/DR IN RCRD: CPT

## 2023-10-03 PROCEDURE — 99213 OFFICE O/P EST LOW 20 MIN: CPT

## 2023-10-03 RX ORDER — NITROFURANTOIN 25; 75 MG/1; MG/1
100 CAPSULE ORAL 2 TIMES DAILY
Qty: 14 CAPSULE | Refills: 0 | Status: SHIPPED | OUTPATIENT
Start: 2023-10-03 | End: 2023-10-10

## 2023-10-03 NOTE — PROGRESS NOTES
"Chief Complaint  Urinary Tract Infection (Pressure Burning and Urgency   x's 2 days)    Subjective        Sara Gary presents to [facility]  History of Present Illness  72-year-old female presenting with UTI symptoms.  Patient of ALEJANDRO Miller.  Symptoms have started over the past couple days.  Symptoms include pressure with urination, burning with urination and urgency.  Has chronic back pain.  Has had kidney stones in the past.  Has been spending extended amount of time traveling looking for housing.  This is caused increased stress overall.  Denies fever, abdominal pain, blood in urine or malodorous urine.  Urinary Tract Infection       Objective   Vital Signs:  /73   Pulse 72   Ht 175.3 cm (69\")   Wt 66 kg (145 lb 9.6 oz)   SpO2 96%   BMI 21.50 kg/m²   Estimated body mass index is 21.5 kg/m² as calculated from the following:    Height as of this encounter: 175.3 cm (69\").    Weight as of this encounter: 66 kg (145 lb 9.6 oz).       BMI is within normal parameters. No other follow-up for BMI required.      Physical Exam  Vitals reviewed.   Constitutional:       Appearance: Normal appearance.   HENT:      Head: Normocephalic.   Musculoskeletal:         General: Normal range of motion.      Cervical back: Normal range of motion.   Skin:     General: Skin is warm and dry.      Capillary Refill: Capillary refill takes less than 2 seconds.   Neurological:      General: No focal deficit present.      Mental Status: She is alert and oriented to person, place, and time.   Psychiatric:         Mood and Affect: Mood normal.         Behavior: Behavior normal.         Thought Content: Thought content normal.         Judgment: Judgment normal.      Result Review :    Common labs          6/14/2023    14:34 7/14/2023    14:53 9/1/2023    12:54   Common Labs   Glucose  88  99    BUN  9  10    Creatinine  0.79  0.73    Sodium  143  141    Potassium  4.4  4.1    Chloride  108  " 105    Calcium  9.5  9.4    Total Protein 6.8  6.4  7.0    Albumin 4.5  4.3  4.6    Total Bilirubin 0.4  0.4  0.5    Alkaline Phosphatase 149  134  152    AST (SGOT) 17  15  15    ALT (SGPT) 9  7  10    WBC  5.11  4.48    Hemoglobin  12.6  13.5    Hematocrit  37.3  39.7    Platelets  263  268          Current Outpatient Medications on File Prior to Visit   Medication Sig Dispense Refill    busPIRone (BUSPAR) 10 MG tablet       estradiol (ESTRACE) 0.1 MG/GM vaginal cream       famotidine (PEPCID) 40 MG tablet       hydrOXYzine (ATARAX) 25 MG tablet Take 1 tablet by mouth 3 (Three) Times a Day As Needed for Itching. 10 tablet 0    liothyronine (CYTOMEL) 5 MCG tablet Take 1 tablet by mouth Daily. 90 tablet 1    meclizine (ANTIVERT) 25 MG tablet Take 1 tablet by mouth 3 (Three) Times a Day As Needed for Dizziness. 60 tablet 1    sertraline (ZOLOFT) 100 MG tablet Take 1 tablet by mouth Daily.      SUMAtriptan (IMITREX) 100 MG tablet Take one tablet at onset of headache. May repeat dose one time in 2 hours if headache not relieved. 30 tablet 1    Synthroid 75 MCG tablet Take 1 tablet by mouth Daily. 90 tablet 1    Ventolin  (90 Base) MCG/ACT inhaler       cetirizine (zyrTEC) 10 MG tablet Take 1 tablet by mouth Daily for 10 days. 90 tablet 0    [DISCONTINUED] Restasis 0.05 % ophthalmic emulsion        No current facility-administered medications on file prior to visit.              Assessment and Plan   Diagnoses and all orders for this visit:    1. Dysuria (Primary)  -     Urinalysis With Microscopic If Indicated (No Culture) - Urine, Clean Catch  -     Urine Culture - Urine, Urine, Clean Catch  -     nitrofurantoin, macrocrystal-monohydrate, (Macrobid) 100 MG capsule; Take 1 capsule by mouth 2 (Two) Times a Day for 7 days.  Dispense: 14 capsule; Refill: 0      Patient Instructions   Take Macrobid twice a day for 7 days. Stay hydrated with water.  Await for culture results to determine if new antibiotic is needed.  Follow-up as needed.          Follow Up   Return if symptoms worsen or fail to improve.  Patient was given instructions and counseling regarding her condition or for health maintenance advice. Please see specific information pulled into the AVS if appropriate.

## 2023-10-03 NOTE — TELEPHONE ENCOUNTER
"HUB-She thinks she has an UTI, She declined Er and triage.Calling back line. [id number]- Connected with the office.   Reason for Disposition   [1] Caller requests to speak ONLY to PCP AND [2] URGENT question    Additional Information   Negative: Lab calling with strep throat test results and triager can call in prescription   Negative: Lab calling with urinalysis test results and triager can call in prescription   Negative: Medication questions   Negative: Medication renewal and refill questions   Negative: Pre-operative or pre-procedural questions   Negative: ED call to PCP (i.e., primary care provider; doctor, NP, or PA)   Negative: Doctor (or NP/PA) call to PCP   Negative: Call about patient who is currently hospitalized   Negative: Lab or radiology calling with CRITICAL test results   Negative: [1] Follow-up call from patient regarding patient's clinical status AND [2] information urgent    Answer Assessment - Initial Assessment Questions  1. REASON FOR CALL or QUESTION: \"What is your reason for calling today?\" or \"How can I best  help you?\" or \"What question do you have that I can help answer?\"      Uti symptoms.   2. CALLER: Document the source of call. (e.g., laboratory, patient).      Patient.    Protocols used: PCP Call - No Triage-ADULT-    "

## 2023-10-03 NOTE — TELEPHONE ENCOUNTER
HUB-She thinks she has an UTI, She declined Er and triage.Calling back line. [id number]- Connected with the office.

## 2023-10-03 NOTE — PATIENT INSTRUCTIONS
Take Macrobid twice a day for 7 days. Stay hydrated with water.  Await for culture results to determine if new antibiotic is needed. Follow-up as needed.

## 2023-10-09 LAB
APPEARANCE UR: ABNORMAL
BACTERIA #/AREA URNS HPF: ABNORMAL /HPF
BACTERIA UR CULT: ABNORMAL
BACTERIA UR CULT: ABNORMAL
BILIRUB UR QL STRIP: ABNORMAL
CASTS URNS MICRO: ABNORMAL
COLOR UR: ABNORMAL
EPI CELLS #/AREA URNS HPF: ABNORMAL /HPF
GLUCOSE UR QL STRIP: NEGATIVE
HGB UR QL STRIP: ABNORMAL
KETONES UR QL STRIP: NEGATIVE
LEUKOCYTE ESTERASE UR QL STRIP: ABNORMAL
NITRITE UR QL STRIP: POSITIVE
OTHER ANTIBIOTIC SUSC ISLT: ABNORMAL
PH UR STRIP: 6 [PH] (ref 5–8)
PROT UR QL STRIP: ABNORMAL
RBC #/AREA URNS HPF: ABNORMAL /HPF
SP GR UR STRIP: 1.02 (ref 1–1.03)
UROBILINOGEN UR STRIP-MCNC: ABNORMAL MG/DL
WBC #/AREA URNS HPF: ABNORMAL /HPF

## 2023-10-17 NOTE — TELEPHONE ENCOUNTER
Caller: Sara aGry    Relationship: Self    Best call back number: [phone] (Mobile)     Requested Prescriptions:   Requested Prescriptions     Pending Prescriptions Disp Refills    busPIRone (BUSPAR) 10 MG tablet          Pharmacy where request should be sent: [facility] [id number] [place] [address]  - [phone]  - [phone] FX     Last office visit with prescribing clinician: 9/22/2023   Last telemedicine visit with prescribing clinician: Visit date not found   Next office visit with prescribing clinician: 3/22/2024     Additional details provided by patient: HAS A FEW LEFT.     Does the patient have less than a 3 day supply:   Yes  [] No[x]    Would you like a call back once the refill request has been completed: [x] Yes [] No    If the office needs to give you a call back, can they leave a voicemail: [x] Yes [] No    Mio Hines Rep   10/17/23 11:37 EDT

## 2023-10-18 RX ORDER — BUSPIRONE HYDROCHLORIDE 10 MG/1
10 TABLET ORAL DAILY
Qty: 90 TABLET | Refills: 1 | Status: SHIPPED | OUTPATIENT
Start: 2023-10-18

## 2023-10-24 ENCOUNTER — OFFICE VISIT (OUTPATIENT)
Dept: INTERNAL MEDICINE | Facility: CLINIC | Age: 73
End: 2023-10-24
Payer: MEDICARE

## 2023-10-24 VITALS
SYSTOLIC BLOOD PRESSURE: 120 MMHG | BODY MASS INDEX: 21.48 KG/M2 | DIASTOLIC BLOOD PRESSURE: 82 MMHG | WEIGHT: 145 LBS | HEART RATE: 60 BPM | HEIGHT: 69 IN | OXYGEN SATURATION: 99 %

## 2023-10-24 DIAGNOSIS — J06.9 UPPER RESPIRATORY TRACT INFECTION, UNSPECIFIED TYPE: Primary | ICD-10-CM

## 2023-10-24 LAB
EXPIRATION DATE: NORMAL
EXPIRATION DATE: NORMAL
FLUAV RNA RESP QL NAA+PROBE: NOT DETECTED
FLUBV RNA RESP QL NAA+PROBE: NOT DETECTED
INTERNAL CONTROL: NORMAL
INTERNAL CONTROL: NORMAL
Lab: NORMAL
Lab: NORMAL
S PYO AG THROAT QL: NEGATIVE
SARS-COV-2 RNA RESP QL NAA+PROBE: NOT DETECTED

## 2023-10-24 RX ORDER — CEFDINIR 300 MG/1
300 CAPSULE ORAL 2 TIMES DAILY
Qty: 14 CAPSULE | Refills: 0 | Status: SHIPPED | OUTPATIENT
Start: 2023-10-24

## 2023-10-24 RX ORDER — METHYLPREDNISOLONE 4 MG/1
TABLET ORAL
Qty: 21 EACH | Refills: 0 | Status: SHIPPED | OUTPATIENT
Start: 2023-10-24

## 2023-10-24 NOTE — PROGRESS NOTES
"Chief Complaint  Cough, Nasal Congestion, and Sore Throat    Subjective        Sara Gary presents to [facility]  History of Present Illness  This is a 71 y/o female presenting to office for f/u with cough, nasal congestion, sore throat. Reports symptoms started about 3-4 days ago. Denies n/v/d. Denies fever. Patient reports taking benadryl/advil PRN; also gargling with salt water and pushing fluids. Reports dry cough. Reports some bloody white discharge from right nose.     Objective   Vital Signs:  /82 (BP Location: Left arm, Patient Position: Sitting, Cuff Size: Adult)   Pulse 60   Ht 175.3 cm (69\")   Wt 65.8 kg (145 lb)   SpO2 99%   BMI 21.41 kg/m²   Estimated body mass index is 21.41 kg/m² as calculated from the following:    Height as of this encounter: 175.3 cm (69\").    Weight as of this encounter: 65.8 kg (145 lb).       BMI is within normal parameters. No other follow-up for BMI required.      Physical Exam  Constitutional:       Appearance: Normal appearance.   HENT:      Head: Normocephalic and atraumatic.      Right Ear: Hearing, tympanic membrane, ear canal and external ear normal.      Left Ear: Hearing, tympanic membrane, ear canal and external ear normal.      Nose: Congestion and rhinorrhea present.      Right Sinus: Frontal sinus tenderness present.      Mouth/Throat:      Mouth: Mucous membranes are moist.      Pharynx: Posterior oropharyngeal erythema present. No oropharyngeal exudate.   Eyes:      Conjunctiva/sclera: Conjunctivae normal.      Pupils: Pupils are equal, round, and reactive to light.   Cardiovascular:      Rate and Rhythm: Normal rate and regular rhythm.      Pulses: Normal pulses.      Heart sounds: Normal heart sounds. No murmur heard.     No gallop.   Pulmonary:      Effort: Pulmonary effort is normal. No respiratory distress.      Breath sounds: Normal breath sounds. No stridor. No wheezing, rhonchi or rales.   Skin:     General: " Skin is warm and dry.   Neurological:      General: No focal deficit present.      Mental Status: She is alert and oriented to person, place, and time. Mental status is at baseline.   Psychiatric:         Mood and Affect: Mood normal.         Thought Content: Thought content normal.         Judgment: Judgment normal.        Result Review :  The following data was reviewed by: ALEJANDRO Miller on 10/24/2023:  Common labs          6/14/2023    14:34 7/14/2023    14:53 9/1/2023    12:54   Common Labs   Glucose  88  99    BUN  9  10    Creatinine  0.79  0.73    Sodium  143  141    Potassium  4.4  4.1    Chloride  108  105    Calcium  9.5  9.4    Total Protein 6.8  6.4  7.0    Albumin 4.5  4.3  4.6    Total Bilirubin 0.4  0.4  0.5    Alkaline Phosphatase 149  134  152    AST (SGOT) 17  15  15    ALT (SGPT) 9  7  10    WBC  5.11  4.48    Hemoglobin  12.6  13.5    Hematocrit  37.3  39.7    Platelets  263  268      Covid-19 + Flu A&B AG, Veritor (10/24/2023 11:20 AM)  POCT rapid strep A (10/24/2023 11:20 AM)             Assessment and Plan   Diagnoses and all orders for this visit:    1. Upper respiratory tract infection, unspecified type (Primary)  Assessment & Plan:  Omnicef BID x 7 days  Medrol pack as ordered  Can use mucinex or OTC cough suppressant as needed  Continue with hydration/cough drops/rest PRN  Advised if experiencing SOA or CP to go to ER for acute evaluation    Orders:  -     Covid-19 + Flu A&B AG, Veritor  -     POCT rapid strep A  -     methylPREDNISolone (MEDROL) 4 MG dose pack; Take as directed on package instructions.  Dispense: 21 each; Refill: 0  -     cefdinir (OMNICEF) 300 MG capsule; Take 1 capsule by mouth 2 (Two) Times a Day.  Dispense: 14 capsule; Refill: 0             Follow Up   Return if symptoms worsen or fail to improve.  Patient was given instructions and counseling regarding her condition or for health maintenance advice. Please see specific information pulled into the AVS if  appropriate.

## 2023-10-24 NOTE — ASSESSMENT & PLAN NOTE
Omnicef BID x 7 days  Medrol pack as ordered  Can use mucinex or OTC cough suppressant as needed  Continue with hydration/cough drops/rest PRN  Advised if experiencing SOA or CP to go to ER for acute evaluation

## 2023-10-30 ENCOUNTER — TELEPHONE (OUTPATIENT)
Dept: INTERNAL MEDICINE | Facility: CLINIC | Age: 73
End: 2023-10-30

## 2023-10-30 ENCOUNTER — TELEPHONE (OUTPATIENT)
Dept: CARDIOLOGY | Facility: CLINIC | Age: 73
End: 2023-10-30

## 2023-10-30 NOTE — TELEPHONE ENCOUNTER
Caller: Sara Gary    Relationship to patient: Self    Best call back number: [phone]    Type of visit: FOLLOW UP     Requested date: 11.6.23-11.10.23     If rescheduling, when is the original appointment: 10.30.23     Additional notes: PT CALLED IN SICK, NEEDING TO R/S FROM 10.30.23. PUT PT DOWN FOR 11.2.23 BUT PT WAS HOPING FOR SOMETIME THE WEEK AFTER 11.6.23-11.10.23. PLEASE ADVISE IF PT CAN GET IN THEN.

## 2023-10-30 NOTE — TELEPHONE ENCOUNTER
Caller: Sara Gary    Relationship: Self    Best call back number:     [phone] (Mobile       What is the best time to reach you: ANYTIME    Who are you requesting to speak with (clinical staff, provider,  specific staff member): CLINIC    PATIENT WOULD LIKE A CALL BACK TO DISCUSS POSSIBLE NEED FOR FOLLOW UP.    SHE WAS SEEN IN CLINIC 10-24-23 AND HAS ONE MORE DAY OF ANTIBIOTIC LEFT.    SHE IS FEELING SOME BETTER BUT SHE CONTINUES TO HAVE CHEST CONGESTION AND COUGH.    PLEASE CALL TO ADVISE.      THANK YOU

## 2023-10-30 NOTE — TELEPHONE ENCOUNTER
The cough can linger up to 8-10 weeks. I would continue on mucinex OTC to help; continue with fluids, hydration, rest as needed.

## 2023-11-09 ENCOUNTER — TELEPHONE (OUTPATIENT)
Dept: CARDIOLOGY | Facility: CLINIC | Age: 73
End: 2023-11-09
Payer: MEDICARE

## 2023-11-09 ENCOUNTER — HOSPITAL ENCOUNTER (OUTPATIENT)
Dept: CARDIOLOGY | Facility: HOSPITAL | Age: 73
Discharge: HOME | End: 2023-11-09
Payer: MEDICARE

## 2023-11-09 ENCOUNTER — TELEPHONE (OUTPATIENT)
Dept: INTERNAL MEDICINE | Facility: CLINIC | Age: 73
End: 2023-11-09
Payer: MEDICARE

## 2023-11-09 VITALS
WEIGHT: 145 LBS | DIASTOLIC BLOOD PRESSURE: 68 MMHG | SYSTOLIC BLOOD PRESSURE: 142 MMHG | HEIGHT: 69 IN | HEART RATE: 64 BPM | BODY MASS INDEX: 21.48 KG/M2

## 2023-11-09 DIAGNOSIS — R94.31 ABNORMAL ELECTROCARDIOGRAM (ECG) (EKG): ICD-10-CM

## 2023-11-09 DIAGNOSIS — Z87.74 H/O CONGENITAL ATRIAL SEPTAL DEFECT (ASD) REPAIR: ICD-10-CM

## 2023-11-09 DIAGNOSIS — I35.1 NONRHEUMATIC AORTIC VALVE INSUFFICIENCY: Primary | ICD-10-CM

## 2023-11-09 LAB
ASCENDING AORTA: 4.1 CM
BH CV ECHO MEAS - ACS: 1.66 CM
BH CV ECHO MEAS - AI P1/2T: 443.8 MSEC
BH CV ECHO MEAS - AO ROOT DIAM: 3.7 CM
BH CV ECHO MEAS - EDV(CUBED): 110.6 ML
BH CV ECHO MEAS - EDV(MOD-SP2): 74 ML
BH CV ECHO MEAS - EDV(MOD-SP4): 73 ML
BH CV ECHO MEAS - EF(MOD-BP): 61.8 %
BH CV ECHO MEAS - EF(MOD-SP2): 62.2 %
BH CV ECHO MEAS - EF(MOD-SP4): 58.9 %
BH CV ECHO MEAS - ESV(CUBED): 27.9 ML
BH CV ECHO MEAS - ESV(MOD-SP2): 28 ML
BH CV ECHO MEAS - ESV(MOD-SP4): 30 ML
BH CV ECHO MEAS - FS: 36.8 %
BH CV ECHO MEAS - IVS/LVPW: 1 CM
BH CV ECHO MEAS - IVSD: 1 CM
BH CV ECHO MEAS - LV DIASTOLIC VOL/BSA (35-75): 40.5 CM2
BH CV ECHO MEAS - LV MASS(C)D: 170.2 GRAMS
BH CV ECHO MEAS - LV SYSTOLIC VOL/BSA (12-30): 16.7 CM2
BH CV ECHO MEAS - LVIDD: 4.8 CM
BH CV ECHO MEAS - LVIDS: 3 CM
BH CV ECHO MEAS - LVOT AREA: 3.2 CM2
BH CV ECHO MEAS - LVOT DIAM: 2.02 CM
BH CV ECHO MEAS - LVPWD: 1 CM
BH CV ECHO MEAS - MR MAX PG: 118.6 MMHG
BH CV ECHO MEAS - MR MAX VEL: 544.6 CM/SEC
BH CV ECHO MEAS - RAP SYSTOLE: 3 MMHG
BH CV ECHO MEAS - RVSP: 19.7 MMHG
BH CV ECHO MEAS - SI(MOD-SP2): 25.5 ML/M2
BH CV ECHO MEAS - SI(MOD-SP4): 23.9 ML/M2
BH CV ECHO MEAS - SUP REN AO DIAM: 1.4 CM
BH CV ECHO MEAS - SV(MOD-SP2): 46 ML
BH CV ECHO MEAS - SV(MOD-SP4): 43 ML
BH CV ECHO MEAS - TR MAX PG: 16.7 MMHG
BH CV ECHO MEAS - TR MAX VEL: 204.1 CM/SEC
BH CV ECHO SHUNT ASSESSMENT PERFORMED (HIDDEN SCRIPTING): 1
SINUS: 3.1 CM
STJ: 3.3 CM

## 2023-11-09 PROCEDURE — 93308 TTE F-UP OR LMTD: CPT

## 2023-11-09 PROCEDURE — 93325 DOPPLER ECHO COLOR FLOW MAPG: CPT

## 2023-11-09 PROCEDURE — 93321 DOPPLER ECHO F-UP/LMTD STD: CPT

## 2023-11-13 ENCOUNTER — OFFICE VISIT (OUTPATIENT)
Age: 73
End: 2023-11-13
Payer: MEDICARE

## 2023-11-13 VITALS
WEIGHT: 144.2 LBS | RESPIRATION RATE: 16 BRPM | HEIGHT: 69 IN | BODY MASS INDEX: 21.36 KG/M2 | DIASTOLIC BLOOD PRESSURE: 82 MMHG | HEART RATE: 61 BPM | SYSTOLIC BLOOD PRESSURE: 124 MMHG

## 2023-11-13 DIAGNOSIS — I34.1 MVP (MITRAL VALVE PROLAPSE): Primary | ICD-10-CM

## 2023-11-13 PROCEDURE — 99213 OFFICE O/P EST LOW 20 MIN: CPT | Performed by: INTERNAL MEDICINE

## 2023-11-13 NOTE — PROGRESS NOTES
Subjective:     Encounter Date: 11/13/23        Patient ID: Sara Gary is a 72 y.o. female.    Chief Complaint: Dyspnea  HPI:   This is a 72-year-old woman who  has a history of remote surgical ASD repair in 1997, mild mitral valve prolapse.   She also has a history of depression/anxiety treated with BuSpar and Zoloft.  She has been evaluated for dyspnea with stress testing 2018, and repeat echos. Most recently in 2023 she had mild AI, mild MVP and aortic root noted to be 4.1 cm, no shunt on bubble study.    Today she returns. She feels well. No complaints. Energy and breathing is fine/ at baseline.   She has stress involving her daughter and grandchildren, which is her biggest complaints.     Her parents both had AAA's but were smokers.  She is a non-smoker.    The following portions of the patient's history were reviewed and updated as appropriate: allergies, current medications, past family history, past medical history, past social history, past surgical history and problem list.     REVIEW OF SYSTEMS:   All systems reviewed.  Pertinent positives identified in HPI.  All other systems are negative.    Past Medical History:   Diagnosis Date    Depression     Diverticulitis     Environmental allergies     History of colon polyps     Hypertension     Kidney stone     hx of 4 stones       Family History   Problem Relation Age of Onset    Heart disease Mother     Hypertension Mother     Heart disease Father     Hypertension Father     Colon cancer Neg Hx     Colon polyps Neg Hx     Crohn's disease Neg Hx     Irritable bowel syndrome Neg Hx     Ulcerative colitis Neg Hx        Social History     Socioeconomic History    Marital status: Single   Tobacco Use    Smoking status: Never    Smokeless tobacco: Never   Vaping Use    Vaping Use: Never used   Substance and Sexual Activity    Alcohol use: No    Drug use: No       Allergies   Allergen Reactions    Sulfamethoxazole-Trimethoprim Hives    Statins GI  "Intolerance     Patient \"felt sick\"    Sulfa Antibiotics Hives       Past Surgical History:   Procedure Laterality Date    APPENDECTOMY      ASD REPAIR, SECUNDUM      CATARACT EXTRACTION      COLONOSCOPY      CYSTOSCOPY BLADDER STONE LITHOTRIPSY      CYSTOSCOPY W/ URETERAL STENT PLACEMENT Left 06/29/2017    Procedure: CYSTOSCOPY LEFT STENT INSERTION & STONE REMOVAL;  Surgeon: Raheem Franklin MD;  Location: [facility] OR;  Service:     HYSTERECTOMY      NEPHRECTOMY RADICAL      THYROIDECTOMY      UPPER GASTROINTESTINAL ENDOSCOPY         Procedures       Objective:         PHYSICAL EXAM:  GEN: VSS, no distress,   Eyes: normal sclera, normal lids and lashes  HENT: moist mucus membranes,   Respiratory: CTAB, no rales or wheezes  CV: RRR, 2 out of 6 systolic murmur worsened with duration, +2 DP and 2+ carotid pulses b/l  GI: NABS, soft,  Nontender, nondistended  MSK: no edema, no scoliosis or kyphosis  Skin: no rash, warm, dry  Heme/Lymph: no bruising or bleeding  Psych: organized thought, normal behavior and affect  Neuro: Cranial nerves grossly intact, Alert and Oriented x 3.         Assessment:         No diagnosis found.         Plan:       1.  Dyspnea and fatigue: Intermitent, chronic. Feels well today.   2. ASD repair, normal echo and bubble study 2023  3. Mildly dilated aortic root, 4.1 cm on echo. Repeat next year.     Amy, thank you very much for referring this kind patient to me. Please call me with any questions or concerns. I will see the patient again in the office in 12 months.         Jacqui Ruffin MD  11/13/23  [facility]    Outpatient Encounter Medications as of 11/13/2023   Medication Sig Dispense Refill    busPIRone (BUSPAR) 10 MG tablet Take 1 tablet by mouth Daily. 90 tablet 1    cetirizine (zyrTEC) 10 MG tablet Take 1 tablet by mouth Daily for 10 days. 90 tablet 0    estradiol (ESTRACE) 0.1 MG/GM vaginal cream       famotidine (PEPCID) 40 MG tablet       liothyronine " (CYTOMEL) 5 MCG tablet Take 1 tablet by mouth Daily. 90 tablet 1    meclizine (ANTIVERT) 25 MG tablet Take 1 tablet by mouth 3 (Three) Times a Day As Needed for Dizziness. 60 tablet 1    sertraline (ZOLOFT) 50 MG tablet       SUMAtriptan (IMITREX) 100 MG tablet Take one tablet at onset of headache. May repeat dose one time in 2 hours if headache not relieved. 30 tablet 1    Synthroid 75 MCG tablet Take 1 tablet by mouth Daily. 90 tablet 1    Ventolin  (90 Base) MCG/ACT inhaler       cefdinir (OMNICEF) 300 MG capsule Take 1 capsule by mouth 2 (Two) Times a Day. 14 capsule 0    hydrOXYzine (ATARAX) 25 MG tablet Take 1 tablet by mouth 3 (Three) Times a Day As Needed for Itching. (Patient not taking: Reported on 11/13/2023) 10 tablet 0    methylPREDNISolone (MEDROL) 4 MG dose pack Take as directed on package instructions. (Patient not taking: Reported on 11/13/2023) 21 each 0     No facility-administered encounter medications on file as of 11/13/2023.

## 2023-12-21 ENCOUNTER — LAB (OUTPATIENT)
Dept: LAB | Facility: HOSPITAL | Age: 73
End: 2023-12-21
Payer: MEDICARE

## 2023-12-21 ENCOUNTER — OFFICE VISIT (OUTPATIENT)
Dept: GASTROENTEROLOGY | Facility: CLINIC | Age: 73
End: 2023-12-21
Payer: MEDICARE

## 2023-12-21 VITALS
HEIGHT: 69 IN | SYSTOLIC BLOOD PRESSURE: 120 MMHG | WEIGHT: 142.65 LBS | HEART RATE: 63 BPM | BODY MASS INDEX: 21.13 KG/M2 | TEMPERATURE: 98 F | OXYGEN SATURATION: 99 % | DIASTOLIC BLOOD PRESSURE: 84 MMHG

## 2023-12-21 DIAGNOSIS — Z12.11 COLON CANCER SCREENING: ICD-10-CM

## 2023-12-21 DIAGNOSIS — Z87.19 HISTORY OF DIVERTICULITIS: ICD-10-CM

## 2023-12-21 DIAGNOSIS — R53.81 MALAISE AND FATIGUE: ICD-10-CM

## 2023-12-21 DIAGNOSIS — E55.9 VITAMIN D DEFICIENCY, UNSPECIFIED: ICD-10-CM

## 2023-12-21 DIAGNOSIS — R53.83 MALAISE AND FATIGUE: ICD-10-CM

## 2023-12-21 DIAGNOSIS — E55.9 VITAMIN D DEFICIENCY: ICD-10-CM

## 2023-12-21 DIAGNOSIS — K21.9 GASTROESOPHAGEAL REFLUX DISEASE, UNSPECIFIED WHETHER ESOPHAGITIS PRESENT: Primary | Chronic | ICD-10-CM

## 2023-12-21 DIAGNOSIS — K63.8219 SMALL INTESTINAL BACTERIAL OVERGROWTH (SIBO): Chronic | ICD-10-CM

## 2023-12-21 DIAGNOSIS — Z86.010 HX OF ADENOMATOUS COLONIC POLYPS: ICD-10-CM

## 2023-12-21 DIAGNOSIS — K57.90 DIVERTICULOSIS: Chronic | ICD-10-CM

## 2023-12-21 DIAGNOSIS — R74.8 ELEVATED ALKALINE PHOSPHATASE LEVEL: ICD-10-CM

## 2023-12-21 LAB
25(OH)D3 SERPL-MCNC: 42.6 NG/ML (ref 30–100)
ALBUMIN SERPL-MCNC: 4.4 G/DL (ref 3.5–5.2)
ALBUMIN/GLOB SERPL: 2.1 G/DL
ALP SERPL-CCNC: 148 U/L (ref 39–117)
ALT SERPL W P-5'-P-CCNC: 11 U/L (ref 1–33)
ANION GAP SERPL CALCULATED.3IONS-SCNC: 10 MMOL/L (ref 5–15)
AST SERPL-CCNC: 16 U/L (ref 1–32)
BASOPHILS # BLD AUTO: 0.03 10*3/MM3 (ref 0–0.2)
BASOPHILS NFR BLD AUTO: 0.5 % (ref 0–1.5)
BILIRUB SERPL-MCNC: 0.3 MG/DL (ref 0–1.2)
BUN SERPL-MCNC: 12 MG/DL (ref 8–23)
BUN/CREAT SERPL: 15.2 (ref 7–25)
CALCIUM SPEC-SCNC: 9 MG/DL (ref 8.6–10.5)
CHLORIDE SERPL-SCNC: 104 MMOL/L (ref 98–107)
CO2 SERPL-SCNC: 25 MMOL/L (ref 22–29)
CREAT SERPL-MCNC: 0.79 MG/DL (ref 0.57–1)
DEPRECATED RDW RBC AUTO: 42.9 FL (ref 37–54)
EGFRCR SERPLBLD CKD-EPI 2021: 78.1 ML/MIN/1.73
EOSINOPHIL # BLD AUTO: 0.26 10*3/MM3 (ref 0–0.4)
EOSINOPHIL NFR BLD AUTO: 4.4 % (ref 0.3–6.2)
ERYTHROCYTE [DISTWIDTH] IN BLOOD BY AUTOMATED COUNT: 12.5 % (ref 12.3–15.4)
FERRITIN SERPL-MCNC: 65.2 NG/ML (ref 13–150)
FOLATE SERPL-MCNC: 7.87 NG/ML (ref 4.78–24.2)
GLOBULIN UR ELPH-MCNC: 2.1 GM/DL
GLUCOSE SERPL-MCNC: 139 MG/DL (ref 65–99)
HCT VFR BLD AUTO: 37.2 % (ref 34–46.6)
HGB BLD-MCNC: 11.9 G/DL (ref 12–15.9)
IMM GRANULOCYTES # BLD AUTO: 0.02 10*3/MM3 (ref 0–0.05)
IMM GRANULOCYTES NFR BLD AUTO: 0.3 % (ref 0–0.5)
IRON 24H UR-MRATE: 85 MCG/DL (ref 37–145)
IRON SATN MFR SERPL: 24 % (ref 20–50)
LYMPHOCYTES # BLD AUTO: 1.66 10*3/MM3 (ref 0.7–3.1)
LYMPHOCYTES NFR BLD AUTO: 28 % (ref 19.6–45.3)
MCH RBC QN AUTO: 29.6 PG (ref 26.6–33)
MCHC RBC AUTO-ENTMCNC: 32 G/DL (ref 31.5–35.7)
MCV RBC AUTO: 92.5 FL (ref 79–97)
MONOCYTES # BLD AUTO: 0.41 10*3/MM3 (ref 0.1–0.9)
MONOCYTES NFR BLD AUTO: 6.9 % (ref 5–12)
NEUTROPHILS NFR BLD AUTO: 3.54 10*3/MM3 (ref 1.7–7)
NEUTROPHILS NFR BLD AUTO: 59.9 % (ref 42.7–76)
NRBC BLD AUTO-RTO: 0 /100 WBC (ref 0–0.2)
PLATELET # BLD AUTO: 211 10*3/MM3 (ref 140–450)
PMV BLD AUTO: 9.5 FL (ref 6–12)
POTASSIUM SERPL-SCNC: 3.4 MMOL/L (ref 3.5–5.2)
PROT SERPL-MCNC: 6.5 G/DL (ref 6–8.5)
RBC # BLD AUTO: 4.02 10*6/MM3 (ref 3.77–5.28)
SODIUM SERPL-SCNC: 139 MMOL/L (ref 136–145)
TIBC SERPL-MCNC: 350 MCG/DL (ref 298–536)
TRANSFERRIN SERPL-MCNC: 235 MG/DL (ref 200–360)
VIT B12 BLD-MCNC: 369 PG/ML (ref 211–946)
WBC NRBC COR # BLD AUTO: 5.92 10*3/MM3 (ref 3.4–10.8)

## 2023-12-21 PROCEDURE — 83540 ASSAY OF IRON: CPT | Performed by: NURSE PRACTITIONER

## 2023-12-21 PROCEDURE — 85025 COMPLETE CBC W/AUTO DIFF WBC: CPT | Performed by: NURSE PRACTITIONER

## 2023-12-21 PROCEDURE — 82728 ASSAY OF FERRITIN: CPT | Performed by: NURSE PRACTITIONER

## 2023-12-21 PROCEDURE — 82607 VITAMIN B-12: CPT | Performed by: NURSE PRACTITIONER

## 2023-12-21 PROCEDURE — 84466 ASSAY OF TRANSFERRIN: CPT | Performed by: NURSE PRACTITIONER

## 2023-12-21 PROCEDURE — 82306 VITAMIN D 25 HYDROXY: CPT | Performed by: NURSE PRACTITIONER

## 2023-12-21 PROCEDURE — 36415 COLL VENOUS BLD VENIPUNCTURE: CPT | Performed by: NURSE PRACTITIONER

## 2023-12-21 PROCEDURE — 82746 ASSAY OF FOLIC ACID SERUM: CPT | Performed by: NURSE PRACTITIONER

## 2023-12-21 PROCEDURE — 80053 COMPREHEN METABOLIC PANEL: CPT | Performed by: NURSE PRACTITIONER

## 2023-12-21 RX ORDER — FAMOTIDINE 20 MG/1
20 TABLET, FILM COATED ORAL 2 TIMES DAILY
Qty: 180 TABLET | Refills: 3 | Status: SHIPPED | OUTPATIENT
Start: 2023-12-21

## 2023-12-21 NOTE — PROGRESS NOTES
"Chief Complaint   Patient presents with    Follow-up     GERD, history of SIBO, colon polyps         History of Present Illness  73-year-old female presents the office today for follow-up.  She was last seen in office on 6/14/2023.  She is a history of diverticulosis, diverticulitis, colon polyps, GERD, chronically elevated alkaline phosphatase in the setting of osteopenia, and excess gas.    Last colonoscopy was performed in July 2016 and she was noted to have total of 3 colon polyps, all of which were tubular adenomas.  Cologuard August 2022 was negative. Last episode of diverticulitis occurred May 2022, but CT scan was negative.  The patient does adhere to a specific diet and also takes turmeric which she feels reduces her inflammation.    For management of GERD she takes famotidine 20 mg twice daily with good control of symptoms.  She denies any nausea, vomiting, or dysphagia.    She has a significant history of SIBO, which was originally diagnosed by Dr. Murrell.  She was reporting severe flatulence.  Hydrogen breath testing was ordered at her last office visit but not completed. She has times where she feels bloated and has significant gas. She has lost 3 lbs since her last OV. She has been under a tremendous amount of stress that got much worse in November. She had to move twice. She reports that she does not have as much energy as she used to have. She is having regular Bms. She denies any rectal bleeding. She takes a daily probiotic.     Result Review :       Office Visit with Dina Baugh APRN (06/14/2023)   SCANNED - COLONOSCOPY (07/22/2016)   SCANNED PATHOLOGY (07/22/2016)   CT Abdomen Pelvis With Contrast (05/18/2022 10:31)   Vital Signs:   /84   Pulse 63   Temp 98 °F (36.7 °C)   Ht 175.3 cm (69\")   Wt 64.7 kg (142 lb 10.4 oz)   SpO2 99%   BMI 21.07 kg/m²     Body mass index is 21.07 kg/m².     Physical Exam  Vitals reviewed.   Constitutional:       Appearance: Normal appearance. "   Pulmonary:      Effort: Pulmonary effort is normal.   Abdominal:      General: Abdomen is flat. Bowel sounds are normal. There is no distension.      Palpations: Abdomen is soft. There is no mass.      Tenderness: There is no abdominal tenderness. There is no guarding.   Musculoskeletal:         General: Normal range of motion.   Skin:     General: Skin is warm and dry.   Neurological:      General: No focal deficit present.      Mental Status: She is alert and oriented to person, place, and time.   Psychiatric:         Mood and Affect: Mood normal.         Behavior: Behavior normal.         Thought Content: Thought content normal.         Judgment: Judgment normal.       Assessment and Plan    Diagnoses and all orders for this visit:    1. Gastroesophageal reflux disease, unspecified whether esophagitis present (Primary)    2. History of diverticulitis    3. Diverticulosis    4. Small intestinal bacterial overgrowth (SIBO)    5. Elevated alkaline phosphatase level  -     CBC & Differential  -     Comprehensive Metabolic Panel  -     Vitamin D,25-Hydroxy  -     Vitamin B12  -     Iron Profile  -     Ferritin  -     Folate    6. Hx of adenomatous colonic polyps    7. Colon cancer screening    8. Malaise and fatigue  -     CBC & Differential  -     Comprehensive Metabolic Panel  -     Vitamin D,25-Hydroxy  -     Vitamin B12  -     Iron Profile  -     Ferritin  -     Folate    9. Vitamin D deficiency    10. Vitamin D deficiency, unspecified  -     Vitamin D,25-Hydroxy    Other orders  -     famotidine (PEPCID) 20 MG tablet; Take 1 tablet by mouth 2 (Two) Times a Day.  Dispense: 180 tablet; Refill: 3           Patient Instructions    Continue famotidine 20 mg twice daily. Refills have been sent to your pharmacy.     2.  For further evaluation of fatigue and history of Vit D deficiency we will check some routine lab work. We will contact you with results.     3.  We recommend that you hold your probiotic for now and  try IBGard. Samples provided. Follow package instructions for use. If you find that your gas improves we would recommend that you stay off the probiotic and use IBGard as needed.     You may also find benefit with use of simethicone. This helps to dissolve gas and can be purchased OTC at your local grocery or pharmacy. You would follow package instructions for use.     4.  Next ColoGuard due August 2025.     5.  Recommend annual office follow up for reassessment of symptoms or sooner should symptoms worsen/recur.       Discussion:  For GERD, patient to continue famotidine 20 mg twice daily as this is working well to manage symptoms.  Refills have been sent to her pharmacy.    For fatigue, we will check some routine lab work to determine if there are any vitamin deficiencies or anemia at play for her symptoms.  We will contact the patient with results once available.    She does have a significant history of SIBO and does report abdominal bloating.  At her last office visit hydrogen breath testing was ordered but patient did not complete.  We will have the patient hold her probiotic and try IBgard to see if symptoms improve.  If symptoms fail to improve patient may resume probiotic and try simethicone.  Also to consider placing new orders for hydrogen breath testing in the future.    Patient has had a history of adenomatous colon polyps.  Her last colonoscopy was performed in 2016.  She did not want to undergo any repeat colonoscopies and performed a Cologuard August 2022 which was negative.  Technically she would be due again for her next Cologuard August 2025, however, I did indicate to the patient that if positive it would indicate the need for colonoscopy.    Plan for annual office follow-up for reassessment of symptoms and medication refills, or sooner should her symptoms worsen or fail to improve.  Patient verbalized understanding of above plan of care and is in agreement.  All questions answered and support  provided.    EMR Dragon/Transcription Disclaimer:  This document has been Dictated utilizing Dragon dictation.

## 2023-12-21 NOTE — PATIENT INSTRUCTIONS
Continue famotidine 20 mg twice daily. Refills have been sent to your pharmacy.     2.  For further evaluation of fatigue and history of Vit D deficiency we will check some routine lab work. We will contact you with results.     3.  We recommend that you hold your probiotic for now and try IBGard. Samples provided. Follow package instructions for use. If you find that your gas improves we would recommend that you stay off the probiotic and use IBGard as needed.     You may also find benefit with use of simethicone. This helps to dissolve gas and can be purchased OTC at your local grocery or pharmacy. You would follow package instructions for use.     4.  Next ColoGuard due August 2025.     5.  Recommend annual office follow up for reassessment of symptoms or sooner should symptoms worsen/recur.

## 2024-01-02 ENCOUNTER — TELEPHONE (OUTPATIENT)
Dept: GASTROENTEROLOGY | Facility: CLINIC | Age: 74
End: 2024-01-02
Payer: MEDICARE

## 2024-01-02 NOTE — TELEPHONE ENCOUNTER
Patient returned my call and stated that she still has the [facility] HMO but is going to check into other insurances

## 2024-01-02 NOTE — TELEPHONE ENCOUNTER
Caller: Sara Gary    Relationship: Self    Best call back number: [phone] (Mobile)    What is the best time to reach you: ANYTIME    Who are you requesting to speak with (clinical staff, provider,  specific staff member):     Do you know the name of the person who called: RADHA    What was the call regarding: PT SAID RADHA WAS CALLING HER RIGHT BEFORE [place] ABOUT [facility] INSURANCE CHANGE AND OPTIONS.     Is it okay if the provider responds through MyChart: NA

## 2024-01-17 ENCOUNTER — TELEPHONE (OUTPATIENT)
Dept: INTERNAL MEDICINE | Facility: CLINIC | Age: 74
End: 2024-01-17
Payer: MEDICARE

## 2024-01-17 RX ORDER — VALACYCLOVIR HYDROCHLORIDE 500 MG/1
500 TABLET, FILM COATED ORAL 2 TIMES DAILY
Qty: 60 TABLET | Refills: 2 | Status: SHIPPED | OUTPATIENT
Start: 2024-01-17

## 2024-01-17 NOTE — TELEPHONE ENCOUNTER
"  Caller: Sara Gary    Relationship: Self    Best call back number: [phone]*    What medication are you requesting: FOR FEVER BLISTER    What are your current symptoms: WOKE UP THIS MORNING WITH A \"BIG\" FEVER BLISTER. PATIENT STATES THAT SHE HAS A HISTORY OF FEVER BLISTERS.    How long have you been experiencing symptoms: THIS MORNING    Have you had these symptoms before:    [x] Yes  [] No    Have you been treated for these symptoms before:   [x] Yes  [] No    If a prescription is needed, what is your preferred pharmacy and phone number: [facility] [id number] [place], [place] - [address] [address] AT [facility] [address] [facility] [address] [facility] [phone] [facility] [phone] FX           "

## 2024-01-17 NOTE — TELEPHONE ENCOUNTER
Patient was confused by the name of the medication. Advised pt that the one that was sent is the one she requested

## 2024-01-26 ENCOUNTER — OFFICE VISIT (OUTPATIENT)
Dept: INTERNAL MEDICINE | Facility: CLINIC | Age: 74
End: 2024-01-26
Payer: MEDICARE

## 2024-01-26 VITALS
SYSTOLIC BLOOD PRESSURE: 122 MMHG | OXYGEN SATURATION: 98 % | HEIGHT: 69 IN | DIASTOLIC BLOOD PRESSURE: 82 MMHG | BODY MASS INDEX: 21.18 KG/M2 | WEIGHT: 143 LBS | HEART RATE: 65 BPM

## 2024-01-26 DIAGNOSIS — R30.0 DYSURIA: Primary | ICD-10-CM

## 2024-01-26 DIAGNOSIS — M25.511 ACUTE PAIN OF RIGHT SHOULDER: ICD-10-CM

## 2024-01-26 LAB
BACTERIA UR QL AUTO: ABNORMAL /HPF
MUCUS, POC: ABNORMAL
RBC # UR STRIP: ABNORMAL /HPF
SQUAMOUS EPITHELIAL, POC: ABNORMAL
WBC # UR STRIP: ABNORMAL /HPF

## 2024-01-26 PROCEDURE — 81001 URINALYSIS AUTO W/SCOPE: CPT

## 2024-01-26 PROCEDURE — 99213 OFFICE O/P EST LOW 20 MIN: CPT

## 2024-01-26 RX ORDER — NITROFURANTOIN 25; 75 MG/1; MG/1
100 CAPSULE ORAL 2 TIMES DAILY
Qty: 14 CAPSULE | Refills: 0 | Status: SHIPPED | OUTPATIENT
Start: 2024-01-26 | End: 2024-02-02

## 2024-01-26 RX ORDER — FLUCONAZOLE 150 MG/1
150 TABLET ORAL ONCE
Qty: 1 TABLET | Refills: 0 | Status: SHIPPED | OUTPATIENT
Start: 2024-01-26 | End: 2024-01-26

## 2024-01-26 NOTE — PATIENT INSTRUCTIONS
Complete Valtrex. Start Macrobid twice a day for 7 days. Take one dose of Diflucan. Stay hydrated with water. Urine culture to determine if antibiotic needs to be switched. Recommend heat and stretching for back pain. Follow-up as needed.

## 2024-01-26 NOTE — PROGRESS NOTES
"Chief Complaint  Cystitis    Subjective        Sara Gary presents to [facility]  History of Present Illness  73-year-old female presenting with dysuria, shoulder pain and fever blisters.  Fever blister started on started taking Valtrex.  After 3 pills, symptoms improved significantly and patient stopped taking medication.  Advised to continue taking antiviral.  Has been having shoulder pain behind her right shoulder blade for years.  Significantly worse recently in the middle of the night.  Does not regularly stretch or perform upper body exercises.  This morning he had chills all over her body and difficulty urinating.  States this is a typical sign for her that she has a bladder infection.  Started taking Pyridium.  Denies fever or burning with urination.  Cystitis        Objective   Vital Signs:  /82 (BP Location: Right arm, Patient Position: Sitting, Cuff Size: Adult)   Pulse 65   Ht 175.3 cm (69\")   Wt 64.9 kg (143 lb)   SpO2 98%   BMI 21.12 kg/m²   Estimated body mass index is 21.12 kg/m² as calculated from the following:    Height as of this encounter: 175.3 cm (69\").    Weight as of this encounter: 64.9 kg (143 lb).       BMI is within normal parameters. No other follow-up for BMI required.      Physical Exam  Vitals reviewed.   Constitutional:       Appearance: Normal appearance.   HENT:      Head: Normocephalic.   Musculoskeletal:         General: No swelling or tenderness. Normal range of motion.      Cervical back: Normal range of motion.   Skin:     General: Skin is warm and dry.      Capillary Refill: Capillary refill takes less than 2 seconds.   Neurological:      General: No focal deficit present.      Mental Status: She is alert and oriented to person, place, and time.   Psychiatric:         Mood and Affect: Mood normal.         Behavior: Behavior normal.         Thought Content: Thought content normal.         Judgment: Judgment normal.        Result " Review :      Common labs          7/14/2023    14:53 9/1/2023    12:54 12/21/2023    16:26   Common Labs   Glucose 88  99  139    BUN 9  10  12    Creatinine 0.79  0.73  0.79    Sodium 143  141  139    Potassium 4.4  4.1  3.4    Chloride 108  105  104    Calcium 9.5  9.4  9.0    Total Protein 6.4  7.0     Albumin 4.3  4.6  4.4    Total Bilirubin 0.4  0.5  0.3    Alkaline Phosphatase 134  152  148    AST (SGOT) 15  15  16    ALT (SGPT) 7  10  11    WBC 5.11  4.48  5.92    Hemoglobin 12.6  13.5  11.9    Hematocrit 37.3  39.7  37.2    Platelets 263  268  211          Current Outpatient Medications on File Prior to Visit   Medication Sig Dispense Refill    busPIRone (BUSPAR) 10 MG tablet Take 1 tablet by mouth Daily. 90 tablet 1    estradiol (ESTRACE) 0.1 MG/GM vaginal cream       famotidine (PEPCID) 20 MG tablet Take 1 tablet by mouth 2 (Two) Times a Day. 180 tablet 3    hydrOXYzine (ATARAX) 25 MG tablet Take 1 tablet by mouth 3 (Three) Times a Day As Needed for Itching. 10 tablet 0    liothyronine (CYTOMEL) 5 MCG tablet Take 1 tablet by mouth Daily. 90 tablet 1    meclizine (ANTIVERT) 25 MG tablet Take 1 tablet by mouth 3 (Three) Times a Day As Needed for Dizziness. 60 tablet 1    sertraline (ZOLOFT) 50 MG tablet       SUMAtriptan (IMITREX) 100 MG tablet Take one tablet at onset of headache. May repeat dose one time in 2 hours if headache not relieved. 30 tablet 1    Synthroid 75 MCG tablet Take 1 tablet by mouth Daily. 90 tablet 1    valACYclovir (Valtrex) 500 MG tablet Take 1 tablet by mouth 2 (Two) Times a Day. Take 1 tablet po BID x 5 days when experiencing fever blister symptoms. 60 tablet 2    Ventolin  (90 Base) MCG/ACT inhaler       cetirizine (zyrTEC) 10 MG tablet Take 1 tablet by mouth Daily for 10 days. 90 tablet 0     No current facility-administered medications on file prior to visit.              Assessment and Plan     Diagnoses and all orders for this visit:    1. Dysuria (Primary)  -      Cancel: POC Urinalysis Dipstick, Automated  -     Urine Culture - Urine, Urine, Clean Catch  -     nitrofurantoin, macrocrystal-monohydrate, (Macrobid) 100 MG capsule; Take 1 capsule by mouth 2 (Two) Times a Day for 7 days.  Dispense: 14 capsule; Refill: 0  -     fluconazole (Diflucan) 150 MG tablet; Take 1 tablet by mouth 1 (One) Time for 1 dose.  Dispense: 1 tablet; Refill: 0    2. Acute pain of right shoulder      Patient Instructions   Complete Valtrex. Start Macrobid twice a day for 7 days. Take one dose of Diflucan. Stay hydrated with water. Urine culture to determine if antibiotic needs to be switched. Recommend heat and stretching for back pain. Follow-up as needed.         Follow Up     Return if symptoms worsen or fail to improve.  Patient was given instructions and counseling regarding her condition or for health maintenance advice. Please see specific information pulled into the AVS if appropriate.

## 2024-01-28 LAB
BACTERIA UR CULT: NORMAL
BACTERIA UR CULT: NORMAL

## 2024-01-29 NOTE — PROGRESS NOTES
Urine culture did not grow enough bacteria to culture.  Recommend continuing antibiotic as prescribed.  Follow-up as needed.

## 2024-03-01 ENCOUNTER — NURSE TRIAGE (OUTPATIENT)
Dept: CALL CENTER | Facility: HOSPITAL | Age: 74
End: 2024-03-01
Payer: MEDICARE

## 2024-03-01 ENCOUNTER — TELEPHONE (OUTPATIENT)
Dept: INTERNAL MEDICINE | Facility: CLINIC | Age: 74
End: 2024-03-01
Payer: MEDICARE

## 2024-03-01 ENCOUNTER — HOSPITAL ENCOUNTER (OUTPATIENT)
Facility: HOSPITAL | Age: 74
Discharge: HOME | End: 2024-03-01
Attending: EMERGENCY MEDICINE
Payer: MEDICARE

## 2024-03-01 VITALS
SYSTOLIC BLOOD PRESSURE: 141 MMHG | DIASTOLIC BLOOD PRESSURE: 87 MMHG | WEIGHT: 142 LBS | RESPIRATION RATE: 16 BRPM | HEART RATE: 91 BPM | TEMPERATURE: 99.3 F | BODY MASS INDEX: 21.03 KG/M2 | HEIGHT: 69 IN | OXYGEN SATURATION: 99 %

## 2024-03-01 DIAGNOSIS — U07.1 COVID-19: Primary | ICD-10-CM

## 2024-03-01 LAB
FLUAV SUBTYP SPEC NAA+PROBE: NOT DETECTED
FLUBV RNA ISLT QL NAA+PROBE: NOT DETECTED
SARS-COV-2 RNA RESP QL NAA+PROBE: DETECTED
STREP A PCR: NOT DETECTED

## 2024-03-01 PROCEDURE — 99203 OFFICE O/P NEW LOW 30 MIN: CPT | Performed by: PHYSICIAN ASSISTANT

## 2024-03-01 PROCEDURE — 87636 SARSCOV2 & INF A&B AMP PRB: CPT | Performed by: EMERGENCY MEDICINE

## 2024-03-01 PROCEDURE — 87651 STREP A DNA AMP PROBE: CPT | Performed by: EMERGENCY MEDICINE

## 2024-03-01 PROCEDURE — G0463 HOSPITAL OUTPT CLINIC VISIT: HCPCS | Performed by: PHYSICIAN ASSISTANT

## 2024-03-01 RX ORDER — DEXTROMETHORPHAN HYDROBROMIDE AND PROMETHAZINE HYDROCHLORIDE 15; 6.25 MG/5ML; MG/5ML
5 SYRUP ORAL NIGHTLY
Qty: 118 ML | Refills: 0 | Status: SHIPPED | OUTPATIENT
Start: 2024-03-01

## 2024-03-01 NOTE — TELEPHONE ENCOUNTER
Patient advised she needs to be seen for a prescription. Patient is going to try what's OTC for now and will go to immediate care if her symptoms worsen    Vitals this morning:  /85  HR 90  TMP 98

## 2024-03-01 NOTE — TELEPHONE ENCOUNTER
"Pt called with c/o Cough, severe sore throat, chills, fever 100.4 now, not feeling well achy all over.  Pt states all s/s started this morning and she feels they are worsening.  Sounds viral but pt wants to be seen to make sure.  Her dr office did not answer, suggested urgent care to be seen today.  She agrees and states there is one close to her she will go to.  Protocols reviewed.  Suggested pt wear mask to make sure she does not catch anything else.  She voiced understanding.  She knows she can call us back if she has any more questions or concerns.                Reason for Disposition   [1] Sore throat with cough/cold symptoms AND [2] present < 5 days    Additional Information   Negative: SEVERE difficulty breathing (e.g., struggling for each breath, speaks in single words, stridor)   Negative: Sounds like a life-threatening emergency to the triager   Negative: [1] Diagnosed strep throat AND [2] taking antibiotic AND [3] symptoms continue   Negative: Throat culture results, call about   Negative: Productive cough is main symptom   Negative: Non-productive cough is main symptom   Negative: Hoarseness is main symptom   Negative: Runny nose is main symptom   Negative: Uvula swelling is main symptom   Negative: [1] Drooling or spitting out saliva (because can't swallow) AND [2] normal breathing   Negative: Unable to open mouth completely   Negative: [1] Difficulty breathing AND [2] not severe   Negative: Fever > 104 F (40 C)   Negative: [1] Refuses to drink anything AND [2] for > 12 hours   Negative: [1] Drinking very little AND [2] dehydration suspected (e.g., no urine > 12 hours, very dry mouth, very lightheaded)   Negative: Patient sounds very sick or weak to the triager   Negative: SEVERE (e.g., excruciating) throat pain   Negative: [1] Pus on tonsils (back of throat) AND [2]  fever AND [3] swollen neck lymph nodes (\"glands\")   Negative: [1] Rash AND [2] widespread (especially chest and abdomen)   Negative: " "Earache also present   Negative: Fever present > 3 days (72 hours)   Negative: Diabetes mellitus or weak immune system (e.g., HIV positive, cancer chemo, splenectomy, organ transplant, chronic steroids)   Negative: History of rheumatic fever   Negative: [1] Adult is leaving on a trip AND [2] requests an antibiotic NOW   Negative: [1] Positive throat culture or rapid strep test (according to lab, PCP, caller, etc.) AND [2] NO  standing order to call in prescription for antibiotic   Negative: [1] Exposure to family member (or spouse or boyfriend/girlfriend) with test-proven strep AND [2] within last 10 days   Negative: [1] Sore throat is the only symptom AND [2] present > 48 hours   Negative: [1] Sore throat with cough/cold symptoms AND [2] present > 5 days   Negative: [1] Sore throat is the only symptom AND [2] sore throat present < 48 hours    Answer Assessment - Initial Assessment Questions  1. ONSET: \"When did the throat start hurting?\" (Hours or days ago)       This morning   2. SEVERITY: \"How bad is the sore throat?\" (Scale 1-10; mild, moderate or severe)    - MILD (1-3):  Doesn't interfere with eating or normal activities.    - MODERATE (4-7): Interferes with eating some solids and normal activities.    - SEVERE (8-10):  Excruciating pain, interferes with most normal activities.    - SEVERE WITH DYSPHAGIA (10): Can't swallow liquids, drooling.      Moderate   3. STREP EXPOSURE: \"Has there been any exposure to strep within the past week?\" If Yes, ask: \"What type of contact occurred?\"       Not sure   4.  VIRAL SYMPTOMS: \"Are there any symptoms of a cold, such as a runny nose, cough, hoarse voice or red eyes?\"       Cough, severe sore throat, chills, fever 100.4 now, not feeling well achy  5. FEVER: \"Do you have a fever?\" If Yes, ask: \"What is your temperature, how was it measured, and when did it start?\"      100.4--home thermometer and started this morning   6. PUS ON THE TONSILS: \"Is there pus on the tonsils " "in the back of your throat?\"      Unsure   7. OTHER SYMPTOMS: \"Do you have any other symptoms?\" (e.g., difficulty breathing, headache, rash)      Cough, severe sore throat, chills, fever 100.4 now, not feeling well achy    8. PREGNANCY: \"Is there any chance you are pregnant?\" \"When was your last menstrual period?\"      no    Protocols used: Sore Throat-ADULT-AH    "

## 2024-03-01 NOTE — TELEPHONE ENCOUNTER
Lvm that if she is having chest pain or SOA we recommend the ER but if she is having sick symptoms we recommend the immediate care

## 2024-03-01 NOTE — TELEPHONE ENCOUNTER
UNABLE TO TRANSFER    Caller: Sara Gary     Relationship: SELF    Best call back number:     [phone]        What is your medical concern?   HEADACHE  FEVER 100.3  COUGH  FATIGUE  BODY ACHE  SORE THROAT    HAS NOT TAKEN COVID TEST    How long has this issue been going on? TODAY    INSTRUCTED TO GO TO THE ER OR URGENT CARE.

## 2024-03-01 NOTE — TELEPHONE ENCOUNTER
Caller: Sara Gary    Relationship: Self    Best call back number: [phone]     What medication are you requesting: SOMETHING TO HELP WITH SYMPTOMS    What are your current symptoms: FEVER, SORE THROAT, HEADACHES    How long have you been experiencing symptoms: OVERNIGHT    If a prescription is needed, what is your preferred pharmacy and phone number: [facility] [id number] [place], [place] - [address] [address] [facility] [address] AT [facility] [phone] [facility] [phone] FX     Additional notes: PLEASE ADVISE

## 2024-03-01 NOTE — FSED PROVIDER NOTE
"Subjective   History of Present Illness  Patient is a 73-year-old female who has had flulike symptoms today.  Said during the night she felt chilled but did not check a fever.  Today she is just feels drained, achy has a runny nose and has a headache.  She also has a sore throat and her voice sounds hoarse.  She had 1 bout of diarrhea earlier but that is all and she is not having any abdominal pain      Review of Systems   Constitutional:  Positive for chills and fatigue.   HENT:  Positive for rhinorrhea and sore throat.    Gastrointestinal:  Positive for diarrhea (Once). Negative for abdominal pain and vomiting.   Musculoskeletal:  Positive for myalgias.   Neurological:  Positive for headaches.       Past Medical History:   Diagnosis Date    Depression     Diverticulitis     Environmental allergies     History of colon polyps     Hypertension     Kidney stone     hx of 4 stones       Allergies   Allergen Reactions    Sulfamethoxazole-Trimethoprim Hives    Statins GI Intolerance     Patient \"felt sick\"    Sulfa Antibiotics Hives       Past Surgical History:   Procedure Laterality Date    APPENDECTOMY      ASD REPAIR, SECUNDUM      CATARACT EXTRACTION      COLONOSCOPY      CYSTOSCOPY BLADDER STONE LITHOTRIPSY      CYSTOSCOPY W/ URETERAL STENT PLACEMENT Left 06/29/2017    Procedure: CYSTOSCOPY LEFT STENT INSERTION & STONE REMOVAL;  Surgeon: Raheem Franklin MD;  Location: [facility] OR;  Service:     HYSTERECTOMY      NEPHRECTOMY RADICAL      THYROIDECTOMY      UPPER GASTROINTESTINAL ENDOSCOPY         Family History   Problem Relation Age of Onset    Heart disease Mother     Hypertension Mother     Heart disease Father     Hypertension Father     Colon cancer Neg Hx     Colon polyps Neg Hx     Crohn's disease Neg Hx     Irritable bowel syndrome Neg Hx     Ulcerative colitis Neg Hx        Social History     Socioeconomic History    Marital status: Single   Tobacco Use    Smoking status: Never    Smokeless tobacco: " Never   Vaping Use    Vaping Use: Never used   Substance and Sexual Activity    Alcohol use: No    Drug use: No    Sexual activity: Not Currently     Birth control/protection: Post-menopausal           Objective   Physical Exam  Constitutional:       Appearance: She is well-developed.   HENT:      Right Ear: Tympanic membrane normal.      Left Ear: Tympanic membrane normal.      Mouth/Throat:      Pharynx: Posterior oropharyngeal erythema present. No pharyngeal swelling or uvula swelling.      Tonsils: No tonsillar exudate.      Comments: Somewhat dry mucosa  Eyes:      Conjunctiva/sclera: Conjunctivae normal.   Cardiovascular:      Rate and Rhythm: Normal rate.      Heart sounds: Normal heart sounds.   Pulmonary:      Effort: Pulmonary effort is normal.      Breath sounds: Normal breath sounds.   Abdominal:      Palpations: Abdomen is soft.   Musculoskeletal:      Cervical back: Normal range of motion.   Skin:     General: Skin is warm and dry.   Neurological:      General: No focal deficit present.   Psychiatric:         Mood and Affect: Mood normal.         Behavior: Behavior normal.         Procedures           ED Course                                           Medical Decision Making  Patient has positive COVID and negative flu and strep.  Vital stable.  She says she is not feeling horrible except for the headache right now.  I Shawanda start her on Tamiflu.  She has good kidney function I went over her medicine list and she does not have any interactions with COVID.  I also gave her Promethazine DM for nighttime.  She will buy some Mucinex during the day.  She return precautions for concerns for dehydration or problems breathing.  She will wake sure she is drinking plenty of fluids.  She has family doctor for follow-up.    Problems Addressed:  COVID-19: complicated acute illness or injury    Risk  Prescription drug management.        Final diagnoses:   COVID-19       ED Disposition  ED Disposition       ED  Disposition   Discharge    Condition   Stable    Comment   --               Catherine Kolb, APRN  [address]  [place] [id number] [phone]    In 1 week           Medication List        New Prescriptions      Nirmatrelvir & Ritonavir (300mg/100mg) 20 x 150 MG & 10 x 100MG tablet therapy pack tablet  Commonly known as: PAXLOVID  Take 3 tablets by mouth 2 (Two) Times a Day for 5 days.     promethazine-dextromethorphan 6.25-15 MG/5ML syrup  Commonly known as: PROMETHAZINE-DM  Take 5 mL by mouth Every Night. May repeat dose after 4 hours            Stop      cetirizine 10 MG tablet  Commonly known as: zyrTEC     estradiol 0.1 MG/GM vaginal cream  Commonly known as: ESTRACE     hydrOXYzine 25 MG tablet  Commonly known as: ATARAX     meclizine 25 MG tablet  Commonly known as: ANTIVERT     Ventolin  (90 Base) MCG/ACT inhaler  Generic drug: albuterol sulfate HFA               Where to Get Your Medications        These medications were sent to [facility] [id number] - [place], [place] - [address] [address] AT [facility] & [address] - [phone]  - [phone]   [address] [address] [address], [facility] [address]      Phone: [phone]   Nirmatrelvir & Ritonavir (300mg/100mg) 20 x 150 MG & 10 x 100MG tablet therapy pack tablet  promethazine-dextromethorphan 6.25-15 MG/5ML syrup

## 2024-03-02 ENCOUNTER — DOCUMENTATION (OUTPATIENT)
Dept: INTERNAL MEDICINE | Facility: CLINIC | Age: 74
End: 2024-03-02
Payer: MEDICARE

## 2024-03-02 NOTE — PROGRESS NOTES
Patient notified on call regarding recent testing positive for Covid 19. Patient was given paxlovid for medication treatment and concern for possible s/e. Patient advised  that it would be safe to take medication if she is comfortable doing so. Reports she may not take it due to listed side effects. Denies any red flag symptoms such as SOA, CP, or NAILS.

## 2024-03-08 ENCOUNTER — OFFICE VISIT (OUTPATIENT)
Dept: INTERNAL MEDICINE | Facility: CLINIC | Age: 74
End: 2024-03-08
Payer: MEDICARE

## 2024-03-08 ENCOUNTER — HOSPITAL ENCOUNTER (OUTPATIENT)
Facility: HOSPITAL | Age: 74
Discharge: HOME | End: 2024-03-08
Payer: MEDICARE

## 2024-03-08 VITALS
DIASTOLIC BLOOD PRESSURE: 78 MMHG | OXYGEN SATURATION: 97 % | SYSTOLIC BLOOD PRESSURE: 120 MMHG | WEIGHT: 144 LBS | HEIGHT: 69 IN | BODY MASS INDEX: 21.33 KG/M2 | HEART RATE: 70 BPM

## 2024-03-08 DIAGNOSIS — M89.8X1 PAIN OF RIGHT SCAPULA: ICD-10-CM

## 2024-03-08 DIAGNOSIS — G89.29 CHRONIC RIGHT-SIDED THORACIC BACK PAIN: ICD-10-CM

## 2024-03-08 DIAGNOSIS — M54.6 CHRONIC RIGHT-SIDED THORACIC BACK PAIN: Chronic | ICD-10-CM

## 2024-03-08 DIAGNOSIS — U07.1 COVID-19: Primary | ICD-10-CM

## 2024-03-08 DIAGNOSIS — M54.6 CHRONIC RIGHT-SIDED THORACIC BACK PAIN: ICD-10-CM

## 2024-03-08 DIAGNOSIS — M89.8X1 PAIN OF RIGHT SCAPULA: Chronic | ICD-10-CM

## 2024-03-08 DIAGNOSIS — G89.29 CHRONIC RIGHT-SIDED THORACIC BACK PAIN: Chronic | ICD-10-CM

## 2024-03-08 PROCEDURE — 73010 X-RAY EXAM OF SHOULDER BLADE: CPT

## 2024-03-08 PROCEDURE — 1159F MED LIST DOCD IN RCRD: CPT | Performed by: NURSE PRACTITIONER

## 2024-03-08 PROCEDURE — 99213 OFFICE O/P EST LOW 20 MIN: CPT | Performed by: NURSE PRACTITIONER

## 2024-03-08 PROCEDURE — 1160F RVW MEDS BY RX/DR IN RCRD: CPT | Performed by: NURSE PRACTITIONER

## 2024-03-08 PROCEDURE — 72072 X-RAY EXAM THORAC SPINE 3VWS: CPT

## 2024-03-08 RX ORDER — METHYLPREDNISOLONE 4 MG/1
TABLET ORAL
Qty: 21 EACH | Refills: 0 | Status: SHIPPED | OUTPATIENT
Start: 2024-03-08

## 2024-03-08 NOTE — PROGRESS NOTES
"Chief Complaint  Exposure To Known Illness (Covid Follow-up)    Subjective        Sara Gary presents to [facility]  History of Present Illness  This is a 74 y/o female presenting to office for f/u with covid 19. Patient tested positive 7 days ago and was seen in - was given paxlovid but patient did not take this. Patient reports she has now lingering symptoms of some cough, sinus pressure. Denies fever. Denies any n/v/d. Reports initial symptoms involved sore throat, body aches, chills.     Reports still struggling with posterior right scapular pain; when pointing she states its right under her right shoulder blade in the thoracic region. Reports completing PT for this; reports pain is worsened with stress. Reports activity and tiredness worsens the pain. Reports she uses heating pad and this helps with the pain. Reports she takes advil PRN for pain relief.     Objective   Vital Signs:  /78 (BP Location: Left arm, Patient Position: Sitting, Cuff Size: Adult)   Pulse 70   Ht 175.3 cm (69\")   Wt 65.3 kg (144 lb)   SpO2 97%   BMI 21.27 kg/m²   Estimated body mass index is 21.27 kg/m² as calculated from the following:    Height as of this encounter: 175.3 cm (69\").    Weight as of this encounter: 65.3 kg (144 lb).       BMI is within normal parameters. No other follow-up for BMI required.      Physical Exam  Constitutional:       Appearance: Normal appearance.   HENT:      Head: Normocephalic and atraumatic.      Right Ear: External ear normal.      Left Ear: External ear normal.      Nose: Nose normal.      Mouth/Throat:      Mouth: Mucous membranes are moist.      Pharynx: Oropharynx is clear.   Eyes:      Conjunctiva/sclera: Conjunctivae normal.      Pupils: Pupils are equal, round, and reactive to light.      Comments: +glasses   Cardiovascular:      Rate and Rhythm: Normal rate and regular rhythm.      Pulses: Normal pulses.      Heart sounds: Normal heart sounds. "      No friction rub. No gallop.   Pulmonary:      Effort: Pulmonary effort is normal. No respiratory distress.      Breath sounds: Normal breath sounds. No stridor. No wheezing, rhonchi or rales.   Musculoskeletal:         General: Tenderness present.        Arms:       Cervical back: Normal range of motion and neck supple.   Neurological:      General: No focal deficit present.      Mental Status: She is alert and oriented to person, place, and time. Mental status is at baseline.   Psychiatric:         Mood and Affect: Mood normal.         Thought Content: Thought content normal.         Judgment: Judgment normal.        Result Review :    The following data was reviewed by: ALEJANDRO Miller on 03/08/2024:  Common labs          7/14/2023    14:53 9/1/2023    12:54 12/21/2023    16:26   Common Labs   Glucose 88  99  139    BUN 9  10  12    Creatinine 0.79  0.73  0.79    Sodium 143  141  139    Potassium 4.4  4.1  3.4    Chloride 108  105  104    Calcium 9.5  9.4  9.0    Total Protein 6.4  7.0     Albumin 4.3  4.6  4.4    Total Bilirubin 0.4  0.5  0.3    Alkaline Phosphatase 134  152  148    AST (SGOT) 15  15  16    ALT (SGPT) 7  10  11    WBC 5.11  4.48  5.92    Hemoglobin 12.6  13.5  11.9    Hematocrit 37.3  39.7  37.2    Platelets 263  268  211      Tobacco Use: Low Risk  (3/8/2024)    Patient History     Smoking Tobacco Use: Never     Smokeless Tobacco Use: Never     Passive Exposure: Not on file     Social History     Substance and Sexual Activity   Alcohol Use No     Family History   Problem Relation Age of Onset    Heart disease Mother     Hypertension Mother     Heart disease Father     Hypertension Father     Colon cancer Neg Hx     Colon polyps Neg Hx     Crohn's disease Neg Hx     Irritable bowel syndrome Neg Hx     Ulcerative colitis Neg Hx                   Assessment and Plan     Diagnoses and all orders for this visit:    1. COVID-19 (Primary)  Assessment & Plan:  Continue with mucinex 1200mg BID  PRN  Continue with tylenol/ibuprofen PRN for pain relief  Medrol pack as ordered  Continue with symptom management       Orders:  -     methylPREDNISolone (MEDROL) 4 MG dose pack; Take as directed on package instructions.  Dispense: 21 each; Refill: 0    2. Chronic right-sided thoracic back pain  -     XR Spine Thoracic 3 View; Future  -     XR Scapula Right; Future    3. Pain of right scapula  -     XR Spine Thoracic 3 View; Future  -     XR Scapula Right; Future    Will get XR's of scapula and thoracic spine-- recommended tylenol 650mg q6h or ibuprofen 600mg Q6H PRN--advised to take with food, most likely will need further eval with ortho based upon her completing PT and pain persisting.          Follow Up     No follow-ups on file.  Patient was given instructions and counseling regarding her condition or for health maintenance advice. Please see specific information pulled into the AVS if appropriate.

## 2024-03-08 NOTE — ASSESSMENT & PLAN NOTE
Continue with mucinex 1200mg BID PRN  Continue with tylenol/ibuprofen PRN for pain relief  Medrol pack as ordered  Continue with symptom management

## 2024-03-13 DIAGNOSIS — M89.8X1 PAIN OF RIGHT SCAPULA: Primary | ICD-10-CM

## 2024-03-14 ENCOUNTER — TELEPHONE (OUTPATIENT)
Dept: INTERNAL MEDICINE | Facility: CLINIC | Age: 74
End: 2024-03-14
Payer: MEDICARE

## 2024-03-14 ENCOUNTER — TELEPHONE (OUTPATIENT)
Dept: INTERNAL MEDICINE | Facility: CLINIC | Age: 74
End: 2024-03-14
Payer: MEDICAID

## 2024-03-19 ENCOUNTER — TELEPHONE (OUTPATIENT)
Dept: INTERNAL MEDICINE | Facility: CLINIC | Age: 74
End: 2024-03-19
Payer: MEDICARE

## 2024-03-19 NOTE — TELEPHONE ENCOUNTER
----- Message from ALEJANDRO Miller sent at 3/13/2024  8:38 AM EDT -----  Please let patient know--    Hub okay to relay  XR of thoracic spine showed no acute fracture; it did recommend repeat AP view of scapula as they weren't able to get pictures of the scapular region; they recommended we repeat the XR-- I have placed the order, she can return to radiology on first floor at her convenience. Depending on results, may need further eval with orthopedics

## 2024-03-22 ENCOUNTER — OFFICE VISIT (OUTPATIENT)
Dept: INTERNAL MEDICINE | Facility: CLINIC | Age: 74
End: 2024-03-22
Payer: MEDICARE

## 2024-03-22 VITALS
DIASTOLIC BLOOD PRESSURE: 82 MMHG | OXYGEN SATURATION: 97 % | SYSTOLIC BLOOD PRESSURE: 120 MMHG | WEIGHT: 145 LBS | BODY MASS INDEX: 21.48 KG/M2 | HEIGHT: 69 IN | HEART RATE: 67 BPM

## 2024-03-22 DIAGNOSIS — E03.8 OTHER SPECIFIED HYPOTHYROIDISM: Chronic | ICD-10-CM

## 2024-03-22 DIAGNOSIS — Q21.10 RESIDUAL ASD (ATRIAL SEPTAL DEFECT) FOLLOWING REPAIR: Chronic | ICD-10-CM

## 2024-03-22 DIAGNOSIS — I34.1 MVP (MITRAL VALVE PROLAPSE): Chronic | ICD-10-CM

## 2024-03-22 DIAGNOSIS — R73.03 PREDIABETES: Chronic | ICD-10-CM

## 2024-03-22 DIAGNOSIS — K21.9 GASTROESOPHAGEAL REFLUX DISEASE WITHOUT ESOPHAGITIS: Chronic | ICD-10-CM

## 2024-03-22 DIAGNOSIS — J30.1 SEASONAL ALLERGIC RHINITIS DUE TO POLLEN: Chronic | ICD-10-CM

## 2024-03-22 DIAGNOSIS — E78.2 MIXED HYPERLIPIDEMIA: Primary | Chronic | ICD-10-CM

## 2024-03-22 PROBLEM — J06.9 UPPER RESPIRATORY TRACT INFECTION: Status: RESOLVED | Noted: 2023-10-24 | Resolved: 2024-03-22

## 2024-03-22 NOTE — PROGRESS NOTES
"Chief Complaint  Migraine, Anxiety, and Hypothyroidism    Subjective        Sara Gary presents to [facility]  History of Present Illness  This is a 72 y/o female presenting to office for f/u with chronic health conditions. Reports she has moved into her new place and she has made new friends. Reports her mental health has improved greatly.     Hx anxiety-- continues on zoloft, buspar 10mg daily; reports mood is stable; denies SI.     Hx hypothyroidism-- continues on synthroid, cytomel; due for labs; will return for fasting lab work; denies any issues.     Hx migraine-- continues on triptan PRN; reports she is having them occasionally; has not been increased pain or worsening episodes.     Hx gerd-- using pepcid BID; reports she has been having more upset stomach due to leftover congestion from covid 19.     Reports she has been having some post nasal drainage-- reports she feels like this is upsetting her stomach. Reports using cetirizine but this made her very tired; reports she is using flonase.    Objective   Vital Signs:  /82 (BP Location: Left arm, Patient Position: Sitting, Cuff Size: Adult)   Pulse 67   Ht 175.3 cm (69\")   Wt 65.8 kg (145 lb)   SpO2 97%   BMI 21.41 kg/m²   Estimated body mass index is 21.41 kg/m² as calculated from the following:    Height as of this encounter: 175.3 cm (69\").    Weight as of this encounter: 65.8 kg (145 lb).       BMI is within normal parameters. No other follow-up for BMI required.      Physical Exam  Constitutional:       Appearance: Normal appearance.   HENT:      Head: Normocephalic and atraumatic.      Right Ear: External ear normal.      Left Ear: External ear normal.      Nose: Nose normal. Congestion present.      Mouth/Throat:      Mouth: Mucous membranes are moist.      Pharynx: Oropharynx is clear.   Eyes:      Conjunctiva/sclera: Conjunctivae normal.      Pupils: Pupils are equal, round, and reactive to light.      " Comments: +glasses   Cardiovascular:      Rate and Rhythm: Normal rate and regular rhythm.      Pulses: Normal pulses.      Heart sounds: No murmur heard.     No gallop.   Pulmonary:      Effort: Pulmonary effort is normal. No respiratory distress.      Breath sounds: Normal breath sounds. No stridor. No wheezing, rhonchi or rales.   Skin:     General: Skin is warm and dry.      Capillary Refill: Capillary refill takes less than 2 seconds.   Neurological:      Mental Status: She is alert and oriented to person, place, and time. Mental status is at baseline.   Psychiatric:         Mood and Affect: Mood normal.         Thought Content: Thought content normal.         Judgment: Judgment normal.        Result Review :    The following data was reviewed by: ALEJANDRO Miller on 03/22/2024:  Common labs          7/14/2023    14:53 9/1/2023    12:54 12/21/2023    16:26   Common Labs   Glucose 88  99  139    BUN 9  10  12    Creatinine 0.79  0.73  0.79    Sodium 143  141  139    Potassium 4.4  4.1  3.4    Chloride 108  105  104    Calcium 9.5  9.4  9.0    Total Protein 6.4  7.0     Albumin 4.3  4.6  4.4    Total Bilirubin 0.4  0.5  0.3    Alkaline Phosphatase 134  152  148    AST (SGOT) 15  15  16    ALT (SGPT) 7  10  11    WBC 5.11  4.48  5.92    Hemoglobin 12.6  13.5  11.9    Hematocrit 37.3  39.7  37.2    Platelets 263  268  211      Tobacco Use: Low Risk  (3/22/2024)    Patient History     Smoking Tobacco Use: Never     Smokeless Tobacco Use: Never     Passive Exposure: Not on file     Social History     Substance and Sexual Activity   Alcohol Use No     Family History   Problem Relation Age of Onset    Heart disease Mother     Hypertension Mother     Heart disease Father     Hypertension Father     Colon cancer Neg Hx     Colon polyps Neg Hx     Crohn's disease Neg Hx     Irritable bowel syndrome Neg Hx     Ulcerative colitis Neg Hx                   Assessment and Plan     Diagnoses and all orders for this  visit:    1. Mixed hyperlipidemia (Primary)  Assessment & Plan:  Continues with lifestyle modification  Lab ordered    Orders:  -     Lipid panel; Future    2. Other specified hypothyroidism  Assessment & Plan:  Continues on cytomel, synthroid  Labs ordered    Orders:  -     TSH Rfx On Abnormal To Free T4; Future  -     T3, free; Future    3. Prediabetes  Assessment & Plan:  Lab ordered  Recommended low glycemic diet choices      Orders:  -     Hemoglobin A1c; Future    4. Gastroesophageal reflux disease without esophagitis  Assessment & Plan:  Continues on pepcid BID     Orders:  -     CBC & Differential; Future  -     Comprehensive metabolic panel; Future    5. Residual ASD (atrial septal defect) following repair  Assessment & Plan:  Following with cardiology.   Stable.              6. MVP (mitral valve prolapse)  Assessment & Plan:  Following with cardiology.   Stable.              7. Seasonal allergic rhinitis due to pollen  Assessment & Plan:  Recommend allegra, flonase  Saline Rinse PRN                Follow Up     Return in about 6 months (around 9/22/2024) for Medicare Wellness.  Patient was given instructions and counseling regarding her condition or for health maintenance advice. Please see specific information pulled into the AVS if appropriate.

## 2024-03-29 ENCOUNTER — HOSPITAL ENCOUNTER (OUTPATIENT)
Facility: HOSPITAL | Age: 74
Discharge: HOME | End: 2024-03-29
Payer: MEDICARE

## 2024-03-29 DIAGNOSIS — M89.8X1 PAIN OF RIGHT SCAPULA: ICD-10-CM

## 2024-03-29 PROCEDURE — 73010 X-RAY EXAM OF SHOULDER BLADE: CPT

## 2024-04-08 DIAGNOSIS — E03.8 OTHER SPECIFIED HYPOTHYROIDISM: ICD-10-CM

## 2024-04-09 RX ORDER — LEVOTHYROXINE SODIUM 75 MCG
75 TABLET ORAL DAILY
Qty: 90 TABLET | Refills: 1 | Status: SHIPPED | OUTPATIENT
Start: 2024-04-09

## 2024-04-12 ENCOUNTER — APPOINTMENT (OUTPATIENT)
Dept: GENERAL RADIOLOGY | Facility: HOSPITAL | Age: 74
End: 2024-04-12
Payer: MEDICARE

## 2024-04-12 ENCOUNTER — HOSPITAL ENCOUNTER (OUTPATIENT)
Facility: HOSPITAL | Age: 74
Setting detail: OBSERVATION
Discharge: HOME | End: 2024-04-13
Attending: EMERGENCY MEDICINE | Admitting: EMERGENCY MEDICINE
Payer: MEDICARE

## 2024-04-12 DIAGNOSIS — R07.89 ATYPICAL CHEST PAIN: Primary | ICD-10-CM

## 2024-04-12 PROBLEM — R07.9 CHEST PAIN: Status: ACTIVE | Noted: 2024-04-12

## 2024-04-12 LAB
ALBUMIN SERPL-MCNC: 4.2 G/DL (ref 3.5–5.2)
ALBUMIN/GLOB SERPL: 1.8 G/DL
ALP SERPL-CCNC: 133 U/L (ref 39–117)
ALT SERPL W P-5'-P-CCNC: 8 U/L (ref 1–33)
ANION GAP SERPL CALCULATED.3IONS-SCNC: 10.6 MMOL/L (ref 5–15)
AST SERPL-CCNC: 16 U/L (ref 1–32)
BASOPHILS # BLD AUTO: 0.02 10*3/MM3 (ref 0–0.2)
BASOPHILS NFR BLD AUTO: 0.4 % (ref 0–1.5)
BILIRUB SERPL-MCNC: 0.4 MG/DL (ref 0–1.2)
BUN SERPL-MCNC: 10 MG/DL (ref 8–23)
BUN/CREAT SERPL: 13.5 (ref 7–25)
CALCIUM SPEC-SCNC: 9 MG/DL (ref 8.6–10.5)
CHLORIDE SERPL-SCNC: 104 MMOL/L (ref 98–107)
CO2 SERPL-SCNC: 24.4 MMOL/L (ref 22–29)
CREAT SERPL-MCNC: 0.74 MG/DL (ref 0.57–1)
D DIMER PPP FEU-MCNC: <0.27 MCGFEU/ML (ref 0–0.75)
DEPRECATED RDW RBC AUTO: 43.2 FL (ref 37–54)
EGFRCR SERPLBLD CKD-EPI 2021: 84.5 ML/MIN/1.73
EOSINOPHIL # BLD AUTO: 0.16 10*3/MM3 (ref 0–0.4)
EOSINOPHIL NFR BLD AUTO: 3.4 % (ref 0.3–6.2)
ERYTHROCYTE [DISTWIDTH] IN BLOOD BY AUTOMATED COUNT: 12.8 % (ref 12.3–15.4)
GEN 5 2HR TROPONIN T REFLEX: 8 NG/L
GLOBULIN UR ELPH-MCNC: 2.4 GM/DL
GLUCOSE SERPL-MCNC: 97 MG/DL (ref 65–99)
HCT VFR BLD AUTO: 38.3 % (ref 34–46.6)
HGB BLD-MCNC: 12.4 G/DL (ref 12–15.9)
IMM GRANULOCYTES # BLD AUTO: 0 10*3/MM3 (ref 0–0.05)
IMM GRANULOCYTES NFR BLD AUTO: 0 % (ref 0–0.5)
INR PPP: 1
LYMPHOCYTES # BLD AUTO: 1.53 10*3/MM3 (ref 0.7–3.1)
LYMPHOCYTES NFR BLD AUTO: 32.3 % (ref 19.6–45.3)
MCH RBC QN AUTO: 29.4 PG (ref 26.6–33)
MCHC RBC AUTO-ENTMCNC: 32.4 G/DL (ref 31.5–35.7)
MCV RBC AUTO: 90.8 FL (ref 79–97)
MONOCYTES # BLD AUTO: 0.27 10*3/MM3 (ref 0.1–0.9)
MONOCYTES NFR BLD AUTO: 5.7 % (ref 5–12)
NEUTROPHILS NFR BLD AUTO: 2.75 10*3/MM3 (ref 1.7–7)
NEUTROPHILS NFR BLD AUTO: 58.2 % (ref 42.7–76)
NT-PROBNP SERPL-MCNC: 570.8 PG/ML (ref 0–1800)
PLATELET # BLD AUTO: 240 10*3/MM3 (ref 140–450)
PMV BLD AUTO: 9.1 FL (ref 6–12)
POTASSIUM SERPL-SCNC: 4.1 MMOL/L (ref 3.5–5.2)
PROT SERPL-MCNC: 6.6 G/DL (ref 6–8.5)
PROTHROMBIN TIME: 11.6 SECONDS (ref 11–15)
QT INTERVAL: 444 MS
QT INTERVAL: 459 MS
QTC INTERVAL: 439 MS
QTC INTERVAL: 440 MS
RBC # BLD AUTO: 4.22 10*6/MM3 (ref 3.77–5.28)
SODIUM SERPL-SCNC: 139 MMOL/L (ref 136–145)
TROPONIN T DELTA: 0 NG/L
TROPONIN T SERPL HS-MCNC: 8 NG/L
WBC NRBC COR # BLD AUTO: 4.73 10*3/MM3 (ref 3.4–10.8)

## 2024-04-12 PROCEDURE — 96361 HYDRATE IV INFUSION ADD-ON: CPT

## 2024-04-12 PROCEDURE — G0378 HOSPITAL OBSERVATION PER HR: HCPCS

## 2024-04-12 PROCEDURE — 93005 ELECTROCARDIOGRAM TRACING: CPT | Performed by: EMERGENCY MEDICINE

## 2024-04-12 PROCEDURE — 84484 ASSAY OF TROPONIN QUANT: CPT | Performed by: EMERGENCY MEDICINE

## 2024-04-12 PROCEDURE — 36415 COLL VENOUS BLD VENIPUNCTURE: CPT

## 2024-04-12 PROCEDURE — 85610 PROTHROMBIN TIME: CPT

## 2024-04-12 PROCEDURE — 85379 FIBRIN DEGRADATION QUANT: CPT | Performed by: EMERGENCY MEDICINE

## 2024-04-12 PROCEDURE — 96375 TX/PRO/DX INJ NEW DRUG ADDON: CPT

## 2024-04-12 PROCEDURE — 25810000003 SODIUM CHLORIDE 0.9 % SOLUTION: Performed by: EMERGENCY MEDICINE

## 2024-04-12 PROCEDURE — 93010 ELECTROCARDIOGRAM REPORT: CPT | Performed by: INTERNAL MEDICINE

## 2024-04-12 PROCEDURE — 85025 COMPLETE CBC W/AUTO DIFF WBC: CPT | Performed by: EMERGENCY MEDICINE

## 2024-04-12 PROCEDURE — 25010000002 HYDRALAZINE PER 20 MG: Performed by: STUDENT IN AN ORGANIZED HEALTH CARE EDUCATION/TRAINING PROGRAM

## 2024-04-12 PROCEDURE — 83880 ASSAY OF NATRIURETIC PEPTIDE: CPT | Performed by: EMERGENCY MEDICINE

## 2024-04-12 PROCEDURE — 25010000002 LABETALOL 5 MG/ML SOLUTION: Performed by: EMERGENCY MEDICINE

## 2024-04-12 PROCEDURE — 99285 EMERGENCY DEPT VISIT HI MDM: CPT

## 2024-04-12 PROCEDURE — 99285 EMERGENCY DEPT VISIT HI MDM: CPT | Performed by: EMERGENCY MEDICINE

## 2024-04-12 PROCEDURE — 96374 THER/PROPH/DIAG INJ IV PUSH: CPT

## 2024-04-12 PROCEDURE — 80053 COMPREHEN METABOLIC PANEL: CPT | Performed by: EMERGENCY MEDICINE

## 2024-04-12 PROCEDURE — 71045 X-RAY EXAM CHEST 1 VIEW: CPT

## 2024-04-12 RX ORDER — NITROGLYCERIN 0.4 MG/1
0.4 TABLET SUBLINGUAL
Status: DISCONTINUED | OUTPATIENT
Start: 2024-04-12 | End: 2024-04-13 | Stop reason: HOSPADM

## 2024-04-12 RX ORDER — SODIUM CHLORIDE 9 MG/ML
100 INJECTION, SOLUTION INTRAVENOUS CONTINUOUS
Status: DISCONTINUED | OUTPATIENT
Start: 2024-04-12 | End: 2024-04-13 | Stop reason: HOSPADM

## 2024-04-12 RX ORDER — LABETALOL HYDROCHLORIDE 5 MG/ML
10 INJECTION, SOLUTION INTRAVENOUS ONCE
Status: COMPLETED | OUTPATIENT
Start: 2024-04-12 | End: 2024-04-12

## 2024-04-12 RX ORDER — BISACODYL 5 MG/1
5 TABLET, DELAYED RELEASE ORAL DAILY PRN
Status: DISCONTINUED | OUTPATIENT
Start: 2024-04-12 | End: 2024-04-13 | Stop reason: HOSPADM

## 2024-04-12 RX ORDER — LIOTHYRONINE SODIUM 5 UG/1
5 TABLET ORAL DAILY
Status: DISCONTINUED | OUTPATIENT
Start: 2024-04-13 | End: 2024-04-13 | Stop reason: HOSPADM

## 2024-04-12 RX ORDER — ASPIRIN 81 MG/1
81 TABLET ORAL DAILY
Status: DISCONTINUED | OUTPATIENT
Start: 2024-04-13 | End: 2024-04-13 | Stop reason: HOSPADM

## 2024-04-12 RX ORDER — AMOXICILLIN 250 MG
2 CAPSULE ORAL 2 TIMES DAILY
Status: DISCONTINUED | OUTPATIENT
Start: 2024-04-12 | End: 2024-04-13 | Stop reason: HOSPADM

## 2024-04-12 RX ORDER — FAMOTIDINE 20 MG/1
20 TABLET, FILM COATED ORAL 2 TIMES DAILY
Status: DISCONTINUED | OUTPATIENT
Start: 2024-04-13 | End: 2024-04-13 | Stop reason: HOSPADM

## 2024-04-12 RX ORDER — BUSPIRONE HYDROCHLORIDE 10 MG/1
10 TABLET ORAL DAILY
Status: DISCONTINUED | OUTPATIENT
Start: 2024-04-13 | End: 2024-04-13 | Stop reason: HOSPADM

## 2024-04-12 RX ORDER — SODIUM CHLORIDE 9 MG/ML
40 INJECTION, SOLUTION INTRAVENOUS AS NEEDED
Status: DISCONTINUED | OUTPATIENT
Start: 2024-04-12 | End: 2024-04-13 | Stop reason: HOSPADM

## 2024-04-12 RX ORDER — SODIUM CHLORIDE 0.9 % (FLUSH) 0.9 %
10 SYRINGE (ML) INJECTION EVERY 12 HOURS SCHEDULED
Status: DISCONTINUED | OUTPATIENT
Start: 2024-04-12 | End: 2024-04-13 | Stop reason: HOSPADM

## 2024-04-12 RX ORDER — ASPIRIN 81 MG/1
324 TABLET, CHEWABLE ORAL ONCE
Status: COMPLETED | OUTPATIENT
Start: 2024-04-12 | End: 2024-04-12

## 2024-04-12 RX ORDER — HYDRALAZINE HYDROCHLORIDE 20 MG/ML
10 INJECTION INTRAMUSCULAR; INTRAVENOUS ONCE
Status: COMPLETED | OUTPATIENT
Start: 2024-04-12 | End: 2024-04-12

## 2024-04-12 RX ORDER — HYDROXYZINE HYDROCHLORIDE 25 MG/1
25 TABLET, FILM COATED ORAL ONCE
Status: COMPLETED | OUTPATIENT
Start: 2024-04-12 | End: 2024-04-12

## 2024-04-12 RX ORDER — ACETAMINOPHEN 325 MG/1
650 TABLET ORAL EVERY 6 HOURS PRN
Status: DISCONTINUED | OUTPATIENT
Start: 2024-04-12 | End: 2024-04-13 | Stop reason: HOSPADM

## 2024-04-12 RX ORDER — POLYETHYLENE GLYCOL 3350 17 G/17G
17 POWDER, FOR SOLUTION ORAL DAILY PRN
Status: DISCONTINUED | OUTPATIENT
Start: 2024-04-12 | End: 2024-04-13 | Stop reason: HOSPADM

## 2024-04-12 RX ORDER — ASPIRIN 81 MG/1
81 TABLET, CHEWABLE ORAL ONCE
Status: DISCONTINUED | OUTPATIENT
Start: 2024-04-13 | End: 2024-04-13 | Stop reason: HOSPADM

## 2024-04-12 RX ORDER — BISACODYL 10 MG
10 SUPPOSITORY, RECTAL RECTAL DAILY PRN
Status: DISCONTINUED | OUTPATIENT
Start: 2024-04-12 | End: 2024-04-13 | Stop reason: HOSPADM

## 2024-04-12 RX ORDER — SODIUM CHLORIDE 0.9 % (FLUSH) 0.9 %
10 SYRINGE (ML) INJECTION AS NEEDED
Status: DISCONTINUED | OUTPATIENT
Start: 2024-04-12 | End: 2024-04-13 | Stop reason: HOSPADM

## 2024-04-12 RX ADMIN — ACETAMINOPHEN 325MG 650 MG: 325 TABLET ORAL at 23:52

## 2024-04-12 RX ADMIN — SODIUM CHLORIDE 100 ML/HR: 9 INJECTION, SOLUTION INTRAVENOUS at 14:47

## 2024-04-12 RX ADMIN — ASPIRIN 324 MG: 81 TABLET, CHEWABLE ORAL at 14:47

## 2024-04-12 RX ADMIN — HYDROXYZINE HYDROCHLORIDE 25 MG: 25 TABLET, FILM COATED ORAL at 23:52

## 2024-04-12 RX ADMIN — HYDRALAZINE HYDROCHLORIDE 10 MG: 20 INJECTION, SOLUTION INTRAMUSCULAR; INTRAVENOUS at 19:34

## 2024-04-12 RX ADMIN — LABETALOL HYDROCHLORIDE 10 MG: 5 INJECTION, SOLUTION INTRAVENOUS at 18:35

## 2024-04-12 NOTE — FSED PROVIDER NOTE
"Subjective   History of Present Illness  72yo female pmh significant hypothyroid presents ED c/o acute onset nontraumatic left shoulder/scapula discomfort while seated associated with fatigue, nausea/neg exac or relieve factors.  Pt denies chest pain/shoulder pain at time of examination.    History provided by:  Patient  Back Pain      Review of Systems   Constitutional: Negative.    HENT: Negative.     Eyes: Negative.    Respiratory: Negative.     Cardiovascular: Negative.    Gastrointestinal: Negative.    Genitourinary: Negative.    Musculoskeletal:  Positive for back pain.   Allergic/Immunologic: Negative for immunocompromised state.   Neurological: Negative.    All other systems reviewed and are negative.      Past Medical History:   Diagnosis Date    Depression     Diverticulitis     Environmental allergies     History of colon polyps     Hypertension     Kidney stone     hx of 4 stones       Allergies   Allergen Reactions    Sulfamethoxazole-Trimethoprim Hives    Statins GI Intolerance     Patient \"felt sick\"    Sulfa Antibiotics Hives       Past Surgical History:   Procedure Laterality Date    APPENDECTOMY      ASD REPAIR, SECUNDUM      CATARACT EXTRACTION      COLONOSCOPY      CYSTOSCOPY BLADDER STONE LITHOTRIPSY      CYSTOSCOPY W/ URETERAL STENT PLACEMENT Left 06/29/2017    Procedure: CYSTOSCOPY LEFT STENT INSERTION & STONE REMOVAL;  Surgeon: Raheem Franklin MD;  Location: [facility];  Service:     HYSTERECTOMY      NEPHRECTOMY RADICAL      THYROIDECTOMY      UPPER GASTROINTESTINAL ENDOSCOPY         Family History   Problem Relation Age of Onset    Heart disease Mother     Hypertension Mother     Heart disease Father     Hypertension Father     Colon cancer Neg Hx     Colon polyps Neg Hx     Crohn's disease Neg Hx     Irritable bowel syndrome Neg Hx     Ulcerative colitis Neg Hx        Social History     Socioeconomic History    Marital status: Single   Tobacco Use    Smoking status: Never    " Smokeless tobacco: Never   Vaping Use    Vaping status: Never Used   Substance and Sexual Activity    Alcohol use: No    Drug use: No    Sexual activity: Not Currently     Birth control/protection: Post-menopausal           Objective   Physical Exam  Vitals and nursing note reviewed.   Constitutional:       Appearance: Normal appearance. She is not toxic-appearing or diaphoretic.   HENT:      Head: Normocephalic and atraumatic.      Right Ear: External ear normal.      Left Ear: External ear normal.      Nose: Nose normal.      Mouth/Throat:      Mouth: Mucous membranes are moist.      Pharynx: Oropharynx is clear.   Eyes:      Conjunctiva/sclera: Conjunctivae normal.      Pupils: Pupils are equal, round, and reactive to light.   Cardiovascular:      Rate and Rhythm: Normal rate and regular rhythm.      Pulses: Normal pulses.      Heart sounds: Normal heart sounds. No murmur heard.     No friction rub. No gallop.   Pulmonary:      Effort: Pulmonary effort is normal.      Breath sounds: Normal breath sounds. No wheezing, rhonchi or rales.   Abdominal:      General: Abdomen is flat. Bowel sounds are normal. There is no distension.      Palpations: Abdomen is soft.      Tenderness: There is no abdominal tenderness. There is no guarding or rebound.   Musculoskeletal:         General: No swelling, tenderness, deformity or signs of injury.      Left shoulder: Normal.      Cervical back: Normal range of motion and neck supple. No rigidity.      Right lower leg: No edema.      Left lower leg: No edema.   Lymphadenopathy:      Cervical: No cervical adenopathy.   Skin:     General: Skin is warm and dry.   Neurological:      General: No focal deficit present.      Mental Status: She is alert and oriented to person, place, and time.      GCS: GCS eye subscore is 4. GCS verbal subscore is 5. GCS motor subscore is 6.      Sensory: Sensation is intact.      Motor: Motor function is intact.         ECG 12 Lead      Date/Time:  4/12/2024 3:13 PM    Performed by: Ferdinand Fernandes MD  Authorized by: Ferdinand Fernandes MD  Interpreted by ED physician  Rhythm: sinus rhythm  Rate: bradycardic  BPM: 55  QRS axis: normal  Conduction: conduction normal  ST Segments: ST segments normal  T depression: aVL, V1 and V2  Other findings: PRWP  Clinical impression: abnormal ECG               ED Course  ED Course as of 04/12/24 1730   Fri Apr 12, 2024   1651 D/w ED observation unit accepting for Dr. Mendenhall. Pt stable transport. [SD]      ED Course User Index  [SD] Ferdinand Fernandes MD      Labs Reviewed   TROPONIN - Normal    Narrative:     High Sensitive Troponin T Reference Range:  <14.0 ng/L- Negative Female for AMI  <22.0 ng/L- Negative Male for AMI  >=14 - Abnormal Female indicating possible myocardial injury.  >=22 - Abnormal Male indicating possible myocardial injury.   Clinicians would have to utilize clinical acumen, EKG, Troponin, and serial changes to determine if it is an Acute Myocardial Infarction or myocardial injury due to an underlying chronic condition.        BNP (IN-HOUSE) - Normal    Narrative:     This assay is used as an aid in the diagnosis of individuals suspected of having heart failure. It can be used as an aid in the diagnosis of acute decompensated heart failure (ADHF) in patients presenting with signs and symptoms of ADHF to the emergency department (ED). In addition, NT-proBNP of <300 pg/mL indicates ADHF is not likely.    Age Range Result Interpretation  NT-proBNP Concentration (pg/mL:      <50             Positive            >450                   Gray                 300-450                    Negative             <300    50-75           Positive            >900                  Gray                300-900                  Negative            <300      >75             Positive            >1800                  Gray                300-1800                  Negative            <300   D-DIMER, QUANTITATIVE - Normal    Narrative:   "   According to the assay 's published package insert, a normal (<0.50 MCGFEU/mL) D-dimer result in conjunction with a non-high clinical probability assessment, excludes deep vein thrombosis (DVT) and pulmonary embolism (PE) with high sensitivity.    D-dimer values increase with age and this can make VTE exclusion of an older population difficult. To address this, the [employer], based on best available evidence and recent guidelines, recommends that clinicians use age-adjusted D-dimer thresholds in patients greater than 50 years of age with: a) a low probability of PE who do not meet all Pulmonary Embolism Rule Out Criteria, or b) in those with intermediate probability of PE.   The formula for an age-adjusted D-dimer cut-off is \"age/100\".  For example, a 60 year old patient would have an age-adjusted cut-off of 0.60 MCGFEU/mL and an 80 year old 0.80 MCGFEU/mL.   CBC WITH AUTO DIFFERENTIAL - Normal   HIGH SENSITIVITIY TROPONIN T 2HR - Normal    Narrative:     High Sensitive Troponin T Reference Range:  <14.0 ng/L- Negative Female for AMI  <22.0 ng/L- Negative Male for AMI  >=14 - Abnormal Female indicating possible myocardial injury.  >=22 - Abnormal Male indicating possible myocardial injury.   Clinicians would have to utilize clinical acumen, EKG, Troponin, and serial changes to determine if it is an Acute Myocardial Infarction or myocardial injury due to an underlying chronic condition.        LAB PROTIME-INR, FINGERSTICK   COMPREHENSIVE METABOLIC PANEL   LIPID PANEL   TROPONIN   BASIC METABOLIC PANEL   POCT PROTIME - INR   CBC AND DIFFERENTIAL    Narrative:     The following orders were created for panel order CBC & Differential.  Procedure                               Abnormality         Status                     ---------                               -----------         ------                     CBC Auto Differential[[id number]]        Normal              Final result         "         Please view results for these tests on the individual orders.     XR Chest 1 View    Result Date: 4/12/2024  Narrative: XR CHEST 1 VW-  HISTORY: Female who is 73 years-old, chest pain  TECHNIQUE: Frontal view of the chest  COMPARISON: 7/5/2017  FINDINGS: The heart size is borderline. Sternotomy wires are present. Pulmonary vasculature is unremarkable. Aorta is calcified. No focal pulmonary consolidation, pleural effusion, or pneumothorax. No acute osseous process.      Impression: No no focal pulmonary consolidation. Borderline heart size. Follow-up as clinical indications persist.  This report was finalized on 4/12/2024 2:53 PM by Dr. Андрей Guzmán M.D on Workstation: [id number]      XR Scapula Right    Result Date: 3/29/2024  Narrative: RIGHT SCAPULA: 2 VIEWS  HISTORY: Scapular pain.  COMPARISON: Right scapular x-rays 03/08/2024.  FINDINGS: There is mild right AC joint arthritis. There is no evidence for fracture or dislocation or acute abnormality of the right scapula.  This report was finalized on 3/29/2024 3:28 PM by Dr. Tan Apodaca M.D on Workstation: [id number]               HEART Score: 4                        HEART Score for Major Cardiac Events - MDCalc  Calculated on Apr 12 2024 4:54 PM  4 points -> Moderate Score (4-6 points) Risk of MACE of 12-16.6%.    Medical Decision Making  Problems Addressed:  Atypical chest pain: complicated acute illness or injury    Amount and/or Complexity of Data Reviewed  Labs: ordered.  Radiology: ordered.  ECG/medicine tests: ordered and independent interpretation performed.    Risk  OTC drugs.  Prescription drug management.  Decision regarding hospitalization.        Final diagnoses:   Atypical chest pain       ED Disposition  ED Disposition       ED Disposition   Decision to Admit    Condition   --    Comment   Level of Care: Observation Unit [28]   Diagnosis: Atypical chest pain [320979]   Admitting Physician: COOPER DONALD [[id number]]   Attending  Physician: COOPER DONALD [[id number]]                 No follow-up provider specified.       Medication List      No changes were made to your prescriptions during this visit.

## 2024-04-12 NOTE — ED NOTES
Nursing report ED to floor  Sara Gary  73 y.o.  female    HPI :  HPI (Adult)  Stated Reason for Visit: chest pains that started behind her left shoulder with nausea  History Obtained From: patient    Chief Complaint  Chief Complaint   Patient presents with    Chest Pain       Admitting doctor:   Evgeny Mendenhall MD    Admitting diagnosis:   The encounter diagnosis was Atypical chest pain.    Code status:   Current Code Status       Date Active Code Status Order ID Comments User Context       Prior            Allergies:   Sulfamethoxazole-trimethoprim, Statins, and Sulfa antibiotics    Isolation:   No active isolations    Intake and Output  No intake or output data in the 24 hours ending 04/12/24 1859    Weight:       04/12/24  1450   Weight: 65.3 kg (144 lb)       Most recent vitals:   Vitals:    04/12/24 1450 04/12/24 1452 04/12/24 1530 04/12/24 1800   BP: (!) 185/93 165/69 169/63 (!) 206/83   BP Location: Left arm      Patient Position: Sitting      Pulse:   (!) 49 58   Resp:   16 16   Temp:       SpO2:   98% 98%   Weight:       Height:           Active LDAs/IV Access:   Lines, Drains & Airways       Active LDAs       Name Placement date Placement time Site Days    Peripheral IV 04/12/24 1440 Anterior;Proximal;Right Forearm 04/12/24  1440  Forearm  less than 1                    Labs (abnormal labs have a star):   Labs Reviewed   COMPREHENSIVE METABOLIC PANEL - Abnormal; Notable for the following components:       Result Value    Alkaline Phosphatase 133 (*)     All other components within normal limits    Narrative:     GFR Normal >60  Chronic Kidney Disease <60  Kidney Failure <15    The GFR formula is only valid for adults with stable renal function between ages 18 and 70.   TROPONIN - Normal    Narrative:     High Sensitive Troponin T Reference Range:  <14.0 ng/L- Negative Female for AMI  <22.0 ng/L- Negative Male for AMI  >=14 - Abnormal Female indicating possible myocardial injury.  >=22 - Abnormal  "Male indicating possible myocardial injury.   Clinicians would have to utilize clinical acumen, EKG, Troponin, and serial changes to determine if it is an Acute Myocardial Infarction or myocardial injury due to an underlying chronic condition.        BNP (IN-HOUSE) - Normal    Narrative:     This assay is used as an aid in the diagnosis of individuals suspected of having heart failure. It can be used as an aid in the diagnosis of acute decompensated heart failure (ADHF) in patients presenting with signs and symptoms of ADHF to the emergency department (ED). In addition, NT-proBNP of <300 pg/mL indicates ADHF is not likely.    Age Range Result Interpretation  NT-proBNP Concentration (pg/mL:      <50             Positive            >450                   Gray                 300-450                    Negative             <300    50-75           Positive            >900                  Gray                300-900                  Negative            <300      >75             Positive            >1800                  Gray                300-1800                  Negative            <300   D-DIMER, QUANTITATIVE - Normal    Narrative:     According to the assay 's published package insert, a normal (<0.50 MCGFEU/mL) D-dimer result in conjunction with a non-high clinical probability assessment, excludes deep vein thrombosis (DVT) and pulmonary embolism (PE) with high sensitivity.    D-dimer values increase with age and this can make VTE exclusion of an older population difficult. To address this, the [employer], based on best available evidence and recent guidelines, recommends that clinicians use age-adjusted D-dimer thresholds in patients greater than 50 years of age with: a) a low probability of PE who do not meet all Pulmonary Embolism Rule Out Criteria, or b) in those with intermediate probability of PE.   The formula for an age-adjusted D-dimer cut-off is \"age/100\".  For example, a " 60 year old patient would have an age-adjusted cut-off of 0.60 MCGFEU/mL and an 80 year old 0.80 MCGFEU/mL.   CBC WITH AUTO DIFFERENTIAL - Normal   HIGH SENSITIVITIY TROPONIN T 2HR - Normal    Narrative:     High Sensitive Troponin T Reference Range:  <14.0 ng/L- Negative Female for AMI  <22.0 ng/L- Negative Male for AMI  >=14 - Abnormal Female indicating possible myocardial injury.  >=22 - Abnormal Male indicating possible myocardial injury.   Clinicians would have to utilize clinical acumen, EKG, Troponin, and serial changes to determine if it is an Acute Myocardial Infarction or myocardial injury due to an underlying chronic condition.        LAB PROTIME-INR, FINGERSTICK   LIPID PANEL   TROPONIN   BASIC METABOLIC PANEL   POCT PROTIME - INR   CBC AND DIFFERENTIAL    Narrative:     The following orders were created for panel order CBC & Differential.  Procedure                               Abnormality         Status                     ---------                               -----------         ------                     CBC Auto Differential[[id number]]        Normal              Final result                 Please view results for these tests on the individual orders.       EKG:   ECG 12 Lead Chest Pain   Preliminary Result   HEART RATE= 59  bpm   RR Interval= 1017  ms   MT Interval= 186  ms   P Horizontal Axis= 195  deg   P Front Axis= -79  deg   QRSD Interval= 97  ms   QT Interval= 444  ms   QTcB= 440  ms   QRS Axis= -26  deg   T Wave Axis= 89  deg   - ABNORMAL ECG -   Sinus or ectopic atrial rhythm   Borderline left axis deviation   Abnrm T, consider ischemia, anterolateral lds   Electronically Signed By:    Date and Time of Study: 2024-04-12 17:31:58      ECG 12 Lead   ED Interpretation   Abnormal   Ferdinand Fernandes MD     4/12/2024  5:30 PM   ECG 12 Lead         Date/Time: 4/12/2024 3:13 PM      Performed by: Ferdinand Fernandes MD   Authorized by: Ferdinand Fernandes MD  Interpreted by ED physician   Rhythm: sinus  rhythm   Rate: bradycardic   BPM: 55   QRS axis: normal   Conduction: conduction normal   ST Segments: ST segments normal   T depression: aVL, V1 and V2   Other findings: PRWP   Clinical impression: abnormal ECG      ECG 12 Lead Chest Pain   Preliminary Result   HEART RATE= 55  bpm   RR Interval= 1091  ms   [place] Interval= 194  ms   P Horizontal Axis= 252  deg   P Front Axis= -41  deg   QRSD Interval= 94  ms   QT Interval= 459  ms   QTcB= 439  ms   QRS Axis= -19  deg   T Wave Axis= 86  deg   - ABNORMAL ECG -   Sinus rhythm   Borderline left axis deviation   Abnrm T, consider ischemia, anterolateral lds   Electronically Signed By:    Date and Time of Study: 2024-04-12 13:57:53      ECG 12 Lead Chest Pain    (Results Pending)       Meds given in ED:   Medications   sodium chloride 0.9 % infusion (100 mL/hr Intravenous New Bag 4/12/24 1447)   nitroglycerin (NITROSTAT) SL tablet 0.4 mg (has no administration in time range)   nitroglycerin (NITROSTAT) SL tablet 0.4 mg (has no administration in time range)   sodium chloride 0.9 % flush 10 mL (has no administration in time range)   sodium chloride 0.9 % flush 10 mL (has no administration in time range)   sodium chloride 0.9 % infusion 40 mL (has no administration in time range)   sennosides-docusate (PERICOLACE) 8.6-50 MG per tablet 2 tablet (has no administration in time range)     And   polyethylene glycol (MIRALAX) packet 17 g (has no administration in time range)     And   bisacodyl (DULCOLAX) EC tablet 5 mg (has no administration in time range)     And   bisacodyl (DULCOLAX) suppository 10 mg (has no administration in time range)   aspirin chewable tablet 81 mg (has no administration in time range)     And   aspirin EC tablet 81 mg (has no administration in time range)   aspirin chewable tablet 324 mg (324 mg Oral Given 4/12/24 1447)   labetalol (NORMODYNE,TRANDATE) injection 10 mg (10 mg Intravenous Given 4/12/24 1835)       Imaging results:  XR Chest 1 View    Result  Date: 4/12/2024  No no focal pulmonary consolidation. Borderline heart size. Follow-up as clinical indications persist.  This report was finalized on 4/12/2024 2:53 PM by Dr. Андрей Guzmán M.D on Workstation: [id number]       Ambulatory status:   - ad bette    Social issues:   Social History     Socioeconomic History    Marital status: Single   Tobacco Use    Smoking status: Never    Smokeless tobacco: Never   Vaping Use    Vaping status: Never Used   Substance and Sexual Activity    Alcohol use: No    Drug use: No    Sexual activity: Not Currently     Birth control/protection: Post-menopausal       Peripheral Neurovascular  Peripheral Neurovascular (Adult)  Peripheral Neurovascular WDL: WDL    Neuro Cognitive  Neuro Cognitive (Adult)  Cognitive/Neuro/Behavioral WDL: WDL    Learning  Learning Assessment (Adult)  Learning Readiness and Ability: no barriers identified    Respiratory  Respiratory WDL  Respiratory WDL: WDL    Abdominal Pain       Pain Assessments  Pain (Adult)  (0-10) Pain Rating: Rest: 2    NIH Stroke Scale       Lan Medley RN  04/12/24 18:59 EDT

## 2024-04-13 ENCOUNTER — READMISSION MANAGEMENT (OUTPATIENT)
Dept: CALL CENTER | Facility: HOSPITAL | Age: 74
End: 2024-04-13
Payer: MEDICARE

## 2024-04-13 ENCOUNTER — APPOINTMENT (OUTPATIENT)
Dept: CT IMAGING | Facility: HOSPITAL | Age: 74
End: 2024-04-13
Payer: MEDICARE

## 2024-04-13 ENCOUNTER — APPOINTMENT (OUTPATIENT)
Dept: CARDIOLOGY | Facility: HOSPITAL | Age: 74
End: 2024-04-13
Payer: MEDICARE

## 2024-04-13 VITALS
RESPIRATION RATE: 16 BRPM | BODY MASS INDEX: 21.32 KG/M2 | DIASTOLIC BLOOD PRESSURE: 70 MMHG | SYSTOLIC BLOOD PRESSURE: 145 MMHG | OXYGEN SATURATION: 97 % | TEMPERATURE: 98.2 F | HEART RATE: 59 BPM | HEIGHT: 69 IN | WEIGHT: 143.96 LBS

## 2024-04-13 LAB
ANION GAP SERPL CALCULATED.3IONS-SCNC: 8.5 MMOL/L (ref 5–15)
AORTIC DIMENSIONLESS INDEX: 1 (DI)
ASCENDING AORTA: 3.6 CM
AV AREA-PISA: 0.46 CM2
AV RADIUS PISA: 1 CM
BH CV ECHO MEAS - AI P1/2T: 492.4 MSEC
BH CV ECHO MEAS - AO MAX PG: 8.6 MMHG
BH CV ECHO MEAS - AO MEAN PG: 4.3 MMHG
BH CV ECHO MEAS - AO V2 MAX: 146.6 CM/SEC
BH CV ECHO MEAS - AO V2 VTI: 32.9 CM
BH CV ECHO MEAS - AVA(I,D): 3.2 CM2
BH CV ECHO MEAS - EDV(CUBED): 146.3 ML
BH CV ECHO MEAS - EDV(MOD-SP2): 108 ML
BH CV ECHO MEAS - EDV(MOD-SP4): 110 ML
BH CV ECHO MEAS - EF(MOD-BP): 59.4 %
BH CV ECHO MEAS - EF(MOD-SP2): 61.1 %
BH CV ECHO MEAS - EF(MOD-SP4): 55.5 %
BH CV ECHO MEAS - ESV(CUBED): 56.5 ML
BH CV ECHO MEAS - ESV(MOD-SP2): 42 ML
BH CV ECHO MEAS - ESV(MOD-SP4): 49 ML
BH CV ECHO MEAS - FS: 27.2 %
BH CV ECHO MEAS - IVS/LVPW: 0.97 CM
BH CV ECHO MEAS - IVSD: 0.97 CM
BH CV ECHO MEAS - LAT PEAK E' VEL: 4.9 CM/SEC
BH CV ECHO MEAS - LV DIASTOLIC VOL/BSA (35-75): 61.2 CM2
BH CV ECHO MEAS - LV MASS(C)D: 194.7 GRAMS
BH CV ECHO MEAS - LV MAX PG: 7.3 MMHG
BH CV ECHO MEAS - LV MEAN PG: 3.2 MMHG
BH CV ECHO MEAS - LV SYSTOLIC VOL/BSA (12-30): 27.3 CM2
BH CV ECHO MEAS - LV V1 MAX: 135.3 CM/SEC
BH CV ECHO MEAS - LV V1 VTI: 31.4 CM
BH CV ECHO MEAS - LVIDD: 5.3 CM
BH CV ECHO MEAS - LVIDS: 3.8 CM
BH CV ECHO MEAS - LVOT AREA: 3.3 CM2
BH CV ECHO MEAS - LVOT DIAM: 2.06 CM
BH CV ECHO MEAS - LVPWD: 1 CM
BH CV ECHO MEAS - MED PEAK E' VEL: 5.9 CM/SEC
BH CV ECHO MEAS - MR MAX PG: 136.5 MMHG
BH CV ECHO MEAS - MR MAX VEL: 584.2 CM/SEC
BH CV ECHO MEAS - MV A DUR: 0.26 SEC
BH CV ECHO MEAS - MV A MAX VEL: 76.7 CM/SEC
BH CV ECHO MEAS - MV DEC SLOPE: 231.2 CM/SEC2
BH CV ECHO MEAS - MV DEC TIME: 255 SEC
BH CV ECHO MEAS - MV E MAX VEL: 56.4 CM/SEC
BH CV ECHO MEAS - MV E/A: 0.73
BH CV ECHO MEAS - MV MAX PG: 3 MMHG
BH CV ECHO MEAS - MV MEAN PG: 1.17 MMHG
BH CV ECHO MEAS - MV P1/2T: 88.6 MSEC
BH CV ECHO MEAS - MV V2 VTI: 29.5 CM
BH CV ECHO MEAS - MVA(P1/2T): 2.48 CM2
BH CV ECHO MEAS - MVA(VTI): 3.6 CM2
BH CV ECHO MEAS - PA ACC TIME: 0.16 SEC
BH CV ECHO MEAS - PA V2 MAX: 78.3 CM/SEC
BH CV ECHO MEAS - PI END-D VEL: 103.9 CM/SEC
BH CV ECHO MEAS - PULM A REVS DUR: 0.21 SEC
BH CV ECHO MEAS - PULM A REVS VEL: 28 CM/SEC
BH CV ECHO MEAS - PULM DIAS VEL: 33.4 CM/SEC
BH CV ECHO MEAS - PULM S/D: 1.37
BH CV ECHO MEAS - PULM SYS VEL: 45.7 CM/SEC
BH CV ECHO MEAS - QP/QS: 0.62
BH CV ECHO MEAS - RAP SYSTOLE: 3 MMHG
BH CV ECHO MEAS - RV MAX PG: 1.12 MMHG
BH CV ECHO MEAS - RV V1 MAX: 52.9 CM/SEC
BH CV ECHO MEAS - RV V1 VTI: 14.7 CM
BH CV ECHO MEAS - RVOT DIAM: 2.37 CM
BH CV ECHO MEAS - RVSP: 44.7 MMHG
BH CV ECHO MEAS - SV(LVOT): 104.8 ML
BH CV ECHO MEAS - SV(MOD-SP2): 66 ML
BH CV ECHO MEAS - SV(MOD-SP4): 61 ML
BH CV ECHO MEAS - SV(RVOT): 64.5 ML
BH CV ECHO MEAS - SVI(MOD-SP2): 36.7 ML/M2
BH CV ECHO MEAS - SVI(MOD-SP4): 34 ML/M2
BH CV ECHO MEAS - TAPSE (>1.6): 1.9 CM
BH CV ECHO MEAS - TR MAX PG: 41.7 MMHG
BH CV ECHO MEAS - TR MAX VEL: 323.1 CM/SEC
BH CV ECHO MEASUREMENTS AVERAGE E/E' RATIO: 10.44
BH CV FAKE - AV AREA-PISA: 0.46 CM2
BH CV XLRA - RV BASE: 3.9 CM
BH CV XLRA - RV LENGTH: 6.9 CM
BH CV XLRA - RV MID: 2.8 CM
BH CV XLRA - TDI S': 11.4 CM/SEC
BUN SERPL-MCNC: 8 MG/DL (ref 8–23)
BUN/CREAT SERPL: 11.8 (ref 7–25)
CALCIUM SPEC-SCNC: 8.5 MG/DL (ref 8.6–10.5)
CHLORIDE SERPL-SCNC: 110 MMOL/L (ref 98–107)
CHOLEST SERPL-MCNC: 218 MG/DL (ref 0–200)
CO2 SERPL-SCNC: 22.5 MMOL/L (ref 22–29)
CREAT SERPL-MCNC: 0.68 MG/DL (ref 0.57–1)
DEPRECATED RDW RBC AUTO: 42.7 FL (ref 37–54)
EGFRCR SERPLBLD CKD-EPI 2021: 91 ML/MIN/1.73
ERYTHROCYTE [DISTWIDTH] IN BLOOD BY AUTOMATED COUNT: 13.1 % (ref 12.3–15.4)
GLUCOSE SERPL-MCNC: 93 MG/DL (ref 65–99)
HCT VFR BLD AUTO: 34.9 % (ref 34–46.6)
HDLC SERPL-MCNC: 62 MG/DL (ref 40–60)
HGB BLD-MCNC: 11.8 G/DL (ref 12–15.9)
LDLC SERPL CALC-MCNC: 140 MG/DL (ref 0–100)
LDLC/HDLC SERPL: 2.22 {RATIO}
LEFT ATRIUM VOLUME INDEX: 45.7 ML/M2
LEFT ATRIUM VOLUME: 82 ML
MAGNESIUM SERPL-MCNC: 2.2 MG/DL (ref 1.6–2.4)
MCH RBC QN AUTO: 30 PG (ref 26.6–33)
MCHC RBC AUTO-ENTMCNC: 33.8 G/DL (ref 31.5–35.7)
MCV RBC AUTO: 88.8 FL (ref 79–97)
PISA AR ALIASING VEL: 38.5 M/S
PISA AR MAX VEL: 528 M/S
PLATELET # BLD AUTO: 240 10*3/MM3 (ref 140–450)
PMV BLD AUTO: 9.3 FL (ref 6–12)
POTASSIUM SERPL-SCNC: 3.5 MMOL/L (ref 3.5–5.2)
QT INTERVAL: 471 MS
QTC INTERVAL: 447 MS
RBC # BLD AUTO: 3.93 10*6/MM3 (ref 3.77–5.28)
SINUS: 3.7 CM
SODIUM SERPL-SCNC: 141 MMOL/L (ref 136–145)
STJ: 3.4 CM
TRIGL SERPL-MCNC: 93 MG/DL (ref 0–150)
TROPONIN T SERPL HS-MCNC: 12 NG/L
VLDLC SERPL-MCNC: 16 MG/DL (ref 5–40)
WBC NRBC COR # BLD AUTO: 5.41 10*3/MM3 (ref 3.4–10.8)

## 2024-04-13 PROCEDURE — 71275 CT ANGIOGRAPHY CHEST: CPT

## 2024-04-13 PROCEDURE — 25810000003 SODIUM CHLORIDE 0.9 % SOLUTION: Performed by: STUDENT IN AN ORGANIZED HEALTH CARE EDUCATION/TRAINING PROGRAM

## 2024-04-13 PROCEDURE — G0378 HOSPITAL OBSERVATION PER HR: HCPCS

## 2024-04-13 PROCEDURE — 93005 ELECTROCARDIOGRAM TRACING: CPT | Performed by: STUDENT IN AN ORGANIZED HEALTH CARE EDUCATION/TRAINING PROGRAM

## 2024-04-13 PROCEDURE — 80048 BASIC METABOLIC PNL TOTAL CA: CPT | Performed by: STUDENT IN AN ORGANIZED HEALTH CARE EDUCATION/TRAINING PROGRAM

## 2024-04-13 PROCEDURE — 25010000002 HYDRALAZINE PER 20 MG: Performed by: EMERGENCY MEDICINE

## 2024-04-13 PROCEDURE — 99214 OFFICE O/P EST MOD 30 MIN: CPT | Performed by: INTERNAL MEDICINE

## 2024-04-13 PROCEDURE — 83735 ASSAY OF MAGNESIUM: CPT | Performed by: STUDENT IN AN ORGANIZED HEALTH CARE EDUCATION/TRAINING PROGRAM

## 2024-04-13 PROCEDURE — 85027 COMPLETE CBC AUTOMATED: CPT | Performed by: STUDENT IN AN ORGANIZED HEALTH CARE EDUCATION/TRAINING PROGRAM

## 2024-04-13 PROCEDURE — 93010 ELECTROCARDIOGRAM REPORT: CPT | Performed by: INTERNAL MEDICINE

## 2024-04-13 PROCEDURE — 93306 TTE W/DOPPLER COMPLETE: CPT

## 2024-04-13 PROCEDURE — 84484 ASSAY OF TROPONIN QUANT: CPT | Performed by: STUDENT IN AN ORGANIZED HEALTH CARE EDUCATION/TRAINING PROGRAM

## 2024-04-13 PROCEDURE — 25510000001 IOPAMIDOL PER 1 ML: Performed by: EMERGENCY MEDICINE

## 2024-04-13 PROCEDURE — 80061 LIPID PANEL: CPT | Performed by: STUDENT IN AN ORGANIZED HEALTH CARE EDUCATION/TRAINING PROGRAM

## 2024-04-13 PROCEDURE — 93306 TTE W/DOPPLER COMPLETE: CPT | Performed by: INTERNAL MEDICINE

## 2024-04-13 RX ORDER — AMLODIPINE BESYLATE 5 MG/1
2.5 TABLET ORAL DAILY
Qty: 30 TABLET | Refills: 0 | Status: SHIPPED | OUTPATIENT
Start: 2024-04-13

## 2024-04-13 RX ORDER — ALUMINA, MAGNESIA, AND SIMETHICONE 2400; 2400; 240 MG/30ML; MG/30ML; MG/30ML
15 SUSPENSION ORAL AS NEEDED
Status: DISCONTINUED | OUTPATIENT
Start: 2024-04-13 | End: 2024-04-13 | Stop reason: HOSPADM

## 2024-04-13 RX ORDER — HYDRALAZINE HYDROCHLORIDE 20 MG/ML
10 INJECTION INTRAMUSCULAR; INTRAVENOUS EVERY 6 HOURS PRN
Status: DISCONTINUED | OUTPATIENT
Start: 2024-04-13 | End: 2024-04-13 | Stop reason: HOSPADM

## 2024-04-13 RX ADMIN — FAMOTIDINE 20 MG: 20 TABLET, FILM COATED ORAL at 09:14

## 2024-04-13 RX ADMIN — LIOTHYRONINE SODIUM 5 MCG: 5 TABLET ORAL at 09:07

## 2024-04-13 RX ADMIN — ASPIRIN 81 MG: 81 TABLET, COATED ORAL at 09:07

## 2024-04-13 RX ADMIN — SERTRALINE 50 MG: 50 TABLET, FILM COATED ORAL at 09:07

## 2024-04-13 RX ADMIN — Medication 10 ML: at 09:07

## 2024-04-13 RX ADMIN — ACETAMINOPHEN 325MG 650 MG: 325 TABLET ORAL at 07:54

## 2024-04-13 RX ADMIN — SODIUM CHLORIDE 100 ML/HR: 9 INJECTION, SOLUTION INTRAVENOUS at 04:17

## 2024-04-13 RX ADMIN — IOPAMIDOL 95 ML: 755 INJECTION, SOLUTION INTRAVENOUS at 11:41

## 2024-04-13 RX ADMIN — LEVOTHYROXINE SODIUM 75 MCG: 50 TABLET ORAL at 09:07

## 2024-04-13 RX ADMIN — HYDRALAZINE HYDROCHLORIDE 10 MG: 20 INJECTION INTRAMUSCULAR; INTRAVENOUS at 13:04

## 2024-04-13 RX ADMIN — BUSPIRONE HYDROCHLORIDE 10 MG: 10 TABLET ORAL at 09:07

## 2024-04-13 NOTE — OUTREACH NOTE
Prep Survey      Flowsheet Row Responses   [facility] patient discharged from? [place]   Is LACE score < 7 ? Yes   Eligibility [facility]   Date of Admission 04/12/24   Date of Discharge 04/13/24   Discharge Disposition Home or Self Care   Discharge diagnosis Atypical chest pain   Does the patient have one of the following disease processes/diagnoses(primary or secondary)? Other   Does the patient have Home health ordered? No   Is there a DME ordered? No   Prep survey completed? Yes            Maria Del Carmen MARIEE - Registered Nurse

## 2024-04-13 NOTE — H&P
[facility]   HISTORY AND PHYSICAL    Patient Name: Sara Gary  : 1950  MRN: [id number]  Primary Care Physician:  Catherine Kolb APRN  Date of admission: 2024    Subjective   Subjective     Chief Complaint:   Chief Complaint   Patient presents with    Chest Pain         HPI:    Sara Gary is a 73 y.o. female with a history of remote surgical ASD repair in , mitral valve prolapse, hyperlipidemia, depression/anxiety, and hypothyroidism who presents to [facility] with left shoulder discomfort.  Patient reports the pain started today and was noted in her left scapular shoulder area but did not radiate.  She reports when it started she became very nauseous and clammy and dizziness.  She denies shortness of breath and palpitations.  She does report a headache but states has not eaten much today.  She denies numbness and tingling.  Denies abdominal pain and diarrhea.  Denies fevers and chills.  She reports she is feeling very anxious about her blood pressure it has been up at the ER and reports she has never had issues with high blood pressure in the past.      Review of Systems   All systems were reviewed and negative except for: what is mentioned above in the HPI.     Personal History     Past Medical History:   Diagnosis Date    Depression     Diverticulitis     Environmental allergies     History of colon polyps     Hypertension     Kidney stone     hx of 4 stones       Past Surgical History:   Procedure Laterality Date    APPENDECTOMY      ASD REPAIR, SECUNDUM      CATARACT EXTRACTION      COLONOSCOPY      CYSTOSCOPY BLADDER STONE LITHOTRIPSY      CYSTOSCOPY W/ URETERAL STENT PLACEMENT Left 2017    Procedure: CYSTOSCOPY LEFT STENT INSERTION & STONE REMOVAL;  Surgeon: Raheem Franklin MD;  Location: [facility];  Service:     HYSTERECTOMY      NEPHRECTOMY RADICAL      THYROIDECTOMY      UPPER GASTROINTESTINAL ENDOSCOPY         Family History: family history includes  "Heart disease in her father and mother; Hypertension in her father and mother. Otherwise pertinent FHx was reviewed and not pertinent to current issue.    Social History:  reports that she has never smoked. She has never used smokeless tobacco. She reports that she does not drink alcohol and does not use drugs.    Home Medications:  SUMAtriptan, Turmeric, busPIRone, famotidine, levothyroxine, liothyronine, sertraline, and valACYclovir    Allergies:  Allergies   Allergen Reactions    Sulfamethoxazole-Trimethoprim Hives    Statins GI Intolerance     Patient \"felt sick\"    Sulfa Antibiotics Hives       Objective   Objective     Vitals:   Temp:  [98 °F (36.7 °C)-98.6 °F (37 °C)] 98.6 °F (37 °C)  Heart Rate:  [49-65] 63  Resp:  [16] 16  BP: (139-206)/(63-94) 165/77  Physical Exam    Constitutional: 73-year-old female in no acute distress on room air   Eyes: PERRLA, sclerae anicteric, no conjunctival injection   HENT: NCAT, mucous membranes moist   Neck: Supple, no thyromegaly, no lymphadenopathy, trachea midline   Respiratory: Clear to auscultation bilaterally, nonlabored respirations    Cardiovascular: RRR, no murmurs, rubs, or gallops, palpable pedal pulses bilaterally   Gastrointestinal: Positive bowel sounds, soft, nontender, nondistended   Musculoskeletal: No bilateral ankle edema, no clubbing or cyanosis to extremities, patient does have some mild diffuse tenderness over left trapezius, full range of joint motion intact of left arm   Psychiatric: Appropriate affect, cooperative   Neurologic: Oriented x 3, strength symmetric in all extremities, Cranial Nerves grossly intact to confrontation, speech clear   Skin: No rashes     Result Review    Result Review:  I have personally reviewed the results from the time of this admission to 4/13/2024 00:08 EDT and agree with these findings:  [x]  Laboratory list / accordion  []  Microbiology  [x]  Radiology  [x]  EKG/Telemetry   []  Cardiology/Vascular   []  Pathology  [x]  " Old records  []  Other:  Most notable findings include:   XR Chest 1 View    Result Date: 4/12/2024  XR CHEST 1 VW-  HISTORY: Female who is 73 years-old, chest pain  TECHNIQUE: Frontal view of the chest  COMPARISON: 7/5/2017  FINDINGS: The heart size is borderline. Sternotomy wires are present. Pulmonary vasculature is unremarkable. Aorta is calcified. No focal pulmonary consolidation, pleural effusion, or pneumothorax. No acute osseous process.      No no focal pulmonary consolidation. Borderline heart size. Follow-up as clinical indications persist.  This report was finalized on 4/12/2024 2:53 PM by Dr. Андрей Guzmán M.D on Workstation: [id number]      XR Scapula Right    Result Date: 3/29/2024  RIGHT SCAPULA: 2 VIEWS  HISTORY: Scapular pain.  COMPARISON: Right scapular x-rays 03/08/2024.  FINDINGS: There is mild right AC joint arthritis. There is no evidence for fracture or dislocation or acute abnormality of the right scapula.  This report was finalized on 3/29/2024 3:28 PM by Dr. Tan Apodaca M.D on Workstation: [id number]           Assessment & Plan   Assessment / Plan     Brief Patient Summary:  Sara Gary is a 73 y.o. female who is being admitted the observation unit for further evaluation and atypical chest pain with complaint of left shoulder discomfort with concern for anginal equivalent.  [facility] patient's high-sensitivity troponins were noted negative x 2.  A D-dimer was normal.  Chest x-ray showed no focal pulmonary consolidation.  EKG showed no acute ischemic changes.  Patient's blood pressure was elevated highest at 206/83.  Patient received a dose of IV labetalol and hydralazine with improvement.  Patient's been placed on telemetry monitoring, we will continue to trend troponins and consult cardiology.    Active Hospital Problems:  Active Hospital Problems    Diagnosis     **Atypical chest pain     Chest pain      Plan:     Atypical chest pain  -Reporting left shoulder  discomfort with concern for anginal equivalent  -High-sensitivity troponin negative x 2  -EKG nonischemic  -Chest x-ray negative acute  -D-dimer normal  -Aspirin given in ER  -Cardiology consulted  -Telemetry monitoring  -N.p.o. after midnight    High blood pressure  -Blood pressure noted highest at 206/83 in the ED  -Patient received 10 mg IV labetalol.  There is not much improvement in the blood pressure but heart rate was noted in the 60s.  Patient then received 10 mg IV hydralazine with improvement.  -Continue to monitor with vital signs every 4 hours    Hypothyroidism  -TSH ordered  -Continue Cytomel and Synthroid    GERD  -PPI    Anxiety and depression  -Continue BuSpar and Zoloft  -Hydroxyzine as needed      DVT prophylaxis:  Mechanical DVT prophylaxis orders are present.        CODE STATUS:    Code Status (Patient has no pulse and is not breathing): CPR (Attempt to Resuscitate)  Medical Interventions (Patient has pulse or is breathing): Full Support    Admission Status:  I believe this patient meets observation status.    76 minutes have been spent by [facility] providers in the care of this patient while under observation status.      Appropriate PPE worn during patient encounter.  Hand hygeine performed before and after seeing the patient.      Electronically signed by Maria Isabel Madrigal PA-C, 04/12/24, 10:11 PM EDT.

## 2024-04-13 NOTE — PLAN OF CARE
Goal Outcome Evaluation:  Plan of Care Reviewed With: patient           Outcome Evaluation: no c/o pain, periods of dizziness, and elevated BPs-improved w/meds. IVF @100, NPO w/consults to cards.

## 2024-04-13 NOTE — PROGRESS NOTES
ED OBSERVATION PROGRESS/DISCHARGE SUMMARY    Date of Admission: 4/12/2024   LOS: 0 days   PCP: Catherine Kolb APRN    Final Diagnosis hypertensive urgency      Subjective     Hospital Outcome: 73-year-old female who presented to the ED with complaint of atypical chest pain at left shoulder associated with nausea.  She had an elevated BP of 206/83.  Patient has had negative troponins.  EKG was negative for any acute ischemic changes.  D-dimer negative.  Chest x-ray negative for any acute cardiopulmonary processes.  Cardiology consulted and recommended a CTA chest to rule out aortic pathology; CTA chest showed mild dilation to the ascending thoracic aorta measuring 4.0 cm but no aortic dissection appreciated, and also showed small 3 mm pulmonary nodule in the apical right upper lobe.  Cardiology also recommended an echo which showed normal LV systolic function with EF of 56 to 60%, grade 1 diastolic dysfunction of LV, moderate to severely dilated LA, mild calcification of the AV without evidence of stenosis, and moderate to severe AV regurg present.  Cardiology did recommend low-dose amlodipine on discharge.  And for patient to follow-up in 2 weeks with cardiology.    All labs and imaging results discussed with patient, as well as all specialist recommendations.  Patient is agreeable and eager for discharge at this time.    ROS:  General: no fevers, chills  Respiratory: no cough, dyspnea  Cardiovascular: no chest pain, palpitations  Abdomen: No abdominal pain, nausea, vomiting, or diarrhea  Neurologic: No focal weakness    Objective   Physical Exam:  I have reviewed the vital signs.  Temp:  [97.7 °F (36.5 °C)-98.6 °F (37 °C)] 97.7 °F (36.5 °C)  Heart Rate:  [49-65] 54  Resp:  [16] 16  BP: (139-206)/(63-94) 142/72  General Appearance:    Alert, cooperative, no distress, chronically ill-appearing frail female  Head:    Normocephalic, atraumatic, normal hearing  Eyes:    Sclerae anicteric, EOMI  Neck:   Supple,  nontender  Lungs: Clear to auscultation bilaterally, respirations unlabored  Heart: Regular rate and rhythm, S1 and S2 normal, no murmur, rub or gallop  Abdomen:  Soft, non-tender, bowel sounds active, nondistended  Extremities: No clubbing, cyanosis, or edema to lower extremities  Pulses:  2+ and symmetric in distal lower extremities  Skin: No rashes   Neurologic: Oriented x3, Normal strength to extremities    Results Review:    I have reviewed the labs, radiology results and diagnostic studies.    Results from last 7 days   Lab Units 04/13/24  0418   WBC 10*3/mm3 5.41   HEMOGLOBIN g/dL 11.8*   HEMATOCRIT % 34.9   PLATELETS 10*3/mm3 240     Results from last 7 days   Lab Units 04/13/24  0418 04/12/24  1439   SODIUM mmol/L 141 139   POTASSIUM mmol/L 3.5 4.1   CHLORIDE mmol/L 110* 104   CO2 mmol/L 22.5 24.4   BUN mg/dL 8 10   CREATININE mg/dL 0.68 0.74   CALCIUM mg/dL 8.5* 9.0   BILIRUBIN mg/dL  --  0.4   ALK PHOS U/L  --  133*   ALT (SGPT) U/L  --  8   AST (SGOT) U/L  --  16   GLUCOSE mg/dL 93 97     Imaging Results (Last 24 Hours)       Procedure Component Value Units Date/Time    XR Chest 1 View [[id number]] Collected: 04/12/24 1452     Updated: 04/12/24 1456    Narrative:      XR CHEST 1 VW-     HISTORY: Female who is 73 years-old, chest pain     TECHNIQUE: Frontal view of the chest     COMPARISON: 7/5/2017     FINDINGS: The heart size is borderline. Sternotomy wires are present.  Pulmonary vasculature is unremarkable. Aorta is calcified. No focal  pulmonary consolidation, pleural effusion, or pneumothorax. No acute  osseous process.       Impression:      No no focal pulmonary consolidation. Borderline heart size.  Follow-up as clinical indications persist.     This report was finalized on 4/12/2024 2:53 PM by Dr. Андрей Guzmán M.D on Workstation: [id number]               I have reviewed the medications.  ---------------------------------------------------------------------------------------------  Assessment  & Plan   Assessment/Problem List    Atypical chest pain    Chest pain      Plan:  Atypical chest pain suspected related to hypertensive urgency:  -Troponins negative x 3  -EKG negative for any acute ischemic changes, no irregular rhythms noted on cardiac monitor throughout admission  -Chest x-ray negative for any acute cardiopulmonary issues  -CTA chest did show patient's known aortic aneurysm at 4 cm without dissection, and did notice a right upper lobe 2 mm pulmonary nodule that radiology recommended outpatient repeat scan in 12 months.  -Echo showed normal LV systolic function with EF of 56 to 60%, grade 1 diastolic dysfunction of LV, moderate to severely dilated LA, mild calcification of the AV without evidence of stenosis, and moderate to severe AV regurg present.  -Cardiology recommended 2.5 mg of amlodipine on discharge.  -Also discussed with patient to do a BP log twice daily or with any symptoms and to take this log with her to her cardiology and PCP appointments.  Patient gave verbal understanding    Disposition: Discharge to home    Follow-up after Discharge: PCP in 1 week, cardiology in 1 to 2 weeks    This note will serve as a discharge summary    Laurie Thomas PA-C 04/13/24 07:32 EDT    I have worn appropriate PPE during this patient encounter, sanitized my hands both with entering and exiting patient's room.      38 minutes has been spent by [facility] providers in the care of this patient while under observation status

## 2024-04-13 NOTE — PLAN OF CARE
Goal Outcome Evaluation:  Plan of Care Reviewed With: patient    Pt has had ECHO and cardiology has followed up with patient. Pt alert and orient times 4, NAD, up ad bette, VSS. Pt to be discharged home with appropriate follow up. Pt agreeable to plan of care. Discharge instructions provided and all questions addressed prior to discharge.     Progress: improving

## 2024-04-13 NOTE — PROGRESS NOTES
MD ATTESTATION NOTE    The DEEP and I have discussed this patient's history, physical exam, and treatment plan.  I have reviewed the documentation and personally had a face to face interaction with the patient. I affirm the documentation and agree with the treatment and plan.  The attached note describes my personal findings.      I provided a substantive portion of the care of the patient.  I personally performed the physical exam in its entirety, and below are my findings.      Brief HPI: Patient presents for evaluation of left scapular pain.  Patient states she gets dizzy and nauseated.  Patient has had no fevers or chills patient was seen at outside emergency department and had an elevated blood pressure.  Was given 2 medications for her blood pressure.  Transferred here patient states symptoms have improved..  Is concerned about her blood pressure.  Has had no vomiting or diarrhea    PHYSICAL EXAM  ED Triage Vitals   Temp Heart Rate Resp BP SpO2   04/12/24 1450 04/12/24 1359 04/12/24 1359 04/12/24 1450 04/12/24 1359   98 °F (36.7 °C) 58 16 (!) 185/93 97 %      Temp src Heart Rate Source Patient Position BP Location FiO2 (%)   04/12/24 2207 04/12/24 2117 04/12/24 1450 04/12/24 1450 --   Oral Monitor Sitting Left arm          GENERAL: no acute distress  HENT: nares patent  EYES: no scleral icterus  CV: regular rhythm, normal rate  RESPIRATORY: normal effort  ABDOMEN: soft  MUSCULOSKELETAL: no deformity  NEURO: alert, moves all extremities, follows commands  PSYCH:  calm, cooperative  SKIN: warm, dry    Vital signs and nursing notes reviewed.        Plan: Patient seen by cardiology and appreciate consult.  Echo and CT.    SHARED VISIT: This visit was performed by BOTH a physician and an APC. The substantive portion of the medical decision making was performed by this attesting physician who made or approved the management plan and takes responsibility for patient management. All studies in the APC note (if  performed) were independently interpreted by me.

## 2024-04-13 NOTE — DISCHARGE INSTRUCTIONS
Record your Blood pressures in morning and evening as well as anytime you feel symptomatic and take the log to your PCP and cardiology appointments

## 2024-04-13 NOTE — CONSULTS
Date of Hospital Visit: 2024  Encounter Provider: Terrell Jose Jr, MD  Place of Service: [facility]  Patient Name: Sara Gary  :1950  Referral Provider: Catherine Kolb APRN    Chief complaint: Evaluation of left arm pain    History of Present Illness: Patient with history of surgical ASD repair in her teens who follows with Dr. Ruffin and presented with a left shoulder pain.  Patient was describing left shoulder and scapular pain without radiation.  Blood pressure at home was 206/83 mmHg.  She had nonspecific repolarization abnormality on EKG with normal troponin.  She has a known history of thoracic aortic aneurysm at 4.1 cm.  Blood pressure has been better controlled since she has been in the observation unit.  She was on no blood pressure medications at home.  She had no angina.  No orthopnea, PND or edema.  No palpitations, dizziness or syncope.      Past Medical History:   Diagnosis Date    Depression     Diverticulitis     Environmental allergies     History of colon polyps     Hypertension     Kidney stone     hx of 4 stones       Past Surgical History:   Procedure Laterality Date    APPENDECTOMY      ASD REPAIR, SECUNDUM      CATARACT EXTRACTION      COLONOSCOPY      CYSTOSCOPY BLADDER STONE LITHOTRIPSY      CYSTOSCOPY W/ URETERAL STENT PLACEMENT Left 2017    Procedure: CYSTOSCOPY LEFT STENT INSERTION & STONE REMOVAL;  Surgeon: Raheem Franklin MD;  Location: [facility];  Service:     HYSTERECTOMY      NEPHRECTOMY RADICAL      THYROIDECTOMY      UPPER GASTROINTESTINAL ENDOSCOPY         Medications Prior to Admission   Medication Sig Dispense Refill Last Dose    busPIRone (BUSPAR) 10 MG tablet Take 1 tablet by mouth Daily. 90 tablet 1     famotidine (PEPCID) 20 MG tablet Take 1 tablet by mouth 2 (Two) Times a Day. 180 tablet 3     liothyronine (CYTOMEL) 5 MCG tablet Take 1 tablet by mouth Daily. 90 tablet 1     sertraline (ZOLOFT) 50 MG  tablet Daily.       SUMAtriptan (IMITREX) 100 MG tablet Take one tablet at onset of headache. May repeat dose one time in 2 hours if headache not relieved. 30 tablet 1     Synthroid 75 MCG tablet TAKE 1 TABLET BY MOUTH DAILY 90 tablet 1     Turmeric (QC TUMERIC COMPLEX PO) Take  by mouth. Power force brand       valACYclovir (Valtrex) 500 MG tablet Take 1 tablet by mouth 2 (Two) Times a Day. Take 1 tablet po BID x 5 days when experiencing fever blister symptoms. 60 tablet 2        Current Meds  Scheduled Meds:aspirin, 81 mg, Oral, Once   And  aspirin, 81 mg, Oral, Daily  busPIRone, 10 mg, Oral, Daily  famotidine, 20 mg, Oral, BID  levothyroxine, 75 mcg, Oral, Daily  liothyronine, 5 mcg, Oral, Daily  senna-docusate sodium, 2 tablet, Oral, BID  sertraline, 50 mg, Oral, Daily  sodium chloride, 10 mL, Intravenous, Q12H      Continuous Infusions:sodium chloride, 100 mL/hr, Last Rate: 100 mL/hr (04/13/24 0417)      PRN Meds:.  acetaminophen    senna-docusate sodium **AND** polyethylene glycol **AND** bisacodyl **AND** bisacodyl    nitroglycerin    nitroglycerin    sodium chloride    sodium chloride    Allergies as of 04/12/2024 - Reviewed 04/12/2024   Allergen Reaction Noted    Sulfamethoxazole-trimethoprim Hives 10/09/2018    Statins GI Intolerance 09/07/2016    Sulfa antibiotics Hives 09/07/2016       Social History     Socioeconomic History    Marital status: Single   Tobacco Use    Smoking status: Never    Smokeless tobacco: Never   Vaping Use    Vaping status: Never Used   Substance and Sexual Activity    Alcohol use: No    Drug use: No    Sexual activity: Not Currently     Birth control/protection: Post-menopausal       Family History   Problem Relation Age of Onset    Heart disease Mother     Hypertension Mother     Heart disease Father     Hypertension Father     Colon cancer Neg Hx     Colon polyps Neg Hx     Crohn's disease Neg Hx     Irritable bowel syndrome Neg Hx     Ulcerative colitis Neg Hx      Past  "surgical, medical, social and family history was reviewed, updated and amended when necessary.    Review of Systems   Constitutional: Negative for chills and fever.   HENT:  Negative for hoarse voice and sore throat.    Eyes:  Negative for double vision and photophobia.   Cardiovascular:  Negative for chest pain, leg swelling, near-syncope, orthopnea, palpitations, paroxysmal nocturnal dyspnea and syncope.   Respiratory:  Negative for cough and wheezing.    Skin:  Negative for poor wound healing and rash.   Musculoskeletal:  Positive for back pain and joint pain. Negative for arthritis and joint swelling.   Gastrointestinal:  Negative for bloating, abdominal pain, hematemesis and hematochezia.   Neurological:  Negative for dizziness and focal weakness.   Psychiatric/Behavioral:  Negative for depression and suicidal ideas.             Objective:   Temp:  [97.5 °F (36.4 °C)-98.6 °F (37 °C)] 97.5 °F (36.4 °C)  Heart Rate:  [49-65] 55  Resp:  [16] 16  BP: (139-206)/(63-94) 145/78  Body mass index is 21.25 kg/m².  Flowsheet Rows      Flowsheet Row First Filed Value   Admission Height 175.3 cm (69\") Documented at 04/12/2024 1450   Admission Weight 65.3 kg (144 lb) Documented at 04/12/2024 1450          Vitals:    04/13/24 0745   BP: 145/78   Pulse: 55   Resp: 16   Temp: 97.5 °F (36.4 °C)   SpO2: 100%       Vitals reviewed.   Constitutional:       Appearance: Healthy appearance. Not in distress.   Neck:      Vascular: No JVR. JVD normal.   Pulmonary:      Effort: Pulmonary effort is normal.      Breath sounds: Normal breath sounds. No wheezing. No rhonchi. No rales.   Chest:      Chest wall: Not tender to palpatation.   Cardiovascular:      PMI at left midclavicular line. Normal rate. Regular rhythm. Normal S1. Normal S2.       Murmurs: There is a systolic murmur.      No gallop.  No click. No rub.   Pulses:     Intact distal pulses.   Edema:     Peripheral edema absent.   Abdominal:      General: Bowel sounds are normal. "      Palpations: Abdomen is soft.      Tenderness: There is no abdominal tenderness.   Musculoskeletal: Normal range of motion.         General: No tenderness. Skin:     General: Skin is warm and dry.   Neurological:      General: No focal deficit present.      Mental Status: Alert and oriented to person, place and time.                 Lab Review:      Results from last 7 days   Lab Units 04/13/24  0418 04/12/24  1439   SODIUM mmol/L 141 139   POTASSIUM mmol/L 3.5 4.1   CHLORIDE mmol/L 110* 104   CO2 mmol/L 22.5 24.4   BUN mg/dL 8 10   CREATININE mg/dL 0.68 0.74   CALCIUM mg/dL 8.5* 9.0   BILIRUBIN mg/dL  --  0.4   ALK PHOS U/L  --  133*   ALT (SGPT) U/L  --  8   AST (SGOT) U/L  --  16   GLUCOSE mg/dL 93 97     Results from last 7 days   Lab Units 04/13/24  0418 04/12/24  1642 04/12/24  1439   HSTROP T ng/L 12 8 8     @LABRCNTbnp@  Results from last 7 days   Lab Units 04/13/24  0418 04/12/24  1439   WBC 10*3/mm3 5.41 4.73   HEMOGLOBIN g/dL 11.8* 12.4   HEMATOCRIT % 34.9 38.3   PLATELETS 10*3/mm3 240 240     Results from last 7 days   Lab Units 04/12/24  1635   INR  1.00     Results from last 7 days   Lab Units 04/13/24  0418   MAGNESIUM mg/dL 2.2     @LABRCNTIP(chol,trig,hdl,ldl)    I personally viewed and interpreted the patient's EKG/Telemetry data    Atypical chest pain    Chest pain    Assessment and Plan:    Left arm/shoulder pain -normal troponin with no acute ischemic changes makes anginal equivalent lower suspicion.  Given her thoracic aortic aneurysm we will check a CTA chest to rule out aortic pathology.  Murmur on exam we will check an echocardiogram.  If no cardiac pathology or concern for ongoing angina I would favor continuing outpatient workup of symptoms and addition of blood pressure control but we will follow diagnostic testing results for further treatment recommendations  Elevated blood pressure without diagnosis of hypertension -quite high on measurement in outpatient setting.  I will add  low-dose amlodipine.  History of ASD closure -echo planned for this morning  Mixed hyperlipidemia    Terrell Jose Jr, MD  04/13/24  10:40 EDT.  Time spent in reviewing chart, discussion and examination:

## 2024-04-15 ENCOUNTER — TRANSITIONAL CARE MANAGEMENT TELEPHONE ENCOUNTER (OUTPATIENT)
Dept: CALL CENTER | Facility: HOSPITAL | Age: 74
End: 2024-04-15
Payer: MEDICARE

## 2024-04-15 ENCOUNTER — TELEPHONE (OUTPATIENT)
Dept: INTERNAL MEDICINE | Facility: CLINIC | Age: 74
End: 2024-04-15
Payer: MEDICARE

## 2024-04-15 NOTE — TELEPHONE ENCOUNTER
Caller: Sara Gary    Relationship: Self    Best call back number: [phone]    What is the best time to reach you: ANYTIME     Who are you requesting to speak with (clinical staff, provider,  specific staff member): ALY KLINE     What was the call regarding: PATIENT STATES SHE IS WANTING TO KNOW IF SHE SHOULD BE TAKING THE AMLODIPINE MEDICATION IN THE MORNING OR AT NIGHT TIME.     PATIENT IS REQUESTING A CALL BACK AS SOON AS POSSIBLE.

## 2024-04-17 ENCOUNTER — OFFICE VISIT (OUTPATIENT)
Dept: INTERNAL MEDICINE | Facility: CLINIC | Age: 74
End: 2024-04-17
Payer: MEDICARE

## 2024-04-17 VITALS
OXYGEN SATURATION: 97 % | BODY MASS INDEX: 21.33 KG/M2 | HEIGHT: 69 IN | HEART RATE: 68 BPM | SYSTOLIC BLOOD PRESSURE: 120 MMHG | DIASTOLIC BLOOD PRESSURE: 70 MMHG | WEIGHT: 144 LBS

## 2024-04-17 DIAGNOSIS — R73.03 PREDIABETES: ICD-10-CM

## 2024-04-17 DIAGNOSIS — E78.2 MIXED HYPERLIPIDEMIA: ICD-10-CM

## 2024-04-17 DIAGNOSIS — I10 PRIMARY HYPERTENSION: ICD-10-CM

## 2024-04-17 DIAGNOSIS — Q21.10 RESIDUAL ASD (ATRIAL SEPTAL DEFECT) FOLLOWING REPAIR: Primary | ICD-10-CM

## 2024-04-17 DIAGNOSIS — K21.9 GASTROESOPHAGEAL REFLUX DISEASE WITHOUT ESOPHAGITIS: ICD-10-CM

## 2024-04-17 DIAGNOSIS — G43.809 OTHER MIGRAINE WITHOUT STATUS MIGRAINOSUS, NOT INTRACTABLE: ICD-10-CM

## 2024-04-17 DIAGNOSIS — E03.8 OTHER SPECIFIED HYPOTHYROIDISM: ICD-10-CM

## 2024-04-17 RX ORDER — DIAZEPAM 5 MG/1
TABLET ORAL
Qty: 2 TABLET | Refills: 0 | Status: SHIPPED | OUTPATIENT
Start: 2024-04-17

## 2024-04-17 NOTE — ASSESSMENT & PLAN NOTE
Continues on amlodipine 2.5mg daily   Recommended to bring BP monitor to her cardiology f/u on 5/3/24  BP goal 120/80  DASH diet

## 2024-04-17 NOTE — ASSESSMENT & PLAN NOTE
Anisha Greco is a 72 Continues on pepcid

## 2024-04-17 NOTE — PROGRESS NOTES
Transitional Care Follow Up Visit  Subjective     Sara TRAMMELL Abdirahman is a 73 y.o. female who presents for a transitional care management visit.    Within 48 business hours after discharge our office contacted her via telephone to coordinate her care and needs.      I reviewed and discussed the details of that call along with the discharge summary, hospital problems, inpatient lab results, inpatient diagnostic studies, and consultation reports with Sara.     Current outpatient and discharge medications have been reconciled for the patient.  Reviewed by: ALEJANDRO Miller          4/13/2024     4:56 PM   Date of TCM Phone Call   [facility]   Date of Admission 4/12/2024   Date of Discharge 4/13/2024   Discharge Disposition Home or Self Care     Risk for Readmission (LACE) Score: 1 (4/13/2024  6:00 AM)      History of Present Illness   Course During Hospital Stay:  This is a 72 y/o female presenting to office for hospital f/u from  4/12/24 through 4/14/24. Patient had presented with complaints of left shoulder painw ithout radiation; had also experiencing nausea and clammy/dizziness. Patient underwent cardiac w/u-- troponin neg x 2; d-dimer normal. CXR showed no acute abnormality. EKG was unremarkable. Patient was noted to have hypertension and required IV dose of labetolol and hydralazine with improvement. Patient was seen by cardiology; hx of TAA at 4.1cm and hx ASD closure. CTA was stable.  2D echo showed mild calcification of aortic valve, mild dilated ascending aorta at 3.6 cm, normal EF at 56 to 60 percent. Cardiology recommended starting amlodipine 2.5mg daily.   Recommended close f/u with cardiology and PCP.     Has been checking BP at home-- avg 120-130's/80's. Denies any further radiating chest pain. Reports she is doing okay. Has f/u scheduled with cardiology on 5/3/24. To note patient does have hx of HLD but is statin intolerant. Reports she is still dealing with off and on  headaches. Reports long hx of migraines. Using imitrex PRN.        The following portions of the patient's history were reviewed and updated as appropriate: allergies, current medications, past family history, past medical history, past social history, past surgical history, and problem list.        Objective   Physical Exam  Constitutional:       Appearance: Normal appearance.   HENT:      Head: Normocephalic and atraumatic.      Right Ear: External ear normal.      Left Ear: External ear normal.      Nose: Nose normal.      Mouth/Throat:      Mouth: Mucous membranes are moist.      Pharynx: Oropharynx is clear.   Eyes:      Conjunctiva/sclera: Conjunctivae normal.      Pupils: Pupils are equal, round, and reactive to light.   Neck:      Vascular: No carotid bruit.   Cardiovascular:      Rate and Rhythm: Normal rate and regular rhythm.      Pulses: Normal pulses.      Heart sounds: Murmur heard.      No gallop.   Pulmonary:      Effort: Pulmonary effort is normal. No respiratory distress.      Breath sounds: Normal breath sounds. No stridor. No wheezing, rhonchi or rales.   Musculoskeletal:      Cervical back: Normal range of motion and neck supple.   Skin:     General: Skin is warm and dry.      Capillary Refill: Capillary refill takes less than 2 seconds.   Neurological:      Mental Status: She is alert and oriented to person, place, and time. Mental status is at baseline.   Psychiatric:         Mood and Affect: Mood normal.         Thought Content: Thought content normal.         Judgment: Judgment normal.         Assessment & Plan   Problems Addressed this Visit          Cardiac and Vasculature    Residual ASD (atrial septal defect) following repair - Primary     Has future f/u with cardiology on 5/3/24         Primary hypertension     Continues on amlodipine 2.5mg daily   Recommended to bring BP monitor to her cardiology f/u on 5/3/24  BP goal 120/80  DASH diet         Mixed hyperlipidemia     Continues with  dietary modification and exercise as tolerated            Endocrine and Metabolic    Other specified hypothyroidism     Continues on synthroid and cytomel          Prediabetes     Recommended low glycemic diet choices               Gastrointestinal Abdominal     Gastroesophageal reflux disease without esophagitis     Continues on pepcid             Neuro    Migraine     MRI brain re-ordered  Will refer to neurology  Continue with imitrex PRN  We discussed h/a hygiene-- continue with hydration goal of 64 ounces h20 daily  Will send valium to help with claustrophobia  Advised to not drive while taking medicine; will need ride home  PDMP reviewed               Relevant Medications    diazePAM (Valium) 5 MG tablet    Other Relevant Orders    Ambulatory Referral to Neurology    MRI Brain With & Without Contrast     Diagnoses         Codes Comments    Residual ASD (atrial septal defect) following repair    -  Primary ICD-10-CM: Q21.10  ICD-9-CM: 745.5     Mixed hyperlipidemia     ICD-10-CM: E78.2  ICD-9-CM: 272.2     Prediabetes     ICD-10-CM: R73.03  ICD-9-CM: 790.29     Other specified hypothyroidism     ICD-10-CM: E03.8  ICD-9-CM: 244.8     Gastroesophageal reflux disease without esophagitis     ICD-10-CM: K21.9  ICD-9-CM: 530.81     Primary hypertension     ICD-10-CM: I10  ICD-9-CM: 401.9     Other migraine without status migrainosus, not intractable     ICD-10-CM: G43.809  ICD-9-CM: 346.80

## 2024-04-17 NOTE — ASSESSMENT & PLAN NOTE
MRI brain re-ordered  Will refer to neurology  Continue with imitrex PRN  We discussed h/a hygiene-- continue with hydration goal of 64 ounces h20 daily  Will send valium to help with claustrophobia  Advised to not drive while taking medicine; will need ride home  PDMP reviewed

## 2024-04-30 NOTE — TELEPHONE ENCOUNTER
Caller: Sara Gary    Relationship: Self    What was the call regarding: PATIENT IS CALLING TO INFORM RAMONE THAT SHE HAS CHANGED HER PLAN TO A PPO BUT ALL OF THE INFORMATION ON THE CARD IS STILL THE SAME.     
Never smoker

## 2024-05-01 NOTE — TELEPHONE ENCOUNTER
Caller: Sara Gary    Relationship: Self    Best call back number: [phone]     Requested Prescriptions:   Requested Prescriptions     Pending Prescriptions Disp Refills    sertraline (ZOLOFT) 50 MG tablet       Sig: Daily.        Pharmacy where request should be sent: [address] [address] [facility] [phone] [facility] [phone]      Last office visit with prescribing clinician: 4/17/2024   Last telemedicine visit with prescribing clinician: Visit date not found   Next office visit with prescribing clinician: 9/25/2024     Additional details provided by patient: PATIENT STATES SHE HAS A COUPLE DAYS LEFT     Does the patient have less than a 3 day supply:  [x] Yes  [] No

## 2024-05-16 ENCOUNTER — OFFICE VISIT (OUTPATIENT)
Age: 74
End: 2024-05-16
Payer: MEDICARE

## 2024-05-16 VITALS
HEIGHT: 69 IN | OXYGEN SATURATION: 98 % | WEIGHT: 144 LBS | BODY MASS INDEX: 21.33 KG/M2 | SYSTOLIC BLOOD PRESSURE: 130 MMHG | DIASTOLIC BLOOD PRESSURE: 80 MMHG | HEART RATE: 59 BPM

## 2024-05-16 DIAGNOSIS — I35.1 NONRHEUMATIC AORTIC VALVE INSUFFICIENCY: Primary | ICD-10-CM

## 2024-05-16 NOTE — PROGRESS NOTES
Subjective:     Encounter Date: 05/16/24        Patient ID: Sara Gary is a 73 y.o. female.    Chief Complaint: Dyspnea  HPI:   This is a 72-year-old woman who  has a history of remote surgical ASD repair in 1997 via median sternotomy, mild mitral valve prolapse.   She also has a history of depression/anxiety treated with BuSpar and Zoloft.  2023 she had mild AI, mild MVP and aortic root noted to be 4.1 cm, no shunt on bubble study.      April 2024 she was admitted to the observation unit with scapular pain.  She had a CT scan to rule out dissection which showed minimally dilated proximal aorta at 4 cm.  She had an echocardiogram due to a prominent diastolic murmur found to have moderate to severe AI.  At the time of her echocardiogram her blood pressure was 190/70.  Her blood pressure was elevated throughout her hospital stay as high as 200.  She was started on amlodipine however she is discontinued this herself because she felt her blood pressure is probably normal although she has not been checking it at home.  Today her blood pressure is 130/70.  She is feeling well without angina or dyspnea.  No further back pain.    Her parents both had AAA's but were smokers.  She is a non-smoker.    The following portions of the patient's history were reviewed and updated as appropriate: allergies, current medications, past family history, past medical history, past social history, past surgical history and problem list.     REVIEW OF SYSTEMS:   All systems reviewed.  Pertinent positives identified in HPI.  All other systems are negative.    Past Medical History:   Diagnosis Date    Depression     Diverticulitis     Environmental allergies     History of colon polyps     Hypertension     Kidney stone     hx of 4 stones       Family History   Problem Relation Age of Onset    Heart disease Mother     Hypertension Mother     Heart disease Father     Hypertension Father     Colon cancer Neg Hx     Colon polyps Neg Hx   "   Crohn's disease Neg Hx     Irritable bowel syndrome Neg Hx     Ulcerative colitis Neg Hx        Social History     Socioeconomic History    Marital status: Single   Tobacco Use    Smoking status: Never    Smokeless tobacco: Never   Vaping Use    Vaping status: Never Used   Substance and Sexual Activity    Alcohol use: No    Drug use: No    Sexual activity: Not Currently     Birth control/protection: Post-menopausal       Allergies   Allergen Reactions    Sulfamethoxazole-Trimethoprim Hives    Statins GI Intolerance     Patient \"felt sick\"    Sulfa Antibiotics Hives       Past Surgical History:   Procedure Laterality Date    APPENDECTOMY      ASD REPAIR, SECUNDUM      CATARACT EXTRACTION      COLONOSCOPY      CYSTOSCOPY BLADDER STONE LITHOTRIPSY      CYSTOSCOPY W/ URETERAL STENT PLACEMENT Left 06/29/2017    Procedure: CYSTOSCOPY LEFT STENT INSERTION & STONE REMOVAL;  Surgeon: Raheem Franklin MD;  Location: [facility];  Service:     HYSTERECTOMY      NEPHRECTOMY RADICAL      THYROIDECTOMY      UPPER GASTROINTESTINAL ENDOSCOPY         Procedures       Objective:         PHYSICAL EXAM:  GEN: VSS, no distress,   Eyes: normal sclera, normal lids and lashes  HENT: moist mucus membranes,   Respiratory: CTAB, no rales or wheezes  CV: RRR, 2 out of 6 diastolic murmur +2 DP and 2+ carotid pulses b/l  GI: NABS, soft,  Nontender, nondistended  MSK: no edema, no scoliosis or kyphosis  Skin: no rash, warm, dry  Heme/Lymph: no bruising or bleeding  Psych: organized thought, normal behavior and affect  Neuro: Cranial nerves grossly intact, Alert and Oriented x 3.         Assessment:          Diagnosis Plan   1. Nonrheumatic aortic valve insufficiency                 Plan:       1.  Aortic regurgitation: November 2023 her echocardiogram showed mild to moderate aortic insufficiency.  Repeat echocardiogram at the time of severe hypertension shows moderate to severe AI.  She will very closely follow her blood pressure over the " next 2 weeks and we will reinstate antihypertensive therapy as needed.  We will see her again in 6 months and at that time determine which imaging study to reevaluate the valve, ROSINA versus MRI.  2. ASD repair, normal echo and bubble study 2023  3. Mildly dilated aortic root, 4.0 cm, stable    Amy, thank you very much for referring this kind patient to me. Please call me with any questions or concerns. I will see the patient again in the office in 6 months.         Jacqui Ruffin MD  05/16/24  [facility]    Outpatient Encounter Medications as of 5/16/2024   Medication Sig Dispense Refill    busPIRone (BUSPAR) 10 MG tablet Take 1 tablet by mouth Daily. 90 tablet 1    famotidine (PEPCID) 20 MG tablet Take 1 tablet by mouth 2 (Two) Times a Day. 180 tablet 3    liothyronine (CYTOMEL) 5 MCG tablet Take 1 tablet by mouth Daily. 90 tablet 1    sertraline (ZOLOFT) 50 MG tablet Take 1 tablet by mouth Daily. 90 tablet 1    SUMAtriptan (IMITREX) 100 MG tablet Take one tablet at onset of headache. May repeat dose one time in 2 hours if headache not relieved. 30 tablet 1    Synthroid 75 MCG tablet TAKE 1 TABLET BY MOUTH DAILY 90 tablet 1    Turmeric (QC TUMERIC COMPLEX PO) Take  by mouth. Power force brand      valACYclovir (Valtrex) 500 MG tablet Take 1 tablet by mouth 2 (Two) Times a Day. Take 1 tablet po BID x 5 days when experiencing fever blister symptoms. 60 tablet 2    amLODIPine (NORVASC) 5 MG tablet Take 0.5 tablets by mouth Daily. (Patient not taking: Reported on 5/16/2024) 30 tablet 0    diazePAM (Valium) 5 MG tablet Take 1 tablet 1 hour before procedure by mouth. If anxiety still increased, can take second dose 30 minutes before procedure by mouth. DO NOT DRIVE WHILE TAKING MEDICATION. (Patient not taking: Reported on 5/16/2024) 2 tablet 0     No facility-administered encounter medications on file as of 5/16/2024.

## 2024-05-17 ENCOUNTER — TELEPHONE (OUTPATIENT)
Dept: CARDIOLOGY | Facility: CLINIC | Age: 74
End: 2024-05-17

## 2024-05-17 NOTE — TELEPHONE ENCOUNTER
"  Caller: CLIFTON    Relationship: SELF    Best call back number: [phone]        What was the call regarding: PATIENT WAS CALLING TO SEE IF SHE COULD GET A CALL BACK TO DISCUSS HER VALVE INSUFFICIENCY-  SHE WAS REALLY CONCERNED AS IT HAS MOVED FROM \"MODERATE TO SEVERE.\"    ONCE SHE GOT HOME SHE AFTER HER LAST VISIT STARTED TO GET REALLY NERVOUS ABOUT THE DIAGNOSIS AND HAS SOME ADDITIONAL QUESTIONS.  "

## 2024-05-22 ENCOUNTER — TELEPHONE (OUTPATIENT)
Dept: INTERNAL MEDICINE | Facility: CLINIC | Age: 74
End: 2024-05-22
Payer: MEDICARE

## 2024-05-22 ENCOUNTER — PRIOR AUTHORIZATION (OUTPATIENT)
Dept: INTERNAL MEDICINE | Facility: CLINIC | Age: 74
End: 2024-05-22
Payer: MEDICARE

## 2024-05-22 NOTE — TELEPHONE ENCOUNTER
Caller: Sara Gary    Relationship: Self    Best call back number: [phone]    What is the medical concern/diagnosis: HAIR LOSS, NOT FEELING WELL     What specialty or service is being requested: ENDOCRINOLOGY     Any additional details: PATIENT WOULD LIKE TO SEE SOMEONE THAT HER PCP WOULD RECOMMEND.     PATIENT WOULD LIKE TO CHECK AND SEE IF HER THYROID COULD BE CAUSING HER SYMPTOMS.     PLEASE ADVISE

## 2024-05-23 NOTE — TELEPHONE ENCOUNTER
Patient advised that we can get her in to see Catherine so can place a referral. However, patient stated that she does not need a referral and is able to call and make an appointment     Patient states she will call endocrinology upstairs to make an appointment. Advised of she needs to see Catherine first she can call to schedule an appointment

## 2024-05-23 NOTE — TELEPHONE ENCOUNTER
Please tell her I would like to see her first. We can draw labs and see what exactly is going on. If we draw her labs and things are stable, at that point we can get her upstairs.

## 2024-05-23 NOTE — TELEPHONE ENCOUNTER
Patient called back and states she does need a referral. Pt asked about getting an appointment for Catherine and the Nya on the same day but I explained that is not how it works    Will ask if referral can placed and call her back

## 2024-05-30 ENCOUNTER — OFFICE VISIT (OUTPATIENT)
Dept: INTERNAL MEDICINE | Facility: CLINIC | Age: 74
End: 2024-05-30
Payer: MEDICARE

## 2024-05-30 VITALS
BODY MASS INDEX: 21.33 KG/M2 | HEART RATE: 68 BPM | WEIGHT: 144 LBS | DIASTOLIC BLOOD PRESSURE: 76 MMHG | OXYGEN SATURATION: 97 % | HEIGHT: 69 IN | SYSTOLIC BLOOD PRESSURE: 130 MMHG

## 2024-05-30 DIAGNOSIS — M25.511 CHRONIC RIGHT SHOULDER PAIN: ICD-10-CM

## 2024-05-30 DIAGNOSIS — M54.41 CHRONIC RIGHT-SIDED LOW BACK PAIN WITH RIGHT-SIDED SCIATICA: Primary | ICD-10-CM

## 2024-05-30 DIAGNOSIS — F32.A ANXIETY AND DEPRESSION: ICD-10-CM

## 2024-05-30 DIAGNOSIS — F41.9 ANXIETY AND DEPRESSION: ICD-10-CM

## 2024-05-30 DIAGNOSIS — G89.29 CHRONIC RIGHT SHOULDER PAIN: ICD-10-CM

## 2024-05-30 DIAGNOSIS — G89.29 CHRONIC RIGHT-SIDED LOW BACK PAIN WITH RIGHT-SIDED SCIATICA: Primary | ICD-10-CM

## 2024-05-30 DIAGNOSIS — E03.8 OTHER SPECIFIED HYPOTHYROIDISM: ICD-10-CM

## 2024-05-30 PROCEDURE — 3078F DIAST BP <80 MM HG: CPT | Performed by: NURSE PRACTITIONER

## 2024-05-30 PROCEDURE — 3075F SYST BP GE 130 - 139MM HG: CPT | Performed by: NURSE PRACTITIONER

## 2024-05-30 PROCEDURE — 99214 OFFICE O/P EST MOD 30 MIN: CPT | Performed by: NURSE PRACTITIONER

## 2024-05-30 PROCEDURE — 1159F MED LIST DOCD IN RCRD: CPT | Performed by: NURSE PRACTITIONER

## 2024-05-30 PROCEDURE — G2211 COMPLEX E/M VISIT ADD ON: HCPCS | Performed by: NURSE PRACTITIONER

## 2024-05-30 PROCEDURE — 1160F RVW MEDS BY RX/DR IN RCRD: CPT | Performed by: NURSE PRACTITIONER

## 2024-05-30 PROCEDURE — 1126F AMNT PAIN NOTED NONE PRSNT: CPT | Performed by: NURSE PRACTITIONER

## 2024-05-30 RX ORDER — BUSPIRONE HYDROCHLORIDE 10 MG/1
10 TABLET ORAL DAILY
Qty: 90 TABLET | Refills: 3 | Status: SHIPPED | OUTPATIENT
Start: 2024-05-30

## 2024-05-30 RX ORDER — LIOTHYRONINE SODIUM 5 UG/1
5 TABLET ORAL DAILY
Qty: 90 TABLET | Refills: 3 | Status: SHIPPED | OUTPATIENT
Start: 2024-05-30

## 2024-05-30 RX ORDER — PREDNISOLONE ACETATE 10 MG/ML
SUSPENSION/ DROPS OPHTHALMIC
COMMUNITY
Start: 2024-05-28

## 2024-05-30 RX ORDER — SERTRALINE HYDROCHLORIDE 100 MG/1
100 TABLET, FILM COATED ORAL DAILY
Qty: 90 TABLET | Refills: 2 | Status: SHIPPED | OUTPATIENT
Start: 2024-05-30

## 2024-05-30 NOTE — PROGRESS NOTES
"Chief Complaint  Leg Pain    Subjective        Sara Gary presents to [facility]  History of Present Illness  This is a 72 y/o female presenting to office for complaints of right sciatica and some low back pain. Reports this is intermittent; reports it is okay to day but it comes and goes. Reports the pain does not follow a consistent pattern but will notice low back right sided pain and some pain will travel down her lateral leg. Reports activity does not worsen her pain. Reports it just aches at times. Denies any B&B issues; denies any saddle anesthesia. Reports she is also having some ongoing right chronic shoulder pain. Reports she would be agreeable to see PT. XR showed mild right AC joint arthritis.     Reports she is having buzzing in her b/l ears; reports this has bothered here for a while. Reports she has not seen ENT. Reports she has some difficulty with hearing. Does not have hearing aids. Reports she did try hearing aids but did not buy them.     Reports she has also been struggling with anxiety and depression; was wondering if we could increase her zoloft dosing; prev on 100mg zoloft; reports she is feels stressed out; report it is hard for her to deal with; denies SI.       Objective   Vital Signs:  /76 (BP Location: Left arm, Patient Position: Sitting, Cuff Size: Adult)   Pulse 68   Ht 175.3 cm (69.02\")   Wt 65.3 kg (144 lb)   SpO2 97%   BMI 21.25 kg/m²   Estimated body mass index is 21.25 kg/m² as calculated from the following:    Height as of this encounter: 175.3 cm (69.02\").    Weight as of this encounter: 65.3 kg (144 lb).       BMI is within normal parameters. No other follow-up for BMI required.      Physical Exam  Constitutional:       Appearance: Normal appearance.   HENT:      Head: Normocephalic and atraumatic.      Right Ear: External ear normal.      Left Ear: External ear normal.      Nose: Nose normal.      Mouth/Throat:      Mouth: Mucous " membranes are moist.      Pharynx: Oropharynx is clear.   Eyes:      Conjunctiva/sclera: Conjunctivae normal.      Pupils: Pupils are equal, round, and reactive to light.   Cardiovascular:      Rate and Rhythm: Normal rate and regular rhythm.      Pulses: Normal pulses.      Heart sounds: Murmur heard.      Systolic murmur is present with a grade of 2/6.      No gallop.   Pulmonary:      Effort: Pulmonary effort is normal. No respiratory distress.      Breath sounds: Normal breath sounds. No stridor. No wheezing, rhonchi or rales.   Musculoskeletal:      Cervical back: Normal range of motion and neck supple.      Lumbar back: Spasms and tenderness present.   Skin:     General: Skin is warm and dry.      Capillary Refill: Capillary refill takes less than 2 seconds.   Neurological:      Mental Status: She is alert and oriented to person, place, and time. Mental status is at baseline.   Psychiatric:         Mood and Affect: Mood normal.         Thought Content: Thought content normal.         Judgment: Judgment normal.        Result Review :    The following data was reviewed by: ALEJANDRO Miller on 05/30/2024:  Common labs          3/29/2024    10:23 4/12/2024    14:39 4/13/2024    04:18   Common Labs   Glucose 88  97  93    BUN 11  10  8    Creatinine 0.80  0.74  0.68    Sodium 141  139  141    Potassium 4.4  4.1  3.5    Chloride 106  104  110    Calcium 8.9  9.0  8.5    Total Protein 6.3      Albumin 3.9  4.2     Total Bilirubin 0.5  0.4     Alkaline Phosphatase 137  133     AST (SGOT) 18  16     ALT (SGPT) 8  8     WBC 4.2  4.73  5.41    Hemoglobin 12.4  12.4  11.8    Hematocrit 37.8  38.3  34.9    Platelets 222  240  240    Total Cholesterol   218    Total Cholesterol 255      Triglycerides 73   93    HDL Cholesterol 73   62    LDL Cholesterol  170   140    Hemoglobin A1C 6.1        Tobacco Use: Low Risk  (5/30/2024)    Patient History     Smoking Tobacco Use: Never     Smokeless Tobacco Use: Never     Passive  Exposure: Not on file     Social History     Substance and Sexual Activity   Alcohol Use No     Family History   Problem Relation Age of Onset    Heart disease Mother     Hypertension Mother     Heart disease Father     Hypertension Father     Colon cancer Neg Hx     Colon polyps Neg Hx     Crohn's disease Neg Hx     Irritable bowel syndrome Neg Hx     Ulcerative colitis Neg Hx        XR Scapula Right (03/29/2024 10:54 AM)   XR Scapula Right (03/08/2024 2:05 PM)          Assessment and Plan     Diagnoses and all orders for this visit:    1. Chronic right-sided low back pain with right-sided sciatica (Primary)  Assessment & Plan:  Referral placed to PT  Continue with light activity as tolerated  Tylenol 650mg Q6H PRN OTC  Heat/ice per patient preference      Orders:  -     Cancel: Ambulatory Referral to Physical Therapy  -     Ambulatory Referral to Physical Therapy    2. Chronic right shoulder pain  Assessment & Plan:  Referral placed to PT  May benefit from voltaren gel topically OTC PRN  Continue tylenol 650mg Q6H PRN for pain relief  Heat/ice per patient preference    Orders:  -     Ambulatory Referral to Physical Therapy    3. Other specified hypothyroidism  Assessment & Plan:  Labs today  Continues on cytomel and synthroid      Orders:  -     TSH Rfx On Abnormal To Free T4  -     T3, free  -     liothyronine (CYTOMEL) 5 MCG tablet; Take 1 tablet by mouth Daily.  Dispense: 90 tablet; Refill: 3    4. Anxiety and depression  Assessment & Plan:  Increase zoloft to 100mg daily  Buspar 10mg daily as patient tolerates this dose  Denies SI  RTO in 6 weeks    Orders:  -     sertraline (Zoloft) 100 MG tablet; Take 1 tablet by mouth Daily.  Dispense: 90 tablet; Refill: 2  -     busPIRone (BUSPAR) 10 MG tablet; Take 1 tablet by mouth Daily.  Dispense: 90 tablet; Refill: 3             Follow Up     Return in about 6 weeks (around 7/11/2024) for Recheck anxiety and depression.  Patient was given instructions and counseling  regarding her condition or for health maintenance advice. Please see specific information pulled into the AVS if appropriate.

## 2024-05-30 NOTE — ASSESSMENT & PLAN NOTE
Referral placed to PT  Continue with light activity as tolerated  Tylenol 650mg Q6H PRN OTC  Heat/ice per patient preference

## 2024-05-30 NOTE — ASSESSMENT & PLAN NOTE
Referral placed to PT  May benefit from voltaren gel topically OTC PRN  Continue tylenol 650mg Q6H PRN for pain relief  Heat/ice per patient preference

## 2024-05-30 NOTE — ASSESSMENT & PLAN NOTE
Increase zoloft to 100mg daily  Buspar 10mg daily as patient tolerates this dose  Denies SI  RTO in 6 weeks

## 2024-05-31 LAB
T3FREE SERPL-MCNC: 2.3 PG/ML (ref 2–4.4)
TSH SERPL DL<=0.005 MIU/L-ACNC: 0.65 UIU/ML (ref 0.27–4.2)

## 2024-06-03 ENCOUNTER — TELEPHONE (OUTPATIENT)
Dept: CARDIOLOGY | Facility: CLINIC | Age: 74
End: 2024-06-03
Payer: MEDICARE

## 2024-06-03 NOTE — TELEPHONE ENCOUNTER
Caller: Sara Gary    Relationship: Self    Best call back number: [phone]    5.17 - 166/76 132/67 157/70 136/68 161/69  5.18 - 148/75 134/66   5.19 - 140/69 140/69  5.20 - 160/90 144/74  5.21 - 130/80 139/75   5.22 - 124/78 139/71 126/66  5.23 - 137/87 147/87  5.24 - 122/79 128/77 166/90 178/84  5.25 - 164/76 135/74 127/67 136/69  5.26 - 155/75 132/69 159/85 146/86  5.28 - 124/77 159/74  5.29 - 124/81 152/79 146/82  5.30 - 146/85 130/72(AT DR OFFICE)  5.31 - 141/79 168/81 141/76  6.1 - 179/80 138/75  6.2 - 124/71

## 2024-06-04 NOTE — TELEPHONE ENCOUNTER
Pl let her know her BP is too high particularly in light of her leaky aortic valve. She needs to restart the amlodipine daily and report BP in 2 weeks

## 2024-06-05 NOTE — TELEPHONE ENCOUNTER
"Spoke with pt and relayed the information per Dr. Ruffin, BP too high and needs to restart amlodipine daily and call back with BP readings in 2 wks.    Pt states her amlodipine rx is for 5mg \"take 1/2 tablet daily\".  She verbalizes understanding and will call with BP readings in 2 wks.    "

## 2024-06-06 DIAGNOSIS — G43.819 OTHER MIGRAINE WITHOUT STATUS MIGRAINOSUS, INTRACTABLE: ICD-10-CM

## 2024-06-06 RX ORDER — SUMATRIPTAN 100 MG/1
TABLET, FILM COATED ORAL
Qty: 9 TABLET | Refills: 2 | Status: SHIPPED | OUTPATIENT
Start: 2024-06-06

## 2024-06-06 NOTE — TELEPHONE ENCOUNTER
PATIENT STATES SHE ONLY HAS 1 PILL LEFT AND HER PRESCRIPTION IS  AT THE PHARMACY SO SHE NEEDS A NEW PRESCRIPTION SENT ASAP    PLEASE ADVISE PATIENT REGARDING THIS REQUEST ASA    CONTACT:     [phone] (Mobile)

## 2024-06-11 ENCOUNTER — TELEPHONE (OUTPATIENT)
Dept: INTERNAL MEDICINE | Facility: CLINIC | Age: 74
End: 2024-06-11
Payer: MEDICARE

## 2024-06-11 NOTE — TELEPHONE ENCOUNTER
Advised patient she is overdue to have MRI of brain done. Patient has an appointment with Neurology Thursday and will discuss if she needs this done with them

## 2024-06-13 ENCOUNTER — OFFICE VISIT (OUTPATIENT)
Dept: NEUROLOGY | Facility: CLINIC | Age: 74
End: 2024-06-13
Payer: MEDICARE

## 2024-06-13 VITALS
OXYGEN SATURATION: 96 % | DIASTOLIC BLOOD PRESSURE: 72 MMHG | WEIGHT: 142 LBS | HEART RATE: 73 BPM | BODY MASS INDEX: 21.03 KG/M2 | HEIGHT: 69 IN | SYSTOLIC BLOOD PRESSURE: 132 MMHG

## 2024-06-13 DIAGNOSIS — G43.109 MIGRAINE WITH AURA AND WITHOUT STATUS MIGRAINOSUS, NOT INTRACTABLE: ICD-10-CM

## 2024-06-13 DIAGNOSIS — R29.818 SUSPECTED SLEEP APNEA: Primary | ICD-10-CM

## 2024-06-13 RX ORDER — AMLODIPINE BESYLATE 2.5 MG/1
2.5 TABLET ORAL DAILY
COMMUNITY

## 2024-06-13 NOTE — PROGRESS NOTES
DOS: 2024  NAME: Sara Gary   : 1950  PCP: Catherine Kolb APRN  Chief Complaint   Patient presents with    Migraine      Referring MD: Catherine Kolb APRN    Neurological Problem and Interval History:  73 y.o. female with a Hx of pression, diverticulitis, allergies, hypertension, kidney stones, fibromyalgia, and migraine headaches.  She presents today for evaluation for migraine headaches.  She says she has had them for many years, and that they seem to improve significantly once she retired about 20 years ago.  She says she may have a couple migraines in a row and then may go months without having 1.  Her migraines start with a visual aura and then more often than not she will have a headache for about 20 to 30 minutes before it resolves.  She uses sumatriptan as needed.  The dose is 100 mg, however she says she often takes a quarter or half of a tablet.  She had an MRI of her brain ordered, however she says she is very claustrophobic and has not had this done.  She does report symptoms of tinnitus to her bilateral ears that is present when she wakes up in the morning and then improves.  She also reports some vision changes but attributes this to cataract surgery and a second surgery that she had to have after the cataract surgery.  She reports tenderness over her scalp in varying locations at times.  She also reports recent hair loss.    Her blood pressure today was 132/72.  She is taking amlodipine 2.5 mg daily for her blood pressure.  She takes Zoloft 100 mg daily for anxiety and depression.    Past Medical/Surgical Hx:  Past Medical History:   Diagnosis Date    Depression     Diverticulitis     Environmental allergies     History of colon polyps     Hypertension     Kidney stone     hx of 4 stones     Past Surgical History:   Procedure Laterality Date    APPENDECTOMY      ASD REPAIR, SECUNDUM      CATARACT EXTRACTION      COLONOSCOPY      CYSTOSCOPY BLADDER STONE LITHOTRIPSY       CYSTOSCOPY W/ URETERAL STENT PLACEMENT Left 06/29/2017    Procedure: CYSTOSCOPY LEFT STENT INSERTION & STONE REMOVAL;  Surgeon: Raheem Franklin MD;  Location: [facility] OR;  Service:     HYSTERECTOMY      NEPHRECTOMY RADICAL      THYROIDECTOMY      UPPER GASTROINTESTINAL ENDOSCOPY         Review of Systems:        Review of Systems   HENT:  Positive for tinnitus (buzzing).    Eyes:  Positive for photophobia and visual disturbance.   Gastrointestinal:  Negative for nausea and vomiting.   Neurological:  Positive for dizziness, light-headedness and headache (head is sore all over, losing hair). Negative for tremors, seizures, syncope, facial asymmetry, speech difficulty, weakness, numbness, memory problem and confusion.      Medications    Current Outpatient Medications:     busPIRone (BUSPAR) 10 MG tablet, Take 1 tablet by mouth Daily., Disp: 90 tablet, Rfl: 3    diazePAM (Valium) 5 MG tablet, Take 1 tablet 1 hour before procedure by mouth. If anxiety still increased, can take second dose 30 minutes before procedure by mouth. DO NOT DRIVE WHILE TAKING MEDICATION., Disp: 2 tablet, Rfl: 0    famotidine (PEPCID) 20 MG tablet, Take 1 tablet by mouth 2 (Two) Times a Day., Disp: 180 tablet, Rfl: 3    liothyronine (CYTOMEL) 5 MCG tablet, Take 1 tablet by mouth Daily., Disp: 90 tablet, Rfl: 3    prednisoLONE acetate (PRED FORTE) 1 % ophthalmic suspension, , Disp: , Rfl:     sertraline (Zoloft) 100 MG tablet, Take 1 tablet by mouth Daily., Disp: 90 tablet, Rfl: 2    SUMAtriptan (IMITREX) 100 MG tablet, TAKE 1 TABLET BY MOUTH AT ONSET OF HEADACHE; MAY REPEAT DOSE ONE TIME IN 2 HOURS IF HEADACHE NOT RELIEVED, Disp: 9 tablet, Rfl: 2    Synthroid 75 MCG tablet, TAKE 1 TABLET BY MOUTH DAILY, Disp: 90 tablet, Rfl: 1    Turmeric (QC TUMERIC COMPLEX PO), Take  by mouth. Power force brand, Disp: , Rfl:     valACYclovir (Valtrex) 500 MG tablet, Take 1 tablet by mouth 2 (Two) Times a Day. Take 1 tablet po BID x 5 days when  "experiencing fever blister symptoms., Disp: 60 tablet, Rfl: 2     Allergies:  Allergies   Allergen Reactions    Sulfamethoxazole-Trimethoprim Hives    Statins GI Intolerance     Patient \"felt sick\"    Sulfa Antibiotics Hives       Social Hx:  Social History     Socioeconomic History    Marital status: Single   Tobacco Use    Smoking status: Never    Smokeless tobacco: Never   Vaping Use    Vaping status: Never Used   Substance and Sexual Activity    Alcohol use: No    Drug use: No    Sexual activity: Not Currently     Birth control/protection: Post-menopausal       Family Hx:  Family History   Problem Relation Age of Onset    Heart disease Mother     Hypertension Mother     Heart disease Father     Hypertension Father     Colon cancer Neg Hx     Colon polyps Neg Hx     Crohn's disease Neg Hx     Irritable bowel syndrome Neg Hx     Ulcerative colitis Neg Hx        I have reviewed and confirmed the accuracy of the patient's history: Chief complaint, HPI, ROS, Subjective, and Past Family Social History as entered by the MA/SERENAN/RN. Preeti Veras MA 06/13/24      Review of Imaging (Interpretation of scans not reports):      Laboratory Results:       Physical Examination:  General Appearance:  Well developed, well nourished, well groomed, alert, and cooperative.  HEENT: Normocephalic.    Neck and Spine: Normal range of motion.  Normal alignment. No mass or tenderness. No bruits.  Cardiac: Regular rate and rhythm. No murmurs.  Peripheral Vasculature: Radial and pedal pulses are equal and symmetric.   Extremities: No edema or deformities. Normal joint ROM.  Skin: No rashes or birth marks.      Neurological examination:   Higher Integrative Function: Oriented to time, place, and person. Normal registration, recall attention span and concentration. Normal language including comprehension, spontaneous speech, repetition, reading, writing, naming and vocabulary. No neglect with normal visual-spatial function and construction. " Normal fund of knowledge and higher integrative function.   CN II: Pupils are equal, round and reactive to light. Normal visual acuity and visual fields.   CN III IV VI: Extraocular movements are full without nystagmus.   CN V: Normal facial sensation and strength of muscles of mastication.   CN VII: Facial movements are symmetric. No weakness.   CN VIII: Auditory acuity is normal.  CN IX & X: Symmetric palatal movement.   CN XI: Sternocleidomastoid and trapezius are normal. No weakness.   CN XII: The tongue is midline. No atrophy or fasciculations.  Motor: Normal muscle strength, bulk and tone in upper and lower extremities. No fasciculations, rigidity, spasticity, or abnormal movements.   Reflexes: 2+ in the upper and lower extremities. Plantar responses are flexor.   Sensation: Normal light touch, pinprick, vibration, temperature, and proprioception in the arms and legs.   Station and Gait: Normal gait and station.   Coordination: Finger to nose test shows no dysmetria. Rapid alternating movements are normal. Heel to shin is normal.            Diagnoses / Discussion:      Plan:       Diagnoses and all orders for this visit:    1. Suspected sleep apnea (Primary)  -     Ambulatory Referral to Sleep Medicine    2. Migraine with aura and without status migrainosus, not intractable    Her migraines overall are fairly infrequent and respond well to sumatriptan.  I did give her samples of Nurtec after discussing the risks of triptans.  If this works well for her she will call for prescription.  The scalp sensitivity that she is experiencing may be related to her migraines or also the fibromyalgia.  I did recommend that she have a sleep study and placed a referral to sleep medicine for this.  I recommended that she see dermatology for her hair loss.  She is continuing to follow with ophthalmology for her vision concerns post surgery.  She can follow-up as needed, unless she would like us to refill her Nurtec and if that  is the case she can be seen annually.    Coding

## 2024-06-14 ENCOUNTER — PATIENT ROUNDING (BHMG ONLY) (OUTPATIENT)
Dept: NEUROLOGY | Facility: CLINIC | Age: 74
End: 2024-06-14
Payer: MEDICARE

## 2024-06-21 ENCOUNTER — TELEPHONE (OUTPATIENT)
Dept: CARDIOLOGY | Facility: CLINIC | Age: 74
End: 2024-06-21
Payer: MEDICARE

## 2024-06-21 NOTE — TELEPHONE ENCOUNTER
Caller: Sara Gary     Best call back number: [phone]     What is your medical concern? PT WAS FOLLOWING UP AS SHE CALLED IN HER BP READINGS EARLIER THIS WEEK AND STATES SHE HASN'T HEARD ANYTHING ABOUT ADVISEMENT FOR MEDICATION AS THE NUMBERS WERE ABOUT THE SAME AND GOING UP AND DOWN - NO NOTE SHOWING IN CHART, - PT STATES SHE SPOKE WITH SOMEONE WHO TOOK DOWN HER READINGS - PT SEEKING ADVISEMENT

## 2024-06-21 NOTE — TELEPHONE ENCOUNTER
Notified patient of recommendations. Patient verbalized understanding. She said she is currently taking the amlodipine HS. She is wondering if she can try 2.5mg AM and 2.5mg HS, or are you wanting to her to take the 5mg all together?    Abbi Millard RN  Triage [facility] [place]

## 2024-06-21 NOTE — TELEPHONE ENCOUNTER
159/74      119/67  147/78      152/76  123/75      121/76  174/77      144/79  151/79      156/77  132/75      149/84  124/71      131/72  153/82      149/82  145/79      143/78  128/78      164/83  144/77    Patient had called again with some B/P readings. She is taking the Amlodipine 2.5 mg QD. Please advise.

## 2024-06-21 NOTE — TELEPHONE ENCOUNTER
Pl let her know her BP is only controlled about 50% of the time. She is on a very low amlodipine dose. I would suggest taking the 5mg dose (two of her current tablets) and see how she feels on that. She can send me a msg on mychart with her BP next week.

## 2024-06-24 NOTE — TELEPHONE ENCOUNTER
Notified patient of recommendations. Patient verbalized understanding.    Abbi Millard RN  [place] [facility] [place]

## 2024-06-25 DIAGNOSIS — R74.8 ELEVATED ALKALINE PHOSPHATASE LEVEL: Primary | ICD-10-CM

## 2024-07-01 ENCOUNTER — TELEPHONE (OUTPATIENT)
Dept: CARDIOLOGY | Facility: CLINIC | Age: 74
End: 2024-07-01
Payer: MEDICARE

## 2024-07-01 RX ORDER — AMLODIPINE BESYLATE 2.5 MG/1
2.5 TABLET ORAL 2 TIMES DAILY
Qty: 180 TABLET | Refills: 1 | Status: SHIPPED | OUTPATIENT
Start: 2024-07-01 | End: 2024-07-01 | Stop reason: SDUPTHER

## 2024-07-01 RX ORDER — AMLODIPINE BESYLATE 2.5 MG/1
2.5 TABLET ORAL 2 TIMES DAILY
Qty: 180 TABLET | Refills: 3 | Status: SHIPPED | OUTPATIENT
Start: 2024-07-01

## 2024-07-01 NOTE — TELEPHONE ENCOUNTER
Pt called needing refills on Amlodipine 2.5 MG. Refill has been completed. Pt also wanted to leave BP readings. She said you do not request the time of day they are taken. BP readings are below:    136/84  14/81  128/73  130/72  156/83  157/85  113/73  142/74

## 2024-07-20 DIAGNOSIS — E03.8 OTHER SPECIFIED HYPOTHYROIDISM: ICD-10-CM

## 2024-07-20 DIAGNOSIS — U07.1 COVID-19: ICD-10-CM

## 2024-07-22 RX ORDER — LIOTHYRONINE SODIUM 5 UG/1
5 TABLET ORAL DAILY
Qty: 90 TABLET | Refills: 3 | OUTPATIENT
Start: 2024-07-22

## 2024-07-22 RX ORDER — METHYLPREDNISOLONE 4 MG/1
TABLET ORAL
Qty: 21 TABLET | OUTPATIENT
Start: 2024-07-22

## 2024-07-22 NOTE — TELEPHONE ENCOUNTER
Refill already sent to [facility]. Called patient to verify and she said only uses [facility], not the [facility] anymore

## 2024-07-24 ENCOUNTER — OFFICE VISIT (OUTPATIENT)
Dept: INTERNAL MEDICINE | Facility: CLINIC | Age: 74
End: 2024-07-24
Payer: MEDICARE

## 2024-07-24 VITALS
OXYGEN SATURATION: 95 % | HEIGHT: 69 IN | SYSTOLIC BLOOD PRESSURE: 118 MMHG | DIASTOLIC BLOOD PRESSURE: 82 MMHG | RESPIRATION RATE: 18 BRPM | WEIGHT: 145 LBS | BODY MASS INDEX: 21.48 KG/M2 | HEART RATE: 76 BPM

## 2024-07-24 DIAGNOSIS — F41.9 ANXIETY AND DEPRESSION: Primary | ICD-10-CM

## 2024-07-24 DIAGNOSIS — F32.A ANXIETY AND DEPRESSION: Primary | ICD-10-CM

## 2024-07-24 PROCEDURE — 99213 OFFICE O/P EST LOW 20 MIN: CPT | Performed by: NURSE PRACTITIONER

## 2024-07-24 PROCEDURE — 3079F DIAST BP 80-89 MM HG: CPT | Performed by: NURSE PRACTITIONER

## 2024-07-24 PROCEDURE — 1126F AMNT PAIN NOTED NONE PRSNT: CPT | Performed by: NURSE PRACTITIONER

## 2024-07-24 PROCEDURE — 3074F SYST BP LT 130 MM HG: CPT | Performed by: NURSE PRACTITIONER

## 2024-07-24 PROCEDURE — 1159F MED LIST DOCD IN RCRD: CPT | Performed by: NURSE PRACTITIONER

## 2024-07-24 PROCEDURE — 1160F RVW MEDS BY RX/DR IN RCRD: CPT | Performed by: NURSE PRACTITIONER

## 2024-07-24 NOTE — PROGRESS NOTES
"Chief Complaint  Anxiety and Depression    Subjective        Sara Gary presents to [facility]  History of Present Illness  This is a 72 y/o female presenting to office for f/u with anxiety and depression. Reports doing well on zoloft 100mg, buspar 10mg daily. Denies any SI. Reports overall she feels calmer. Reports she overall is doing well and does not have any further questions about her medications at this time.   Objective   Vital Signs:  /82 (BP Location: Left arm, Patient Position: Sitting, Cuff Size: Adult)   Pulse 76   Resp 18   Ht 175.3 cm (69.02\")   Wt 65.8 kg (145 lb)   SpO2 95%   BMI 21.40 kg/m²   Estimated body mass index is 21.4 kg/m² as calculated from the following:    Height as of this encounter: 175.3 cm (69.02\").    Weight as of this encounter: 65.8 kg (145 lb).       BMI is within normal parameters. No other follow-up for BMI required.      Physical Exam  Constitutional:       Appearance: Normal appearance.   HENT:      Head: Normocephalic and atraumatic.      Right Ear: External ear normal.      Left Ear: External ear normal.      Nose: Nose normal.      Mouth/Throat:      Mouth: Mucous membranes are moist.      Pharynx: Oropharynx is clear.   Eyes:      Conjunctiva/sclera: Conjunctivae normal.      Pupils: Pupils are equal, round, and reactive to light.      Comments: +glasses   Pulmonary:      Effort: Pulmonary effort is normal.   Musculoskeletal:      Cervical back: Normal range of motion and neck supple.   Skin:     General: Skin is warm and dry.      Capillary Refill: Capillary refill takes less than 2 seconds.   Neurological:      Mental Status: She is alert and oriented to person, place, and time. Mental status is at baseline.   Psychiatric:         Mood and Affect: Mood normal.         Thought Content: Thought content normal.         Judgment: Judgment normal.        Result Review :    The following data was reviewed by: Catherine Kolb, " APRN on 07/24/2024:  Common labs          3/29/2024    10:23 4/12/2024    14:39 4/13/2024    04:18   Common Labs   Glucose 88  97  93    BUN 11  10  8    Creatinine 0.80  0.74  0.68    Sodium 141  139  141    Potassium 4.4  4.1  3.5    Chloride 106  104  110    Calcium 8.9  9.0  8.5    Total Protein 6.3      Albumin 3.9  4.2     Total Bilirubin 0.5  0.4     Alkaline Phosphatase 137  133     AST (SGOT) 18  16     ALT (SGPT) 8  8     WBC 4.2  4.73  5.41    Hemoglobin 12.4  12.4  11.8    Hematocrit 37.8  38.3  34.9    Platelets 222  240  240    Total Cholesterol   218    Total Cholesterol 255      Triglycerides 73   93    HDL Cholesterol 73   62    LDL Cholesterol  170   140    Hemoglobin A1C 6.1        Tobacco Use: Low Risk  (7/24/2024)    Patient History     Smoking Tobacco Use: Never     Smokeless Tobacco Use: Never     Passive Exposure: Not on file     Social History     Substance and Sexual Activity   Alcohol Use No     Family History   Problem Relation Age of Onset    Heart disease Mother     Hypertension Mother     Heart disease Father     Hypertension Father     Colon cancer Neg Hx     Colon polyps Neg Hx     Crohn's disease Neg Hx     Irritable bowel syndrome Neg Hx     Ulcerative colitis Neg Hx                   Assessment and Plan     Diagnoses and all orders for this visit:    1. Anxiety and depression (Primary)             Follow Up     Return for Next scheduled follow up 9/25/24.  Patient was given instructions and counseling regarding her condition or for health maintenance advice. Please see specific information pulled into the AVS if appropriate.

## 2024-07-25 ENCOUNTER — PATIENT MESSAGE (OUTPATIENT)
Dept: GASTROENTEROLOGY | Facility: CLINIC | Age: 74
End: 2024-07-25

## 2024-08-01 ENCOUNTER — TELEPHONE (OUTPATIENT)
Dept: INTERNAL MEDICINE | Facility: CLINIC | Age: 74
End: 2024-08-01
Payer: MEDICARE

## 2024-08-01 NOTE — TELEPHONE ENCOUNTER
"Neurology office note from June 13, 2024 states that the patient has not completed the brain Mri because she is \"very claustrophobic\"    Is there another test you can order? Or does the patient need to ask her Neurologist?  "

## 2024-08-01 NOTE — TELEPHONE ENCOUNTER
We can cancel it from our side, neuro did not want to pursue MRI from what patient told me during last visit

## 2024-08-14 ENCOUNTER — NURSE TRIAGE (OUTPATIENT)
Dept: CALL CENTER | Facility: HOSPITAL | Age: 74
End: 2024-08-14
Payer: MEDICARE

## 2024-08-14 ENCOUNTER — TELEPHONE (OUTPATIENT)
Dept: INTERNAL MEDICINE | Facility: CLINIC | Age: 74
End: 2024-08-14
Payer: MEDICARE

## 2024-08-14 DIAGNOSIS — K21.9 GASTROESOPHAGEAL REFLUX DISEASE WITHOUT ESOPHAGITIS: Primary | ICD-10-CM

## 2024-08-14 RX ORDER — FAMOTIDINE 40 MG/1
40 TABLET, FILM COATED ORAL DAILY
Qty: 90 TABLET | Refills: 1 | Status: SHIPPED | OUTPATIENT
Start: 2024-08-14

## 2024-08-14 NOTE — TELEPHONE ENCOUNTER
Caller: Sara Gary    Relationship: Self    Best call back number: [phone]    What was the call regarding: PATIENT CALLING STATING HER GERD HAS BEEN ACTING UP AND THINKS SHE MAY NEED A HIGHER DOSE OF THE   famotidine (PEPCID) 20 MG tablet     IF ALY KLINE THINKS SHE NEEDS A HIGHER DOSE THEN PLEASE CALL IT INTO   [facility] [id number] [place], [place] - [address] [facility] AT [facility] [address] [facility] [phone] [facility] [phone] FX   PER PATIENTS REQUEST      PATIENT ALSO STATES SHE HAS BEEN EXPOSED TO COVID LIVING IN HER SENIOR HOUSING. SHE STATES SHE ISN'T HAVING ANY SYMPTOMS BUT WANTED ALY KLINE.    PLEASE GIVE CALLBACK IF ALY KLINE  CALLS IN A NEW HIGHER DOSE OF MEDICATION SO SHE CAN PICK IT UP

## 2024-08-14 NOTE — TELEPHONE ENCOUNTER
"COVID Protocols reviewed with caller as documented. Encouraged to call back if any symptoms develop. Verbalized understanding.    Reason for Disposition   COVID-19 EXPOSURE within last 14 days and NO symptoms    Additional Information   Negative: COVID-19 lab test positive   Negative: Lives with someone known to have influenza (flu test positive) and flu-like symptoms (e.g., cough, runny nose, sore throat, SOB; with or without fever)   Negative: Symptoms of COVID-19 (e.g., cough, fever, SOB, or others) and doctor (or NP/PA) diagnosed COVID-19 based on symptoms   Negative: Symptoms of COVID-19 (e.g., cough, fever, SOB, or others) and within 14 days of COVID-19 EXPOSURE   Negative: Symptoms of COVID-19 (e.g., cough, fever, SOB, or others) and within 14 days of being in a high-risk area for COVID-19 community spread (identified by CDC)   Negative: Difficulty breathing (shortness of breath) occurs and onset > 14 days after COVID-19 EXPOSURE   Negative: Cough occurs and onset > 14 days after COVID-19 EXPOSURE   Negative: Common cold symptoms and onset > 14 days after COVID-19 EXPOSURE   Negative: COVID-19 vaccine reaction suspected (e.g., fever, headache, muscle aches) occurring during days 1-3 after getting vaccine   Negative: COVID-19 vaccine, questions about   Negative: COVID-19 EXPOSURE within last 14 days and requests COVID-19 lab test to return to work and NO symptoms   Negative: COVID-19 EXPOSURE within last 14 days and weak immune system (e.g., HIV positive, cancer chemo, splenectomy, organ transplant, chronic steroids) and NO symptoms   Negative: Living or working in a correctional facility, long-term care facility, or shelter (i.e., group setting; densely populated) where an outbreak has occurred and NO symptoms    Answer Assessment - Initial Assessment Questions  1. COVID-19 EXPOSURE: \"Please describe how you were exposed to someone with a COVID-19 infection.\"      Lives in housing complex and has been around " "many people now diagnosed with COVID in last couple of days  2. PLACE of CONTACT: \"Where were you when you were exposed to COVID-19?\" (e.g., home, school, medical waiting room; which city?)      Home  3. TYPE of CONTACT: \"How much contact was there?\" (e.g., sitting next to, live in same house, work in same office, same building)      Group activities  4. DURATION of CONTACT: \"How long were you in contact with the COVID-19 patient?\" (e.g., a few seconds, passed by person, a few minutes, 15 minutes or longer, live with the patient)      15minutes and longer  5. MASK: \"Were you wearing a mask?\" \"Was the other person wearing a mask?\" Note: wearing a mask reduces the risk of an otherwise close contact.      No  6. DATE of CONTACT: \"When did you have contact with a COVID-19 patient?\" (e.g., how many days ago)      Yesterday  7. COMMUNITY SPREAD: \"Do you live in or have you traveled to an area where there are lots of COVID-19 cases (community spread)?\" (See public health department website, if unsure)        Lives in facility with many known cases  8. SYMPTOMS: \"Do you have any symptoms?\" (e.g., fever, cough, breathing difficulty, loss of taste or smell)      Absolutely no symptoms  9. VACCINE: \"Have you gotten the COVID-19 vaccine?\" If Yes, ask: \"Which one, how many shots, when did you get it?\"      States has had all vaccines and boosters  10. PREGNANCY OR POSTPARTUM: \"Is there any chance you are pregnant?\" \"When was your last menstrual period?\" \"Did you deliver in the last 2 weeks?\"        N/A  11. HIGH RISK: \"Do you have any heart or lung problems?\" (e.g., asthma, COPD, heart failure) \"Do you have a weak immune system or other risk factors?\" (e.g., HIV positive, chemotherapy, renal failure, diabetes mellitus, sickle cell anemia, obesity)        Atrial Septal defect    Protocols used: Coronavirus (COVID-19) Exposure-ADULT-[place]    "

## 2024-08-15 NOTE — TELEPHONE ENCOUNTER
Patient states she will try the 40mg tablet once a day for a week to see if her symptoms improve. If not patient will schedule an appointment    Patient is scheduled with Gastro. But does not see them until October

## 2024-08-15 NOTE — TELEPHONE ENCOUNTER
Patient was already taking 40mg a day with the 20mg prescription of Pepcid because she was taking 1 tablet by mouth twice a day. The instructions for the 40mg you sent says to take 1 tablet daily. Please advise if patient can take the 40mg twice a day

## 2024-08-15 NOTE — TELEPHONE ENCOUNTER
We typically would recommend patient just take the 40mg daily.   She could half it and take 20mg BID

## 2024-08-26 ENCOUNTER — TELEPHONE (OUTPATIENT)
Dept: CARDIOLOGY | Facility: CLINIC | Age: 74
End: 2024-08-26
Payer: MEDICARE

## 2024-08-26 NOTE — TELEPHONE ENCOUNTER
"Pt called in because for about the past 2 weeks, pt has been having tightness in her chest upon waking in the morning before she's even gotten out of bed.  This is the only time of the day she's having chest tightness.  She also tells me that her \"stomach hurts all the time\" and that she wakes up \"feeling horrible\"/  She's taking amlodipine 2.5 mg BID.  BP has been running 120-130s/60-70s, HR 60-70s.  Pt has an appt with her GI MD tomorrow.  She has a h/o esophagitis and GERD.    Do you have any recommendations for this patient?    Thank you,    Melissa NAVA RN  [facility] [place] [facility]  08/26/24  13:03 EDT    "

## 2024-08-27 ENCOUNTER — PREP FOR SURGERY (OUTPATIENT)
Dept: SURGERY | Facility: SURGERY CENTER | Age: 74
End: 2024-08-27
Payer: MEDICARE

## 2024-08-27 ENCOUNTER — OFFICE VISIT (OUTPATIENT)
Dept: GASTROENTEROLOGY | Facility: CLINIC | Age: 74
End: 2024-08-27
Payer: MEDICARE

## 2024-08-27 VITALS
TEMPERATURE: 98.6 F | OXYGEN SATURATION: 98 % | WEIGHT: 145.3 LBS | DIASTOLIC BLOOD PRESSURE: 78 MMHG | HEART RATE: 65 BPM | BODY MASS INDEX: 21.52 KG/M2 | SYSTOLIC BLOOD PRESSURE: 146 MMHG | HEIGHT: 69 IN

## 2024-08-27 DIAGNOSIS — Z86.010 HX OF ADENOMATOUS COLONIC POLYPS: ICD-10-CM

## 2024-08-27 DIAGNOSIS — K21.9 GASTROESOPHAGEAL REFLUX DISEASE, UNSPECIFIED WHETHER ESOPHAGITIS PRESENT: Primary | Chronic | ICD-10-CM

## 2024-08-27 DIAGNOSIS — K57.90 DIVERTICULOSIS: Chronic | ICD-10-CM

## 2024-08-27 DIAGNOSIS — R07.89 ATYPICAL CHEST PAIN: Chronic | ICD-10-CM

## 2024-08-27 DIAGNOSIS — Z87.19 HISTORY OF DIVERTICULITIS: ICD-10-CM

## 2024-08-27 DIAGNOSIS — K63.8219 SMALL INTESTINAL BACTERIAL OVERGROWTH (SIBO): Chronic | ICD-10-CM

## 2024-08-27 DIAGNOSIS — R74.8 ELEVATED ALKALINE PHOSPHATASE LEVEL: Chronic | ICD-10-CM

## 2024-08-27 DIAGNOSIS — Z12.11 COLON CANCER SCREENING: ICD-10-CM

## 2024-08-27 DIAGNOSIS — R07.89 ATYPICAL CHEST PAIN: Primary | ICD-10-CM

## 2024-08-27 DIAGNOSIS — K21.9 GASTROESOPHAGEAL REFLUX DISEASE, UNSPECIFIED WHETHER ESOPHAGITIS PRESENT: ICD-10-CM

## 2024-08-27 DIAGNOSIS — K21.9 GASTROESOPHAGEAL REFLUX DISEASE WITHOUT ESOPHAGITIS: ICD-10-CM

## 2024-08-27 PROCEDURE — 99214 OFFICE O/P EST MOD 30 MIN: CPT | Performed by: NURSE PRACTITIONER

## 2024-08-27 PROCEDURE — 3078F DIAST BP <80 MM HG: CPT | Performed by: NURSE PRACTITIONER

## 2024-08-27 PROCEDURE — 1159F MED LIST DOCD IN RCRD: CPT | Performed by: NURSE PRACTITIONER

## 2024-08-27 PROCEDURE — 1160F RVW MEDS BY RX/DR IN RCRD: CPT | Performed by: NURSE PRACTITIONER

## 2024-08-27 PROCEDURE — 3077F SYST BP >= 140 MM HG: CPT | Performed by: NURSE PRACTITIONER

## 2024-08-27 RX ORDER — SODIUM CHLORIDE 0.9 % (FLUSH) 0.9 %
10 SYRINGE (ML) INJECTION AS NEEDED
OUTPATIENT
Start: 2024-08-27

## 2024-08-27 RX ORDER — FAMOTIDINE 40 MG/1
20 TABLET, FILM COATED ORAL 2 TIMES DAILY
Qty: 180 TABLET | Refills: 3 | Status: SHIPPED | OUTPATIENT
Start: 2024-08-27

## 2024-08-27 RX ORDER — SODIUM CHLORIDE, SODIUM LACTATE, POTASSIUM CHLORIDE, CALCIUM CHLORIDE 600; 310; 30; 20 MG/100ML; MG/100ML; MG/100ML; MG/100ML
30 INJECTION, SOLUTION INTRAVENOUS CONTINUOUS PRN
OUTPATIENT
Start: 2024-08-27

## 2024-08-27 RX ORDER — SODIUM CHLORIDE 0.9 % (FLUSH) 0.9 %
3 SYRINGE (ML) INJECTION EVERY 12 HOURS SCHEDULED
OUTPATIENT
Start: 2024-08-27

## 2024-08-27 NOTE — PATIENT INSTRUCTIONS
1.  For GERD, continue famotidine 20 mg twice daily. Refills have been sent to your pharmacy.     2.  For GERD, we recommend avoiding eating 3-4 hours before bedtime, eating smaller more frequent meals, and avoiding any known food triggers including spicy foods, tomatoes and tomato-based sauces, chocolate, coffee/tea, citrus fruits, carbonated  beverages and alcohol.     3.  For further evaluation of atypical chest pain and GERD we will schedule an EGD.     4.  Next Cologuard will be due August 2025 for colon cancer screening.    5.  Plan for office follow up 4 weeks after EGD to discuss results and reassess symptoms.     5.  For dyspepsia you my try FD guard or IBGard.  FDgard is typically used for upper GI symptoms, IBgard is typically used for lower GI symptoms.  However sometimes patients respond better to 1 than the other.  These can be purchased OTC at your local grocery or pharmacy. We recommend following package instructions for use.

## 2024-08-27 NOTE — PROGRESS NOTES
Chief Complaint   Patient presents with    Follow-up     GERD, SIBO, colon polyps           History of Present Illness  73-year-old female presents the office today for follow-up.  She was last seen in office on 12/21/2023.  She history of GERD, chronically elevated alkaline phosphatase in the setting of osteopenia, diverticulosis, history of diverticulitis, and excess gas.    She was seen and evaluated by urgent care for tightness in her chest and dizziness. She had an EKG performed. Systolic BP went up to 200 per her report. She reports having had some issues with her blood pressure and her medication was changed to amlodipine. She was placed on amlodipine 20 mg twice daily. This stabilized her BP.     She reports abdominal discomfort with use of amlodipine. She reports that this mediation has been bothering her stomach since the first of August. She held 3 doses of amlodipine and her abdominal pain has resolved. She called cardiology yesterday. She has not taken any amlodipine since. She describes her abdominal pain as a burning in her middle abdomen/periumbilical region.     Her last Cologuard August 2022 was negative.  Her last colonoscopy July 2016 revealed a total of 3 colon polyps, all of which were tubular adenomas.  Last episode of diverticulitis May 2022.  The patient follows a strict diet and also takes turmeric Reduce her intestinal inflammation.    For GERD she continues famotidine 20 mg twice daily. She does have acid reflux and will have symptoms. She is not currently having any epigastric pain.  She denies any nausea, vomiting, or dysphagia. Last EGD was in 2016.     She is a history of SIBO.  She takes a daily probiotic.  IBGard was recommended at her last office visit as well as possible use of simethicone.  She has not started IBGard.     She reports that her bowel habits are regular. She denies any rectal bleeding or abdominal pain.        Result Review :       Office Visit with Lupis  "ALEJANDRO Arroyo (12/21/2023)   SCANNED - COLONOSCOPY (07/22/2016)   CT ABDOMEN PELVIS W CONTRAST (05/18/2022 10:31)     Vital Signs:   /78   Pulse 65   Temp 98.6 °F (37 °C)   Ht 175.3 cm (69\")   Wt 65.9 kg (145 lb 4.8 oz)   SpO2 98%   BMI 21.46 kg/m²     Body mass index is 21.46 kg/m².     Physical Exam  Vitals reviewed.   Constitutional:       Appearance: Normal appearance.   Pulmonary:      Effort: Pulmonary effort is normal. No respiratory distress.   Abdominal:      General: Abdomen is flat. Bowel sounds are normal. There is no distension.      Palpations: Abdomen is soft. There is no mass.      Tenderness: There is no abdominal tenderness. There is no guarding.   Musculoskeletal:         General: Normal range of motion.   Skin:     General: Skin is warm and dry.   Neurological:      General: No focal deficit present.      Mental Status: She is alert and oriented to person, place, and time.   Psychiatric:         Mood and Affect: Mood normal.         Thought Content: Thought content normal.         Judgment: Judgment normal.       Assessment and Plan    Diagnoses and all orders for this visit:    1. Gastroesophageal reflux disease, unspecified whether esophagitis present (Primary)    2. Atypical chest pain    3. Elevated alkaline phosphatase level    4. History of diverticulitis    5. Diverticulosis    6. Small intestinal bacterial overgrowth (SIBO)    7. Hx of adenomatous colonic polyps    8. Colon cancer screening    9. Gastroesophageal reflux disease without esophagitis  -     famotidine (PEPCID) 40 MG tablet; Take 0.5 tablets by mouth 2 (Two) Times a Day.  Dispense: 180 tablet; Refill: 3           Patient Instructions   1.  For GERD, continue famotidine 20 mg twice daily. Refills have been sent to your pharmacy.     2.  For GERD, we recommend avoiding eating 3-4 hours before bedtime, eating smaller more frequent meals, and avoiding any known food triggers including spicy foods, tomatoes and " tomato-based sauces, chocolate, coffee/tea, citrus fruits, carbonated  beverages and alcohol.     3.  For further evaluation of atypical chest pain and GERD we will schedule an EGD.     4.  Next Cologuard will be due August 2025 for colon cancer screening.    5.  Plan for office follow up 4 weeks after EGD to discuss results and reassess symptoms.     5.  For dyspepsia you my try FD guard or IBGard.  FDgard is typically used for upper GI symptoms, IBgard is typically used for lower GI symptoms.  However sometimes patients respond better to 1 than the other.  These can be purchased OTC at your local grocery or pharmacy. We recommend following package instructions for use.       Discussion:  For further evaluation of atypical chest pain and GERD we will schedule an EGD.  Patient to continue famotidine 20 mg twice daily refills been sent to her pharmacy.  Antireflux precautions encouraged.  FD guard/IBgard recommended.    Next Cologuard due August 2025 for colon cancer screening.  Recommend office follow-up visit 4 weeks after EGD to discuss results and reassess symptoms.  Patient verbalized understanding of above plan of care and is in agreement.  All questions answered and support provided.    EMR Dragon/Transcription Disclaimer:  This document has been Dictated utilizing Dragon dictation.

## 2024-08-27 NOTE — TELEPHONE ENCOUNTER
I spoke with Sara Gary and gave them message from the provider.  They verbalized understanding & have no further questions at this time.    Thank you,    Melissa NAVA , RN  [facility] [place]  08/27/24 08:32 EDT

## 2024-08-29 ENCOUNTER — TELEPHONE (OUTPATIENT)
Dept: CARDIOLOGY | Facility: CLINIC | Age: 74
End: 2024-08-29
Payer: MEDICARE

## 2024-08-29 NOTE — TELEPHONE ENCOUNTER
Pt called re: needing cardiac clearance for Gastro.    Requesting Cardiac Clearance for ESOPHAGOGASTRODUODENOSCOPY  on 9/4/24 w/ Dr. Luis Enrique Murrell.     LOV w/ you on 5/16/24  FU w/ ALEJANDRO Jefferson on 11/12/24    Pt not currently taking any blood thinner.    Please advise on Clearance and recommendations.    CARY Gallegos

## 2024-09-03 ENCOUNTER — TELEPHONE (OUTPATIENT)
Dept: GASTROENTEROLOGY | Facility: CLINIC | Age: 74
End: 2024-09-03
Payer: MEDICARE

## 2024-09-03 NOTE — TELEPHONE ENCOUNTER
Caller: Sara Gary    Relationship: Self    Best call back number: [phone]    What is the best time to reach you: ANY    Who are you requesting to speak with (clinical staff, provider,  specific staff member): CLINICAL    Do you know the name of the person who called: CLINICAL    What was the call regarding: SURGICAL CLEARANCE    Is it okay if the provider responds through MyChart: NO

## 2024-09-11 RX ORDER — LISINOPRIL 5 MG/1
5 TABLET ORAL DAILY
Qty: 30 TABLET | Refills: 11 | Status: SHIPPED | OUTPATIENT
Start: 2024-09-11

## 2024-09-25 ENCOUNTER — OFFICE VISIT (OUTPATIENT)
Dept: INTERNAL MEDICINE | Facility: CLINIC | Age: 74
End: 2024-09-25
Payer: MEDICARE

## 2024-09-25 VITALS
WEIGHT: 144 LBS | SYSTOLIC BLOOD PRESSURE: 124 MMHG | HEIGHT: 69 IN | HEART RATE: 75 BPM | DIASTOLIC BLOOD PRESSURE: 76 MMHG | OXYGEN SATURATION: 98 % | BODY MASS INDEX: 21.33 KG/M2

## 2024-09-25 DIAGNOSIS — R73.03 PREDIABETES: Chronic | ICD-10-CM

## 2024-09-25 DIAGNOSIS — E03.9 HYPOTHYROIDISM, UNSPECIFIED TYPE: Chronic | ICD-10-CM

## 2024-09-25 DIAGNOSIS — G43.109 MIGRAINE WITH AURA AND WITHOUT STATUS MIGRAINOSUS, NOT INTRACTABLE: Chronic | ICD-10-CM

## 2024-09-25 DIAGNOSIS — E78.2 MIXED HYPERLIPIDEMIA: Chronic | ICD-10-CM

## 2024-09-25 DIAGNOSIS — F32.A ANXIETY AND DEPRESSION: Chronic | ICD-10-CM

## 2024-09-25 DIAGNOSIS — Z12.31 SCREENING MAMMOGRAM FOR BREAST CANCER: ICD-10-CM

## 2024-09-25 DIAGNOSIS — F41.9 ANXIETY AND DEPRESSION: Chronic | ICD-10-CM

## 2024-09-25 DIAGNOSIS — I34.1 MVP (MITRAL VALVE PROLAPSE): Chronic | ICD-10-CM

## 2024-09-25 DIAGNOSIS — Z00.00 ANNUAL PHYSICAL EXAM: ICD-10-CM

## 2024-09-25 DIAGNOSIS — I10 PRIMARY HYPERTENSION: Chronic | ICD-10-CM

## 2024-09-25 DIAGNOSIS — Z00.00 MEDICARE ANNUAL WELLNESS VISIT, SUBSEQUENT: Primary | ICD-10-CM

## 2024-09-25 DIAGNOSIS — K21.9 GASTROESOPHAGEAL REFLUX DISEASE WITHOUT ESOPHAGITIS: Chronic | ICD-10-CM

## 2024-09-25 DIAGNOSIS — Q21.10 RESIDUAL ASD (ATRIAL SEPTAL DEFECT) FOLLOWING REPAIR: Chronic | ICD-10-CM

## 2024-09-25 PROCEDURE — G0439 PPPS, SUBSEQ VISIT: HCPCS | Performed by: NURSE PRACTITIONER

## 2024-09-25 PROCEDURE — 1125F AMNT PAIN NOTED PAIN PRSNT: CPT | Performed by: NURSE PRACTITIONER

## 2024-09-25 PROCEDURE — 1170F FXNL STATUS ASSESSED: CPT | Performed by: NURSE PRACTITIONER

## 2024-09-25 PROCEDURE — 3074F SYST BP LT 130 MM HG: CPT | Performed by: NURSE PRACTITIONER

## 2024-09-25 PROCEDURE — 99214 OFFICE O/P EST MOD 30 MIN: CPT | Performed by: NURSE PRACTITIONER

## 2024-09-25 PROCEDURE — 3078F DIAST BP <80 MM HG: CPT | Performed by: NURSE PRACTITIONER

## 2024-09-25 PROCEDURE — 1159F MED LIST DOCD IN RCRD: CPT | Performed by: NURSE PRACTITIONER

## 2024-09-25 PROCEDURE — 99397 PER PM REEVAL EST PAT 65+ YR: CPT | Performed by: NURSE PRACTITIONER

## 2024-09-25 PROCEDURE — 1160F RVW MEDS BY RX/DR IN RCRD: CPT | Performed by: NURSE PRACTITIONER

## 2024-09-26 LAB
ALBUMIN SERPL-MCNC: 4.3 G/DL (ref 3.5–5.2)
ALBUMIN/GLOB SERPL: 1.8 G/DL
ALP SERPL-CCNC: 146 U/L (ref 39–117)
ALT SERPL-CCNC: 7 U/L (ref 1–33)
AST SERPL-CCNC: 14 U/L (ref 1–32)
BASOPHILS # BLD AUTO: 0.02 10*3/MM3 (ref 0–0.2)
BASOPHILS NFR BLD AUTO: 0.4 % (ref 0–1.5)
BILIRUB SERPL-MCNC: 0.3 MG/DL (ref 0–1.2)
BUN SERPL-MCNC: 11 MG/DL (ref 8–23)
BUN/CREAT SERPL: 13.6 (ref 7–25)
CALCIUM SERPL-MCNC: 9.3 MG/DL (ref 8.6–10.5)
CHLORIDE SERPL-SCNC: 106 MMOL/L (ref 98–107)
CHOLEST SERPL-MCNC: 258 MG/DL (ref 0–200)
CO2 SERPL-SCNC: 23.1 MMOL/L (ref 22–29)
CREAT SERPL-MCNC: 0.81 MG/DL (ref 0.57–1)
EGFRCR SERPLBLD CKD-EPI 2021: 75.8 ML/MIN/1.73
EOSINOPHIL # BLD AUTO: 0.1 10*3/MM3 (ref 0–0.4)
EOSINOPHIL NFR BLD AUTO: 2 % (ref 0.3–6.2)
ERYTHROCYTE [DISTWIDTH] IN BLOOD BY AUTOMATED COUNT: 12.6 % (ref 12.3–15.4)
GLOBULIN SER CALC-MCNC: 2.4 GM/DL
GLUCOSE SERPL-MCNC: 101 MG/DL (ref 65–99)
HBA1C MFR BLD: 5.5 % (ref 4.8–5.6)
HCT VFR BLD AUTO: 39.4 % (ref 34–46.6)
HDLC SERPL-MCNC: 68 MG/DL (ref 40–60)
HGB BLD-MCNC: 12.7 G/DL (ref 12–15.9)
IMM GRANULOCYTES # BLD AUTO: 0.01 10*3/MM3 (ref 0–0.05)
IMM GRANULOCYTES NFR BLD AUTO: 0.2 % (ref 0–0.5)
LDLC SERPL CALC-MCNC: 171 MG/DL (ref 0–100)
LYMPHOCYTES # BLD AUTO: 1.37 10*3/MM3 (ref 0.7–3.1)
LYMPHOCYTES NFR BLD AUTO: 28 % (ref 19.6–45.3)
MCH RBC QN AUTO: 29.3 PG (ref 26.6–33)
MCHC RBC AUTO-ENTMCNC: 32.2 G/DL (ref 31.5–35.7)
MCV RBC AUTO: 90.8 FL (ref 79–97)
MONOCYTES # BLD AUTO: 0.4 10*3/MM3 (ref 0.1–0.9)
MONOCYTES NFR BLD AUTO: 8.2 % (ref 5–12)
NEUTROPHILS # BLD AUTO: 3 10*3/MM3 (ref 1.7–7)
NEUTROPHILS NFR BLD AUTO: 61.2 % (ref 42.7–76)
NRBC BLD AUTO-RTO: 0 /100 WBC (ref 0–0.2)
PLATELET # BLD AUTO: 246 10*3/MM3 (ref 140–450)
POTASSIUM SERPL-SCNC: 4.8 MMOL/L (ref 3.5–5.2)
PROT SERPL-MCNC: 6.7 G/DL (ref 6–8.5)
RBC # BLD AUTO: 4.34 10*6/MM3 (ref 3.77–5.28)
SODIUM SERPL-SCNC: 141 MMOL/L (ref 136–145)
T3FREE SERPL-MCNC: 2.2 PG/ML (ref 2–4.4)
T4 FREE SERPL-MCNC: 1.09 NG/DL (ref 0.92–1.68)
TRIGL SERPL-MCNC: 108 MG/DL (ref 0–150)
TSH SERPL DL<=0.005 MIU/L-ACNC: 0.71 UIU/ML (ref 0.27–4.2)
VLDLC SERPL CALC-MCNC: 19 MG/DL (ref 5–40)
WBC # BLD AUTO: 4.9 10*3/MM3 (ref 3.4–10.8)

## 2024-10-02 NOTE — PROGRESS NOTES
Chief Complaint  Heartburn and GI Problem    Subjective      History of Present Illness  The patient is a 73-year-old female presenting today for follow up. She is new to me but is an established patient of our practice, last seen by Dina Baugh, nurse practitioner, on 08/27/2014. Her medical history includes acid reflux, small intestinal bacterial overgrowth (SIBO), chronically elevated alkaline phosphatase in the setting of osteopenia, diverticulosis, and excessive gas.    She has been experiencing issues with her blood pressure medication, which is not friendly to her gastrointestinal system. She was previously on amlodipine, which caused stomach discomfort. Upon discontinuation, her condition improved. She has been consuming probiotic tree and lemon tea and being mindful of her diet to alleviate her stomach issues. She was then prescribed lisinopril, but it caused a severe dry cough. She is seeking a more gastro-friendly blood pressure medication. A friend suggested losartan as a potential alternative. She is hesitant about hydrochlorothiazide due to its diuretic effect, which could disrupt her sleep. She believes losartan alone would be the best option. She is curious if she needs to consult her cardiologist for a change in medication. She is also concerned about dietary restrictions associated with lisinopril, such as avoiding bananas due to their high potassium content.    Her EGD has been rescheduled for 11/19/2024 for history of duodenum diverticulum. She is currently taking Pepcid 20 mg twice daily. She switched to Pepcid because it did not upset her stomach like other medications (omeprazole or pantoprazole). However, she is now experiencing stomach issues again and is considering changing her medication.     She is not currently taking vitamin D or calcium supplements, but believes she may need to start taking calcium.     She has completed a Cologuard test, which came back negative. She is  "hesitant to undergo a colonoscopy due to the bowel preparation required. She is curious if an ultrasound of her bowel would reveal any issues in place of actual traditional colonoscopy screening. She follows a strict diet and takes turmeric. She was given samples of IBgard, but is hesitant to take it due to its peppermint oil content. As mentioned, she reports no current abdominal pain, which was previously alleviated by discontinuing amlodipine. No changes in bowel regimens. No blood in stool     No recent change in weight or appetite.   Patient denies personal or family history of colorectal or upper GI cancer.    9/25/24 CMP (AST/ALT normal values)  (trending values run between 124-152); CBC, TSH normal.    Interval history related to tests/imaging studies:  4/13/24 CTA - suspect transient hepatic attenuation difference in the anterior left hepatic lobe. Small cyst at the right hepatic dome. Colonic diverticulosis. Incidental duodenal diverticulum.    DEXA scan 2023 - osteopenia at the hips and the lumbar spine.  8/2022 Cologuard negative    7/2016 EGD/Colonoscopy - 3 tubular adenomas colon polyps    5/2022 Acute diverticulitis.     Objective   Vital Signs:  /64   Pulse 89   Temp 98.2 °F (36.8 °C)   Ht 175.3 cm (69\")   Wt 65.3 kg (143 lb 14.4 oz)   SpO2 98%   BMI 21.25 kg/m²   Estimated body mass index is 21.25 kg/m² as calculated from the following:    Height as of this encounter: 175.3 cm (69\").    Weight as of this encounter: 65.3 kg (143 lb 14.4 oz).       BMI is within normal parameters. No other follow-up for BMI required.    Physical Exam  Vitals and nursing note reviewed.   Constitutional:       General: She is not in acute distress.     Appearance: Normal appearance. She is normal weight. She is not ill-appearing, toxic-appearing or diaphoretic.   Eyes:      General: No scleral icterus.     Conjunctiva/sclera: Conjunctivae normal.   Cardiovascular:      Rate and Rhythm: Normal rate and " regular rhythm.      Pulses: Normal pulses.      Heart sounds: Normal heart sounds.   Pulmonary:      Effort: Pulmonary effort is normal. No respiratory distress.      Breath sounds: Normal breath sounds. No stridor.   Abdominal:      General: Abdomen is flat. Bowel sounds are normal. There is no distension.      Palpations: Abdomen is soft. There is no mass.      Tenderness: There is no abdominal tenderness. There is no right CVA tenderness, left CVA tenderness, guarding or rebound.      Hernia: No hernia is present.   Skin:     Coloration: Skin is not jaundiced or pale.      Findings: No erythema or rash.   Neurological:      Mental Status: She is alert and oriented to person, place, and time. Mental status is at baseline.   Psychiatric:         Mood and Affect: Mood normal.             Assessment and Plan     Diagnoses and all orders for this visit:    1. Elevated alkaline phosphatase level (Primary)    2. Osteopenia, unspecified location    3. Duodenal diverticulum    4. Hepatic cyst    5. Gastroesophageal reflux disease without esophagitis    Discussion  Patient is a 73 year-old  female, new to me, an established patient of our practice, here for follow up with:  Assessment & Plan  1. Elevated alkaline phosphate.  This chronic issue, likely associated with her osteopenia. She is current with her DEXA scan as of 03/18/2023. She should resume taking vitamin D and calcium supplements and discuss this with her primary care physician.    2. Hepatic cyst, noted on CTA result 4/13/2023.  Despite the presence of a hepatic cyst, her liver function tests are normal.   An abdominal ultrasound will be ordered to ensure hepatic structure stability as part of baseline evaluation.    3. Duodenal diverticulum and GERD  An EGD is scheduled for 11/19/2024.  She is advised to continue Pepcid 40mg at night. A 6 to 8-weeks supply of Voquezna 10mg - samples will be provided, to be taken once daily. Dietary modifications  were discussed, including avoiding greasy foods, spicy foods, tomato-based products, and not lying down immediately after eating.    5. Hypertension.  She can discuss with primary care provider If amlodipine or lisinopril are not tolerated due to cough or GI symptoms, perhaps Losartan may be considered, this is up to PCP's recommendation.     6. Health Maintenance.  Her next Cologuard is due in August 2025 for colon cancer screening.  Patient declines traditional colonoscopy due to bowel prep process that she does not think she could tolerate.      Follow-up with me after EGD    Much of this encounter note is an electronic transcription/translation of spoken language to printed text. The electronic translation of spoken language may permit erroneous, or at times, nonsensical words or phrases to be inadvertently transcribed; Although I have reviewed the note for such errors, some may still exist.    Follow Up     No follow-ups on file.    I have reviewed patient's current medications list, relevant clinical information necessary for today's encounter. I discussed the clinical impression and treatment plan with the patient, who verbalized understanding and in agreement.  All questions were answered and support was provided.    Patient or patient representative verbalized consent for the use of Ambient Listening during the visit with  ALEJANDRO Weeks for chart documentation. 10/7/2024  14:21 EDT

## 2024-10-07 ENCOUNTER — OFFICE VISIT (OUTPATIENT)
Dept: GASTROENTEROLOGY | Facility: CLINIC | Age: 74
End: 2024-10-07
Payer: MEDICARE

## 2024-10-07 VITALS
DIASTOLIC BLOOD PRESSURE: 64 MMHG | TEMPERATURE: 98.2 F | HEART RATE: 89 BPM | OXYGEN SATURATION: 98 % | SYSTOLIC BLOOD PRESSURE: 122 MMHG | HEIGHT: 69 IN | WEIGHT: 143.9 LBS | BODY MASS INDEX: 21.31 KG/M2

## 2024-10-07 DIAGNOSIS — M85.80 OSTEOPENIA, UNSPECIFIED LOCATION: ICD-10-CM

## 2024-10-07 DIAGNOSIS — K76.89 HEPATIC CYST: ICD-10-CM

## 2024-10-07 DIAGNOSIS — R74.8 ELEVATED ALKALINE PHOSPHATASE LEVEL: Primary | ICD-10-CM

## 2024-10-07 DIAGNOSIS — K57.10 DUODENAL DIVERTICULUM: ICD-10-CM

## 2024-10-07 DIAGNOSIS — K21.9 GASTROESOPHAGEAL REFLUX DISEASE WITHOUT ESOPHAGITIS: ICD-10-CM

## 2024-10-07 DIAGNOSIS — E78.2 MIXED HYPERLIPIDEMIA: Primary | ICD-10-CM

## 2024-10-07 PROCEDURE — 3078F DIAST BP <80 MM HG: CPT | Performed by: NURSE PRACTITIONER

## 2024-10-07 PROCEDURE — 3074F SYST BP LT 130 MM HG: CPT | Performed by: NURSE PRACTITIONER

## 2024-10-07 PROCEDURE — 99214 OFFICE O/P EST MOD 30 MIN: CPT | Performed by: NURSE PRACTITIONER

## 2024-10-07 PROCEDURE — 1159F MED LIST DOCD IN RCRD: CPT | Performed by: NURSE PRACTITIONER

## 2024-10-07 PROCEDURE — 1160F RVW MEDS BY RX/DR IN RCRD: CPT | Performed by: NURSE PRACTITIONER

## 2024-10-07 RX ORDER — EZETIMIBE 10 MG/1
10 TABLET ORAL DAILY
Qty: 90 TABLET | Refills: 1 | Status: SHIPPED | OUTPATIENT
Start: 2024-10-07

## 2024-10-07 NOTE — PATIENT INSTRUCTIONS
Proceed with abdominal ultrasound after upper endoscopy.     GERD is a chronic disease that occurs when stomach acid flows back into the tube that connects your mouth and stomach. Warning of worsening symptoms may include: Hoarseness, trouble swallowing, too much throat mucus, a lump in the throat, chronic cough, and heartburn. Some conservative measures or treatment may help, which include:  Diet and lifestyle modification: avoid greasy foods or any foods that will cause heartburn for example chocolate, mint, spicy foods, tomato based products, and citrus. Avoid alcohol, tobacco, and caffeine. Avoid late night heavy meals. Avoid bending forward or stooping after eating. Sleep with head of your bed raised 6-8 inches.  You may also try Apple Cider Vinegar, 2 teaspoons in 8 ounces of water 3 times a day. Take 30 minutes before meals or after meals and rinse mouth after each use.   If difficulty swallowing occur, I recommend other supportive care measures, including thicken liquids to avoid aspiration. Eat smaller meals at different time intervals and cut into smaller pieces, take sips of water in between. If the above symptoms do not improve, please contact our office for further evaluation or as plan has discussed.

## 2024-10-09 ENCOUNTER — TELEPHONE (OUTPATIENT)
Age: 74
End: 2024-10-09
Payer: MEDICARE

## 2024-10-09 NOTE — TELEPHONE ENCOUNTER
Pt called and lvm regarding her Lisinopril. She is having a dry cough and some dizziness.     Would you like to change?  Thank you,    SARAN Romero

## 2024-10-10 RX ORDER — LOSARTAN POTASSIUM 25 MG/1
25 TABLET ORAL DAILY
Qty: 90 TABLET | Refills: 3 | Status: SHIPPED | OUTPATIENT
Start: 2024-10-10

## 2024-10-10 NOTE — TELEPHONE ENCOUNTER
Pt called back to suggest prescribing Losartan because she has terrible GI issues and her GI Doctor told her that Losartan (straight) seems to do well with GI patients.  [facility].    Thank you,    SARAN Romero

## 2024-11-08 ENCOUNTER — HOSPITAL ENCOUNTER (OUTPATIENT)
Dept: CARDIOLOGY | Facility: HOSPITAL | Age: 74
Discharge: HOME | End: 2024-11-08
Payer: MEDICARE

## 2024-11-08 VITALS
WEIGHT: 143 LBS | DIASTOLIC BLOOD PRESSURE: 70 MMHG | BODY MASS INDEX: 21.18 KG/M2 | HEIGHT: 69 IN | SYSTOLIC BLOOD PRESSURE: 140 MMHG | HEART RATE: 66 BPM | OXYGEN SATURATION: 98 %

## 2024-11-08 DIAGNOSIS — I35.1 NONRHEUMATIC AORTIC VALVE INSUFFICIENCY: ICD-10-CM

## 2024-11-08 LAB
AORTIC ARCH: 3.3 CM
AORTIC DIMENSIONLESS INDEX: 0.72 (DI)
ASCENDING AORTA: 3.7 CM
BH CV ECHO MEAS - ACS: 1.8 CM
BH CV ECHO MEAS - AI P1/2T: 625.2 MSEC
BH CV ECHO MEAS - AO MAX PG: 6.7 MMHG
BH CV ECHO MEAS - AO MEAN PG: 3.9 MMHG
BH CV ECHO MEAS - AO ROOT AREA (BSA CORRECTED): 1.8 CM2
BH CV ECHO MEAS - AO ROOT DIAM: 3.2 CM
BH CV ECHO MEAS - AO V2 MAX: 129 CM/SEC
BH CV ECHO MEAS - AO V2 VTI: 30.8 CM
BH CV ECHO MEAS - AVA(I,D): 2.3 CM2
BH CV ECHO MEAS - EDV(CUBED): 101.9 ML
BH CV ECHO MEAS - EDV(MOD-SP2): 97 ML
BH CV ECHO MEAS - EDV(MOD-SP4): 100 ML
BH CV ECHO MEAS - EF(MOD-BP): 53.2 %
BH CV ECHO MEAS - EF(MOD-SP2): 51.5 %
BH CV ECHO MEAS - EF(MOD-SP4): 56 %
BH CV ECHO MEAS - ESV(CUBED): 53.8 ML
BH CV ECHO MEAS - ESV(MOD-SP2): 47 ML
BH CV ECHO MEAS - ESV(MOD-SP4): 44 ML
BH CV ECHO MEAS - FS: 19.2 %
BH CV ECHO MEAS - IVS/LVPW: 0.83 CM
BH CV ECHO MEAS - IVSD: 0.96 CM
BH CV ECHO MEAS - LAT PEAK E' VEL: 4 CM/SEC
BH CV ECHO MEAS - LV DIASTOLIC VOL/BSA (35-75): 55.8 CM2
BH CV ECHO MEAS - LV MASS(C)D: 174.9 GRAMS
BH CV ECHO MEAS - LV MAX PG: 4.6 MMHG
BH CV ECHO MEAS - LV MEAN PG: 2.24 MMHG
BH CV ECHO MEAS - LV SYSTOLIC VOL/BSA (12-30): 24.6 CM2
BH CV ECHO MEAS - LV V1 MAX: 107.2 CM/SEC
BH CV ECHO MEAS - LV V1 VTI: 22.4 CM
BH CV ECHO MEAS - LVIDD: 4.7 CM
BH CV ECHO MEAS - LVIDS: 3.8 CM
BH CV ECHO MEAS - LVOT AREA: 3.2 CM2
BH CV ECHO MEAS - LVOT DIAM: 2.01 CM
BH CV ECHO MEAS - LVPWD: 1.15 CM
BH CV ECHO MEAS - MED PEAK E' VEL: 5.7 CM/SEC
BH CV ECHO MEAS - MR MAX PG: 119.1 MMHG
BH CV ECHO MEAS - MR MAX VEL: 545.7 CM/SEC
BH CV ECHO MEAS - MV A DUR: 0.11 SEC
BH CV ECHO MEAS - MV A MAX VEL: 72.4 CM/SEC
BH CV ECHO MEAS - MV DEC SLOPE: 382.1 CM/SEC2
BH CV ECHO MEAS - MV DEC TIME: 0.13 SEC
BH CV ECHO MEAS - MV E MAX VEL: 42.6 CM/SEC
BH CV ECHO MEAS - MV E/A: 0.59
BH CV ECHO MEAS - MV MAX PG: 2.35 MMHG
BH CV ECHO MEAS - MV MEAN PG: 1.27 MMHG
BH CV ECHO MEAS - MV P1/2T: 53.5 MSEC
BH CV ECHO MEAS - MV V2 VTI: 26.4 CM
BH CV ECHO MEAS - MVA(P1/2T): 4.1 CM2
BH CV ECHO MEAS - MVA(VTI): 2.7 CM2
BH CV ECHO MEAS - PA ACC TIME: 0.11 SEC
BH CV ECHO MEAS - PA V2 MAX: 69.8 CM/SEC
BH CV ECHO MEAS - PULM A REVS DUR: 0.14 SEC
BH CV ECHO MEAS - PULM A REVS VEL: 31.5 CM/SEC
BH CV ECHO MEAS - PULM DIAS VEL: 31.5 CM/SEC
BH CV ECHO MEAS - PULM S/D: 2
BH CV ECHO MEAS - PULM SYS VEL: 63.1 CM/SEC
BH CV ECHO MEAS - QP/QS: 1.33
BH CV ECHO MEAS - RAP SYSTOLE: 3 MMHG
BH CV ECHO MEAS - RV MAX PG: 1.59 MMHG
BH CV ECHO MEAS - RV V1 MAX: 63 CM/SEC
BH CV ECHO MEAS - RV V1 VTI: 15.9 CM
BH CV ECHO MEAS - RVOT DIAM: 2.7 CM
BH CV ECHO MEAS - RVSP: 21.2 MMHG
BH CV ECHO MEAS - SV(LVOT): 71 ML
BH CV ECHO MEAS - SV(MOD-SP2): 50 ML
BH CV ECHO MEAS - SV(MOD-SP4): 56 ML
BH CV ECHO MEAS - SV(RVOT): 94.4 ML
BH CV ECHO MEAS - SVI(LVOT): 39.6 ML/M2
BH CV ECHO MEAS - SVI(MOD-SP2): 27.9 ML/M2
BH CV ECHO MEAS - SVI(MOD-SP4): 31.3 ML/M2
BH CV ECHO MEAS - TAPSE (>1.6): 1.75 CM
BH CV ECHO MEAS - TR MAX PG: 18.2 MMHG
BH CV ECHO MEAS - TR MAX VEL: 213.6 CM/SEC
BH CV ECHO MEASUREMENTS AVERAGE E/E' RATIO: 8.78
BH CV XLRA - RV BASE: 2.8 CM
BH CV XLRA - RV LENGTH: 7.6 CM
BH CV XLRA - RV MID: 2.8 CM
BH CV XLRA - TDI S': 10.1 CM/SEC
LEFT ATRIUM VOLUME INDEX: 33.7 ML/M2
SINUS: 3.5 CM
STJ: 3.4 CM

## 2024-11-08 PROCEDURE — 93306 TTE W/DOPPLER COMPLETE: CPT

## 2024-11-08 PROCEDURE — 25510000001 PERFLUTREN 6.52 MG/ML SUSPENSION 2 ML VIAL: Performed by: NURSE PRACTITIONER

## 2024-11-08 RX ADMIN — PERFLUTREN 2 ML: 6.52 INJECTION, SUSPENSION INTRAVENOUS at 13:47

## 2024-11-12 ENCOUNTER — OFFICE VISIT (OUTPATIENT)
Dept: CARDIOLOGY | Facility: CLINIC | Age: 74
End: 2024-11-12
Payer: MEDICARE

## 2024-11-12 VITALS
HEIGHT: 69 IN | OXYGEN SATURATION: 96 % | SYSTOLIC BLOOD PRESSURE: 130 MMHG | BODY MASS INDEX: 21.18 KG/M2 | HEART RATE: 65 BPM | WEIGHT: 143 LBS | DIASTOLIC BLOOD PRESSURE: 64 MMHG

## 2024-11-12 DIAGNOSIS — E78.2 MIXED HYPERLIPIDEMIA: Primary | ICD-10-CM

## 2024-11-12 PROCEDURE — 3075F SYST BP GE 130 - 139MM HG: CPT | Performed by: NURSE PRACTITIONER

## 2024-11-12 PROCEDURE — 1160F RVW MEDS BY RX/DR IN RCRD: CPT | Performed by: NURSE PRACTITIONER

## 2024-11-12 PROCEDURE — 1159F MED LIST DOCD IN RCRD: CPT | Performed by: NURSE PRACTITIONER

## 2024-11-12 PROCEDURE — 99214 OFFICE O/P EST MOD 30 MIN: CPT | Performed by: NURSE PRACTITIONER

## 2024-11-12 PROCEDURE — 93000 ELECTROCARDIOGRAM COMPLETE: CPT | Performed by: NURSE PRACTITIONER

## 2024-11-12 PROCEDURE — 3078F DIAST BP <80 MM HG: CPT | Performed by: NURSE PRACTITIONER

## 2024-11-12 NOTE — PROGRESS NOTES
Date of Office Visit: 2024  Encounter Provider: ALEJANDRO Rogers  Place of Service: [facility] CARDIOLOGY  Patient Name: Sara Gary  :1950    Chief complaint: ASD repair    HPI: Sara Gary is a 73 y.o. female who is a patient of  Dr. Ruffin and is new to me today. She has a history of remote surgical ASD repair in  via median sternotomy, mitral valve prolapse. She has a history of depression/anxiety treated with Buspar and zoloft. In  she had mild AI, mild MVP and aortic root to be 4.1cm, no shunt on bubble study.    In April of this year she had some subscapular pain She had a CT to rule out dissection that showed a dilated proximal aorta at 4cm. She had an echo and her BP was high at 190/70. She also had moderate to severe AI. She was here 6 months ago and we planned to re-evaluate in 6 months.     She had an echocardiogram done last month her aortic insufficiency appears mild to moderate.  She is asymptomatic.  She has no shortness of breath or chest discomfort.  Her blood pressure has been well-controlled at home.  She occasionally will have some palpitations but that is also been stable.  She has been intolerant to statins in the past.  She tried Zetia as well and she said it made her feel very fatigued and tired.  She is now doing  red yeast rice.  Previous testing and notes have been reviewed by me.   Past Medical History:   Diagnosis Date    Depression     Diverticulitis     Environmental allergies     History of colon polyps     Hypertension     Kidney stone     hx of 4 stones       Past Surgical History:   Procedure Laterality Date    APPENDECTOMY      ASD REPAIR, SECUNDUM      CATARACT EXTRACTION      COLONOSCOPY      CYSTOSCOPY BLADDER STONE LITHOTRIPSY      CYSTOSCOPY W/ URETERAL STENT PLACEMENT Left 2017    Procedure: CYSTOSCOPY LEFT STENT INSERTION & STONE REMOVAL;  Surgeon: Raheem Franklin MD;  Location: [facility] OR;   "Service:     HYSTERECTOMY      NEPHRECTOMY RADICAL      THYROIDECTOMY      UPPER GASTROINTESTINAL ENDOSCOPY         Social History     Socioeconomic History    Marital status: Single   Tobacco Use    Smoking status: Never    Smokeless tobacco: Never   Vaping Use    Vaping status: Never Used   Substance and Sexual Activity    Alcohol use: No    Drug use: No    Sexual activity: Not Currently     Birth control/protection: Post-menopausal       Family History   Problem Relation Age of Onset    Heart disease Mother     Hypertension Mother     Heart disease Father     Hypertension Father     Colon cancer Neg Hx     Colon polyps Neg Hx     Crohn's disease Neg Hx     Irritable bowel syndrome Neg Hx     Ulcerative colitis Neg Hx        Review of Systems   Constitutional: Negative for diaphoresis and malaise/fatigue.   Cardiovascular:  Negative for chest pain, claudication, dyspnea on exertion, irregular heartbeat, leg swelling, near-syncope, orthopnea, palpitations, paroxysmal nocturnal dyspnea and syncope.   Respiratory:  Negative for cough, shortness of breath and sleep disturbances due to breathing.    Musculoskeletal:  Negative for falls.   Neurological:  Negative for dizziness and weakness.   Psychiatric/Behavioral:  Negative for altered mental status and substance abuse.        Allergies   Allergen Reactions    Sulfamethoxazole-Trimethoprim Hives    Statins GI Intolerance     Patient \"felt sick\"    Sulfa Antibiotics Hives         Current Outpatient Medications:     busPIRone (BUSPAR) 10 MG tablet, Take 1 tablet by mouth Daily., Disp: 90 tablet, Rfl: 3    ezetimibe (Zetia) 10 MG tablet, Take 1 tablet by mouth Daily., Disp: 90 tablet, Rfl: 1    famotidine (PEPCID) 40 MG tablet, Take 0.5 tablets by mouth 2 (Two) Times a Day., Disp: 180 tablet, Rfl: 3    liothyronine (CYTOMEL) 5 MCG tablet, Take 1 tablet by mouth Daily., Disp: 90 tablet, Rfl: 3    losartan (Cozaar) 25 MG tablet, Take 1 tablet by mouth Daily., Disp: 90 " "tablet, Rfl: 3    RSV Pre-Fusion F A&B Vac Rcmb (Abrysvo) 120 MCG/0.5ML reconstituted solution injection, Inject 0.5 mL into the appropriate muscle as directed by prescriber., Disp: 0.5 mL, Rfl: 0    sertraline (Zoloft) 100 MG tablet, Take 1 tablet by mouth Daily., Disp: 90 tablet, Rfl: 2    SUMAtriptan (IMITREX) 100 MG tablet, TAKE 1 TABLET BY MOUTH AT ONSET OF HEADACHE; MAY REPEAT DOSE ONE TIME IN 2 HOURS IF HEADACHE NOT RELIEVED, Disp: 9 tablet, Rfl: 2    Synthroid 75 MCG tablet, TAKE 1 TABLET BY MOUTH DAILY, Disp: 90 tablet, Rfl: 1    Turmeric (QC TUMERIC COMPLEX PO), Take  by mouth. Power force brand, Disp: , Rfl:     valACYclovir (Valtrex) 500 MG tablet, Take 1 tablet by mouth 2 (Two) Times a Day. Take 1 tablet po BID x 5 days when experiencing fever blister symptoms., Disp: 60 tablet, Rfl: 2      Objective:     Vitals:    11/12/24 1314   BP: 130/64   BP Location: Left arm   Patient Position: Sitting   Pulse: 65   SpO2: 96%   Weight: 64.9 kg (143 lb)   Height: 175.3 cm (69\")     Body mass index is 21.12 kg/m².    PHYSICAL EXAM:    Constitutional:       General: Not in acute distress.     Appearance: Normal appearance. Well-developed.   Eyes:      Pupils: Pupils are equal, round, and reactive to light.   HENT:      Head: Normocephalic.   Neck:      Vascular: No carotid bruit or JVD.   Pulmonary:      Effort: Pulmonary effort is normal. No tachypnea.      Breath sounds: Normal breath sounds. No wheezing. No rales.   Cardiovascular:      Normal rate. Regular rhythm.      No gallop.    Pulses:     Intact distal pulses.   Edema:     Peripheral edema absent.   Abdominal:      General: Bowel sounds are normal.      Palpations: Abdomen is soft.      Tenderness: There is no abdominal tenderness.   Musculoskeletal: Normal range of motion.      Cervical back: Normal range of motion and neck supple. No edema. Skin:     General: Skin is warm and dry.   Neurological:      Mental Status: Alert and oriented to person, place, " and time.           ECG 12 Lead    Date/Time: 11/12/2024 1:30 PM  Performed by: Li Galvan APRN    Authorized by: Li Galvan APRN  Comparison: compared with previous ECG from 4/13/2024  Similar to previous ECG  Rhythm: sinus rhythm  Rate: normal  QRS axis: normal  Other findings: non-specific ST-T wave changes    Clinical impression: non-specific ECG          Assessment:      1.  History of ASD surgical repair     2.  Aortic valve regurgitation stable on echo done last week repeat in 6 months    3.  Hyperlipidemia willing to try injectables for blood pressure LDL significantly increased from previous.  Would like to do Leqvio as it is less frequent dosing.    4.  Ascending aortic aneurysm stable size on 2D echo  Plan:       Follow-up in 6 months with Dr. Ruffin.       Your medication list            Accurate as of November 12, 2024  1:30 PM. If you have any questions, ask your nurse or doctor.                CONTINUE taking these medications        Instructions Last Dose Given Next Dose Due   Abrysvo 120 MCG/0.5ML reconstituted solution injection  Generic drug: RSV Pre-Fusion F A&B Vac Rcmb      Inject 0.5 mL into the appropriate muscle as directed by prescriber.       busPIRone 10 MG tablet  Commonly known as: BUSPAR      Take 1 tablet by mouth Daily.       ezetimibe 10 MG tablet  Commonly known as: Zetia      Take 1 tablet by mouth Daily.       famotidine 40 MG tablet  Commonly known as: PEPCID      Take 0.5 tablets by mouth 2 (Two) Times a Day.       liothyronine 5 MCG tablet  Commonly known as: CYTOMEL      Take 1 tablet by mouth Daily.       losartan 25 MG tablet  Commonly known as: Cozaar      Take 1 tablet by mouth Daily.       QC TUMERIC COMPLEX PO      Take  by mouth. Power force brand       sertraline 100 MG tablet  Commonly known as: Zoloft      Take 1 tablet by mouth Daily.       SUMAtriptan 100 MG tablet  Commonly known as: IMITREX      TAKE 1 TABLET BY MOUTH AT ONSET OF HEADACHE; MAY  REPEAT DOSE ONE TIME IN 2 HOURS IF HEADACHE NOT RELIEVED       Synthroid 75 MCG tablet  Generic drug: levothyroxine      TAKE 1 TABLET BY MOUTH DAILY       valACYclovir 500 MG tablet  Commonly known as: Valtrex      Take 1 tablet by mouth 2 (Two) Times a Day. Take 1 tablet po BID x 5 days when experiencing fever blister symptoms.                  As always, it has been a pleasure to participate in your patient's care.      Sincerely,     Li ALLEN

## 2024-11-13 ENCOUNTER — TELEPHONE (OUTPATIENT)
Dept: CARDIOLOGY | Facility: CLINIC | Age: 74
End: 2024-11-13
Payer: MEDICARE

## 2024-11-19 ENCOUNTER — ANESTHESIA EVENT (OUTPATIENT)
Dept: SURGERY | Facility: SURGERY CENTER | Age: 74
End: 2024-11-19
Payer: MEDICARE

## 2024-11-19 ENCOUNTER — HOSPITAL ENCOUNTER (OUTPATIENT)
Facility: SURGERY CENTER | Age: 74
Setting detail: HOSPITAL OUTPATIENT SURGERY
Discharge: HOME | End: 2024-11-19
Attending: INTERNAL MEDICINE | Admitting: INTERNAL MEDICINE
Payer: MEDICARE

## 2024-11-19 ENCOUNTER — TELEPHONE (OUTPATIENT)
Dept: INTERNAL MEDICINE | Facility: CLINIC | Age: 74
End: 2024-11-19
Payer: MEDICARE

## 2024-11-19 ENCOUNTER — ANESTHESIA (OUTPATIENT)
Dept: SURGERY | Facility: SURGERY CENTER | Age: 74
End: 2024-11-19
Payer: MEDICARE

## 2024-11-19 VITALS
WEIGHT: 143 LBS | SYSTOLIC BLOOD PRESSURE: 150 MMHG | BODY MASS INDEX: 21.18 KG/M2 | TEMPERATURE: 98.7 F | HEIGHT: 69 IN | HEART RATE: 76 BPM | OXYGEN SATURATION: 97 % | DIASTOLIC BLOOD PRESSURE: 89 MMHG | RESPIRATION RATE: 18 BRPM

## 2024-11-19 DIAGNOSIS — R07.89 ATYPICAL CHEST PAIN: ICD-10-CM

## 2024-11-19 DIAGNOSIS — K21.9 GASTROESOPHAGEAL REFLUX DISEASE, UNSPECIFIED WHETHER ESOPHAGITIS PRESENT: ICD-10-CM

## 2024-11-19 PROCEDURE — 43239 EGD BIOPSY SINGLE/MULTIPLE: CPT | Performed by: INTERNAL MEDICINE

## 2024-11-19 PROCEDURE — C1726 CATH, BAL DIL, NON-VASCULAR: HCPCS | Performed by: INTERNAL MEDICINE

## 2024-11-19 PROCEDURE — 25010000002 GLYCOPYRROLATE PF 0.2 MG/ML SOLUTION: Performed by: NURSE ANESTHETIST, CERTIFIED REGISTERED

## 2024-11-19 PROCEDURE — 25010000002 LIDOCAINE 1 % SOLUTION: Performed by: NURSE ANESTHETIST, CERTIFIED REGISTERED

## 2024-11-19 PROCEDURE — 25010000002 PROPOFOL 10 MG/ML EMULSION: Performed by: NURSE ANESTHETIST, CERTIFIED REGISTERED

## 2024-11-19 PROCEDURE — 25010000002 PROPOFOL 200 MG/20ML EMULSION: Performed by: NURSE ANESTHETIST, CERTIFIED REGISTERED

## 2024-11-19 PROCEDURE — 88305 TISSUE EXAM BY PATHOLOGIST: CPT | Performed by: INTERNAL MEDICINE

## 2024-11-19 PROCEDURE — 43249 ESOPH EGD DILATION <30 MM: CPT | Performed by: INTERNAL MEDICINE

## 2024-11-19 RX ORDER — SODIUM CHLORIDE 0.9 % (FLUSH) 0.9 %
3 SYRINGE (ML) INJECTION EVERY 12 HOURS SCHEDULED
Status: DISCONTINUED | OUTPATIENT
Start: 2024-11-19 | End: 2024-11-19 | Stop reason: HOSPADM

## 2024-11-19 RX ORDER — SODIUM CHLORIDE 0.9 % (FLUSH) 0.9 %
10 SYRINGE (ML) INJECTION AS NEEDED
Status: DISCONTINUED | OUTPATIENT
Start: 2024-11-19 | End: 2024-11-19 | Stop reason: HOSPADM

## 2024-11-19 RX ORDER — SODIUM CHLORIDE, SODIUM LACTATE, POTASSIUM CHLORIDE, CALCIUM CHLORIDE 600; 310; 30; 20 MG/100ML; MG/100ML; MG/100ML; MG/100ML
30 INJECTION, SOLUTION INTRAVENOUS CONTINUOUS PRN
Status: DISCONTINUED | OUTPATIENT
Start: 2024-11-19 | End: 2024-11-19 | Stop reason: HOSPADM

## 2024-11-19 RX ORDER — LIDOCAINE HYDROCHLORIDE 10 MG/ML
INJECTION, SOLUTION INFILTRATION; PERINEURAL AS NEEDED
Status: DISCONTINUED | OUTPATIENT
Start: 2024-11-19 | End: 2024-11-19 | Stop reason: SURG

## 2024-11-19 RX ORDER — PROPOFOL 10 MG/ML
INJECTION, EMULSION INTRAVENOUS AS NEEDED
Status: DISCONTINUED | OUTPATIENT
Start: 2024-11-19 | End: 2024-11-19 | Stop reason: SURG

## 2024-11-19 RX ORDER — FAMOTIDINE 20 MG/1
1 TABLET, FILM COATED ORAL EVERY 12 HOURS SCHEDULED
COMMUNITY
Start: 2024-10-28

## 2024-11-19 RX ADMIN — GLYCOPYRROLATE 0.2 MCG: 0.2 INJECTION, SOLUTION INTRAMUSCULAR; INTRAVITREAL at 09:06

## 2024-11-19 RX ADMIN — LIDOCAINE HYDROCHLORIDE 30 MG: 10 INJECTION, SOLUTION INFILTRATION; PERINEURAL at 09:07

## 2024-11-19 RX ADMIN — PROPOFOL 100 MG: 10 INJECTION, EMULSION INTRAVENOUS at 09:07

## 2024-11-19 RX ADMIN — PROPOFOL 160 MCG/KG/MIN: 10 INJECTION, EMULSION INTRAVENOUS at 09:07

## 2024-11-19 NOTE — ANESTHESIA POSTPROCEDURE EVALUATION
Patient: Sara Gary    Procedure Summary       Date: 11/19/24 Room / Location[facility]  [facility]    Anesthesia Start: 0904 Anesthesia Stop: 0921    Procedure: ESOPHAGOGASTRODUODENOSCOPY WITH DILATATION Diagnosis:       Atypical chest pain      Gastroesophageal reflux disease, unspecified whether esophagitis present      (Atypical chest pain [R07.89])      (Gastroesophageal reflux disease, unspecified whether esophagitis present [K21.9])    Surgeons: Luis Enrique Murrell MD Provider: Escobar Moura MD    Anesthesia Type: MAC ASA Status: 3            Anesthesia Type: MAC    Vitals  Vitals Value Taken Time   /89 11/19/24 0940   Temp 37.1 °C (98.7 °F) 11/19/24 0920   Pulse 71 11/19/24 0941   Resp 18 11/19/24 0940   SpO2 93 % 11/19/24 0941   Vitals shown include unfiled device data.        Post Anesthesia Care and Evaluation    Patient location during evaluation: bedside  Patient participation: complete - patient participated  Level of consciousness: awake and alert  Pain management: adequate    Airway patency: patent  Anesthetic complications: No anesthetic complications  PONV Status: controlled  Cardiovascular status: blood pressure returned to baseline and acceptable  Respiratory status: acceptable  Hydration status: acceptable

## 2024-11-19 NOTE — TELEPHONE ENCOUNTER
Advised patient she needs to be seen because this has not been prescribed since the beginning of the year. However, patient just had a Gastro procedure done and she having that pressure like a UTI. Advised patient if she does not want to come here for an appointment she can go an urgent care near her.     Patient said she will go to the urgent care

## 2024-11-19 NOTE — TELEPHONE ENCOUNTER
Caller: Sara Gary    Relationship: Self    Best call back number: [phone]     Requested Prescriptions:   NITROFURANTOIN 100 MG       Pharmacy where request should be sent:  [facility] [id number] [facility] [address] [facility] AT [facility] [address] - [phone]  - [phone] FX     Last office visit with prescribing clinician: 9/25/2024   Last telemedicine visit with prescribing clinician: Visit date not found   Next office visit with prescribing clinician: 1/10/2025     Additional details provided by patient: PATIENT WOULD LIKE THIS CALLED IN FOR HER. SHE HAS BEEN ON IT BEFORE.     Does the patient have less than a 3 day supply:  [x] Yes  [] No    Would you like a call back once the refill request has been completed: [] Yes [x] No    If the office needs to give you a call back, can they leave a voicemail: [] Yes [x] No    Mio Pan Rep   11/19/24 12:47 EST

## 2024-11-19 NOTE — H&P
No chief complaint on file.      HPI  Patient today for an EGD due to GERD.         Problem List:    Patient Active Problem List   Diagnosis    Diverticulosis of large intestine without hemorrhage    Hydronephrosis, left    Residual ASD (atrial septal defect) following repair    Elevated liver enzymes    Primary hypertension    MVP (mitral valve prolapse)    Other specified hypothyroidism    Anxiety and depression    Gastroesophageal reflux disease without esophagitis    Mixed hyperlipidemia    Prediabetes    Dry skin dermatitis    Seasonal allergic rhinitis due to pollen    Vertigo    Chronic illness    Healthcare maintenance    Migraine    Acute recurrent cystitis    Adaptive colitis    AK (actinic keratosis)    Allergic rhinitis    Acute pain of right shoulder    COVID-19    Atypical chest pain    Chest pain    Chronic right-sided low back pain with right-sided sciatica    Chronic right shoulder pain    Gastroesophageal reflux disease    Annual physical exam       Medical History:    Past Medical History:   Diagnosis Date    Depression     Diverticulitis     Environmental allergies     History of colon polyps     Hypertension     Kidney stone     hx of 4 stones        Social History:    Social History     Socioeconomic History    Marital status: Single   Tobacco Use    Smoking status: Never    Smokeless tobacco: Never   Vaping Use    Vaping status: Never Used   Substance and Sexual Activity    Alcohol use: No    Drug use: No    Sexual activity: Not Currently     Birth control/protection: Post-menopausal       Family History:   Family History   Problem Relation Age of Onset    Heart disease Mother     Hypertension Mother     Heart disease Father     Hypertension Father     Colon cancer Neg Hx     Colon polyps Neg Hx     Crohn's disease Neg Hx     Irritable bowel syndrome Neg Hx     Ulcerative colitis Neg Hx        Surgical History:   Past Surgical History:   Procedure Laterality Date    APPENDECTOMY      ASD  "REPAIR, SECUNDUM      CATARACT EXTRACTION      COLONOSCOPY      CYSTOSCOPY BLADDER STONE LITHOTRIPSY      CYSTOSCOPY W/ URETERAL STENT PLACEMENT Left 06/29/2017    Procedure: CYSTOSCOPY LEFT STENT INSERTION & STONE REMOVAL;  Surgeon: Raheem Franklin MD;  Location: [facility] OR;  Service:     HYSTERECTOMY      NEPHRECTOMY RADICAL      THYROIDECTOMY      UPPER GASTROINTESTINAL ENDOSCOPY           Current Facility-Administered Medications:     lactated ringers infusion, 30 mL/hr, Intravenous, Continuous PRN, Lupis, Dina, APRN    sodium chloride 0.9 % flush 10 mL, 10 mL, Intravenous, PRN, Lupis, Dina, APRN    sodium chloride 0.9 % flush 3 mL, 3 mL, Intravenous, Q12H, Lupis, Dina, APRN    Allergies:   Allergies   Allergen Reactions    Sulfamethoxazole-Trimethoprim Hives    Statins GI Intolerance     Patient \"felt sick\"    Sulfa Antibiotics Hives    Zetia [Ezetimibe] Other (See Comments)     Felt sick        The following portions of the patient's history were reviewed by me and updated as appropriate: review of systems, allergies, current medications, past family history, past medical history, past social history, past surgical history and problem list.    There were no vitals filed for this visit.    PHYSICAL EXAM:    CONSTITUTIONAL:  today's vital signs reviewed by me  GASTROINTESTINAL: abdomen is soft nontender nondistended with normal active bowel sounds, no masses are appreciated    Assessment/ Plan  We will proceed today with EGD.    Risks and benefits as well as alternatives to endoscopic evaluation were explained to the patient and they voiced understanding and wish to proceed.  These risks include but are not limited to the risk of bleeding, perforation, adverse reaction to sedation, and missed lesions.  The patient was given the opportunity to ask questions prior to the endoscopic procedure.           "

## 2024-11-19 NOTE — ANESTHESIA PREPROCEDURE EVALUATION
Anesthesia Evaluation     Patient summary reviewed and Nursing notes reviewed   NPO Solid Status: > 8 hours  NPO Liquid Status: > 2 hours           Airway   Mallampati: II  TM distance: >3 FB  Neck ROM: full  Dental      Pulmonary    Cardiovascular     ECG reviewed    (+) hypertension, valvular problems/murmurs, hyperlipidemia    ROS comment: ASD repair in 1997    TTE  ·  Left ventricular systolic function is normal. Left ventricular ejection fraction appears to be 56 - 60%.  ·  Left ventricular diastolic function is consistent with (grade I) impaired relaxation.  ·  The left atrial cavity is moderate to severely dilated.  ·  There is mild calcification of the aortic valve without evidence of stenosis..  ·  Moderate to severe aortic valve regurgitation is present.  Mildly dilated ascending aorta measures 3.6 cm.  ·  Estimated right ventricular systolic pressure from tricuspid regurgitation is mildly elevated (35-45 mmHg).  ·  Mild dilation of the ascending aorta is present.      Neuro/Psych  (+) headaches, numbness, psychiatric history Anxiety and Depression  GI/Hepatic/Renal/Endo    (+) GERD, renal disease-, thyroid problem hypothyroidism    Musculoskeletal     Abdominal    Substance History      OB/GYN          Other                      Anesthesia Plan    ASA 3     MAC     intravenous induction     Anesthetic plan, risks, benefits, and alternatives have been provided, discussed and informed consent has been obtained with: patient.    CODE STATUS:

## 2024-11-20 ENCOUNTER — TELEPHONE (OUTPATIENT)
Dept: GASTROENTEROLOGY | Facility: CLINIC | Age: 74
End: 2024-11-20
Payer: MEDICARE

## 2024-11-20 LAB
CYTO UR: NORMAL
LAB AP CASE REPORT: NORMAL
PATH REPORT.FINAL DX SPEC: NORMAL
PATH REPORT.GROSS SPEC: NORMAL

## 2024-11-20 NOTE — TELEPHONE ENCOUNTER
Spoke with regarding US not ECHO. As of right now she isn't sure if she will have the US abdomen. She is still recovering from EGD yesterday.    She is concerned with the coughing that she is experiencing. Cough is dry and worse at night. She is taking Famotidine BID. She stated that her GERD is not well managed with Pecid.     She does feel acid in her throat often.      Path results are not back yet. She will discuss the cough when she gets the path results. Waiting to see if cough improves.    She was on Lisinopril, caused coughing, stopped medication. Given Losartan in place of lisinopril. Coughing has not improved after stopping medication.

## 2024-11-20 NOTE — TELEPHONE ENCOUNTER
Hub staff attempted to follow warm transfer process and was unsuccessful     Caller: Sara Gary    Relationship to patient: Self    Best call back number: [phone]    Patient is needing: PT CALLED TO FIND OUT WHY SHE IS TO HAVE AN ECHO CARDIOGRAM 11/22/24 SHE HAS NEVER HAD ONE AFTER SHE HAS HAD AN ENDOSCOPY BEFORE.    SHE WANTS TO KNOW WHAT THE PROCEDURE WILL CONSIST OF BECAUSE SHE IS NOT IN GOOD SHAPE TODAY AND MAY NOT BE ABLE TO DRINK ANY SOLUTION.    PLEASE CALL PT TO DISCUSS FURTHER PT WOULD LIKE A CALL BACK TODAY.

## 2024-11-21 NOTE — TELEPHONE ENCOUNTER
Since famotidine is not adequately managing her reflux symptoms.  I will switch to omeprazole 40 mg daily.  Advised her to avoid triggers like spicy foods, caffeine, alcohol, and large meals close to bedtime.  If cough persists for the next few weeks, possible need for further testing will be discussed during follow-up visit coming.  Dry cough at night may also be related to acid reflux.  Hopefully this PPI (omeprazole) will help reduce throat irritation and cough may improve.  Allergies test (asthma or other respiratory issues) is recommended.  Also keep in mind that while a cough associated with ACE inhibitor like lisinopril sometimes can persist for weeks after discontinuation.

## 2024-11-21 NOTE — TELEPHONE ENCOUNTER
Spoke with patient, verbalized understanding and agreement to take pepcid 20mg in the am and 40mg at bedtime.

## 2024-11-22 ENCOUNTER — HOSPITAL ENCOUNTER (OUTPATIENT)
Dept: ULTRASOUND IMAGING | Facility: HOSPITAL | Age: 74
Discharge: HOME | End: 2024-11-22
Payer: MEDICARE

## 2024-11-22 PROCEDURE — 76705 ECHO EXAM OF ABDOMEN: CPT

## 2024-11-24 DIAGNOSIS — E03.8 OTHER SPECIFIED HYPOTHYROIDISM: ICD-10-CM

## 2024-11-25 RX ORDER — LEVOTHYROXINE SODIUM 75 MCG
75 TABLET ORAL DAILY
Qty: 90 TABLET | Refills: 0 | Status: SHIPPED | OUTPATIENT
Start: 2024-11-25

## 2024-11-29 ENCOUNTER — HOSPITAL ENCOUNTER (OUTPATIENT)
Facility: HOSPITAL | Age: 74
Discharge: HOME | End: 2024-11-29
Attending: STUDENT IN AN ORGANIZED HEALTH CARE EDUCATION/TRAINING PROGRAM | Admitting: STUDENT IN AN ORGANIZED HEALTH CARE EDUCATION/TRAINING PROGRAM
Payer: MEDICARE

## 2024-11-29 VITALS
OXYGEN SATURATION: 99 % | RESPIRATION RATE: 15 BRPM | SYSTOLIC BLOOD PRESSURE: 145 MMHG | HEART RATE: 61 BPM | BODY MASS INDEX: 21.33 KG/M2 | DIASTOLIC BLOOD PRESSURE: 72 MMHG | HEIGHT: 69 IN | TEMPERATURE: 97.6 F | WEIGHT: 144 LBS

## 2024-11-29 DIAGNOSIS — N30.90 CYSTITIS: Primary | ICD-10-CM

## 2024-11-29 LAB
BACTERIA UR QL AUTO: ABNORMAL /HPF
BILIRUB UR QL STRIP: NEGATIVE
CLARITY UR: ABNORMAL
COLOR UR: YELLOW
GLUCOSE UR STRIP-MCNC: NEGATIVE MG/DL
HGB UR QL STRIP.AUTO: NEGATIVE
HOLD SPECIMEN: NORMAL
HYALINE CASTS UR QL AUTO: ABNORMAL /LPF
KETONES UR QL STRIP: NEGATIVE
LEUKOCYTE ESTERASE UR QL STRIP.AUTO: ABNORMAL
NITRITE UR QL STRIP: NEGATIVE
PH UR STRIP.AUTO: 7 [PH] (ref 5–8)
PROT UR QL STRIP: NEGATIVE
RBC # UR STRIP: ABNORMAL /HPF
REF LAB TEST METHOD: ABNORMAL
SP GR UR STRIP: 1.02 (ref 1–1.03)
SQUAMOUS #/AREA URNS HPF: ABNORMAL /HPF
UROBILINOGEN UR QL STRIP: ABNORMAL
WBC # UR STRIP: ABNORMAL /HPF

## 2024-11-29 PROCEDURE — 81001 URINALYSIS AUTO W/SCOPE: CPT | Performed by: STUDENT IN AN ORGANIZED HEALTH CARE EDUCATION/TRAINING PROGRAM

## 2024-11-29 PROCEDURE — G0463 HOSPITAL OUTPT CLINIC VISIT: HCPCS | Performed by: NURSE PRACTITIONER

## 2024-11-29 PROCEDURE — 87086 URINE CULTURE/COLONY COUNT: CPT | Performed by: STUDENT IN AN ORGANIZED HEALTH CARE EDUCATION/TRAINING PROGRAM

## 2024-11-29 RX ORDER — NITROFURANTOIN 25; 75 MG/1; MG/1
100 CAPSULE ORAL 2 TIMES DAILY
Qty: 10 CAPSULE | Refills: 0 | Status: SHIPPED | OUTPATIENT
Start: 2024-11-29 | End: 2024-12-04

## 2024-11-29 RX ORDER — PHENAZOPYRIDINE HYDROCHLORIDE 100 MG/1
100 TABLET, FILM COATED ORAL 3 TIMES DAILY PRN
Qty: 6 TABLET | Refills: 0 | Status: SHIPPED | OUTPATIENT
Start: 2024-11-29 | End: 2024-12-01

## 2024-11-29 NOTE — FSED PROVIDER NOTE
"Subjective   History of Present Illness  74 yr old female presents with C/O some dysuria with frequency and decreased urination when voiding. Has been occurring off and on for a couple of weeks and she has been taking AZO which then stops the symptoms.  Last episode started 3 days ago.  She ran out of AZO yesterday and started having frequency and decreased urination again last night.  Denies ABD pain or flank pain.  No fever or chills.  States it has been over 1 year since last UTI but this is the way it usually presents when she has one.         Review of Systems   Constitutional: Negative.    Respiratory: Negative.     Gastrointestinal: Negative.    Genitourinary:  Positive for decreased urine volume, dysuria and frequency. Negative for flank pain, hematuria, pelvic pain, vaginal bleeding, vaginal discharge and vaginal pain.       Past Medical History:   Diagnosis Date    Depression     Diverticulitis     Environmental allergies     History of colon polyps     Hypertension     Kidney stone     hx of 4 stones       Allergies   Allergen Reactions    Sulfamethoxazole-Trimethoprim Hives    Statins GI Intolerance     Patient \"felt sick\"    Sulfa Antibiotics Hives    Zetia [Ezetimibe] Other (See Comments)     Felt sick       Past Surgical History:   Procedure Laterality Date    APPENDECTOMY      ASD REPAIR, SECUNDUM      CATARACT EXTRACTION      COLONOSCOPY      CYSTOSCOPY BLADDER STONE LITHOTRIPSY      CYSTOSCOPY W/ URETERAL STENT PLACEMENT Left 06/29/2017    Procedure: CYSTOSCOPY LEFT STENT INSERTION & STONE REMOVAL;  Surgeon: Raheem Franklin MD;  Location: [facility] MAIN OR;  Service:     ENDOSCOPY N/A 11/19/2024    Procedure: ESOPHAGOGASTRODUODENOSCOPY WITH DILATATION;  Surgeon: Luis Enrique Murrell MD;  Location: [facility] MAIN OR;  Service: Gastroenterology;  Laterality: N/A;  Dilated with balloon to 20mm, Esophagitis,    HYSTERECTOMY      NEPHRECTOMY RADICAL      THYROIDECTOMY      UPPER GASTROINTESTINAL ENDOSCOPY   "       Family History   Problem Relation Age of Onset    Heart disease Mother     Hypertension Mother     Heart disease Father     Hypertension Father     Colon cancer Neg Hx     Colon polyps Neg Hx     Crohn's disease Neg Hx     Irritable bowel syndrome Neg Hx     Ulcerative colitis Neg Hx        Social History     Socioeconomic History    Marital status: Single   Tobacco Use    Smoking status: Never    Smokeless tobacco: Never   Vaping Use    Vaping status: Never Used   Substance and Sexual Activity    Alcohol use: No    Drug use: No    Sexual activity: Not Currently     Birth control/protection: Post-menopausal           Objective   Physical Exam  Vitals and nursing note reviewed.   Constitutional:       Appearance: Normal appearance.   Pulmonary:      Effort: Pulmonary effort is normal.      Breath sounds: Normal breath sounds and air entry.   Abdominal:      General: Abdomen is protuberant.      Palpations: Abdomen is soft.      Tenderness: There is no abdominal tenderness. There is no right CVA tenderness or left CVA tenderness.   Neurological:      Mental Status: She is alert.         Procedures           ED Course                                           Medical Decision Making      Final diagnoses:   Cystitis       ED Disposition  ED Disposition       ED Disposition   Discharge    Condition   Stable    Comment   --               No follow-up provider specified.       Medication List        New Prescriptions      nitrofurantoin (macrocrystal-monohydrate) 100 MG capsule  Commonly known as: MACROBID  Take 1 capsule by mouth 2 (Two) Times a Day for 5 days.     phenazopyridine 100 MG tablet  Commonly known as: PYRIDIUM  Take 1 tablet by mouth 3 (Three) Times a Day As Needed for Bladder Spasms for up to 2 days.               Where to Get Your Medications        These medications were sent to [facility] [address] [place] [address] [facility] [phone] [facility] [phone]   [address] [address]  [facility], [place] [id number]      Phone: [phone]   nitrofurantoin (macrocrystal-monohydrate) 100 MG capsule  phenazopyridine 100 MG tablet

## 2024-12-01 LAB — BACTERIA SPEC AEROBE CULT: NORMAL

## 2024-12-05 ENCOUNTER — TELEPHONE (OUTPATIENT)
Dept: CARDIOLOGY | Facility: CLINIC | Age: 74
End: 2024-12-05
Payer: MEDICARE

## 2024-12-05 RX ORDER — AMOXICILLIN 500 MG/1
2000 CAPSULE ORAL SEE ADMIN INSTRUCTIONS
Qty: 12 CAPSULE | Refills: 0 | Status: SHIPPED | OUTPATIENT
Start: 2024-12-05

## 2024-12-05 NOTE — TELEPHONE ENCOUNTER
I called and spoke with patient and let her know about the antibiotic including indication, dosing, and frequency.    She verbalizes understanding and agrees with plan.    Lula Morales RN  [facility] Triage  12/05/24 10:56 EST

## 2024-12-05 NOTE — TELEPHONE ENCOUNTER
Patient is scheduled to have a root canal on Monday.  She feels like she needs a prophylactic antibiotic due to her cardiac issues and the invasiveness of a root canal.      Can you advise?    Thanks!    Lula Morales RN  [facility] Triage  12/05/24 10:40 EST

## 2024-12-10 ENCOUNTER — TELEPHONE (OUTPATIENT)
Dept: CARDIOLOGY | Facility: CLINIC | Age: 74
End: 2024-12-10
Payer: MEDICARE

## 2024-12-10 ENCOUNTER — TELEPHONE (OUTPATIENT)
Dept: GASTROENTEROLOGY | Facility: CLINIC | Age: 74
End: 2024-12-10
Payer: MEDICARE

## 2024-12-10 NOTE — TELEPHONE ENCOUNTER
I would try to stop the Pepcid first but if Voquezna alone does not control symptoms, it is okay to take the Pepcid with the Voquezna if needed.

## 2024-12-10 NOTE — TELEPHONE ENCOUNTER
Called patient about repatha. She is going to call once she picks it up and we can giver her the injection.

## 2024-12-10 NOTE — TELEPHONE ENCOUNTER
Patient picked up Voquenza samples.  Should she take Pepcid with the Voquenza or stop the Pepcid?

## 2024-12-13 DIAGNOSIS — F32.A ANXIETY AND DEPRESSION: ICD-10-CM

## 2024-12-13 DIAGNOSIS — F41.9 ANXIETY AND DEPRESSION: ICD-10-CM

## 2024-12-18 RX ORDER — BUSPIRONE HYDROCHLORIDE 10 MG/1
10 TABLET ORAL DAILY
Qty: 90 TABLET | Refills: 2 | Status: SHIPPED | OUTPATIENT
Start: 2024-12-18

## 2024-12-30 ENCOUNTER — TELEPHONE (OUTPATIENT)
Dept: CARDIOLOGY | Facility: CLINIC | Age: 74
End: 2024-12-30
Payer: MEDICARE

## 2025-01-14 ENCOUNTER — TELEPHONE (OUTPATIENT)
Dept: INTERNAL MEDICINE | Facility: CLINIC | Age: 75
End: 2025-01-14
Payer: MEDICARE

## 2025-01-14 NOTE — TELEPHONE ENCOUNTER
Patient is having issues with her insurance right. Advised patient she can call [phone] to schedule her mammogram when she gets everything figured out with her insurance

## 2025-01-17 ENCOUNTER — TELEPHONE (OUTPATIENT)
Dept: CARDIOLOGY | Facility: CLINIC | Age: 75
End: 2025-01-17
Payer: MEDICARE

## 2025-01-17 NOTE — TELEPHONE ENCOUNTER
Repatha is a medication she will receive from her pharmacy.  Is is an injection she will give herself every other week.

## 2025-01-17 NOTE — TELEPHONE ENCOUNTER
Patient received letter on Dec 30th stating she was approved for the shot of Repatha. She wants to know does she need to schedule an apt for that or just walk in. /CC 01/17/25

## 2025-01-23 ENCOUNTER — OFFICE VISIT (OUTPATIENT)
Dept: INTERNAL MEDICINE | Facility: CLINIC | Age: 75
End: 2025-01-23
Payer: MEDICARE

## 2025-01-23 ENCOUNTER — LAB (OUTPATIENT)
Facility: HOSPITAL | Age: 75
End: 2025-01-23
Payer: MEDICARE

## 2025-01-23 VITALS
BODY MASS INDEX: 19.85 KG/M2 | DIASTOLIC BLOOD PRESSURE: 76 MMHG | HEART RATE: 69 BPM | OXYGEN SATURATION: 97 % | SYSTOLIC BLOOD PRESSURE: 138 MMHG | WEIGHT: 134 LBS | HEIGHT: 69 IN

## 2025-01-23 DIAGNOSIS — H53.9 VISION CHANGES: ICD-10-CM

## 2025-01-23 DIAGNOSIS — R51.9 TEMPORAL PAIN: Primary | ICD-10-CM

## 2025-01-23 DIAGNOSIS — R51.9 NONINTRACTABLE HEADACHE, UNSPECIFIED CHRONICITY PATTERN, UNSPECIFIED HEADACHE TYPE: ICD-10-CM

## 2025-01-23 DIAGNOSIS — Z83.518 FAMILY HISTORY OF CATARACTS: ICD-10-CM

## 2025-01-23 DIAGNOSIS — E78.2 MIXED HYPERLIPIDEMIA: Primary | ICD-10-CM

## 2025-01-23 DIAGNOSIS — R51.9 TEMPORAL PAIN: ICD-10-CM

## 2025-01-23 LAB
ALBUMIN SERPL-MCNC: 4 G/DL (ref 3.5–5.2)
ALBUMIN/GLOB SERPL: 1.5 G/DL
ALP SERPL-CCNC: 157 U/L (ref 39–117)
ALT SERPL W P-5'-P-CCNC: 7 U/L (ref 1–33)
ANION GAP SERPL CALCULATED.3IONS-SCNC: 8.3 MMOL/L (ref 5–15)
AST SERPL-CCNC: 16 U/L (ref 1–32)
BASOPHILS # BLD MANUAL: 0.06 10*3/MM3 (ref 0–0.2)
BASOPHILS NFR BLD MANUAL: 1.1 % (ref 0–1.5)
BILIRUB SERPL-MCNC: 0.3 MG/DL (ref 0–1.2)
BUN SERPL-MCNC: 10 MG/DL (ref 8–23)
BUN/CREAT SERPL: 12.2 (ref 7–25)
CALCIUM SPEC-SCNC: 8.7 MG/DL (ref 8.6–10.5)
CHLORIDE SERPL-SCNC: 104 MMOL/L (ref 98–107)
CLUMPED PLATELETS: PRESENT
CO2 SERPL-SCNC: 26.7 MMOL/L (ref 22–29)
CREAT SERPL-MCNC: 0.82 MG/DL (ref 0.57–1)
CRP SERPL-MCNC: <0.3 MG/DL (ref 0–0.5)
DEPRECATED RDW RBC AUTO: 39.8 FL (ref 37–54)
EGFRCR SERPLBLD CKD-EPI 2021: 74.2 ML/MIN/1.73
EOSINOPHIL # BLD MANUAL: 0.11 10*3/MM3 (ref 0–0.4)
EOSINOPHIL NFR BLD MANUAL: 2.1 % (ref 0.3–6.2)
ERYTHROCYTE [DISTWIDTH] IN BLOOD BY AUTOMATED COUNT: 12.6 % (ref 12.3–15.4)
ERYTHROCYTE [SEDIMENTATION RATE] IN BLOOD: 1 MM/HR (ref 0–30)
GLOBULIN UR ELPH-MCNC: 2.6 GM/DL
GLUCOSE SERPL-MCNC: 102 MG/DL (ref 65–99)
HCT VFR BLD AUTO: 38.1 % (ref 34–46.6)
HGB BLD-MCNC: 13 G/DL (ref 12–15.9)
LYMPHOCYTES # BLD MANUAL: 1.35 10*3/MM3 (ref 0.7–3.1)
LYMPHOCYTES NFR BLD MANUAL: 2.1 % (ref 5–12)
MAGNESIUM SERPL-MCNC: 2.4 MG/DL (ref 1.6–2.4)
MCH RBC QN AUTO: 29.7 PG (ref 26.6–33)
MCHC RBC AUTO-ENTMCNC: 34.1 G/DL (ref 31.5–35.7)
MCV RBC AUTO: 87 FL (ref 79–97)
MONOCYTES # BLD: 0.11 10*3/MM3 (ref 0.1–0.9)
NEUTROPHILS # BLD AUTO: 3.67 10*3/MM3 (ref 1.7–7)
NEUTROPHILS NFR BLD MANUAL: 69.1 % (ref 42.7–76)
PLATELET # BLD AUTO: 262 10*3/MM3 (ref 140–450)
PMV BLD AUTO: 10.2 FL (ref 6–12)
POTASSIUM SERPL-SCNC: 3.9 MMOL/L (ref 3.5–5.2)
PROT SERPL-MCNC: 6.6 G/DL (ref 6–8.5)
RBC # BLD AUTO: 4.38 10*6/MM3 (ref 3.77–5.28)
RBC MORPH BLD: NORMAL
SMUDGE CELLS BLD QL SMEAR: ABNORMAL
SODIUM SERPL-SCNC: 139 MMOL/L (ref 136–145)
VARIANT LYMPHS NFR BLD MANUAL: 25.5 % (ref 19.6–45.3)
WBC NRBC COR # BLD AUTO: 5.31 10*3/MM3 (ref 3.4–10.8)

## 2025-01-23 PROCEDURE — 85027 COMPLETE CBC AUTOMATED: CPT | Performed by: NURSE PRACTITIONER

## 2025-01-23 PROCEDURE — 3078F DIAST BP <80 MM HG: CPT | Performed by: NURSE PRACTITIONER

## 2025-01-23 PROCEDURE — 1125F AMNT PAIN NOTED PAIN PRSNT: CPT | Performed by: NURSE PRACTITIONER

## 2025-01-23 PROCEDURE — 85007 BL SMEAR W/DIFF WBC COUNT: CPT | Performed by: NURSE PRACTITIONER

## 2025-01-23 PROCEDURE — 86140 C-REACTIVE PROTEIN: CPT | Performed by: NURSE PRACTITIONER

## 2025-01-23 PROCEDURE — 85652 RBC SED RATE AUTOMATED: CPT | Performed by: NURSE PRACTITIONER

## 2025-01-23 PROCEDURE — 1159F MED LIST DOCD IN RCRD: CPT | Performed by: NURSE PRACTITIONER

## 2025-01-23 PROCEDURE — 99214 OFFICE O/P EST MOD 30 MIN: CPT | Performed by: NURSE PRACTITIONER

## 2025-01-23 PROCEDURE — 80053 COMPREHEN METABOLIC PANEL: CPT | Performed by: NURSE PRACTITIONER

## 2025-01-23 PROCEDURE — 36415 COLL VENOUS BLD VENIPUNCTURE: CPT | Performed by: NURSE PRACTITIONER

## 2025-01-23 PROCEDURE — 86038 ANTINUCLEAR ANTIBODIES: CPT | Performed by: NURSE PRACTITIONER

## 2025-01-23 PROCEDURE — 1160F RVW MEDS BY RX/DR IN RCRD: CPT | Performed by: NURSE PRACTITIONER

## 2025-01-23 PROCEDURE — 3075F SYST BP GE 130 - 139MM HG: CPT | Performed by: NURSE PRACTITIONER

## 2025-01-23 PROCEDURE — 83735 ASSAY OF MAGNESIUM: CPT | Performed by: NURSE PRACTITIONER

## 2025-01-23 RX ORDER — PREDNISONE 50 MG/1
50 TABLET ORAL DAILY
Qty: 30 TABLET | Refills: 1 | Status: SHIPPED | OUTPATIENT
Start: 2025-01-23

## 2025-01-23 NOTE — PROGRESS NOTES
"Chief Complaint  Hyperlipidemia    Subjective        Sara Gary presents to [facility]  History of Present Illness  This is a 73 y/o female presenting to office for f/u with HDL. Reports she has not started Repatha; she had tried zetia but reports this made her ill. Reports she is following up with cardiology to learn how to give herself the injection. She does have hx of statin intolerance. Reports she has been having some tenderness on the side of her head right above about 1 inch from ear bilaterally; reports she has not had any new onset joint pain; denies any jaw claudication; denies any loss of vision or new onset visual changes; reports she is still having intermittent migraines-- reports at least 2-3x per month; reports she is using imitrex for her migraine relief. Reports she has not been monitoring her BP at home. She denies any jaw pain or difficulty with eating. Reports she may have had symptoms similar previously before 2018-- she had been seen by Dr. Martinez for concerns of possible GCA but reports this was ruled out.  Denies any joint pain, weakness, unexplained fevers, or weight loss.     Objective   Vital Signs:  /76 (BP Location: Left arm, Patient Position: Sitting, Cuff Size: Adult)   Pulse 69   Ht 175.3 cm (69\")   Wt 60.8 kg (134 lb)   SpO2 97%   BMI 19.79 kg/m²   Estimated body mass index is 19.79 kg/m² as calculated from the following:    Height as of this encounter: 175.3 cm (69\").    Weight as of this encounter: 60.8 kg (134 lb).    BMI is within normal parameters. No other follow-up for BMI required.      Physical Exam  Constitutional:       Appearance: Normal appearance.   HENT:      Head: Normocephalic and atraumatic.      Right Ear: External ear normal.      Left Ear: External ear normal.      Nose: Nose normal.      Mouth/Throat:      Mouth: Mucous membranes are moist.      Pharynx: Oropharynx is clear.   Eyes:      Pupils: Pupils are equal, " round, and reactive to light.   Cardiovascular:      Rate and Rhythm: Normal rate and regular rhythm.      Pulses: Normal pulses.      Heart sounds: Normal heart sounds. No murmur heard.     No gallop.   Pulmonary:      Effort: Pulmonary effort is normal. No respiratory distress.      Breath sounds: Normal breath sounds. No stridor. No wheezing, rhonchi or rales.   Musculoskeletal:      Cervical back: Normal range of motion and neck supple.   Skin:     General: Skin is warm and dry.      Capillary Refill: Capillary refill takes less than 2 seconds.   Neurological:      Mental Status: She is alert.      GCS: GCS eye subscore is 4. GCS verbal subscore is 5. GCS motor subscore is 6.      Cranial Nerves: No cranial nerve deficit, dysarthria or facial asymmetry.      Sensory: Sensation is intact.      Motor: Motor function is intact.      Coordination: Coordination is intact.      Gait: Gait is intact.   Psychiatric:         Mood and Affect: Mood normal.         Thought Content: Thought content normal.         Judgment: Judgment normal.        Result Review :  The following data was reviewed by: ALEJANDRO Miller on 01/23/2025:  Common labs          4/13/2024    04:18 9/25/2024    15:27 1/23/2025    13:53   Common Labs   Glucose 93  101  102    BUN 8  11  10    Creatinine 0.68  0.81  0.82    Sodium 141  141  139    Potassium 3.5  4.8  3.9    Chloride 110  106  104    Calcium 8.5  9.3  8.7    Total Protein  6.7     Albumin  4.3  4.0    Total Bilirubin  0.3  0.3    Alkaline Phosphatase  146  157    AST (SGOT)  14  16    ALT (SGPT)  7  7    WBC 5.41  4.90  5.31    Hemoglobin 11.8  12.7  13.0    Hematocrit 34.9  39.4  38.1    Platelets 240  246  262    Total Cholesterol 218      Total Cholesterol  258     Triglycerides 93  108     HDL Cholesterol 62  68     LDL Cholesterol  140  171     Hemoglobin A1C  5.50       Tobacco Use: Low Risk  (1/23/2025)    Patient History     Smoking Tobacco Use: Never     Smokeless Tobacco Use:  Never     Passive Exposure: Not on file     Social History     Substance and Sexual Activity   Alcohol Use No     Family History   Problem Relation Age of Onset    Heart disease Mother     Hypertension Mother     Heart disease Father     Hypertension Father     Colon cancer Neg Hx     Colon polyps Neg Hx     Crohn's disease Neg Hx     Irritable bowel syndrome Neg Hx     Ulcerative colitis Neg Hx                Assessment and Plan   Diagnoses and all orders for this visit:    1. Mixed hyperlipidemia (Primary)  Assessment & Plan:  Will be starting Repatha; has not picked this up yet        2. Family history of cataracts  -     Cancel: Ambulatory Referral to Ophthalmology    3. Nonintractable headache, unspecified chronicity pattern, unspecified headache type  -     Cancel: Sedimentation rate, automated  -     Cancel: C-reactive protein  -     C-reactive protein  -     Sedimentation rate, automated  -     Magnesium  -     AKI With / DsDNA, RNP, Sjogrens A / B, Beckman  -     Comprehensive metabolic panel  -     predniSONE (DELTASONE) 50 MG tablet; Take 1 tablet by mouth Daily.  Dispense: 30 tablet; Refill: 1  -     Ambulatory Referral to Rheumatology    4. Vision changes  -     Ambulatory Referral to Ophthalmology    5. Temporal pain  -     Ambulatory Referral to Rheumatology    Proceed with labs as ordered  Start 50mg prednisone due to concern for possible GCA-- advised risks vs benefits of high dose steroids; will go ahead and tx until this can be definitively ruled out/in  Urgent referral placed to ophthalmology and rheumatology         I spent 30 minutes caring for Sara on this date of service. This time includes time spent by me in the following activities:preparing for the visit, reviewing tests, obtaining and/or reviewing a separately obtained history, performing a medically appropriate examination and/or evaluation , counseling and educating the patient/family/caregiver, ordering medications, tests, or  procedures, documenting information in the medical record, and care coordination  Follow Up   Return for Next scheduled follow up 3/27/25.  Patient was given instructions and counseling regarding her condition or for health maintenance advice. Please see specific information pulled into the AVS if appropriate.

## 2025-01-24 LAB — ANA SER QL: NEGATIVE

## 2025-01-29 ENCOUNTER — TELEPHONE (OUTPATIENT)
Dept: INTERNAL MEDICINE | Facility: CLINIC | Age: 75
End: 2025-01-29
Payer: MEDICARE

## 2025-01-29 NOTE — TELEPHONE ENCOUNTER
Called patient, advised. Patient said she will call tomorrow to make sure she still has an appointment for February. Pt said she declined the appointment they offered today because they told her she would need to be there in an hour. Pt stated she could not get ready and make an appointment at such a short noticed    I elaborated that she needs to call them as soon as she can to make sure she has an appointment

## 2025-01-30 ENCOUNTER — TELEPHONE (OUTPATIENT)
Dept: CARDIOLOGY | Facility: OTHER | Age: 75
End: 2025-01-30

## 2025-01-30 NOTE — TELEPHONE ENCOUNTER
Caller: CLIFTON    Relationship: SELF    Best call back number: [phone]         What was the call regarding: PT NEEDS AN APPOINTMENT TO COME IN AND LEARN HOW TO USE HER REPATHA- UNSURE IF THIS SHOULD BE WITH THE PROVIDER OR ON THE NURSE SCHEDULE.

## 2025-02-03 ENCOUNTER — TELEPHONE (OUTPATIENT)
Dept: GASTROENTEROLOGY | Facility: CLINIC | Age: 75
End: 2025-02-03

## 2025-02-03 RX ORDER — VONOPRAZAN FUMARATE 13.36 MG/1
10 TABLET ORAL DAILY
Qty: 90 TABLET | Refills: 1 | Status: SHIPPED | OUTPATIENT
Start: 2025-02-03

## 2025-02-03 NOTE — TELEPHONE ENCOUNTER
Caller Name: Sara Gary  Relationship: Self  Best Contact Number: [phone]    Patient is requesting samples of VOQUEZNA OR CAN A PRESCRIPTION OR CAN A PRESCRIPTION BE CALLED IN TO [facility] ON [address]?  IT HAS BEEN WORKING VERY GOOD FOR PT.    How many days of medication do you have left? NO DAYS LEFT.    PLEASE CALL TO LET HER KNOW IF SHE CAN PICKUP SAMPLES ASAP OR CAN THAT PRESCRIPTION BE CALLED IN.

## 2025-02-14 ENCOUNTER — OFFICE VISIT (OUTPATIENT)
Dept: INTERNAL MEDICINE | Facility: CLINIC | Age: 75
End: 2025-02-14
Payer: MEDICARE

## 2025-02-14 VITALS
BODY MASS INDEX: 21.33 KG/M2 | HEIGHT: 69 IN | OXYGEN SATURATION: 99 % | WEIGHT: 144 LBS | SYSTOLIC BLOOD PRESSURE: 130 MMHG | TEMPERATURE: 98.2 F | DIASTOLIC BLOOD PRESSURE: 76 MMHG | HEART RATE: 70 BPM

## 2025-02-14 DIAGNOSIS — G43.109 MIGRAINE WITH AURA AND WITHOUT STATUS MIGRAINOSUS, NOT INTRACTABLE: ICD-10-CM

## 2025-02-14 DIAGNOSIS — R74.8 ELEVATED ALKALINE PHOSPHATASE LEVEL: ICD-10-CM

## 2025-02-14 DIAGNOSIS — J06.9 VIRAL URI: Primary | ICD-10-CM

## 2025-02-14 PROCEDURE — 99213 OFFICE O/P EST LOW 20 MIN: CPT | Performed by: STUDENT IN AN ORGANIZED HEALTH CARE EDUCATION/TRAINING PROGRAM

## 2025-02-14 PROCEDURE — 3078F DIAST BP <80 MM HG: CPT | Performed by: STUDENT IN AN ORGANIZED HEALTH CARE EDUCATION/TRAINING PROGRAM

## 2025-02-14 PROCEDURE — 1160F RVW MEDS BY RX/DR IN RCRD: CPT | Performed by: STUDENT IN AN ORGANIZED HEALTH CARE EDUCATION/TRAINING PROGRAM

## 2025-02-14 PROCEDURE — 3075F SYST BP GE 130 - 139MM HG: CPT | Performed by: STUDENT IN AN ORGANIZED HEALTH CARE EDUCATION/TRAINING PROGRAM

## 2025-02-14 PROCEDURE — 1159F MED LIST DOCD IN RCRD: CPT | Performed by: STUDENT IN AN ORGANIZED HEALTH CARE EDUCATION/TRAINING PROGRAM

## 2025-02-14 PROCEDURE — 1125F AMNT PAIN NOTED PAIN PRSNT: CPT | Performed by: STUDENT IN AN ORGANIZED HEALTH CARE EDUCATION/TRAINING PROGRAM

## 2025-02-14 NOTE — PROGRESS NOTES
"Chief Complaint  URI (For over a week)    Subjective    HPI       The patient presents for evaluation of sinus pressure, elevated alkaline phosphatase, and migraines.    Approximately 10 days ago, she began experiencing persistent head pressure, a symptom she had not previously encountered. She thought this might be related to barometric pressure and her sinuses. She has a history of sinus pressure in other areas but not on the top of her head. She also reports some drainage and notes that her allergies are beginning to flare up.  She reports no cough, wheezing, or fevers.  She attempted to alleviate the discomfort with Tylenol, which provided temporary relief.  Eight days ago, she initiated a self-treatment regimen with amoxicillin 500 mg, left over from a previous root canal procedure, suspecting a sinus infection. She has been taking one dose daily for the past 8 days and reports feeling normal today.     She has been prescribed Nurtec for her migraines, which she finds less invasive than sumatriptan. Her migraines are infrequent, characterized by an aura and lasting approximately 15 minutes.           Objective   Vital Signs:  /76 (BP Location: Left arm, Patient Position: Sitting, Cuff Size: Adult)   Pulse 70   Temp 98.2 °F (36.8 °C) (Oral)   Ht 175.3 cm (69\")   Wt 65.3 kg (144 lb)   SpO2 99%   BMI 21.27 kg/m²   Physical Exam  Vitals reviewed.   Constitutional:       Appearance: She is not toxic-appearing.   HENT:      Nose: Congestion present.      Mouth/Throat:      Mouth: Mucous membranes are moist.      Pharynx: Oropharynx is clear. Posterior oropharyngeal erythema present. No oropharyngeal exudate.   Pulmonary:      Effort: No respiratory distress.   Neurological:      Mental Status: She is alert and oriented to person, place, and time.   Psychiatric:         Mood and Affect: Mood normal.         Behavior: Behavior normal.           There is a little bit of redness in the throat.    The " following data was reviewed by: Miriam Guerrero MD on 02/14/2025:  Data reviewed : Consultant notes rheumatology  Common labs          4/13/2024    04:18 9/25/2024    15:27 1/23/2025    13:53   Common Labs   Glucose 93  101  102    BUN 8  11  10    Creatinine 0.68  0.81  0.82    Sodium 141  141  139    Potassium 3.5  4.8  3.9    Chloride 110  106  104    Calcium 8.5  9.3  8.7    Albumin  4.3  4.0    Total Bilirubin  0.3  0.3    Alkaline Phosphatase  146  157    AST (SGOT)  14  16    ALT (SGPT)  7  7    WBC 5.41  4.90  5.31    Hemoglobin 11.8  12.7  13.0    Hematocrit 34.9  39.4  38.1    Platelets 240  246  262    Total Cholesterol 218      Total Cholesterol  258     Triglycerides 93  108     HDL Cholesterol 62  68     LDL Cholesterol  140  171     Hemoglobin A1C  5.50         Results  Laboratory Studies  Alkaline phosphatase levels have been elevated in the past.              Assessment and Plan        1. Sinus pressure.  2. Elevated alkaline phosphatase.  3. Migraines.       Diagnoses and all orders for this visit:    1. Viral URI (Primary)  -     Cancel: POCT SARS-CoV-2 + Flu Antigen GUERDA    2. Elevated alkaline phosphatase level  -     Alkaline Phosphatase, Bone Specific    3. Migraine with aura and without status migrainosus, not intractable  -     rimegepant sulfate ODT (Nurtec) 75 MG disintegrating tablet; Place 1 tablet under the tongue Daily As Needed (migraine headache) for up to 10 doses.  Dispense: 10 tablet; Refill: 0    Other orders  -     Discontinue: rimegepant sulfate ODT (Nurtec) 75 MG disintegrating tablet; Place 1 tablet under the tongue Daily As Needed (migraine headache) for up to 10 doses.  Dispense: 10 tablet; Refill: 0    Discussed with patient amoxicillin is typically taken 2-3 times a day based on dosing and  based on her symptoms I do not think antibiotics are necessary at this time.  Discussed antibiotic stewardship with her.  Her symptoms have shown improvement, suggesting that a repeat  course of antibiotics may not be necessary at this time. She has been advised to use Mucinex for her congestion. If her symptoms recur, particularly the pressure, it would be prudent to consider another course of antibiotics. She voiced comfort with plan of care to avoid antibiotics at this point in time.    Also let her know Nurtec may be expensive and we will defer final decision continued need for this to her PCP.       Follow Up   Return in about 6 weeks (around 3/27/2025) for Next scheduled follow up.  Patient was given instructions and counseling regarding her condition or for health maintenance advice. Please see specific information pulled into the AVS if appropriate.  Patient or patient representative verbalized consent for the use of Ambient Listening during the visit with  Miriam Guerrero MD for chart documentation. 2/14/2025  13:38 EST

## 2025-02-14 NOTE — PROGRESS NOTES
"Chief Complaint  URI (For over a week)    Subjective    {Problem List  Visit Diagnosis   Encounters  Notes  Medications  Labs  Result Review Imaging  Media :23}    Sara Gary presents to [facility]  History of Present Illness    Objective   Vital Signs:  /76 (BP Location: Left arm, Patient Position: Sitting, Cuff Size: Adult)   Pulse 70   Temp 98.2 °F (36.8 °C) (Oral)   Ht 175.3 cm (69\")   Wt 65.3 kg (144 lb)   SpO2 99%   BMI 21.27 kg/m²   Estimated body mass index is 21.27 kg/m² as calculated from the following:    Height as of this encounter: 175.3 cm (69\").    Weight as of this encounter: 65.3 kg (144 lb).    BMI is within normal parameters. No other follow-up for BMI required.      Physical Exam   Result Review :{Labs  Result Review  Imaging  Med Tab  Media  Procedures :23}  {The following data was reviewed by (Optional):43141}  {Ambulatory Labs (Optional):49058}  {Data reviewed (Optional):53897:::1}          Assessment and Plan {CC Problem List  Visit Diagnosis   ROS  Review (Popup)  Health Maintenance  Quality  BestPractice  Medications  SmartSets  SnapShot Encounters  Media :23}  Diagnoses and all orders for this visit:    1. Viral URI (Primary)  -     Cancel: POCT SARS-CoV-2 + Flu Antigen GUERDA           {Time Spent (Optional):27199}  Follow Up {Instructions Charge Capture  Follow-up Communications :23}  No follow-ups on file.  Patient was given instructions and counseling regarding her condition or for health maintenance advice. Please see specific information pulled into the AVS if appropriate.             "

## 2025-02-18 DIAGNOSIS — E03.8 OTHER SPECIFIED HYPOTHYROIDISM: ICD-10-CM

## 2025-02-18 LAB — ALP BONE SERPL-MCNC: 20.6 UG/L

## 2025-02-18 RX ORDER — LEVOTHYROXINE SODIUM 75 MCG
75 TABLET ORAL DAILY
Qty: 90 TABLET | Refills: 0 | Status: SHIPPED | OUTPATIENT
Start: 2025-02-18

## 2025-02-19 ENCOUNTER — TELEPHONE (OUTPATIENT)
Dept: GASTROENTEROLOGY | Facility: CLINIC | Age: 75
End: 2025-02-19
Payer: MEDICARE

## 2025-02-19 NOTE — TELEPHONE ENCOUNTER
PA was APPROVED for VOQEZNA 10MG.   Authorization starting on 01/01/2025 and ending on 12/31/2025.

## 2025-02-21 ENCOUNTER — TELEPHONE (OUTPATIENT)
Dept: INTERNAL MEDICINE | Facility: CLINIC | Age: 75
End: 2025-02-21
Payer: MEDICARE

## 2025-02-21 NOTE — TELEPHONE ENCOUNTER
Caller: Sara Gary    Relationship: Self    Best call back number: [phone]    What medication are you requesting: AMOXICILLIN 500MG    What are your current symptoms: SINUS CONGESTION    How long have you been experiencing symptoms:     Have you had these symptoms before:    [x] Yes  [] No    Have you been treated for these symptoms before:   [x] Yes  [] No    If a prescription is needed, what is your preferred pharmacy and phone number: [facility] [address] [place] [address] [facility] [phone] [facility] [phone]      Additional notes: PATIENT IS CALLING IN TO REQUEST A MEDICATION REFILL ON HER AMOXICILLIN 500MG BUT THIS IS NOT ON HER MEDICATION LIST.

## 2025-02-21 NOTE — TELEPHONE ENCOUNTER
Name: AbdirahmanSara      Relationship: Self      Best Callback Number: [phone]       HUB PROVIDED THE RELAY MESSAGE FROM THE OFFICE      PATIENT: HAS FURTHER QUESTIONS AND WOULD LIKE A CALL BACK AT THE FOLLOWING PHONE NUMBER     ADDITIONAL INFORMATION: PATIENT STATES THAT IT IS LINGERING CONGESTION FROM WHEN SHE WAS SEEN

## 2025-02-21 NOTE — TELEPHONE ENCOUNTER
"Relay     \"Have you been having the sinus congestion since you seen Dr. Guerrero on 2/14/25? Or is this a new concern?\"  "

## 2025-02-25 ENCOUNTER — TELEPHONE (OUTPATIENT)
Dept: CARDIOLOGY | Facility: CLINIC | Age: 75
End: 2025-02-25
Payer: MEDICARE

## 2025-03-03 ENCOUNTER — TELEPHONE (OUTPATIENT)
Dept: CARDIOLOGY | Age: 75
End: 2025-03-03

## 2025-03-03 RX ORDER — AMOXICILLIN 500 MG/1
2000 CAPSULE ORAL SEE ADMIN INSTRUCTIONS
Qty: 12 CAPSULE | Refills: 0 | Status: SHIPPED | OUTPATIENT
Start: 2025-03-03

## 2025-03-03 NOTE — TELEPHONE ENCOUNTER
Caller: Sara Gary    Relationship to patient: Self    Best call back number: [phone]    Patient is needing: PT HAVING SOME INVASIVE DENTAL WORK DONE ON THURSDAY. HAS NEEDED PRE-MEDICATION ANTI-BIOTICS IN THE PAST. PT WANTS TO KNOW IF SHE NEEDS THIS AGAIN AND IF SOMEONE WILL CALL THE PREVIOUS RX FOR AMOXICILLIN INTO PHARMACY. PLEASE CALL PT TO ADVISE.

## 2025-03-04 DIAGNOSIS — F41.9 ANXIETY AND DEPRESSION: ICD-10-CM

## 2025-03-04 DIAGNOSIS — F32.A ANXIETY AND DEPRESSION: ICD-10-CM

## 2025-03-04 RX ORDER — SERTRALINE HYDROCHLORIDE 100 MG/1
100 TABLET, FILM COATED ORAL DAILY
Qty: 90 TABLET | Refills: 0 | Status: SHIPPED | OUTPATIENT
Start: 2025-03-04

## 2025-03-04 NOTE — TELEPHONE ENCOUNTER
Caller: Sara Gary    Relationship: Self    Best call back number: [phone]    Requested Prescriptions:   Requested Prescriptions     Pending Prescriptions Disp Refills    sertraline (Zoloft) 100 MG tablet 90 tablet 2     Sig: Take 1 tablet by mouth Daily.        Pharmacy where request should be sent: [facility] [address] [place] [address] [facility] [phone] [facility] [phone] FX     Last office visit with prescribing clinician: 1/23/2025   Last telemedicine visit with prescribing clinician: Visit date not found   Next office visit with prescribing clinician: 3/6/2025       Mio Whitehead Rep   03/04/25 14:49 EST

## 2025-03-06 ENCOUNTER — OFFICE VISIT (OUTPATIENT)
Dept: INTERNAL MEDICINE | Facility: CLINIC | Age: 75
End: 2025-03-06
Payer: MEDICARE

## 2025-03-06 VITALS
WEIGHT: 145 LBS | HEIGHT: 69 IN | DIASTOLIC BLOOD PRESSURE: 70 MMHG | HEART RATE: 63 BPM | OXYGEN SATURATION: 97 % | BODY MASS INDEX: 21.48 KG/M2 | SYSTOLIC BLOOD PRESSURE: 130 MMHG

## 2025-03-06 DIAGNOSIS — R45.89 USES EMOTIONAL SUPPORT ANIMAL: Primary | ICD-10-CM

## 2025-03-06 PROCEDURE — 1159F MED LIST DOCD IN RCRD: CPT | Performed by: NURSE PRACTITIONER

## 2025-03-06 PROCEDURE — 3075F SYST BP GE 130 - 139MM HG: CPT | Performed by: NURSE PRACTITIONER

## 2025-03-06 PROCEDURE — 1160F RVW MEDS BY RX/DR IN RCRD: CPT | Performed by: NURSE PRACTITIONER

## 2025-03-06 PROCEDURE — 1125F AMNT PAIN NOTED PAIN PRSNT: CPT | Performed by: NURSE PRACTITIONER

## 2025-03-06 PROCEDURE — 99212 OFFICE O/P EST SF 10 MIN: CPT | Performed by: NURSE PRACTITIONER

## 2025-03-06 PROCEDURE — 3078F DIAST BP <80 MM HG: CPT | Performed by: NURSE PRACTITIONER

## 2025-03-06 NOTE — PROGRESS NOTES
"Chief Complaint  Med Management and Paperwork     Subjective        Sara Gary presents to [facility]  History of Present Illness  This is a 75 y/o female presenting to office for concerns for needing [employer] animal paperwork filled out for her apartment.   She has been physically declared prev by the state of being permanently disabled r/t chronic migraines, fibromyalgia.   She is requesting paperwork be completed for her JERAMY animal-- cat.   This will help waive her fee and she does have some financial hardship.     Objective   Vital Signs:  /70 (BP Location: Left arm, Patient Position: Sitting, Cuff Size: Small Adult)   Pulse 63   Ht 175.3 cm (69\")   Wt 65.8 kg (145 lb)   SpO2 97%   BMI 21.41 kg/m²   Estimated body mass index is 21.41 kg/m² as calculated from the following:    Height as of this encounter: 175.3 cm (69\").    Weight as of this encounter: 65.8 kg (145 lb).    BMI is within normal parameters. No other follow-up for BMI required.      Physical Exam  Constitutional:       Appearance: Normal appearance.   HENT:      Head: Normocephalic and atraumatic.      Right Ear: External ear normal.      Left Ear: External ear normal.      Nose: Nose normal.      Mouth/Throat:      Mouth: Mucous membranes are moist.      Pharynx: Oropharynx is clear.   Eyes:      Conjunctiva/sclera: Conjunctivae normal.      Pupils: Pupils are equal, round, and reactive to light.      Comments: +glasses   Pulmonary:      Effort: Pulmonary effort is normal.   Musculoskeletal:      Cervical back: Normal range of motion.   Neurological:      Mental Status: She is alert and oriented to person, place, and time. Mental status is at baseline.   Psychiatric:         Mood and Affect: Mood normal.         Thought Content: Thought content normal.         Judgment: Judgment normal.        Result Review :  The following data was reviewed by: ALEJANDRO Miller on 03/06/2025:  Common labs          " 4/13/2024    04:18 9/25/2024    15:27 1/23/2025    13:53   Common Labs   Glucose 93  101  102    BUN 8  11  10    Creatinine 0.68  0.81  0.82    Sodium 141  141  139    Potassium 3.5  4.8  3.9    Chloride 110  106  104    Calcium 8.5  9.3  8.7    Albumin  4.3  4.0    Total Bilirubin  0.3  0.3    Alkaline Phosphatase  146  157    AST (SGOT)  14  16    ALT (SGPT)  7  7    WBC 5.41  4.90  5.31    Hemoglobin 11.8  12.7  13.0    Hematocrit 34.9  39.4  38.1    Platelets 240  246  262    Total Cholesterol 218      Total Cholesterol  258     Triglycerides 93  108     HDL Cholesterol 62  68     LDL Cholesterol  140  171     Hemoglobin A1C  5.50       Tobacco Use: Low Risk  (3/6/2025)    Patient History     Smoking Tobacco Use: Never     Smokeless Tobacco Use: Never     Passive Exposure: Not on file     Social History     Substance and Sexual Activity   Alcohol Use No     Family History   Problem Relation Age of Onset    Heart disease Mother     Hypertension Mother     Heart disease Father     Hypertension Father     Colon cancer Neg Hx     Colon polyps Neg Hx     Crohn's disease Neg Hx     Irritable bowel syndrome Neg Hx     Ulcerative colitis Neg Hx                Assessment and Plan   Diagnoses and all orders for this visit:    1. Uses emotional support animal (Primary)    Paperwork completed during visit today.          Follow Up   Return for Next scheduled follow up .  Patient was given instructions and counseling regarding her condition or for health maintenance advice. Please see specific information pulled into the AVS if appropriate.

## 2025-03-08 DIAGNOSIS — F41.9 ANXIETY AND DEPRESSION: ICD-10-CM

## 2025-03-08 DIAGNOSIS — F32.A ANXIETY AND DEPRESSION: ICD-10-CM

## 2025-03-10 ENCOUNTER — TELEPHONE (OUTPATIENT)
Dept: INTERNAL MEDICINE | Facility: CLINIC | Age: 75
End: 2025-03-10
Payer: MEDICARE

## 2025-03-10 DIAGNOSIS — J30.1 SEASONAL ALLERGIC RHINITIS DUE TO POLLEN: Primary | ICD-10-CM

## 2025-03-10 DIAGNOSIS — J45.20 MILD INTERMITTENT ASTHMA WITHOUT COMPLICATION: ICD-10-CM

## 2025-03-10 RX ORDER — FLUTICASONE PROPIONATE 50 MCG
2 SPRAY, SUSPENSION (ML) NASAL DAILY
Qty: 16 G | Refills: 3 | Status: SHIPPED | OUTPATIENT
Start: 2025-03-10

## 2025-03-10 RX ORDER — BUSPIRONE HYDROCHLORIDE 10 MG/1
10 TABLET ORAL DAILY
Qty: 90 TABLET | Refills: 0 | Status: SHIPPED | OUTPATIENT
Start: 2025-03-10

## 2025-03-10 NOTE — TELEPHONE ENCOUNTER
Caller: Sara Gary    Relationship: Self    Best call back number: [phone]    Requested Prescriptions: FLONASE NASAL SPRAY AND RESCUE INHALER       Pharmacy where request should be sent:  [id number] [place] [address] [phone] [facility] [phone]       Last office visit with prescribing clinician: 3/6/2025   Last telemedicine visit with prescribing clinician: Visit date not found   Next office visit with prescribing clinician: 3/27/2025     PATIENT IS REQUESTING THESE FROM SEVERAL YEARS AGO.     Does the patient have less than a 3 day supply:  [] Yes  [] No    Would you like a call back once the refill request has been completed: [] Yes [x] No    If the office needs to give you a call back, can they leave a voicemail: [] Yes [x] No    Mio Cole Rep   03/10/25 15:29 EDT

## 2025-03-12 RX ORDER — ALBUTEROL SULFATE 90 UG/1
2 INHALANT RESPIRATORY (INHALATION) EVERY 4 HOURS PRN
Qty: 18 G | Refills: 3 | Status: SHIPPED | OUTPATIENT
Start: 2025-03-12

## 2025-03-12 NOTE — TELEPHONE ENCOUNTER
Sent ventolin to pharmacy  If she continues to have bothersome symptoms , she can come in, otherwise it is okay to continue with otc tx including coricidin cough and cold as needed

## 2025-03-12 NOTE — TELEPHONE ENCOUNTER
Patient has asthma, which has been controlled but recently patient has had episodes. Pt was on a ventolin inhaler but the one she has is . Patient does have a sore throat that started last night, sinus infection that has been happening a few weeks. Do you think patient needs to come in to be tested? Patient has only been doing salt water gargles

## 2025-03-13 ENCOUNTER — TELEPHONE (OUTPATIENT)
Dept: INTERNAL MEDICINE | Facility: CLINIC | Age: 75
End: 2025-03-13
Payer: MEDICARE

## 2025-03-13 NOTE — TELEPHONE ENCOUNTER
"Relay     \"If I am not available please ask patient if she ever tried the coricidin cough and cold that Catherine recommended. If so, ask if it helped and if it did not, please let patient know what other cough/cold medicines Catherine recommended below. Patient can take one OR the other, NOT all three     Per Catherine: Recommend to take Mucinex 1200mg twice as day as needed for cough and congestion OR to try delsym cough syrup twice a day as needed.   Salt water nose spray for stuffy nose as needed  You can try throat lozenges or salt water gargles for any sore throat.   Recommend symptom management support including warm fluids, honey if not diabetic, and cough drops. If it's ongoing diarrhea--- stay well hydrated and push fluids    "

## 2025-03-13 NOTE — TELEPHONE ENCOUNTER
Recommend to take Mucinex 1200mg BID PRN as needed for cough and congestion.  She can also try delsym cough syrup BID PRN.   Salt water nose spray for stuffy nose as needed  You can try throat lozenges or salt water gargles for any sore throat.   Recommend symptom management support including warm fluids, honey if not diabetic, and cough drops.   If it's ongoing diarrhea--- stay well hydrated and push fluids

## 2025-03-13 NOTE — TELEPHONE ENCOUNTER
Advised patient she needs to be seen evaluated. Patient is now having diarrhea too. Recommended patient go to urgent care today or wait to see Catherine tomorrow. I did offer/recommend seeing if another provider had an opening today but patient refused. Patient said if she does get worst she will just go to urgent care.     Scheduled patient with you tomorrow. Is there anything you recommend patient to do in the mean time?

## 2025-03-13 NOTE — TELEPHONE ENCOUNTER
Patient has been doing salt water gargle, tylenol, and hot tea with Honey.     Patient was prescribed Amoxicillin by her Cardiologist to take prior to dental work. Patient wants to know if she can take one, because she believes it will help with her symptoms. Advised patient I will check with Catherine and put her on hold.     Per Catherine, what patient has is most likely viral and does not recommend taking an antibiotic. Advised patient and she understood. Will see patient tomorrow 3/14 at 1:30pm

## 2025-03-13 NOTE — TELEPHONE ENCOUNTER
Caller: Sara Gary    Relationship: Self    Best call back number: [phone]      What was the call regarding: PATIENT IS CALLING TODAY TO LET ALY ALLEN KNOW PATIENT COUGHED ALL NIGHT LAST NIGHT AND THROAT IS SORE THIS MORNING     PATIENT WANTED TO SEE WHAT ALY ALLEN RECOMMENDS HER TO DO    PLEASE GIVE CALLBACK

## 2025-03-14 ENCOUNTER — OFFICE VISIT (OUTPATIENT)
Dept: INTERNAL MEDICINE | Facility: CLINIC | Age: 75
End: 2025-03-14
Payer: MEDICARE

## 2025-03-14 VITALS
WEIGHT: 145 LBS | BODY MASS INDEX: 21.48 KG/M2 | HEIGHT: 69 IN | TEMPERATURE: 98.2 F | DIASTOLIC BLOOD PRESSURE: 80 MMHG | OXYGEN SATURATION: 96 % | HEART RATE: 75 BPM | SYSTOLIC BLOOD PRESSURE: 128 MMHG

## 2025-03-14 DIAGNOSIS — J06.9 ACUTE URI: Primary | ICD-10-CM

## 2025-03-14 LAB
EXPIRATION DATE: NORMAL
EXPIRATION DATE: NORMAL
FLUAV AG UPPER RESP QL IA.RAPID: NOT DETECTED
FLUBV AG UPPER RESP QL IA.RAPID: NOT DETECTED
INTERNAL CONTROL: NORMAL
INTERNAL CONTROL: NORMAL
Lab: NORMAL
Lab: NORMAL
S PYO AG THROAT QL: NEGATIVE
SARS-COV-2 AG UPPER RESP QL IA.RAPID: NOT DETECTED

## 2025-03-14 PROCEDURE — 99213 OFFICE O/P EST LOW 20 MIN: CPT | Performed by: NURSE PRACTITIONER

## 2025-03-14 PROCEDURE — 87880 STREP A ASSAY W/OPTIC: CPT | Performed by: NURSE PRACTITIONER

## 2025-03-14 PROCEDURE — 87428 SARSCOV & INF VIR A&B AG IA: CPT | Performed by: NURSE PRACTITIONER

## 2025-03-14 PROCEDURE — 3079F DIAST BP 80-89 MM HG: CPT | Performed by: NURSE PRACTITIONER

## 2025-03-14 PROCEDURE — 1159F MED LIST DOCD IN RCRD: CPT | Performed by: NURSE PRACTITIONER

## 2025-03-14 PROCEDURE — 1125F AMNT PAIN NOTED PAIN PRSNT: CPT | Performed by: NURSE PRACTITIONER

## 2025-03-14 PROCEDURE — 1160F RVW MEDS BY RX/DR IN RCRD: CPT | Performed by: NURSE PRACTITIONER

## 2025-03-14 PROCEDURE — 3074F SYST BP LT 130 MM HG: CPT | Performed by: NURSE PRACTITIONER

## 2025-03-14 NOTE — PROGRESS NOTES
"Chief Complaint  Nasal Congestion, Sore Throat (Minor ), and Post Nasal Drip     Subjective        Sara Gary presents to [facility]  History of Present Illness  This is a 75 y/o female presenting to office for complaints of nasal congestion, mild sore throat, and PND. Reports symptoms started earlier this week; she has been using OTC symptom management to help with treatment. Using warm fluids and rest PRN. Denies SOA/CP. Reports she had 1 incidence of diarrhea but this has since resolved. Using zyrtec OTC.     Objective   Vital Signs:  /80 (BP Location: Left arm, Patient Position: Sitting, Cuff Size: Adult)   Pulse 75   Temp 98.2 °F (36.8 °C) (Oral)   Ht 175.3 cm (69\")   Wt 65.8 kg (145 lb)   SpO2 96%   BMI 21.41 kg/m²   Estimated body mass index is 21.41 kg/m² as calculated from the following:    Height as of this encounter: 175.3 cm (69\").    Weight as of this encounter: 65.8 kg (145 lb).    BMI is within normal parameters. No other follow-up for BMI required.      Physical Exam  Constitutional:       General: She is awake.      Appearance: Normal appearance.   HENT:      Head: Normocephalic and atraumatic.      Right Ear: Hearing and external ear normal.      Left Ear: Hearing and external ear normal.      Nose: Congestion present.      Mouth/Throat:      Lips: Pink.      Mouth: Mucous membranes are moist.      Pharynx: Oropharynx is clear.   Eyes:      Extraocular Movements: Extraocular movements intact.      Conjunctiva/sclera: Conjunctivae normal.      Pupils: Pupils are equal, round, and reactive to light.   Cardiovascular:      Rate and Rhythm: Normal rate and regular rhythm.      Pulses: Normal pulses.      Heart sounds: Murmur heard.       with a grade of 2/6.      No gallop.   Pulmonary:      Effort: Pulmonary effort is normal. No respiratory distress.      Breath sounds: Normal breath sounds. No stridor. No wheezing, rhonchi or rales.   Musculoskeletal:  "        General: No swelling.      Cervical back: Normal range of motion and neck supple.   Skin:     General: Skin is warm and dry.      Capillary Refill: Capillary refill takes less than 2 seconds.   Neurological:      General: No focal deficit present.      Mental Status: She is alert and oriented to person, place, and time. Mental status is at baseline.      Motor: Motor function is intact.      Coordination: Coordination is intact.      Gait: Gait is intact.      Deep Tendon Reflexes: Reflexes are normal and symmetric.   Psychiatric:         Attention and Perception: Attention normal.         Mood and Affect: Mood normal.         Speech: Speech normal.         Behavior: Behavior normal. Behavior is cooperative.         Thought Content: Thought content normal.         Cognition and Memory: Cognition normal.         Judgment: Judgment normal.        Result Review :  The following data was reviewed by: ALEJANDRO Miller on 03/14/2025:  Common labs          4/13/2024    04:18 9/25/2024    15:27 1/23/2025    13:53   Common Labs   Glucose 93  101  102    BUN 8  11  10    Creatinine 0.68  0.81  0.82    Sodium 141  141  139    Potassium 3.5  4.8  3.9    Chloride 110  106  104    Calcium 8.5  9.3  8.7    Albumin  4.3  4.0    Total Bilirubin  0.3  0.3    Alkaline Phosphatase  146  157    AST (SGOT)  14  16    ALT (SGPT)  7  7    WBC 5.41  4.90  5.31    Hemoglobin 11.8  12.7  13.0    Hematocrit 34.9  39.4  38.1    Platelets 240  246  262    Total Cholesterol 218      Total Cholesterol  258     Triglycerides 93  108     HDL Cholesterol 62  68     LDL Cholesterol  140  171     Hemoglobin A1C  5.50       Tobacco Use: Low Risk  (3/14/2025)    Patient History     Smoking Tobacco Use: Never     Smokeless Tobacco Use: Never     Passive Exposure: Not on file     Social History     Substance and Sexual Activity   Alcohol Use No     Family History   Problem Relation Age of Onset    Heart disease Mother     Hypertension Mother      Heart disease Father     Hypertension Father     Colon cancer Neg Hx     Colon polyps Neg Hx     Crohn's disease Neg Hx     Irritable bowel syndrome Neg Hx     Ulcerative colitis Neg Hx       POCT rapid strep A (03/14/2025 1:28 PM)  POCT SARS-CoV-2 Antigen GUERDA + Flu (03/14/2025 1:28 PM)         Assessment and Plan   Diagnoses and all orders for this visit:    1. Acute URI (Primary)  -     POCT SARS-CoV-2 Antigen GUERDA + Flu  -     POCT rapid strep A    Recommend to take Mucinex 1200mg BID as needed for cough and congestion.  You can also try Flonase Allergy 2 sprays to each nostril once a day for congestion.  If you have a history of heart disease or HTN-- recommend coricidin cough and cold OTC or delsym cough syrup BID PRN.   You can also try salt water nose spray for stuffy nose.  Take over-the-counter acetaminophen or ibuprofen as needed for pain or fever.  You can try throat lozenges or salt water gargles for any sore throat.   Your symptoms may take up to 10-14 days to fully subside.  Recommend symptom management support including warm fluids, honey if not diabetic, and cough drops.   Contact us if you have worsening symptoms such as high fever (102F), shortness of breath, inability to keep liquids and solids down, or other worsening symptoms.            Follow Up   Return if symptoms worsen or fail to improve.  Patient was given instructions and counseling regarding her condition or for health maintenance advice. Please see specific information pulled into the AVS if appropriate.

## 2025-03-26 ENCOUNTER — TELEPHONE (OUTPATIENT)
Dept: INTERNAL MEDICINE | Facility: CLINIC | Age: 75
End: 2025-03-26
Payer: MEDICARE

## 2025-03-26 NOTE — TELEPHONE ENCOUNTER
Patient called today wondering what he office visit was for tomorrow since she was just seen 3/14 for an URI. I told her it was a 6 month follow up, she understood and said she would like to discuss her repatha.

## 2025-03-27 ENCOUNTER — OFFICE VISIT (OUTPATIENT)
Dept: INTERNAL MEDICINE | Facility: CLINIC | Age: 75
End: 2025-03-27
Payer: MEDICARE

## 2025-03-27 VITALS
HEIGHT: 69 IN | WEIGHT: 146 LBS | SYSTOLIC BLOOD PRESSURE: 128 MMHG | OXYGEN SATURATION: 97 % | HEART RATE: 57 BPM | DIASTOLIC BLOOD PRESSURE: 82 MMHG | BODY MASS INDEX: 21.62 KG/M2

## 2025-03-27 DIAGNOSIS — I10 PRIMARY HYPERTENSION: Chronic | ICD-10-CM

## 2025-03-27 DIAGNOSIS — F41.9 ANXIETY AND DEPRESSION: Chronic | ICD-10-CM

## 2025-03-27 DIAGNOSIS — J45.20 MILD INTERMITTENT ASTHMA WITHOUT COMPLICATION: Chronic | ICD-10-CM

## 2025-03-27 DIAGNOSIS — R73.03 PREDIABETES: Chronic | ICD-10-CM

## 2025-03-27 DIAGNOSIS — J30.1 SEASONAL ALLERGIC RHINITIS DUE TO POLLEN: Primary | Chronic | ICD-10-CM

## 2025-03-27 DIAGNOSIS — F32.A ANXIETY AND DEPRESSION: Chronic | ICD-10-CM

## 2025-03-27 DIAGNOSIS — Q21.10 RESIDUAL ASD (ATRIAL SEPTAL DEFECT) FOLLOWING REPAIR: Chronic | ICD-10-CM

## 2025-03-27 DIAGNOSIS — E78.2 MIXED HYPERLIPIDEMIA: Chronic | ICD-10-CM

## 2025-03-27 DIAGNOSIS — Z78.0 POST-MENOPAUSAL: ICD-10-CM

## 2025-03-27 DIAGNOSIS — E89.0 POSTOPERATIVE HYPOTHYROIDISM: Chronic | ICD-10-CM

## 2025-03-27 PROBLEM — R07.9 CHEST PAIN: Status: RESOLVED | Noted: 2024-04-12 | Resolved: 2025-03-27

## 2025-03-27 PROBLEM — U07.1 COVID-19: Status: RESOLVED | Noted: 2024-03-08 | Resolved: 2025-03-27

## 2025-03-27 PROBLEM — J06.9 ACUTE URI: Status: RESOLVED | Noted: 2023-10-24 | Resolved: 2025-03-27

## 2025-03-27 RX ORDER — CETIRIZINE HYDROCHLORIDE 10 MG/1
10 TABLET ORAL DAILY
Qty: 90 TABLET | Refills: 2 | Status: SHIPPED | OUTPATIENT
Start: 2025-03-27

## 2025-03-27 RX ORDER — CETIRIZINE HYDROCHLORIDE 10 MG/1
10 TABLET ORAL DAILY
COMMUNITY
End: 2025-03-27 | Stop reason: SDUPTHER

## 2025-03-27 NOTE — PROGRESS NOTES
"Chief Complaint  Hypertension, Hypothyroidism, Anxiety, and Depression    Subjective        Sara Gary presents to [facility]  History of Present Illness  This is a 73 y/o female presenting to office for f/u with chronic health conditions    HTN-- continues on losartan; reports checking BP at home; denies CP or SOA.    HLD-- continues on repatha; managed by cardiology.     Continues on voquenza for hx of GERD-- managed by GI. Reports symptoms well controlled.      Continues on cytomel, synthroid. Reports taking this by itself; will wait 1 hour before taking other medications.      Continues on zoloft, buspar; reports mood is stable. Denies SI.     Reports she was diagnosed with asthma in 2006; has used albuterol as needed; reports her asthma flares more when season changes. Reports she uses this sparingly.     Objective   Vital Signs:  /82 (BP Location: Right leg, Patient Position: Sitting, Cuff Size: Adult)   Pulse 57   Ht 175.3 cm (69\")   Wt 66.2 kg (146 lb)   SpO2 97%   BMI 21.56 kg/m²   Estimated body mass index is 21.56 kg/m² as calculated from the following:    Height as of this encounter: 175.3 cm (69\").    Weight as of this encounter: 66.2 kg (146 lb).    BMI is within normal parameters. No other follow-up for BMI required.      Physical Exam  Constitutional:       Appearance: Normal appearance.   HENT:      Head: Normocephalic and atraumatic.      Right Ear: External ear normal.      Left Ear: External ear normal.      Nose: Nose normal.      Mouth/Throat:      Mouth: Mucous membranes are moist.      Pharynx: Oropharynx is clear.   Eyes:      Conjunctiva/sclera: Conjunctivae normal.      Pupils: Pupils are equal, round, and reactive to light.      Comments: +glasses   Cardiovascular:      Rate and Rhythm: Normal rate and regular rhythm.      Pulses: Normal pulses.      Heart sounds: Murmur heard.      Systolic murmur is present with a grade of 2/6.      No " friction rub. No gallop.   Pulmonary:      Effort: Pulmonary effort is normal. No respiratory distress.      Breath sounds: Normal breath sounds. No stridor. No wheezing, rhonchi or rales.   Musculoskeletal:      Cervical back: Normal range of motion and neck supple.   Skin:     General: Skin is warm and dry.      Capillary Refill: Capillary refill takes less than 2 seconds.   Neurological:      Mental Status: She is alert and oriented to person, place, and time. Mental status is at baseline.   Psychiatric:         Mood and Affect: Mood normal.         Thought Content: Thought content normal.         Judgment: Judgment normal.        Result Review :  The following data was reviewed by: ALEJANDRO Miller on 03/27/2025:  Common labs          4/13/2024    04:18 9/25/2024    15:27 1/23/2025    13:53   Common Labs   Glucose 93  101  102    BUN 8  11  10    Creatinine 0.68  0.81  0.82    Sodium 141  141  139    Potassium 3.5  4.8  3.9    Chloride 110  106  104    Calcium 8.5  9.3  8.7    Albumin  4.3  4.0    Total Bilirubin  0.3  0.3    Alkaline Phosphatase  146  157    AST (SGOT)  14  16    ALT (SGPT)  7  7    WBC 5.41  4.90  5.31    Hemoglobin 11.8  12.7  13.0    Hematocrit 34.9  39.4  38.1    Platelets 240  246  262    Total Cholesterol 218      Total Cholesterol  258     Triglycerides 93  108     HDL Cholesterol 62  68     LDL Cholesterol  140  171     Hemoglobin A1C  5.50       Tobacco Use: Low Risk  (3/27/2025)    Patient History     Smoking Tobacco Use: Never     Smokeless Tobacco Use: Never     Passive Exposure: Not on file     Social History     Substance and Sexual Activity   Alcohol Use No     Family History   Problem Relation Age of Onset    Heart disease Mother     Hypertension Mother     Heart disease Father     Hypertension Father     Colon cancer Neg Hx     Colon polyps Neg Hx     Crohn's disease Neg Hx     Irritable bowel syndrome Neg Hx     Ulcerative colitis Neg Hx                Assessment and Plan    Diagnoses and all orders for this visit:    1. Seasonal allergic rhinitis due to pollen (Primary)  Assessment & Plan:  Needs refill zyrtec    Orders:  -     cetirizine (zyrTEC) 10 MG tablet; Take 1 tablet by mouth Daily.  Dispense: 90 tablet; Refill: 2    2. Primary hypertension  Assessment & Plan:  Hypertension is stable and controlled  Continue current treatment regimen.  Dietary sodium restriction.  Regular aerobic exercise.  Blood pressure will be reassessed in 6 months.  Continue losartan      3. Residual ASD (atrial septal defect) following repair  Assessment & Plan:  Continues following with cardiology  4/13/24  Interpretation Summary         Left ventricular systolic function is normal. Left ventricular ejection fraction appears to be 56 - 60%.    Left ventricular diastolic function is consistent with (grade I) impaired relaxation.    The left atrial cavity is moderate to severely dilated.    There is mild calcification of the aortic valve without evidence of stenosis..    Moderate to severe aortic valve regurgitation is present.  Mildly dilated ascending aorta measures 3.6 cm.    Estimated right ventricular systolic pressure from tricuspid regurgitation is mildly elevated (35-45 mmHg).    Mild dilation of the ascending aorta is present.      4. Mixed hyperlipidemia  Assessment & Plan:  On Repatha  Managed by cardiology        5. Prediabetes  Assessment & Plan:  Continue with low glycemic diet choices including reducing refined carbohydrates and simple sugars in diet. Recommended 150 minutes weekly exercise or as tolerated.       Orders:  -     Hemoglobin A1c    6. Postoperative hypothyroidism  Assessment & Plan:  Advised to take medication on empty stomach; wait at least 30 minutes before eating, drinking, or taking other medications  Take medication at same time everyday  Continues on cytomel, synthroid    Orders:  -     TSH Rfx On Abnormal To Free T4  -     T3, free    7. Anxiety and  depression  Assessment & Plan:  Stable on buspar, zoloft  Denies SI      8. Mild intermittent asthma without complication  Assessment & Plan:  Using albuterol as needed  Denies current wheezing or SOA          9. Post-menopausal  -     DEXA Bone Density Axial; Future             Follow Up   Return in about 6 months (around 9/27/2025) for Medicare Wellness.  Patient was given instructions and counseling regarding her condition or for health maintenance advice. Please see specific information pulled into the AVS if appropriate.

## 2025-03-27 NOTE — ASSESSMENT & PLAN NOTE
Hypertension is stable and controlled  Continue current treatment regimen.  Dietary sodium restriction.  Regular aerobic exercise.  Blood pressure will be reassessed in 6 months.  Continue losartan

## 2025-03-27 NOTE — ASSESSMENT & PLAN NOTE
Advised to take medication on empty stomach; wait at least 30 minutes before eating, drinking, or taking other medications  Take medication at same time everyday  Continues on cytomel, synthroid

## 2025-03-27 NOTE — ASSESSMENT & PLAN NOTE
Continues following with cardiology  4/13/24  Interpretation Summary         Left ventricular systolic function is normal. Left ventricular ejection fraction appears to be 56 - 60%.    Left ventricular diastolic function is consistent with (grade I) impaired relaxation.    The left atrial cavity is moderate to severely dilated.    There is mild calcification of the aortic valve without evidence of stenosis..    Moderate to severe aortic valve regurgitation is present.  Mildly dilated ascending aorta measures 3.6 cm.    Estimated right ventricular systolic pressure from tricuspid regurgitation is mildly elevated (35-45 mmHg).    Mild dilation of the ascending aorta is present.

## 2025-03-28 ENCOUNTER — RESULTS FOLLOW-UP (OUTPATIENT)
Dept: INTERNAL MEDICINE | Facility: CLINIC | Age: 75
End: 2025-03-28
Payer: MEDICARE

## 2025-03-28 LAB
HBA1C MFR BLD: 5.8 % (ref 4.8–5.6)
T3FREE SERPL-MCNC: 3.3 PG/ML (ref 2–4.4)
TSH SERPL DL<=0.005 MIU/L-ACNC: 1.77 UIU/ML (ref 0.27–4.2)

## 2025-04-14 ENCOUNTER — TELEPHONE (OUTPATIENT)
Dept: INTERNAL MEDICINE | Facility: CLINIC | Age: 75
End: 2025-04-14

## 2025-04-14 NOTE — TELEPHONE ENCOUNTER
S/w Pt trying to help her get signed into telehealth for an appt at 1 p.  Was told her symptoms are not appropriate for video, she will need to schedule an OV  Or go to Urgent Care.  Relayed message to Pt, she will go to UC

## 2025-04-25 NOTE — TELEPHONE ENCOUNTER
----- Message from Leisa ALBERT Ch sent at 5/21/2018 10:26 AM EDT -----  Regarding: RE BETA BLOCKER  Contact: [phone]  SHE THINKS THE DOSE NEEDS TO BE LOWERED ALL SHE WANTS TO DO IS SLEEP  PLEASE ADVISE  THANKS   [facility]  Anesthesia Pre-Operative Evaluation         Patient Name: Froylan Borja  YOB: 1957  MRN: [id number]    SUBJECTIVE:     Pre-operative evaluation for Procedure(s) (LRB):  REPLACEMENT, WOUND VAC (Right)     04/25/2025    Froylan Borja is a 68 y.o. male w/ a significant PMHx of alcohol abuse, cirrhosis, hepatocellular carcinoma, and HTN who presented with a R leg wound and pain at prior angiogram site in R groin. RLE U/S showed non-occlusive CFV thrombus and R CFA pseudoaneurysm. CTA re-demonstrated the psdueoaneurysm with active extravasation within the sac as well as a large R calf hematoma. He is s/p debridement and wound vac placement on 4/21/25 and 4/24/25.     Patient now presents for the above procedure(s).     Results for orders placed during the hospital encounter of 04/18/25     Echo     Interpretation Summary    Left Ventricle: The left ventricle is normal in size. Normal wall thickness. There is normal systolic function with a visually estimated ejection fraction of 65 - 70%. There is normal diastolic function.    Right Ventricle: The right ventricle is normal in size. Wall thickness is normal. Systolic function is normal.    IVC/SVC: Normal venous pressure at 3 mmHg.   .    Patient now presents for the above procedure(s).      LDA:        Peripheral IV - Single Lumen 04/25/25 0040 20 G Left;Posterior Hand (Active)   Site Assessment Clean;Dry;Intact;No redness;No swelling 04/25/25 0400   Line Status Saline locked 04/25/25 0400   Dressing Status Clean;Dry;Intact 04/25/25 0400   Dressing Intervention Integrity maintained 04/25/25 0400   Number of days: 0       Prev airway:     Intubation     Date/Time: 4/24/2025 5:39 PM     Performed by: Toy Sandra CRNA  Authorized by: Mere Escobedo MD    Intubation:     Induction:  Intravenous    Intubated:  Postinduction    Mask Ventilation:  Not attempted    Attempts:  1    Attempted By:  CRNA    Method of Intubation:  Video  laryngoscopy    Blade:  Bundy 2    Laryngeal View Grade: Grade I - full view of cords      Difficult Airway Encountered?: No      Complications:  None    Airway Device:  Oral endotracheal tube    Airway Device Size:  7.5    Style/Cuff Inflation:  Cuffed (inflated to minimal occlusive pressure)    Tube secured:  22    Secured at:  The lips    Placement Verified By:  Capnometry    Complicating Factors:  None    Findings Post-Intubation:  BS equal bilateral and atraumatic/condition of teeth unchanged          Drips:   heparin (porcine) in D5W  0-40 Units/kg/hr Intravenous Continuous           Problem List[1]    Review of patient's allergies indicates:   Allergen Reactions    Zoloft [sertraline] Other (See Comments)     hyponatremia    Codeine Itching     Intolerance- doesn't like the way it makes him feel       Current Inpatient Medications:   amLODIPine  10 mg Oral Daily    buPROPion  150 mg Oral BID    folic acid  1 mg Oral Daily    gabapentin  300 mg Oral BID    melatonin  6 mg Oral Nightly    multivitamin  1 tablet Oral Daily    polyethylene glycol  17 g Oral BID    senna  8.6 mg Oral BID    thiamine  100 mg Oral Daily       Medications Ordered Prior to Encounter[2]    Past Surgical History:   Procedure Laterality Date    APPLICATION OF WOUND VACUUM-ASSISTED CLOSURE DEVICE Right 4/21/2025    Procedure: APPLICATION, WOUND VAC;  Surgeon: Estella Ramsay MD;  Location: [facility] OR 2ND FLR;  Service: General;  Laterality: Right;    APPLICATION OF WOUND VACUUM-ASSISTED CLOSURE DEVICE Right 4/24/2025    Procedure: APPLICATION, WOUND VAC;  Surgeon: Estella Ramsay MD;  Location: [facility] OR 2ND FLR;  Service: General;  Laterality: Right;    COLONOSCOPY N/A 9/19/2024    Procedure: COLONOSCOPY;  Surgeon: Mo Patino MD;  Location: [facility] ENDO (4TH FLR);  Service: Endoscopy;  Laterality: N/A;  Ref by Dr. NIGHAT Tamez, PT/INR/CBC am procedure, Suprep, email - PC  9/16-lvm for pre call-tb-labs 8/22/24     ESOPHAGOGASTRODUODENOSCOPY N/A 9/19/2024    Procedure: EGD (ESOPHAGOGASTRODUODENOSCOPY);  Surgeon: Mo Patino MD;  Location: [facility] (4TH FLR);  Service: Endoscopy;  Laterality: N/A;    LIVER BIOPSY      REPLACEMENT OF WOUND VACUUM-ASSISTED CLOSURE DEVICE Right 4/24/2025    Procedure: REPLACEMENT, WOUND VAC;  Surgeon: Estella Ramsay MD;  Location: [facility] OR 2ND FLR;  Service: General;  Laterality: Right;    WOUND DEBRIDEMENT Right 4/21/2025    Procedure: DEBRIDEMENT, WOUND;  Surgeon: Estella Ramsay MD;  Location: [facility] OR 2ND FLR;  Service: General;  Laterality: Right;       Social History[3]    OBJECTIVE:     Vital Signs Range (Last 24H):  Temp:  [36.1 °C (96.9 °F)-37.1 °C (98.7 °F)]   Pulse:  [70-92]   Resp:  [14-23]   BP: (141-185)/(51-88)   SpO2:  [92 %-100 %]       Significant Labs:  Lab Results   Component Value Date    WBC 6.27 04/25/2025    HGB 10.7 (L) 04/25/2025    HCT 32.0 (L) 04/25/2025     04/25/2025    CHOL 184 01/15/2025    TRIG 65 01/15/2025    HDL 80 (H) 01/15/2025    ALT 24 04/25/2025    AST 30 04/25/2025     (L) 04/25/2025    K 5.3 (H) 04/25/2025     04/25/2025    CREATININE 0.9 04/25/2025    BUN 20 04/25/2025    CO2 25 04/25/2025    TSH 3.456 01/15/2025    PSA 5.77 (H) 03/22/2025    INR 1.0 04/25/2025    HGBA1C 4.7 01/15/2025       Diagnostic Studies: No relevant studies.    EKG:   Results for orders placed or performed during the hospital encounter of 04/18/25   EKG 12-lead    Collection Time: 04/18/25  2:26 AM   Result Value Ref Range    QRS Duration 82 ms    OHS QTC Calculation 437 ms    Narrative    Test Reason : E87.5,    Vent. Rate :  83 BPM     Atrial Rate :  83 BPM     P-R Int : 158 ms          QRS Dur :  82 ms      QT Int : 372 ms       P-R-T Axes :  42  36  39 degrees    QTcB Int : 437 ms    Normal sinus rhythm  Abnormal R wave progression in the precordial leads - Cannot rule out  Anterior infarct ,age undetermined but doubt possibly lead  placement  Abnormal ECG  When compared with ECG of 15-Dec-2023 14:09,  Minimal criteria for Anterior infarct are now possible  Confirmed by Froylan Pineda ([id number]) on 4/18/2025 9:13:03 AM    Referred By: AAAREFERRAL SELF           Confirmed By: Froylan Pineda       2D ECHO:  TTE:  Results for orders placed or performed during the hospital encounter of 04/18/25   Echo   Result Value Ref Range    LV Diastolic Volume 102 mL    Echo EF Estimated 72 %    LV Systolic Volume 28 mL    IVS 0.8 0.6 - 1.1 cm    LVIDd 4.7 3.5 - 6.0 cm    LVIDs 2.8 2.1 - 4.0 cm    LVOT diameter 2.2 cm    PW 0.9 0.6 - 1.1 cm    AV LVOT peak gradient 6 mmHg    LVOT mn grad 3 mmHg    LVOT peak talib 1.2 m/s    LVOT peak VTI 24.6 cm    RV- shannon basal diam 3.1 cm    RV S' 16.60 cm/s    LA size 3.6 cm    Left Atrium Major Axis 5.4 cm    Left Atrium Minor Axis 5.8 cm    LA Vol (MOD) 69 mL    RA Major Axis 5.00 cm    RA Area 9.2 cm2    AV valve area 3.4 cm2    AV area by cont VTI 3.3 cm2    AV peak gradient 8 mmHg    AV mean gradient 5 mmHg    Ao peak talib 1.4 m/s    Ao VTI 27.3 cm    MV Peak A Talib 0.64 m/s    E wave deceleration time 241 ms    MV Peak E Talib 0.92 m/s    E/A ratio 1.44     LV LATERAL E/E' RATIO 7.7     LV SEPTAL E/E' RATIO 10.2     TDI LATERAL 0.12 m/s    TDI SEPTAL 0.09 m/s    Ascending aorta 3.39 cm    STJ 3.12 cm    IVC diameter 1.99 cm    Sinus 3.85 cm    LA WIDTH 4.2 cm    RA Width 2.41 cm    TAPSE 2.49 cm    BSA 2.52 m2    LVOT stroke volume 93.5 cm3    LV Systolic Volume Index 11.3 mL/m2    LV Diastolic Volume Index 41.30 mL/m2    LVOT area 3.8 cm2    FS 40.4 28 - 44 %    Left Ventricle Relative Wall Thickness 0.38 cm    LV mass 132.3 g    LV Mass Index 53.6 g/m2    E/E' ratio 9 m/s    JOSSIE 29 mL/m2    LA Vol 72 cm3    RV/LV Ratio 0.66 cm    JOSSIE (MOD) 28 mL/m2    AV Velocity Ratio 0.86     AV index (prosthetic) 0.90     NEGRITO by Velocity Ratio 3.3 cm²    Mean e' 0.11 m/s    ZLVIDS -7.97     ZLVIDD -10.10     Est. RA pres 3 mmHg    Narrative       Left Ventricle: The left ventricle is normal in size. Normal wall   thickness. There is normal systolic function with a visually estimated   ejection fraction of 65 - 70%. There is normal diastolic function.    Right Ventricle: The right ventricle is normal in size. Wall thickness   is normal. Systolic function is normal.    IVC/SVC: Normal venous pressure at 3 mmHg.         STEPHANIE:  No results found for this or any previous visit.    ASSESSMENT/PLAN:         Pre-op Assessment    I have reviewed the Patient Summary Reports.     I have reviewed the Nursing Notes. I have reviewed the NPO Status.   I have reviewed the Medications.     Review of Systems  Anesthesia Hx:             Denies Family Hx of Anesthesia complications.    Denies Personal Hx of Anesthesia complications.                    Social:  Alcohol Use       Hematology/Oncology:                      Current/Recent Cancer.            Oncology Comments: HCC undergoing Y90 treatment     Cardiovascular:     Denies Pacemaker. Hypertension    Denies CABG/stent.                                       Pulmonary:     Denies Asthma.   Denies Shortness of breath.                  Hepatic/GI:      Liver Disease, Hepatitis              Neurological:  Denies TIA.  Denies CVA.    Denies Seizures.                                Psych:  Psychiatric History                  Physical Exam  General: Well nourished, Cooperative, Alert and Oriented    Airway:  Mallampati: III / II  Mouth Opening: Normal  TM Distance: Normal  Tongue: Normal  Neck ROM: Normal ROM    Dental:  Intact        Anesthesia Plan  Type of Anesthesia, risks & benefits discussed:    Anesthesia Type: Gen ETT  Intra-op Monitoring Plan: Standard ASA Monitors  Post Op Pain Control Plan: multimodal analgesia and IV/PO Opioids PRN  Induction:  IV  Airway Plan: Video and Direct, Post-Induction  Informed Consent: Informed consent signed with the Patient and all parties understand the risks and agree with anesthesia  plan.  All questions answered.   ASA Score: 3  Day of Surgery Review of History & Physical: H&P Update referred to the surgeon/provider.    Ready For Surgery From Anesthesia Perspective.     .           [1]   Patient Active Problem List  Diagnosis    Glaucoma    History of hepatitis C, s/p successful Rx w/ SVR24 - 1/2017    Cirrhosis    Anxiety    Erectile dysfunction    Essential hypertension    Alcohol use disorder, severe, dependence    Obesity (BMI 30-39.9)    Gross hematuria    Diverticulosis: seen on colonoscopy 2014    Hyponatremia    Septic arthritis of right ankle    Bacterial conjunctivitis    Major depressive disorder in full remission    Alcohol withdrawal, uncomplicated    Fall    Homelessness    Impaired functional mobility and endurance    Nocturnal hypoxia    Primary osteoarthritis of right knee    Primary osteoarthritis of left knee    Rhabdomyolysis    Debility    Discharge planning issues    Irritant contact dermatitis due to fecal, urinary or dual incontinence    Alcohol-induced polyneuropathy    Calcified granuloma of lung    Purpura    HCC (hepatocellular carcinoma)    Benign prostatic hyperplasia    Pain following surgery or procedure    Hematoma    Pain of right lower extremity    Acute deep vein thrombosis (DVT) of right lower extremity    Pseudoaneurysm of femoral artery    Hyperkalemia   [2]   No current facility-administered medications on file prior to encounter.     Current Outpatient Medications on File Prior to Encounter   Medication Sig Dispense Refill    amLODIPine (NORVASC) 2.5 MG tablet Take 1 tablet (2.5 mg total) by mouth once daily. 90 tablet 3    buPROPion (WELLBUTRIN SR) 150 MG TBSR 12 hr tablet Take 1 tablet (150 mg total) by mouth 2 (two) times daily. 180 tablet 1    gabapentin (NEURONTIN) 300 MG capsule One in AM and two at bedtime 90 capsule 6    losartan (COZAAR) 100 MG tablet Take 1 tablet (100 mg total) by mouth once daily. 90 tablet 3    multivitamin Tab Take 1 tablet  by mouth once daily. (Patient not taking: Reported on 3/27/2025) 60 tablet 0    oxyCODONE-acetaminophen (PERCOCET)  mg per tablet Take 1 tablet by mouth every 6 (six) hours as needed for Pain. 5 tablet 0   [3]   Social History  Socioeconomic History    Marital status:    Tobacco Use    Smoking status: Former     Types: Cigars    Smokeless tobacco: Never    Tobacco comments:     smokes cigars 20 per month   Substance and Sexual Activity    Alcohol use: Yes     Alcohol/week: 28.0 standard drinks of alcohol     Types: 28 Cans of beer per week     Comment: 2-3 beers daily    Drug use: No   Social History Narrative     (mental health issues), retired  at [employer]. No kids. 2 dogs. No exercise.     Social Drivers of Health     Financial Resource Strain: Low Risk  (4/21/2025)    Overall Financial Resource Strain (CARDIA)     Difficulty of Paying Living Expenses: Not very hard   Recent Concern: Financial Resource Strain - Medium Risk (4/19/2025)    Overall Financial Resource Strain (CARDIA)     Difficulty of Paying Living Expenses: Somewhat hard   Food Insecurity: No Food Insecurity (4/21/2025)    Hunger Vital Sign     Worried About Running Out of Food in the Last Year: Never true     Ran Out of Food in the Last Year: Never true   Transportation Needs: No Transportation Needs (4/21/2025)    PRAPARE - Transportation     Lack of Transportation (Medical): No     Lack of Transportation (Non-Medical): No   Physical Activity: Inactive (4/21/2025)    Exercise Vital Sign     Days of Exercise per Week: 0 days     Minutes of Exercise per Session: 0 min   Stress: Stress Concern Present (4/21/2025)    [employer] [place] - Occupational Stress Questionnaire     Feeling of Stress : To some extent   Housing Stability: Low Risk  (4/21/2025)    Housing Stability Vital Sign     Unable to Pay for Housing in the Last Year: No     Homeless in the Last Year: No

## 2025-05-09 ENCOUNTER — HOSPITAL ENCOUNTER (EMERGENCY)
Facility: HOSPITAL | Age: 75
Discharge: HOME | End: 2025-05-09
Attending: EMERGENCY MEDICINE
Payer: MEDICARE

## 2025-05-09 ENCOUNTER — APPOINTMENT (OUTPATIENT)
Dept: CT IMAGING | Facility: HOSPITAL | Age: 75
End: 2025-05-09
Payer: MEDICARE

## 2025-05-09 ENCOUNTER — OFFICE VISIT (OUTPATIENT)
Dept: INTERNAL MEDICINE | Facility: CLINIC | Age: 75
End: 2025-05-09
Payer: MEDICARE

## 2025-05-09 VITALS
DIASTOLIC BLOOD PRESSURE: 87 MMHG | HEIGHT: 69 IN | RESPIRATION RATE: 16 BRPM | SYSTOLIC BLOOD PRESSURE: 183 MMHG | BODY MASS INDEX: 21.77 KG/M2 | HEART RATE: 58 BPM | TEMPERATURE: 99.5 F | OXYGEN SATURATION: 97 % | WEIGHT: 147 LBS

## 2025-05-09 VITALS
RESPIRATION RATE: 18 BRPM | OXYGEN SATURATION: 96 % | WEIGHT: 147 LBS | DIASTOLIC BLOOD PRESSURE: 82 MMHG | SYSTOLIC BLOOD PRESSURE: 138 MMHG | HEIGHT: 69 IN | BODY MASS INDEX: 21.77 KG/M2 | HEART RATE: 71 BPM

## 2025-05-09 DIAGNOSIS — K57.92 ACUTE DIVERTICULITIS: Primary | ICD-10-CM

## 2025-05-09 DIAGNOSIS — R10.32 LEFT LOWER QUADRANT ABDOMINAL PAIN: Primary | ICD-10-CM

## 2025-05-09 LAB
ALBUMIN SERPL-MCNC: 4.3 G/DL (ref 3.5–5.2)
ALBUMIN/GLOB SERPL: 1.4 G/DL
ALP SERPL-CCNC: 170 U/L (ref 39–117)
ALT SERPL W P-5'-P-CCNC: 8 U/L (ref 1–33)
ANION GAP SERPL CALCULATED.3IONS-SCNC: 9.8 MMOL/L (ref 5–15)
AST SERPL-CCNC: 14 U/L (ref 1–32)
BACTERIA UR QL AUTO: NORMAL /HPF
BASOPHILS # BLD AUTO: 0.01 10*3/MM3 (ref 0–0.2)
BASOPHILS NFR BLD AUTO: 0.1 % (ref 0–1.5)
BILIRUB SERPL-MCNC: 0.8 MG/DL (ref 0–1.2)
BILIRUB UR QL STRIP: NEGATIVE
BUN SERPL-MCNC: 9 MG/DL (ref 8–23)
BUN/CREAT SERPL: 12.3 (ref 7–25)
CALCIUM SPEC-SCNC: 9.3 MG/DL (ref 8.6–10.5)
CHLORIDE SERPL-SCNC: 101 MMOL/L (ref 98–107)
CLARITY UR: CLEAR
CO2 SERPL-SCNC: 24.2 MMOL/L (ref 22–29)
COLOR UR: YELLOW
CREAT SERPL-MCNC: 0.73 MG/DL (ref 0.57–1)
D-LACTATE SERPL-SCNC: 0.8 MMOL/L (ref 0.5–2)
DEPRECATED RDW RBC AUTO: 40.5 FL (ref 37–54)
EGFRCR SERPLBLD CKD-EPI 2021: 85.4 ML/MIN/1.73
EOSINOPHIL # BLD AUTO: 0.08 10*3/MM3 (ref 0–0.4)
EOSINOPHIL NFR BLD AUTO: 0.9 % (ref 0.3–6.2)
ERYTHROCYTE [DISTWIDTH] IN BLOOD BY AUTOMATED COUNT: 12.6 % (ref 12.3–15.4)
GLOBULIN UR ELPH-MCNC: 3 GM/DL
GLUCOSE SERPL-MCNC: 108 MG/DL (ref 65–99)
GLUCOSE UR STRIP-MCNC: NEGATIVE MG/DL
HCT VFR BLD AUTO: 39.3 % (ref 34–46.6)
HGB BLD-MCNC: 13.4 G/DL (ref 12–15.9)
HGB UR QL STRIP.AUTO: NEGATIVE
HOLD SPECIMEN: NORMAL
HOLD SPECIMEN: NORMAL
HYALINE CASTS UR QL AUTO: NORMAL /LPF
IMM GRANULOCYTES # BLD AUTO: 0.03 10*3/MM3 (ref 0–0.05)
IMM GRANULOCYTES NFR BLD AUTO: 0.3 % (ref 0–0.5)
KETONES UR QL STRIP: ABNORMAL
LEUKOCYTE ESTERASE UR QL STRIP.AUTO: ABNORMAL
LIPASE SERPL-CCNC: 22 U/L (ref 13–60)
LYMPHOCYTES # BLD AUTO: 1.69 10*3/MM3 (ref 0.7–3.1)
LYMPHOCYTES NFR BLD AUTO: 18.2 % (ref 19.6–45.3)
MCH RBC QN AUTO: 30 PG (ref 26.6–33)
MCHC RBC AUTO-ENTMCNC: 34.1 G/DL (ref 31.5–35.7)
MCV RBC AUTO: 87.9 FL (ref 79–97)
MONOCYTES # BLD AUTO: 0.58 10*3/MM3 (ref 0.1–0.9)
MONOCYTES NFR BLD AUTO: 6.2 % (ref 5–12)
NEUTROPHILS NFR BLD AUTO: 6.92 10*3/MM3 (ref 1.7–7)
NEUTROPHILS NFR BLD AUTO: 74.3 % (ref 42.7–76)
NITRITE UR QL STRIP: NEGATIVE
NRBC BLD AUTO-RTO: 0 /100 WBC (ref 0–0.2)
PH UR STRIP.AUTO: 6.5 [PH] (ref 5–8)
PLATELET # BLD AUTO: 236 10*3/MM3 (ref 140–450)
PMV BLD AUTO: 9.6 FL (ref 6–12)
POTASSIUM SERPL-SCNC: 3.9 MMOL/L (ref 3.5–5.2)
PROT SERPL-MCNC: 7.3 G/DL (ref 6–8.5)
PROT UR QL STRIP: NEGATIVE
RBC # BLD AUTO: 4.47 10*6/MM3 (ref 3.77–5.28)
RBC # UR STRIP: NORMAL /HPF
REF LAB TEST METHOD: NORMAL
SODIUM SERPL-SCNC: 135 MMOL/L (ref 136–145)
SP GR UR STRIP: 1.02 (ref 1–1.03)
SQUAMOUS #/AREA URNS HPF: NORMAL /HPF
UROBILINOGEN UR QL STRIP: ABNORMAL
WBC # UR STRIP: NORMAL /HPF
WBC NRBC COR # BLD AUTO: 9.31 10*3/MM3 (ref 3.4–10.8)
WHOLE BLOOD HOLD COAG: NORMAL
WHOLE BLOOD HOLD SPECIMEN: NORMAL

## 2025-05-09 PROCEDURE — 1160F RVW MEDS BY RX/DR IN RCRD: CPT | Performed by: NURSE PRACTITIONER

## 2025-05-09 PROCEDURE — 1159F MED LIST DOCD IN RCRD: CPT | Performed by: NURSE PRACTITIONER

## 2025-05-09 PROCEDURE — 1125F AMNT PAIN NOTED PAIN PRSNT: CPT | Performed by: NURSE PRACTITIONER

## 2025-05-09 PROCEDURE — 99213 OFFICE O/P EST LOW 20 MIN: CPT | Performed by: NURSE PRACTITIONER

## 2025-05-09 PROCEDURE — 83690 ASSAY OF LIPASE: CPT | Performed by: EMERGENCY MEDICINE

## 2025-05-09 PROCEDURE — 80053 COMPREHEN METABOLIC PANEL: CPT | Performed by: EMERGENCY MEDICINE

## 2025-05-09 PROCEDURE — 81001 URINALYSIS AUTO W/SCOPE: CPT | Performed by: EMERGENCY MEDICINE

## 2025-05-09 PROCEDURE — 83605 ASSAY OF LACTIC ACID: CPT | Performed by: EMERGENCY MEDICINE

## 2025-05-09 PROCEDURE — 74177 CT ABD & PELVIS W/CONTRAST: CPT

## 2025-05-09 PROCEDURE — 99285 EMERGENCY DEPT VISIT HI MDM: CPT

## 2025-05-09 PROCEDURE — 85025 COMPLETE CBC W/AUTO DIFF WBC: CPT | Performed by: EMERGENCY MEDICINE

## 2025-05-09 PROCEDURE — 3079F DIAST BP 80-89 MM HG: CPT | Performed by: NURSE PRACTITIONER

## 2025-05-09 PROCEDURE — 3075F SYST BP GE 130 - 139MM HG: CPT | Performed by: NURSE PRACTITIONER

## 2025-05-09 PROCEDURE — 25510000001 IOPAMIDOL 61 % SOLUTION: Performed by: EMERGENCY MEDICINE

## 2025-05-09 RX ORDER — SODIUM CHLORIDE 0.9 % (FLUSH) 0.9 %
10 SYRINGE (ML) INJECTION AS NEEDED
Status: DISCONTINUED | OUTPATIENT
Start: 2025-05-09 | End: 2025-05-09 | Stop reason: HOSPADM

## 2025-05-09 RX ORDER — IOPAMIDOL 612 MG/ML
100 INJECTION, SOLUTION INTRAVASCULAR
Status: COMPLETED | OUTPATIENT
Start: 2025-05-09 | End: 2025-05-09

## 2025-05-09 RX ORDER — EVOLOCUMAB 140 MG/ML
INJECTION, SOLUTION SUBCUTANEOUS
COMMUNITY
Start: 2025-05-03

## 2025-05-09 RX ADMIN — IOPAMIDOL 85 ML: 612 INJECTION, SOLUTION INTRAVENOUS at 18:46

## 2025-05-09 RX ADMIN — AMOXICILLIN AND CLAVULANATE POTASSIUM 1 TABLET: 875; 125 TABLET, FILM COATED ORAL at 19:21

## 2025-05-09 NOTE — ED PROVIDER NOTES
EMERGENCY DEPARTMENT MD ATTESTATION NOTE    Room Number:  S04/04  PCP: Catherine Kolb APRN  Independent Historians: Patient    HPI:  A complete HPI/ROS/PMH/PSH/SH/FH are unobtainable due to: None    Chronic or social conditions impacting patient care (Social Determinants of Health): None      Context: Sara Gary is a 74 y.o. female with a medical history of hypertension, GERD, kidney stones and diverticulitis who presents to the ED c/o acute left lower quadrant pain which began yesterday and has persisted through today.  She did have some loose stools earlier this week but she attributed that to a certain meal that she had eaten.  She denies any nausea or vomiting.  Denies any fevers.  She was concerned that this could be related to kidney stones because she has had kidney stones in the past.  Alternatively, patient also remembered that she had been moving a piece of heavy furniture by scooting it with her legs while in a seated position and she wonders if she may have strained a muscle in her left sided abdomen area which could explain the pain.  She went to her PCPs office for evaluation this afternoon but was advised to come here for further workup.        Review of prior external notes (non-ED) -and- Review of prior external test results outside of this encounter: I independently reviewed the ultrasound abdomen limited study from November 22, 2024.  Impression indicated small hepatic cyst otherwise normal right upper quadrant study.    Prescription drug monitoring program review: CAMPOS reviewed by Gilberto Armstrong MD         PHYSICAL EXAM    I have reviewed the triage vital signs and nursing notes.    ED Triage Vitals   Temp Heart Rate Resp BP SpO2   05/09/25 1620 05/09/25 1620 05/09/25 1620 05/09/25 1622 05/09/25 1620   98.9 °F (37.2 °C) 89 16 178/88 96 %      Temp src Heart Rate Source Patient Position BP Location FiO2 (%)   05/09/25 1620 05/09/25 1620 05/09/25 1807 05/09/25 1807 --   Tympanic Monitor  Lying Left arm        Physical Exam  GENERAL: alert, no acute distress  SKIN: Warm, dry, no rashes  HENT: Normocephalic, atraumatic  EYES: no scleral icterus, EOMI, normal conjunctiva  CV: regular rhythm, regular rate  RESPIRATORY: normal effort, lungs clear  ABDOMEN: soft, nondistended, minimal tenderness in the left lower quadrant area.  No peritoneal features evident.  MUSCULOSKELETAL: no deformity, no asymmetry of extremities  NEURO: alert, moves all extremities, follows commands      MEDICATIONS GIVEN IN ER  Medications   iopamidol (ISOVUE-300) 61 % injection 100 mL (85 mL Intravenous Given by Other 5/9/25 1846)   amoxicillin-clavulanate (AUGMENTIN) 875-125 MG per tablet 1 tablet (1 tablet Oral Given 5/9/25 1921)         ORDERS PLACED DURING THIS VISIT:  Orders Placed This Encounter   Procedures    CT Abdomen Pelvis With Contrast    [facility] Draw    Comprehensive Metabolic Panel    Lipase    Urinalysis With Microscopic If Indicated (No Culture) - Urine, Clean Catch    Lactic Acid, Plasma    CBC Auto Differential    Urinalysis, Microscopic Only - Urine, Clean Catch    Undress & Gown    CBC & Differential    Green Top (Gel)    Lavender Top    Gold Top - SST    Light Blue Top         PROCEDURES  Procedures          PROGRESS, DATA ANALYSIS, CONSULTS, AND MEDICAL DECISION MAKING  All labs have been independently interpreted by me.  All radiology studies have been reviewed by me. All EKG's have been independently viewed and interpreted by me.  Discussion below represents my analysis of pertinent findings related to patient's condition, differential diagnosis, treatment plan and final disposition.    Differential diagnosis includes but is not limited to diverticulitis, kidney stone, UTI, pyelonephritis, IBS, epiploic appendagitis, ischemic colitis.    Clinical Scores:                 MDM:  ED Course as of 05/09/25 2232   Fri May 09, 2025   1708 Blood, UA: Negative [DC]   1743 WBC: 9.31 [DC]   1743 Creatinine: 0.73 [DC]  "  1743 Lipase: 22 [DC]   1743 Lactate: 0.8 [DC]   1900 CT Abdomen Pelvis With Contrast  Radiology study independently interpreted by me and my findings are acute diverticulitis, no free air.   [DC]   1906 Discussed imaging results with patient.  She is stable for outpatient treatment and follow-up at this time.  Return precautions given. [DC]      ED Course User Index  [DC] Richelle Arechiga PA       I independently interpreted the abdomen CT study and my findings are: No free air, no small bowel obstruction pattern      I reviewed all the labs from today's ED visit as well as the radiology report.  Findings are suggestive of acute diverticulitis.  Thankfully, she has no fever.  Her white blood cell count is normal.  She is nontoxic-appearing.  Has no nausea or vomiting.  I think she is appropriate for discharge home with outpatient management of this illness.  Will start her on a course of Augmentin here and send a prescription to her pharmacy.  Will encourage prompt follow-up with her primary care provider.  And we will review with her usual \"return to ER \"instructions prior to discharge.      COMPLEXITY OF CARE  Admission was considered but after careful review of the patient's presentation, physical examination, diagnostic results, and response to treatment the patient may be safely discharged with outpatient follow-up.    Please note that portions of this document were completed with a voice recognition program.    Note Disclaimer: At [facility], we believe that sharing information builds trust and better relationships. You are receiving this note because you recently visited [facility]. It is possible you will see health information before a provider has talked with you about it. This kind of information can be easy to misunderstand. To help you fully understand what it means for your health, we urge you to discuss this note with your provider.       Gilberto Armstrong MD  05/09/25 2234    "

## 2025-05-09 NOTE — ED PROVIDER NOTES
EMERGENCY DEPARTMENT ENCOUNTER      PCP: Catherine Kolb APRN  Patient Care Team:  Catherine Kolb APRN as PCP - General (Internal Medicine)  Jacqui Ruffin MD as Consulting Physician (Cardiology)  Matteo Snow APRN as Nurse Practitioner (Nurse Practitioner)  Arnulfo Mercado APRN as Nurse Practitioner (Gastroenterology)   Independent Historians: Patient    HPI:  Chief Complaint: Abdominal pain   A complete HPI/ROS/PMH/PSH/SH/FH are unobtainable due to: None    Chronic or social conditions impacting patient care (social determinants of health): None    Context: Sara Gary is a 74 y.o. female who presents to the ED c/o acute left lower abdominal pain that began yesterday.  Patient reports 1 episode of nonbloody diarrhea.  She reports history of kidney stones and diverticulitis but states pain does not necessarily feel similar to either.  Patient also states she was moving a piece of heavy furniture and may have strained her abdomen.  No fevers or chills.  No nausea or vomiting.    Review of prior external notes and/or external test results outside of this encounter: Right upper quadrant ultrasound on 11/22/2024 showed small hepatic cyst.      PAST MEDICAL HISTORY  Active Ambulatory Problems     Diagnosis Date Noted    Diverticulosis of large intestine without hemorrhage 06/29/2017    Hydronephrosis, left 06/29/2017    Residual ASD (atrial septal defect) following repair 05/11/2018    Elevated liver enzymes 07/25/2018    Primary hypertension 07/25/2018    MVP (mitral valve prolapse) 02/28/2023    Postoperative hypothyroidism 03/06/2023    Anxiety and depression 03/06/2023    Gastroesophageal reflux disease without esophagitis 03/06/2023    Mixed hyperlipidemia 03/06/2023    Prediabetes 03/06/2023    Dry skin dermatitis 04/21/2023    Seasonal allergic rhinitis due to pollen 04/21/2023    Vertigo 04/21/2023    Chronic illness 05/03/2023    Healthcare maintenance 08/24/2023    Migraine 08/30/2023    Acute  recurrent cystitis 11/12/2019    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    AK (actinic keratosis) 08/24/2022    Allergic rhinitis 05/06/2019    Acute pain of right shoulder 09/06/2023    Atypical chest pain 04/12/2024    Chronic right-sided low back pain with right-sided sciatica 05/30/2024    Chronic right shoulder pain 05/30/2024    Gastroesophageal reflux disease 08/27/2024    Annual physical exam 09/25/2024    Uses emotional support animal 03/06/2025    Mild intermittent asthma without complication 03/27/2025     Resolved Ambulatory Problems     Diagnosis Date Noted    Diverticulitis large intestine 06/29/2017    Kidney stone on left side 06/29/2017    Diverticulitis 06/29/2017    Abnormal electrocardiogram 02/28/2023    Multiple joint pain 03/06/2023    Acute URI 10/24/2023    COVID-19 03/08/2024    Chest pain 04/12/2024     Past Medical History:   Diagnosis Date    Colon polyp 2013    Depression     Diverticulitis of colon 7/2017    Environmental allergies     GERD (gastroesophageal reflux disease) 2017    History of colon polyps     Hypertension     Kidney stone            PAST SURGICAL HISTORY  Past Surgical History:   Procedure Laterality Date    APPENDECTOMY      ASD REPAIR, SECUNDUM      CATARACT EXTRACTION      COLONOSCOPY      CYSTOSCOPY BLADDER STONE LITHOTRIPSY      CYSTOSCOPY W/ URETERAL STENT PLACEMENT Left 06/29/2017    Procedure: CYSTOSCOPY LEFT STENT INSERTION & STONE REMOVAL;  Surgeon: Raheem Franklin MD;  Location: [facility] MAIN OR;  Service:     ENDOSCOPY N/A 11/19/2024    Procedure: ESOPHAGOGASTRODUODENOSCOPY WITH DILATATION;  Surgeon: Luis Enrique Murrell MD;  Location: [facility] [place];  Service: Gastroenterology;  Laterality: N/A;  Dilated with balloon to 20mm, Esophagitis,    FLEXIBLE SIGMOIDOSCOPY  2016    HYSTERECTOMY      NEPHRECTOMY RADICAL      THYROIDECTOMY      TUBAL ABDOMINAL LIGATION  1982    UPPER GASTROINTESTINAL ENDOSCOPY           FAMILY HISTORY  Family History   Problem Relation Age of  Onset    Heart disease Mother     Hypertension Mother     Heart disease Father     Hypertension Father     Colon cancer Neg Hx     Colon polyps Neg Hx     Crohn's disease Neg Hx     Irritable bowel syndrome Neg Hx     Ulcerative colitis Neg Hx          SOCIAL HISTORY  Social History     Socioeconomic History    Marital status: Single   Tobacco Use    Smoking status: Never    Smokeless tobacco: Never   Vaping Use    Vaping status: Never Used   Substance and Sexual Activity    Alcohol use: No    Drug use: No    Sexual activity: Not Currently     Birth control/protection: Post-menopausal         ALLERGIES  Sulfamethoxazole-trimethoprim, Statins, Sulfa antibiotics, and Zetia [ezetimibe]        REVIEW OF SYSTEMS  Review of Systems   Constitutional:  Negative for chills and fever.   Respiratory:  Negative for shortness of breath.    Cardiovascular:  Negative for chest pain.   Gastrointestinal:  Positive for abdominal pain (Left lower quadrant) and diarrhea (X 1). Negative for vomiting.   Genitourinary:  Negative for dysuria and hematuria.        All systems reviewed and negative except for those discussed in HPI.       PHYSICAL EXAM    I have reviewed the triage vital signs and nursing notes.    ED Triage Vitals   Temp Heart Rate Resp BP SpO2   05/09/25 1620 05/09/25 1620 05/09/25 1620 05/09/25 1622 05/09/25 1620   98.9 °F (37.2 °C) 89 16 178/88 96 %      Temp src Heart Rate Source Patient Position BP Location FiO2 (%)   05/09/25 1620 05/09/25 1620 -- -- --   Tympanic Monitor          Physical Exam  GENERAL: alert, no acute distress  SKIN: Warm, dry  HENT: Normocephalic, atraumatic  EYES: no scleral icterus  CV: regular rhythm, regular rate  RESPIRATORY: normal effort, lungs clear  ABDOMEN: soft, mild left lower quadrant tenderness to palpation, no guarding or rebound tenderness, nondistended  MUSCULOSKELETAL: no deformity  NEURO: alert, moves all extremities, follows commands          LAB RESULTS  Recent Results (from  the past 24 hours)   Comprehensive Metabolic Panel    Collection Time: 05/09/25  4:41 PM    Specimen: Arm, Right; Blood   Result Value Ref Range    Glucose 108 (H) 65 - 99 mg/dL    BUN 9 8 - 23 mg/dL    Creatinine 0.73 0.57 - 1.00 mg/dL    Sodium 135 (L) 136 - 145 mmol/L    Potassium 3.9 3.5 - 5.2 mmol/L    Chloride 101 98 - 107 mmol/L    CO2 24.2 22.0 - 29.0 mmol/L    Calcium 9.3 8.6 - 10.5 mg/dL    Total Protein 7.3 6.0 - 8.5 g/dL    Albumin 4.3 3.5 - 5.2 g/dL    ALT (SGPT) 8 1 - 33 U/L    AST (SGOT) 14 1 - 32 U/L    Alkaline Phosphatase 170 (H) 39 - 117 U/L    Total Bilirubin 0.8 0.0 - 1.2 mg/dL    Globulin 3.0 gm/dL    A/G Ratio 1.4 g/dL    BUN/Creatinine Ratio 12.3 7.0 - 25.0    Anion Gap 9.8 5.0 - 15.0 mmol/L    eGFR 85.4 >60.0 mL/min/1.73   Lipase    Collection Time: 05/09/25  4:41 PM    Specimen: Arm, Right; Blood   Result Value Ref Range    Lipase 22 13 - 60 U/L   Urinalysis With Microscopic If Indicated (No Culture) - Urine, Clean Catch    Collection Time: 05/09/25  4:41 PM    Specimen: Urine, Clean Catch   Result Value Ref Range    Color, UA Yellow Yellow, Straw    Appearance, UA Clear Clear    pH, UA 6.5 5.0 - 8.0    Specific Gravity, UA 1.017 1.005 - 1.030    Glucose, UA Negative Negative    Ketones, UA Trace (A) Negative    Bilirubin, UA Negative Negative    Blood, UA Negative Negative    Protein, UA Negative Negative    Leuk Esterase, UA Small (1+) (A) Negative    Nitrite, UA Negative Negative    Urobilinogen, UA 1.0 E.U./dL 0.2 - 1.0 E.U./dL   Lactic Acid, Plasma    Collection Time: 05/09/25  4:41 PM    Specimen: Arm, Right; Blood   Result Value Ref Range    Lactate 0.8 0.5 - 2.0 mmol/L   Green Top (Gel)    Collection Time: 05/09/25  4:41 PM   Result Value Ref Range    Extra Tube Hold for add-ons.    Lavender Top    Collection Time: 05/09/25  4:41 PM   Result Value Ref Range    Extra Tube hold for add-on    Gold Top - SST    Collection Time: 05/09/25  4:41 PM   Result Value Ref Range    Extra Tube  Hold for add-ons.    Light Blue Top    Collection Time: 05/09/25  4:41 PM   Result Value Ref Range    Extra Tube Hold for add-ons.    CBC Auto Differential    Collection Time: 05/09/25  4:41 PM    Specimen: Arm, Right; Blood   Result Value Ref Range    WBC 9.31 3.40 - 10.80 10*3/mm3    RBC 4.47 3.77 - 5.28 10*6/mm3    Hemoglobin 13.4 12.0 - 15.9 g/dL    Hematocrit 39.3 34.0 - 46.6 %    MCV 87.9 79.0 - 97.0 fL    MCH 30.0 26.6 - 33.0 pg    MCHC 34.1 31.5 - 35.7 g/dL    RDW 12.6 12.3 - 15.4 %    RDW-SD 40.5 37.0 - 54.0 fl    MPV 9.6 6.0 - 12.0 fL    Platelets 236 140 - 450 10*3/mm3    Neutrophil % 74.3 42.7 - 76.0 %    Lymphocyte % 18.2 (L) 19.6 - 45.3 %    Monocyte % 6.2 5.0 - 12.0 %    Eosinophil % 0.9 0.3 - 6.2 %    Basophil % 0.1 0.0 - 1.5 %    Immature Grans % 0.3 0.0 - 0.5 %    Neutrophils, Absolute 6.92 1.70 - 7.00 10*3/mm3    Lymphocytes, Absolute 1.69 0.70 - 3.10 10*3/mm3    Monocytes, Absolute 0.58 0.10 - 0.90 10*3/mm3    Eosinophils, Absolute 0.08 0.00 - 0.40 10*3/mm3    Basophils, Absolute 0.01 0.00 - 0.20 10*3/mm3    Immature Grans, Absolute 0.03 0.00 - 0.05 10*3/mm3    nRBC 0.0 0.0 - 0.2 /100 WBC   Urinalysis, Microscopic Only - Urine, Clean Catch    Collection Time: 05/09/25  4:41 PM    Specimen: Urine, Clean Catch   Result Value Ref Range    RBC, UA 0-2 None Seen, 0-2 /HPF    WBC, UA 0-2 None Seen, 0-2 /HPF    Bacteria, UA None Seen None Seen /HPF    Squamous Epithelial Cells, UA 0-2 None Seen, 0-2 /HPF    Hyaline Casts, UA None Seen None Seen /LPF    Methodology Automated Microscopy        Ordered the above labs and independently reviewed and interpreted the results.        RADIOLOGY  CT Abdomen Pelvis With Contrast  Result Date: 5/9/2025  CT ABDOMEN PELVIS W CONTRAST-  Radiation dose reduction techniques were utilized, including automated exposure control and exposure modulation based on body size.  Several day history of left lower quadrant pain  COMPARISON examination 4/19/2022  FINDINGS: 1. There  is diverticulosis of the colon fat ascending and sigmoid to the greatest degree. At the descending sigmoid junction there is colon wall thickening and edema with pericolonic fat induration and diverticular wall thickening, consistent with acute diverticulitis. No extraluminal gas or fluid. There is a small amount of free intraperitoneal fluid within the pelvic cul-de-sac, attenuation compatible with serous fluid.  2. Atelectasis at the base of the left lower lobe. There is cardiomegaly.  3. Stable subcapsular hepatic cyst along the dome, local fatty infiltration left lobe of the liver as before. No suspicious liver lesion has developed. The gallbladder is satisfactory in appearance, no biliary duct dilatation or pancreatic abnormalities seen. There is a 2 cm duodenal diverticulum, no atypical features. Spleen is normal in size and shape and both adrenal glands have a typical appearance. Both kidneys demonstrate a symmetric satisfactory pattern of enhancement with a small bilateral renal cysts. Ureters are normal in course and caliber to the bladder which is satisfactory in appearance.  4. Atherosclerotic calcification of a normal diameter aorta. No small bowel abnormality seen. Stomach is collapsed, overall contour within normal limits. Small hiatal hernia.  CONCLUSION: Diverticulitis at the descending sigmoid junction.     This report was finalized on 5/9/2025 6:56 PM by Dr. Maxwell Short M.D on Workstation: [id number]        I ordered the above noted radiological studies. Independently reviewed and interpreted by me.  See dictation for official radiology interpretation.      PROCEDURES    Procedures      MEDICATIONS GIVEN IN ER    Medications   sodium chloride 0.9 % flush 10 mL (has no administration in time range)   amoxicillin-clavulanate (AUGMENTIN) 875-125 MG per tablet 1 tablet (has no administration in time range)   iopamidol (ISOVUE-300) 61 % injection 100 mL (85 mL Intravenous Given by Other 5/9/25  1846)         PROGRESS, DATA ANALYSIS, CONSULTS, AND MEDICAL DECISION MAKING    All labs have been independently reviewed and interpreted by me.  All radiology studies have been independently reviewed and interpreted by me and discussed with radiologist dictating the report.   EKG's independently reviewed and interpreted by me.  Discussion below represents my analysis of pertinent findings related to patient's condition, differential diagnosis, treatment plan and final disposition.    My differential diagnosis for abdominal pain includes but is not limited to:    Gastritis, gastroenteritis, peptic ulcer disease, GERD, esophageal perforation, acute appendicitis, mesenteric adenitis, Meckel’s diverticulum, epiploic appendagitis, diverticulitis, colon cancer, ulcerative colitis, Crohn’s disease, intussusception, small bowel obstruction, adhesions, ischemic bowel, perforated viscus, ileus, obstipation, biliary colic, cholecystitis, cholelithiasis, Kevin-Miguel Khadar, hepatitis, pancreatitis, common bile duct obstruction, cholangitis, bile leak, splenic trauma, splenic rupture, splenic infarction, splenic abscess, abdominal abscess, ascites, spontaneous bacterial peritonitis, hernia, UTI, cystitis, ureterolithiasis, urinary obstruction, ovarian cyst, torsion, pregnancy, ectopic pregnancy, PID, pelvic abscess, mittelschmerz, endometriosis, AAA, myocardial infarction, pneumonia, cancer, porphyria, DKA, medications, sickle cell, viral syndrome, herpes zoster      ED Course as of 05/09/25 1921   Fri May 09, 2025   1708 Blood, UA: Negative [DC]   1743 WBC: 9.31 [DC]   1743 Creatinine: 0.73 [DC]   1743 Lipase: 22 [DC]   1743 Lactate: 0.8 [DC]   1900 CT Abdomen Pelvis With Contrast  Radiology study independently interpreted by me and my findings are acute diverticulitis, no free air.   [DC]   1906 Discussed imaging results with patient.  She is stable for outpatient treatment and follow-up at this time.  Return precautions given.  [DC]      ED Course User Index  [DC] Richelle Arechiga PA             AS OF 19:21 EDT VITALS:    BP - 179/80  HR - 66  TEMP - 98.9 °F (37.2 °C) (Tympanic)  O2 SATS - 97%        DIAGNOSIS  Final diagnoses:   Acute diverticulitis         DISPOSITION  ED Disposition       ED Disposition   Discharge    Condition   Stable    Comment   --                  Note Disclaimer: At [facility], we believe that sharing information builds trust and better relationships. You are receiving this note because you recently visited [facility]. It is possible you will see health information before a provider has talked with you about it. This kind of information can be easy to misunderstand. To help you fully understand what it means for your health, we urge you to discuss this note with your provider.         Richelle Arechiga PA  05/09/25 1909       Richelle Arechiga PA  05/09/25 1921

## 2025-05-09 NOTE — PROGRESS NOTES
"Chief Complaint  Abdominal Pain (Left side cramping, closer to groin area x 1 day/OTC - advil, heating pad/Would like blood work or imaging )    Subjective        Sara Gary presents to [facility]  This is a 75 y/o female presenting to office for complaints of left groin pain and left lower abdomen pain. She reports the pain is severe and intolerable. Reports she woke up yesterday with this pain. Reports she had been moving heavier items the day before this occurred with her knees. Reports she has noticed being more bloated and her stool has been different than usually-- reports she has only been passing small amounts of stool. Reports she has been passing gas. Denies fever. Denies any vomiting. Reports the pain is persistent and not improving. She reports she is not taking any medication OTC. She reports hx of diverticulitis and kidney stones.        Objective   Vital Signs:  /82 (BP Location: Left arm, Patient Position: Sitting, Cuff Size: Small Adult)   Pulse 71   Resp 18   Ht 175.3 cm (69.02\")   Wt 66.7 kg (147 lb)   SpO2 96%   BMI 21.70 kg/m²   Estimated body mass index is 21.7 kg/m² as calculated from the following:    Height as of this encounter: 175.3 cm (69.02\").    Weight as of this encounter: 66.7 kg (147 lb).    BMI is within normal parameters. No other follow-up for BMI required.      Physical Exam  Constitutional:       Appearance: Normal appearance.   HENT:      Head: Normocephalic and atraumatic.      Right Ear: External ear normal.      Left Ear: External ear normal.      Nose: Nose normal.   Eyes:      Conjunctiva/sclera: Conjunctivae normal.      Pupils: Pupils are equal, round, and reactive to light.      Comments: +glasses   Pulmonary:      Effort: Pulmonary effort is normal.   Abdominal:      General: Bowel sounds are decreased.      Palpations: Abdomen is soft.      Tenderness: There is abdominal tenderness. There is guarding.   Musculoskeletal: "      Cervical back: Normal range of motion and neck supple.   Neurological:      Mental Status: She is alert and oriented to person, place, and time. Mental status is at baseline.   Psychiatric:         Mood and Affect: Mood normal.         Thought Content: Thought content normal.         Judgment: Judgment normal.        Result Review :  The following data was reviewed by: ALEJANDRO Miller on 05/09/2025:  Common labs          9/25/2024    15:27 1/23/2025    13:53 3/27/2025    13:37   Common Labs   Glucose 101  102     BUN 11  10     Creatinine 0.81  0.82     Sodium 141  139     Potassium 4.8  3.9     Chloride 106  104     Calcium 9.3  8.7     Albumin 4.3  4.0     Total Bilirubin 0.3  0.3     Alkaline Phosphatase 146  157     AST (SGOT) 14  16     ALT (SGPT) 7  7     WBC 4.90  5.31     Hemoglobin 12.7  13.0     Hematocrit 39.4  38.1     Platelets 246  262     Total Cholesterol 258      Triglycerides 108      HDL Cholesterol 68      LDL Cholesterol  171      Hemoglobin A1C 5.50   5.80      Tobacco Use: Low Risk  (5/9/2025)    Patient History     Smoking Tobacco Use: Never     Smokeless Tobacco Use: Never     Passive Exposure: Not on file     Social History     Substance and Sexual Activity   Alcohol Use No     Family History   Problem Relation Age of Onset    Heart disease Mother     Hypertension Mother     Heart disease Father     Hypertension Father     Colon cancer Neg Hx     Colon polyps Neg Hx     Crohn's disease Neg Hx     Irritable bowel syndrome Neg Hx     Ulcerative colitis Neg Hx                Assessment and Plan   Diagnoses and all orders for this visit:    1. Left lower quadrant abdominal pain (Primary)    Advised to go to ER for further acute evaluation and treatment-- refusing ambulance; requesting private transport.   Report called to AMANDA Hernandez, at [facility] triage.          Follow Up   Return if symptoms worsen or fail to improve.  Patient was given instructions and counseling regarding  her condition or for health maintenance advice. Please see specific information pulled into the AVS if appropriate.

## 2025-05-12 ENCOUNTER — TELEPHONE (OUTPATIENT)
Dept: INTERNAL MEDICINE | Facility: CLINIC | Age: 75
End: 2025-05-12

## 2025-05-12 ENCOUNTER — TELEPHONE (OUTPATIENT)
Dept: GASTROENTEROLOGY | Facility: CLINIC | Age: 75
End: 2025-05-12

## 2025-05-12 ENCOUNTER — NURSE TRIAGE (OUTPATIENT)
Dept: CALL CENTER | Facility: HOSPITAL | Age: 75
End: 2025-05-12
Payer: MEDICARE

## 2025-05-12 NOTE — TELEPHONE ENCOUNTER
Caller: Sara Gary    Relationship: Self    Best call back number: [phone]     What was the call regarding: PATIENT STATES THAT PCP SENT HER TO THE ER ON FRIDAY. SHE WAS ADVISED TO FOLLOW UP AND UPDATE PCP WITH THE RESULTS. SHE WAS DIAGNOSED WITH ACUTE DIVERTICULITIS AND PRESCRIBED amoxicillin-clavulanate (AUGMENTIN) 875-125 MG per tablet AS WELL AS TYLENOL FOR PAIN CONTROL. SHE HAS ALSO CONTACTED HER GASTROENTEROLOGIST FOR FOLLOW UP.

## 2025-05-12 NOTE — TELEPHONE ENCOUNTER
Caller: Sara Gary    Relationship: Self    Best call back number: [phone]    What is the best time to reach you: ANYTIME    Who are you requesting to speak with (clinical staff, provider,  specific staff member): CLINICAL    Do you know the name of the person who called: PATIENT    What was the call regarding: PT WAS IN THE ER ON FRIDAY, 05/09.  THEY DID BLOOD  WORK, URINALYSIS AND CT SCAN.  DX WAS ACUTE DIVERTICULITIS.  THEY PUT HER /125 AUGMENTIN  & TYLENOL.  STILL HAVING DIARRHEA BUT NOT EATING MUCH.  ONLY HAD ONE PIECE OF RASIN TOAST TODAY.   LOTS OF GAS AND ABDOMEN DISCOMFORT.   SHE IS CONCERNED OF HAVING SEPSIS.  SHE IS FEELING DIZZY RIGHT NOW.   PLEASE CALL TO DISCUSS NEXT STEP.  I

## 2025-05-13 NOTE — PROGRESS NOTES
Chief Complaint  Follow-up and Diverticulitis    Subjective    Patient is a 74 year-old female presents for follow up from previous visit with this NP on 10/7/24  Progress Notes by Arnulfo Mercado APRN (10/07/2024 13:30)   - Of note, recently she was evaluated and seen from the ER for abdominal pain, 1 episode of non-bloody diarrhea.  Workups revealed acute diverticulitis. Treated on Augmentin, follow up with outpatient GI was recommended.   History of Present Illness  The patient is a 74-year-old female who presents today for follow-up of her most recent acute diverticulitis, which was treated in the ER on 05/09/2025. She came to the ER on 05/09/2025 due to acute left lower abdominal pain with one episode of nonbloody diarrhea. CT workup revealed diverticulitis at the descending sigmoid junction with colon wall thickening.  She is on Augmentin, tolerates fine except for vaginal itching, suspect antibiotic induced vaginal yeast infection and wants to know if she could take fluconazole.    Lower abdominal pain  Acute diverticulitis  - She continues to experience lower abdominal pain and cramping, although it has improved compared to the previous day. She also reports a sensation of bloating upon waking this morning. She has not had a bowel movement since the episode of diarrhea the day before yesterday. She describes her stool as liquid with solid matter that settles at the bottom of the toilet, which she perceives as an attempt by her bowel to form a regular movement. Prior to this, she was having normal bowel movements but then experienced constipation. She suspects that her symptoms may have been triggered by her diet, specifically mentioning frequent consumption of strawberry poppy seed salad from [employer] and pork.     - She has been maintaining adequate hydration and has resumed a normal diet, including noodles, cookies, broccoli, cheddar cheese, and soup.       - She has undergone several colonoscopies in the  past, with the most recent one performed by Dr. Murrell prior to her relocation to [place] in 2018. She also underwent Cologuard testing while in [place]. Her next Cologuard is due in August 2025 for colon cancer screening.  Previously we have discussed and recommended for a traditional colonoscopy but patient declined, she does not think she could tolerate.     Elevated alkaline phosphate.  - This chronic issue, likely associated with her osteopenia. She is current with her DEXA scan as of 03/18/2023. She has previously taken vitamin D supplements but is not currently on them. She received regular B12 injections when she first moved here in 2006.    Duodenal diverticulum and GERD  - An EGD was completed, refer to results below.  - Continued Pepcid 40mg at night. A 6 to 8-weeks supply of Voquezna 10mg.  She is doing fine new concerns from the upper GI standpoint    2. Hepatic cyst, noted on CTA result 4/13/2023.  - Despite the presence of a hepatic cyst, her liver function tests are normal. An abdominal ultrasound was ordered, refer to results below.      SOCIAL HISTORY  Exercise: Has not been doing as much exercise as should.  Alcohol: Does not drink.  Tobacco: Does not smoke.     Results Reviewed:  5/9/25  - Lipase normal  - CMP (, normal LFT)  - CBC (hemoglobin 13.4)    CT Abdomen Pelvis With Contrast (05/09/2025 18:46)   - Diverticulitis at the descending sigmoid junction   - At the descending sigmoid junction there is colon wall  thickening and edema with pericolonic fat induration and diverticular wall thickening, consistent with acute diverticulitis.   - Stable subcapsular hepatic cyst along the dome, local fatty infiltration left lobe of the liver as before. No suspicious liver lesion has developed.   - The gallbladder is satisfactory in appearance, no biliary duct dilatation or pancreatic abnormalities seen.   - There is a 2 cm duodenal diverticulum, no atypical features.  - Spleen is normal in size  "and shape    US Abdomen Limited (11/22/2024 11:32)   - Small hepatic cyst. Otherwise normal right upper quadrant ultrasound.    Upper GI Endoscopy (11/19/2024 08:55) (evaluate for duodenum diverticulum).  - LA Grade A reflux esophagitis with no bleeding. Biopsied.   - 2 cm hiatal hernia.   - Mild Schatzki ring. Dilated. - Normal stomach.   - Normal examined duodenum    9/25/24 CMP (AST/ALT normal values)  (trending values run between 124-152); CBC, TSH normal.    Interval history related to tests/imaging studies:  4/13/24 CTA - suspect transient hepatic attenuation difference in the anterior left hepatic lobe. Small cyst at the right hepatic dome. Colonic diverticulosis. Incidental duodenal diverticulum.    DEXA scan 2023 - osteopenia at the hips and the lumbar spine.  8/2022 Cologuard negative    7/2016 EGD/Colonoscopy - 3 tubular adenomas colon polyps    5/2022 Acute diverticulitis.     Objective   Vital Signs:  /80   Pulse 70   Temp 98.6 °F (37 °C)   Ht 69 cm (27.17\")   Wt 66.7 kg (147 lb)   .06 kg/m²   Estimated body mass index is 140.06 kg/m² as calculated from the following:    Height as of this encounter: 69 cm (27.17\").    Weight as of this encounter: 66.7 kg (147 lb).       BMI is within normal parameters. No other follow-up for BMI required.    Physical Exam  Vitals and nursing note reviewed.   Constitutional:       General: She is not in acute distress.     Appearance: Normal appearance. She is normal weight. She is not ill-appearing, toxic-appearing or diaphoretic.   Eyes:      General: No scleral icterus.     Conjunctiva/sclera: Conjunctivae normal.   Pulmonary:      Effort: Pulmonary effort is normal. No respiratory distress.      Breath sounds: Normal breath sounds. No stridor.   Abdominal:      General: Abdomen is flat. Bowel sounds are normal. There is no distension.      Palpations: Abdomen is soft. There is no mass.      Tenderness: There is no abdominal tenderness. There " is no right CVA tenderness, left CVA tenderness, guarding or rebound.      Hernia: No hernia is present.   Skin:     Coloration: Skin is not jaundiced or pale.      Findings: No erythema or rash.   Neurological:      Mental Status: She is alert and oriented to person, place, and time. Mental status is at baseline.   Psychiatric:         Mood and Affect: Mood normal.             Assessment and Plan     Diagnoses and all orders for this visit:    1. Acute diverticulitis (Primary)  -     Alkaline Phosphatase, Isoenzymes  -     Vitamin D,25-Hydroxy  -     Comprehensive Metabolic Panel  -     Cancel: US Abdomen Complete  -     Gamma GT  -     AKI  -     IgG, IgA, IgM  -     Mitochondrial Antibodies, M2  -     IgG 4    2. Lower abdominal pain  -     Alkaline Phosphatase, Isoenzymes  -     Vitamin D,25-Hydroxy  -     Comprehensive Metabolic Panel  -     Cancel: US Abdomen Complete  -     Gamma GT  -     AKI  -     IgG, IgA, IgM  -     Mitochondrial Antibodies, M2  -     IgG 4    3. Liver cyst  -     Alkaline Phosphatase, Isoenzymes  -     Vitamin D,25-Hydroxy  -     Comprehensive Metabolic Panel  -     Cancel: US Abdomen Complete  -     Gamma GT  -     AKI  -     IgG, IgA, IgM  -     Mitochondrial Antibodies, M2  -     IgG 4    4. Elevated alkaline phosphatase level  -     Alkaline Phosphatase, Isoenzymes  -     Vitamin D,25-Hydroxy  -     Comprehensive Metabolic Panel  -     Cancel: US Abdomen Complete  -     Gamma GT  -     AKI  -     IgG, IgA, IgM  -     Mitochondrial Antibodies, M2  -     IgG 4          Assessment & Plan  1. Acute diverticulitis  - Continue antibiotic regimen until completion.  - Once acute diverticulitis resolved; start taking daily fiber supplement (Citrucel, Benefiber, Metamucil).  - Maintain adequate hydration.  - Gradually reintroduce solid foods, starting with soups and progressing to bread and potatoes.  - Revert to clear liquid diet if experiencing significant pain or intolerance to solid  foods.  - Colonoscopy recommended for further evaluation, deferred until 6 to 8 weeks post-resolution of diverticulitis, she is agreeable to go ahead and schedule for this; she will call us to cancel if she changes her mind by then; she fully understands that if opting against colonoscopy, malignancy cannot be ruled out.  - Next Cologuard test due in 08/2025.    2. Vaginal infection:  - Fluconazole 150 mg single dose approved for use.    3. Hemorrhoids:  - Avoid constipation by using fiber supplements and maintaining adequate hydration.  - She will need approximately 20-30gm of fiber a day.     4. Vitamin D deficiency  5. Elevated ALK  - Elevated alkaline phosphatase levels potentially indicative of vitamin D deficiency.  - Lab work ordered to assess vitamin D levels and investigate elevated alkaline phosphatase.  - Additional labs: alkaline phosphatase isoenzyme, vitamin D, CMP, IgG, IgA, IgM, mitochondrial antibody, and Ig4 to evaluate for PBC.    Follow-up: 3 months or sooner as needed.    Patient Instructions   I recommend that we proceed with colonoscopy for further evaluation for potential etiologies for proctitis, colitis, ulceration,  ano-rectal masses, colo-rectal cancer, diverticulosis, inflammatory bowel disease, AVMs in the colon, polyps, or mass/malignancy.  Further recommendations to follow depending upon endoscopic findings and clinical course.     - Complete antibiotic as instructed.    ----------------------------------------------------  Diverticulosis is a condition in which small, bulging pouches (diverticuli) form inside the lower part of the intestine, usually in the colon. Constipation and straining during bowel movements can worsen the condition.     - A diet rich in fiber can help keep stools soft and prevent inflammation. Diverticulitis occurs when the pouches in the colon become infected or inflamed.   - Dietary changes can help the colon heal. Fiber is an important part of the diet for  patients with diverticulosis. A high-fiber diet softens and gives bulk to the stool, allowing it to pass quickly and easily.   - Avoid constipation; add daily soluble fiber supplement such as OTC Citrucel, Metamucil, FiberCon or Benefiber. Fiber supplements can take 12 to 72 hours to start working.  - Start fiber supplement at a low dose and gradually increase as tolerates. Fiber softens the stool and helps prevent constipation. It also can help decrease pressure in the colon and help prevent flare-ups of diverticulitis. High-fiber foods include:  Beans and legumes  Bran, whole wheat bread and whole grain cereals such as oatmeal  Brown and wild rice  Fruits such as apples, bananas and pears  Vegetables such as broccoli, carrots, corn and squash  Whole wheat pasta  Drink 6 to 12 ounces of water or noncarbonated beverage with fiber supplement.    If you currently don't have a diet high in fiber, you should add fiber gradually. This helps avoid bloating and abdominal discomfort. The target is to eat 25 to 30 grams of fiber daily. Drink at least 8 cups of fluid daily. Fluid will help soften your stool. Exercise also promotes bowel movement and helps prevent constipation.      I have reviewed patient's current medications list, relevant clinical information necessary for today's encounter. I discussed the clinical impression and treatment plan with the patient, who verbalized understanding and in agreement.  All questions were answered and support was provided.    Patient or patient representative verbalized consent for the use of Ambient Listening during the visit with  ALEJANDRO Weeks for chart documentation. 5/14/2025  14:21 EDT

## 2025-05-13 NOTE — TELEPHONE ENCOUNTER
"Reason for Disposition   MODERATE diarrhea (e.g., 4-6 times / day more than normal)    Additional Information   Negative: Shock suspected (e.g., cold/pale/clammy skin, too weak to stand, low BP, rapid pulse)   Negative: Difficult to awaken or acting confused (e.g., disoriented, slurred speech)   Negative: Sounds like a life-threatening emergency to the triager   Negative: Vomiting also present and worse than the diarrhea   Negative: [1] Blood in stool AND [2] without diarrhea   Negative: Diarrhea in a cancer patient who is currently (or recently) receiving chemotherapy or radiation therapy, or cancer patient who has metastatic or end-stage cancer and is receiving palliative care   Negative: SEVERE abdominal pain (e.g., excruciating)   Negative: [1] Blood in the stool AND [2] moderate or large amount of blood (e.g., any blood clots, passing blood without stool, toilet water turns red)   Negative: Black or tarry bowel movements  (Exception: Chronic-unchanged black-grey BMs AND is taking iron pills or Pepto-Bismol.)   Negative: [1] Drinking very little AND [2] dehydration suspected (e.g., no urine > 12 hours, very dry mouth, very lightheaded)   Negative: Patient sounds very sick or weak to the triager   Negative: SEVERE diarrhea (e.g., 7 or more times / day more than normal)   Negative: [1] Constant abdominal pain AND [2] present > 2 hours   Negative: Abdomen looks much more swollen than usual    Answer Assessment - Initial Assessment Questions  1. ANTIBIOTIC: \"What antibiotic are you taking?\" \"How many times per day?\"      Augmentin  2. ANTIBIOTIC ONSET: \"When was the antibiotic started?\"      05/09/25  3. DIARRHEA SEVERITY: \"How bad is the diarrhea?\" \"How many more stools have you had in the past 24 hours than normal?\"     - NO DIARRHEA (SCALE 0)    - MILD (SCALE 1-3): Few loose or mushy BMs; increase of 1-3 stools over normal daily number of stools; mild increase in ostomy output.    -  MODERATE (SCALE 4-7): " "Increase of 4-6 stools daily over normal; moderate increase in ostomy output.  * SEVERE (SCALE 8-10; OR 'WORST POSSIBLE'): Increase of 7 or more stools daily over normal; moderate increase in ostomy output; incontinence.      Moderate  4. ONSET: \"When did the diarrhea begin?\"       Several days  5. BM CONSISTENCY: \"How loose or watery is the diarrhea?\"       watery  6. VOMITING: \"Are you also vomiting?\" If Yes, ask: \"How many times in the past 24 hours?\"       denies  7. ABDOMEN PAIN: \"Are you having any abdomen pain?\" If Yes, ask: \"What does it feel like?\" (e.g., crampy, dull, intermittent, constant)       yes  8. ABDOMEN PAIN SEVERITY: If present, ask: \"How bad is the pain?\"  (e.g., Scale 1-10; mild, moderate, or severe)    - MILD (1-3): doesn't interfere with normal activities, abdomen soft and not tender to touch     - MODERATE (4-7): interferes with normal activities or awakens from sleep, abdomen tender to touch     - SEVERE (8-10): excruciating pain, doubled over, unable to do any normal activities        moderate  9. ORAL INTAKE: If vomiting, \"Have you been able to drink liquids?\" \"How much liquids have you had in the past 24 hours?\"      liquids  10. HYDRATION: \"Any signs of dehydration?\" (e.g., dry mouth [not just dry lips], too weak to stand, dizziness, new weight loss) \"When did you last urinate?\"         Denies  11. EXPOSURE: \"Have you traveled to a foreign country recently?\" \"Have you been exposed to anyone with diarrhea?\" \"Could you have eaten any food that was spoiled?\"        NA  12. OTHER SYMPTOMS: \"Do you have any other symptoms?\" (e.g., fever, blood in stool)        DX:  Diverticulitis  13. PREGNANCY: \"Is there any chance you are pregnant?\" \"When was your last menstrual period?\"        NA    Protocols used: Diarrhea on Antibiotics-ADULT-AH    "

## 2025-05-13 NOTE — TELEPHONE ENCOUNTER
Spoke with patient via phone. She will be here tomorrow for follow up. Continue with antibiotic for now. She verbalized understanding, will further discuss her progress and plan of care as scheduled visit made.   ALEJANDRO Figueroa

## 2025-05-14 ENCOUNTER — PREP FOR SURGERY (OUTPATIENT)
Dept: SURGERY | Facility: SURGERY CENTER | Age: 75
End: 2025-05-14
Payer: MEDICARE

## 2025-05-14 ENCOUNTER — LAB (OUTPATIENT)
Dept: LAB | Facility: HOSPITAL | Age: 75
End: 2025-05-14
Payer: MEDICARE

## 2025-05-14 ENCOUNTER — OFFICE VISIT (OUTPATIENT)
Dept: GASTROENTEROLOGY | Facility: CLINIC | Age: 75
End: 2025-05-14
Payer: MEDICARE

## 2025-05-14 VITALS
SYSTOLIC BLOOD PRESSURE: 130 MMHG | BODY MASS INDEX: 34.02 KG/M2 | TEMPERATURE: 98.6 F | HEIGHT: 55 IN | WEIGHT: 147 LBS | DIASTOLIC BLOOD PRESSURE: 80 MMHG | HEART RATE: 70 BPM

## 2025-05-14 DIAGNOSIS — K57.92 ACUTE DIVERTICULITIS: Primary | ICD-10-CM

## 2025-05-14 DIAGNOSIS — K76.89 LIVER CYST: ICD-10-CM

## 2025-05-14 DIAGNOSIS — R74.8 ELEVATED ALKALINE PHOSPHATASE LEVEL: ICD-10-CM

## 2025-05-14 DIAGNOSIS — R10.30 LOWER ABDOMINAL PAIN: ICD-10-CM

## 2025-05-14 DIAGNOSIS — K57.30 SIGMOID DIVERTICULOSIS: ICD-10-CM

## 2025-05-14 DIAGNOSIS — E03.8 OTHER SPECIFIED HYPOTHYROIDISM: ICD-10-CM

## 2025-05-14 LAB
25(OH)D3 SERPL-MCNC: 27.4 NG/ML (ref 30–100)
ALBUMIN SERPL-MCNC: 4 G/DL (ref 3.5–5.2)
ALBUMIN/GLOB SERPL: 1.5 G/DL
ALP SERPL-CCNC: 136 U/L (ref 39–117)
ALT SERPL W P-5'-P-CCNC: 6 U/L (ref 1–33)
ANION GAP SERPL CALCULATED.3IONS-SCNC: 7.9 MMOL/L (ref 5–15)
AST SERPL-CCNC: 11 U/L (ref 1–32)
BILIRUB SERPL-MCNC: 0.3 MG/DL (ref 0–1.2)
BUN SERPL-MCNC: 8 MG/DL (ref 8–23)
BUN/CREAT SERPL: 12.3 (ref 7–25)
CALCIUM SPEC-SCNC: 9.1 MG/DL (ref 8.6–10.5)
CHLORIDE SERPL-SCNC: 107 MMOL/L (ref 98–107)
CO2 SERPL-SCNC: 28.1 MMOL/L (ref 22–29)
CREAT SERPL-MCNC: 0.65 MG/DL (ref 0.57–1)
EGFRCR SERPLBLD CKD-EPI 2021: 91.4 ML/MIN/1.73
GGT SERPL-CCNC: 13 U/L (ref 5–36)
GLOBULIN UR ELPH-MCNC: 2.6 GM/DL
GLUCOSE SERPL-MCNC: 136 MG/DL (ref 65–99)
IGA1 MFR SER: 135 MG/DL (ref 70–400)
IGG1 SER-MCNC: 816 MG/DL (ref 700–1600)
IGM SERPL-MCNC: 26 MG/DL (ref 40–230)
POTASSIUM SERPL-SCNC: 3.7 MMOL/L (ref 3.5–5.2)
PROT SERPL-MCNC: 6.6 G/DL (ref 6–8.5)
SODIUM SERPL-SCNC: 143 MMOL/L (ref 136–145)

## 2025-05-14 PROCEDURE — 82784 ASSAY IGA/IGD/IGG/IGM EACH: CPT | Performed by: NURSE PRACTITIONER

## 2025-05-14 PROCEDURE — 86038 ANTINUCLEAR ANTIBODIES: CPT | Performed by: NURSE PRACTITIONER

## 2025-05-14 PROCEDURE — 99214 OFFICE O/P EST MOD 30 MIN: CPT | Performed by: NURSE PRACTITIONER

## 2025-05-14 PROCEDURE — 86381 MITOCHONDRIAL ANTIBODY EACH: CPT | Performed by: NURSE PRACTITIONER

## 2025-05-14 PROCEDURE — 84075 ASSAY ALKALINE PHOSPHATASE: CPT | Performed by: NURSE PRACTITIONER

## 2025-05-14 PROCEDURE — 1159F MED LIST DOCD IN RCRD: CPT | Performed by: NURSE PRACTITIONER

## 2025-05-14 PROCEDURE — 82306 VITAMIN D 25 HYDROXY: CPT | Performed by: NURSE PRACTITIONER

## 2025-05-14 PROCEDURE — 80053 COMPREHEN METABOLIC PANEL: CPT | Performed by: NURSE PRACTITIONER

## 2025-05-14 PROCEDURE — 82977 ASSAY OF GGT: CPT | Performed by: NURSE PRACTITIONER

## 2025-05-14 PROCEDURE — 82787 IGG 1 2 3 OR 4 EACH: CPT | Performed by: NURSE PRACTITIONER

## 2025-05-14 PROCEDURE — 84080 ASSAY ALKALINE PHOSPHATASES: CPT | Performed by: NURSE PRACTITIONER

## 2025-05-14 PROCEDURE — 1160F RVW MEDS BY RX/DR IN RCRD: CPT | Performed by: NURSE PRACTITIONER

## 2025-05-14 PROCEDURE — 3075F SYST BP GE 130 - 139MM HG: CPT | Performed by: NURSE PRACTITIONER

## 2025-05-14 PROCEDURE — 3079F DIAST BP 80-89 MM HG: CPT | Performed by: NURSE PRACTITIONER

## 2025-05-14 PROCEDURE — 36415 COLL VENOUS BLD VENIPUNCTURE: CPT | Performed by: NURSE PRACTITIONER

## 2025-05-14 RX ORDER — SODIUM CHLORIDE 0.9 % (FLUSH) 0.9 %
3 SYRINGE (ML) INJECTION EVERY 12 HOURS SCHEDULED
OUTPATIENT
Start: 2025-05-14

## 2025-05-14 RX ORDER — SODIUM CHLORIDE 0.9 % (FLUSH) 0.9 %
10 SYRINGE (ML) INJECTION AS NEEDED
OUTPATIENT
Start: 2025-05-14

## 2025-05-14 RX ORDER — LEVOTHYROXINE SODIUM 75 MCG
75 TABLET ORAL DAILY
Qty: 90 TABLET | Refills: 1 | Status: SHIPPED | OUTPATIENT
Start: 2025-05-14

## 2025-05-14 RX ORDER — SODIUM CHLORIDE, SODIUM LACTATE, POTASSIUM CHLORIDE, CALCIUM CHLORIDE 600; 310; 30; 20 MG/100ML; MG/100ML; MG/100ML; MG/100ML
30 INJECTION, SOLUTION INTRAVENOUS ONCE
OUTPATIENT
Start: 2025-05-14

## 2025-05-14 NOTE — PATIENT INSTRUCTIONS
I recommend that we proceed with colonoscopy for further evaluation for potential etiologies for proctitis, colitis, ulceration,  ano-rectal masses, colo-rectal cancer, diverticulosis, inflammatory bowel disease, AVMs in the colon, polyps, or mass/malignancy.  Further recommendations to follow depending upon endoscopic findings and clinical course.     - Complete antibiotic as instructed.    ----------------------------------------------------  Diverticulosis is a condition in which small, bulging pouches (diverticuli) form inside the lower part of the intestine, usually in the colon. Constipation and straining during bowel movements can worsen the condition.     - A diet rich in fiber can help keep stools soft and prevent inflammation. Diverticulitis occurs when the pouches in the colon become infected or inflamed.   - Dietary changes can help the colon heal. Fiber is an important part of the diet for patients with diverticulosis. A high-fiber diet softens and gives bulk to the stool, allowing it to pass quickly and easily.   - Avoid constipation; add daily soluble fiber supplement such as OTC Citrucel, Metamucil, FiberCon or Benefiber. Fiber supplements can take 12 to 72 hours to start working.  - Start fiber supplement at a low dose and gradually increase as tolerates. Fiber softens the stool and helps prevent constipation. It also can help decrease pressure in the colon and help prevent flare-ups of diverticulitis. High-fiber foods include:  Beans and legumes  Bran, whole wheat bread and whole grain cereals such as oatmeal  Brown and wild rice  Fruits such as apples, bananas and pears  Vegetables such as broccoli, carrots, corn and squash  Whole wheat pasta  Drink 6 to 12 ounces of water or noncarbonated beverage with fiber supplement.    If you currently don't have a diet high in fiber, you should add fiber gradually. This helps avoid bloating and abdominal discomfort. The target is to eat 25 to 30 grams of  fiber daily. Drink at least 8 cups of fluid daily. Fluid will help soften your stool. Exercise also promotes bowel movement and helps prevent constipation.

## 2025-05-15 ENCOUNTER — PATIENT OUTREACH (OUTPATIENT)
Dept: CASE MANAGEMENT | Facility: OTHER | Age: 75
End: 2025-05-15
Payer: MEDICARE

## 2025-05-15 DIAGNOSIS — E78.2 MIXED HYPERLIPIDEMIA: ICD-10-CM

## 2025-05-15 DIAGNOSIS — I10 PRIMARY HYPERTENSION: Primary | ICD-10-CM

## 2025-05-15 LAB
ANA SER QL: NEGATIVE
IGG4 SER-MCNC: 9 MG/DL (ref 2–96)
MITOCHONDRIA M2 IGG SER-ACNC: <20 UNITS (ref 0–20)

## 2025-05-15 NOTE — OUTREACH NOTE
AMBULATORY CASE MANAGEMENT NOTE    Names and Relationships of Patient/Support Persons: Contact: Sara Gary; Relationship: Self -     Patient Outreach    Called patient regarding her recent ER visit. Patient states she is better today. She reports the diarrhea has stopped, but she continues to have stomach discomfort. She saw the gastroenterologist yesterday and is scheduled for a colonoscopy next month. She declines follow up with PCP at this time. Advised to call the office if her condition worsens.         Taryn MALDONADO  Ambulatory Case Management    5/15/2025, 10:36 EDT

## 2025-05-16 LAB
ALP BONE CFR SERPL: 44 % (ref 14–68)
ALP INTEST CFR SERPL: 14 % (ref 0–18)
ALP LIVER CFR SERPL: 41 % (ref 18–85)
ALP SERPL-CCNC: 140 IU/L (ref 44–121)

## 2025-05-27 ENCOUNTER — OFFICE VISIT (OUTPATIENT)
Dept: INTERNAL MEDICINE | Facility: CLINIC | Age: 75
End: 2025-05-27
Payer: MEDICARE

## 2025-05-27 VITALS
RESPIRATION RATE: 18 BRPM | WEIGHT: 143 LBS | BODY MASS INDEX: 21.18 KG/M2 | DIASTOLIC BLOOD PRESSURE: 84 MMHG | SYSTOLIC BLOOD PRESSURE: 128 MMHG | HEIGHT: 69 IN | HEART RATE: 64 BPM | OXYGEN SATURATION: 98 %

## 2025-05-27 DIAGNOSIS — N30.00 ACUTE CYSTITIS WITHOUT HEMATURIA: Primary | ICD-10-CM

## 2025-05-27 LAB
BILIRUB BLD-MCNC: NEGATIVE MG/DL
CLARITY, POC: ABNORMAL
COLOR UR: ABNORMAL
EXPIRATION DATE: ABNORMAL
GLUCOSE UR STRIP-MCNC: NEGATIVE MG/DL
KETONES UR QL: ABNORMAL
LEUKOCYTE EST, POC: ABNORMAL
Lab: ABNORMAL
NITRITE UR-MCNC: POSITIVE MG/ML
PH UR: 6.5 [PH] (ref 5–8)
PROT UR STRIP-MCNC: ABNORMAL MG/DL
RBC # UR STRIP: NEGATIVE /UL
SP GR UR: 1.02 (ref 1–1.03)
UROBILINOGEN UR QL: ABNORMAL

## 2025-05-27 PROCEDURE — 3079F DIAST BP 80-89 MM HG: CPT | Performed by: STUDENT IN AN ORGANIZED HEALTH CARE EDUCATION/TRAINING PROGRAM

## 2025-05-27 PROCEDURE — 3074F SYST BP LT 130 MM HG: CPT | Performed by: STUDENT IN AN ORGANIZED HEALTH CARE EDUCATION/TRAINING PROGRAM

## 2025-05-27 PROCEDURE — 1159F MED LIST DOCD IN RCRD: CPT | Performed by: STUDENT IN AN ORGANIZED HEALTH CARE EDUCATION/TRAINING PROGRAM

## 2025-05-27 PROCEDURE — 99213 OFFICE O/P EST LOW 20 MIN: CPT | Performed by: STUDENT IN AN ORGANIZED HEALTH CARE EDUCATION/TRAINING PROGRAM

## 2025-05-27 PROCEDURE — 1125F AMNT PAIN NOTED PAIN PRSNT: CPT | Performed by: STUDENT IN AN ORGANIZED HEALTH CARE EDUCATION/TRAINING PROGRAM

## 2025-05-27 PROCEDURE — 81003 URINALYSIS AUTO W/O SCOPE: CPT | Performed by: STUDENT IN AN ORGANIZED HEALTH CARE EDUCATION/TRAINING PROGRAM

## 2025-05-27 PROCEDURE — 1160F RVW MEDS BY RX/DR IN RCRD: CPT | Performed by: STUDENT IN AN ORGANIZED HEALTH CARE EDUCATION/TRAINING PROGRAM

## 2025-05-27 RX ORDER — NITROFURANTOIN 25; 75 MG/1; MG/1
100 CAPSULE ORAL 2 TIMES DAILY
Qty: 10 CAPSULE | Refills: 0 | Status: SHIPPED | OUTPATIENT
Start: 2025-05-27

## 2025-05-27 NOTE — PROGRESS NOTES
"Chief Complaint  Urinary Tract Infection    Subjective        PCP: Catherine Kolb APRN Daphne D Abdirahman presents to clinic today for an acute visit with a cc of dysuria and urinary frequency.  History of Present Illness  Recently was diagnosed with diverticulitis, for which patient completed a two week course of Augmentin. Reports having very frequent bowel movements during this time. Following this, she developed a UTI, which she attributes to increased wiping. She experiences UTIs approximately twice a year, with the most recent episode starting a few days ago. Symptoms include difficulty urinating, producing only a small amount of urine, and chills. She reports no hematuria or urine discoloration. She has a history of four kidney stones and experienced vaginal symptoms after taking Augmentin, which were managed with Diflucan and over-the-counter Mycolog. She reports no back pain but notes tenderness in the lower quadrant of her abdomen, which she attributes to frequent bowel movements due to diverticulitis. She has a known allergy to Bactrim.    Objective   Vital Signs:  /84 (BP Location: Left arm, Patient Position: Sitting, Cuff Size: Adult)   Pulse 64   Resp 18   Ht 175.3 cm (69\")   Wt 64.9 kg (143 lb)   SpO2 98%   BMI 21.12 kg/m²   Estimated body mass index is 21.12 kg/m² as calculated from the following:    Height as of this encounter: 175.3 cm (69\").    Weight as of this encounter: 64.9 kg (143 lb).    Physical Exam  Constitutional:       General: She is not in acute distress.     Appearance: Normal appearance.   Pulmonary:      Effort: Pulmonary effort is normal. No respiratory distress.   Abdominal:      General: Abdomen is flat.      Palpations: Abdomen is soft.      Tenderness: There is no abdominal tenderness. There is no right CVA tenderness or left CVA tenderness.   Skin:     General: Skin is warm and dry.   Neurological:      General: No focal deficit present.      Mental Status: She " is oriented to person, place, and time. Mental status is at baseline.   Psychiatric:         Mood and Affect: Mood normal.         Behavior: Behavior normal.     Result Review :  The following data was reviewed by: Aida Infante MD on 05/27/2025:    Brief Urine Lab Results  (Last result in the past 365 days)        Color   Clarity   Blood   Leuk Est   Nitrite   Protein   CREAT   Urine HCG        05/27/25 1517 Dark Yellow   Slightly Cloudy   Negative   Large (3+)   Positive   1+                            Current Outpatient Medications:     albuterol sulfate HFA (Ventolin HFA) 108 (90 Base) MCG/ACT inhaler, Inhale 2 puffs Every 4 (Four) Hours As Needed for Wheezing., Disp: 18 g, Rfl: 3    busPIRone (BUSPAR) 10 MG tablet, Take 1 tablet by mouth once daily, Disp: 90 tablet, Rfl: 0    cetirizine (zyrTEC) 10 MG tablet, Take 1 tablet by mouth Daily., Disp: 90 tablet, Rfl: 2    Evolocumab (REPATHA) solution prefilled syringe injection, Inject 1 mL under the skin into the appropriate area as directed Every 14 (Fourteen) Days., Disp: 2 mL, Rfl: 11    fluticasone (FLONASE) 50 MCG/ACT nasal spray, Administer 2 sprays into the nostril(s) as directed by provider Daily., Disp: 16 g, Rfl: 3    liothyronine (CYTOMEL) 5 MCG tablet, Take 1 tablet by mouth Daily., Disp: 90 tablet, Rfl: 3    losartan (Cozaar) 25 MG tablet, Take 1 tablet by mouth Daily., Disp: 90 tablet, Rfl: 3    Repatha SureClick solution auto-injector SureClick injection, INJECT 1ML INTO THE SKIN EVERY 14 DAYS, Disp: , Rfl:     rimegepant sulfate ODT (Nurtec) 75 MG disintegrating tablet, Place 1 tablet under the tongue Daily As Needed (migraine headache) for up to 10 doses., Disp: 10 tablet, Rfl: 0    sertraline (Zoloft) 100 MG tablet, Take 1 tablet by mouth Daily., Disp: 90 tablet, Rfl: 0    SUMAtriptan (IMITREX) 100 MG tablet, TAKE 1 TABLET BY MOUTH AT ONSET OF HEADACHE; MAY REPEAT DOSE ONE TIME IN 2 HOURS IF HEADACHE NOT RELIEVED, Disp: 9 tablet, Rfl: 2     Synthroid 75 MCG tablet, Take 1 tablet by mouth once daily, Disp: 90 tablet, Rfl: 1    valACYclovir (Valtrex) 500 MG tablet, Take 1 tablet by mouth 2 (Two) Times a Day. Take 1 tablet po BID x 5 days when experiencing fever blister symptoms., Disp: 60 tablet, Rfl: 2    Vonoprazan Fumarate (Voquezna) 10 MG tablet, Take 1 tablet by mouth Daily., Disp: 90 tablet, Rfl: 1    nitrofurantoin, macrocrystal-monohydrate, (Macrobid) 100 MG capsule, Take 1 capsule by mouth 2 (Two) Times a Day., Disp: 10 capsule, Rfl: 0      Assessment and Plan   Diagnoses and all orders for this visit:    1. Acute cystitis without hematuria (Primary)  -     Urine Culture - Urine, Urine, Clean Catch  -     POC Urinalysis Dipstick, Automated  -     nitrofurantoin, macrocrystal-monohydrate, (Macrobid) 100 MG capsule; Take 1 capsule by mouth 2 (Two) Times a Day.  Dispense: 10 capsule; Refill: 0           Follow Up   Return for regularly scheduled follow up with PCP.    Patient was given instructions and counseling regarding her condition or for health maintenance advice. Please see specific information pulled into the AVS if appropriate.     Aida Infante MD    Patient or patient representative verbalized consent for the use of Ambient Listening during the visit with  Aida Infante MD for chart documentation. 5/27/2025  15:21 EDT

## 2025-05-29 DIAGNOSIS — F41.9 ANXIETY AND DEPRESSION: ICD-10-CM

## 2025-05-29 DIAGNOSIS — F32.A ANXIETY AND DEPRESSION: ICD-10-CM

## 2025-05-29 LAB
BACTERIA UR CULT: NO GROWTH
BACTERIA UR CULT: NORMAL

## 2025-05-29 RX ORDER — SERTRALINE HYDROCHLORIDE 100 MG/1
100 TABLET, FILM COATED ORAL DAILY
Qty: 90 TABLET | Refills: 1 | Status: SHIPPED | OUTPATIENT
Start: 2025-05-29

## 2025-06-04 ENCOUNTER — TELEPHONE (OUTPATIENT)
Dept: GASTROENTEROLOGY | Facility: CLINIC | Age: 75
End: 2025-06-04
Payer: MEDICARE

## 2025-06-04 NOTE — TELEPHONE ENCOUNTER
Hub staff attempted to follow warm transfer process and was unsuccessful     Caller: Sara Gary    Relationship to patient: Self    Best call back number: [phone]    Patient is needing: PT IS CALLING TO SEE WHAT CAN BE DONE FOR HER. FOR OVER A MONTH NOW PT STATED THAT HER GUT FEELS AWFUL AND SHE ALSO HAD A UTI AND THINKS IT COULD BE HER DIVERTICULITIS.    PT IS SUPPOSED TO HAVE A PROCEDURE ON 6/19/2025 BUT DOESN'T THINK SHE CAN WAIT THAT LONG.    PLEASE CALL AND ADVISE. IT'S OK TO [facility].

## 2025-06-05 DIAGNOSIS — F32.A ANXIETY AND DEPRESSION: ICD-10-CM

## 2025-06-05 DIAGNOSIS — F41.9 ANXIETY AND DEPRESSION: ICD-10-CM

## 2025-06-05 RX ORDER — BUSPIRONE HYDROCHLORIDE 10 MG/1
10 TABLET ORAL DAILY
Qty: 90 TABLET | Refills: 0 | Status: SHIPPED | OUTPATIENT
Start: 2025-06-05

## 2025-06-16 ENCOUNTER — TELEPHONE (OUTPATIENT)
Dept: CASE MANAGEMENT | Facility: OTHER | Age: 75
End: 2025-06-16
Payer: MEDICARE

## 2025-06-16 NOTE — TELEPHONE ENCOUNTER
30D chart review performed. No hospitalizations or ED visits noted this past month. No changes or new needs identified. Patient goal achieved, will close program.

## 2025-06-17 ENCOUNTER — TELEPHONE (OUTPATIENT)
Dept: GASTROENTEROLOGY | Facility: CLINIC | Age: 75
End: 2025-06-17
Payer: MEDICARE

## 2025-06-17 NOTE — TELEPHONE ENCOUNTER
I called the patient and cancelled the cls with Dr. Murrell on 6/19/2025 at 2p. Pt will call us back when she speaks to her daughter to find out when she can bring her.

## 2025-06-17 NOTE — TELEPHONE ENCOUNTER
Caller: Sara Gary    Relationship to patient: Self    Best call back number: [phone]        Type of visit: SCOPE       If rescheduling, when is the original appointment: 06.19.25     Additional notes:PT IS CALLING TO CANCEL. PT DOES NOT HAVE TRANSPORTATION.  PLEASE CONTACT PT TO RESCHEDULE.

## 2025-06-21 ENCOUNTER — HOSPITAL ENCOUNTER (OUTPATIENT)
Facility: HOSPITAL | Age: 75
Discharge: HOME | End: 2025-06-21
Attending: EMERGENCY MEDICINE
Payer: MEDICARE

## 2025-06-21 VITALS
DIASTOLIC BLOOD PRESSURE: 78 MMHG | WEIGHT: 147 LBS | RESPIRATION RATE: 16 BRPM | HEART RATE: 74 BPM | OXYGEN SATURATION: 97 % | TEMPERATURE: 98 F | BODY MASS INDEX: 27.75 KG/M2 | SYSTOLIC BLOOD PRESSURE: 146 MMHG | HEIGHT: 61 IN

## 2025-06-21 DIAGNOSIS — T14.8XXA BRUISE: Primary | ICD-10-CM

## 2025-06-21 PROCEDURE — G0463 HOSPITAL OUTPT CLINIC VISIT: HCPCS | Performed by: PHYSICIAN ASSISTANT

## 2025-06-21 PROCEDURE — 99212 OFFICE O/P EST SF 10 MIN: CPT | Performed by: PHYSICIAN ASSISTANT

## 2025-06-21 NOTE — FSED PROVIDER NOTE
"Subjective   History of Present Illness  74-year-old female presents to the ER with a bruise to her right shin.  Patient states she does not recall an injury but noticed that she has a tender bruised area mid shin, noticing it today.  Patient presents to the facility concerned that it may be a blood clot.  She denies any posterior calf pain, upper leg pain, shortness of breath, or distal foot pain.  Denies any numbness or tingling in the extremity.  She has no history of previous blood clot or risks for blood clot.      Review of Systems   Skin:         Bruise to right shin       Past Medical History:   Diagnosis Date    Colon polyp 2013    Depression     Diverticulitis     Diverticulitis of colon 7/2017    Environmental allergies     GERD (gastroesophageal reflux disease) 2017    History of colon polyps     Hypertension     Kidney stone     hx of 4 stones       Allergies   Allergen Reactions    Sulfamethoxazole-Trimethoprim Hives    Statins GI Intolerance     Patient \"felt sick\"    Sulfa Antibiotics Hives    Zetia [Ezetimibe] Other (See Comments)     Felt sick       Past Surgical History:   Procedure Laterality Date    APPENDECTOMY      ASD REPAIR, SECUNDUM      CATARACT EXTRACTION      COLONOSCOPY      CYSTOSCOPY BLADDER STONE LITHOTRIPSY      CYSTOSCOPY W/ URETERAL STENT PLACEMENT Left 06/29/2017    Procedure: CYSTOSCOPY LEFT STENT INSERTION & STONE REMOVAL;  Surgeon: Raheem Franklin MD;  Location: [facility] MAIN OR;  Service:     ENDOSCOPY N/A 11/19/2024    Procedure: ESOPHAGOGASTRODUODENOSCOPY WITH DILATATION;  Surgeon: Luis Enrique Murrell MD;  [place] [facility] [place]  Service: Gastroenterology;  Laterality: N/A;  Dilated with balloon to 20mm, Esophagitis,    FLEXIBLE SIGMOIDOSCOPY  2016    HYSTERECTOMY      NEPHRECTOMY RADICAL      THYROIDECTOMY      TUBAL ABDOMINAL LIGATION  1982    UPPER GASTROINTESTINAL ENDOSCOPY         Family History   Problem Relation Age of Onset    Heart disease Mother     " Hypertension Mother     Heart disease Father     Hypertension Father     Colon cancer Neg Hx     Colon polyps Neg Hx     Crohn's disease Neg Hx     Irritable bowel syndrome Neg Hx     Ulcerative colitis Neg Hx        Social History     Socioeconomic History    Marital status: Single   Tobacco Use    Smoking status: Never    Smokeless tobacco: Never   Vaping Use    Vaping status: Never Used   Substance and Sexual Activity    Alcohol use: No    Drug use: No    Sexual activity: Not Currently     Birth control/protection: Post-menopausal           Objective   Physical Exam  Vitals and nursing note reviewed.   Constitutional:       General: She is not in acute distress.     Appearance: Normal appearance.   HENT:      Head: Normocephalic and atraumatic.      Mouth/Throat:      Mouth: Mucous membranes are moist.   Eyes:      Extraocular Movements: Extraocular movements intact.      Conjunctiva/sclera: Conjunctivae normal.   Pulmonary:      Effort: Pulmonary effort is normal.   Musculoskeletal:         General: Normal range of motion.      Cervical back: Normal range of motion and neck supple.   Skin:     General: Skin is warm and dry.      Comments: Patient has a bruise to right shin, 2-1/2 x 1-1/2 cm to mid tibial shaft area, appears 2-3 days old. No calf tenderness, swelling, erythema or warmth. Normal distal sensation, normal distal DP/PT pulses. No purpura, no petechia.    Neurological:      General: No focal deficit present.      Mental Status: She is alert and oriented to person, place, and time.   Psychiatric:         Mood and Affect: Mood normal.         Behavior: Behavior normal.         Thought Content: Thought content normal.         Judgment: Judgment normal.         Procedures           ED Course                                           Medical Decision Making  I have no concern for an underlying DVT, as patient has no posterior calf pain, tenderness, redness, or swelling.  She does have a superficial bruise,  likely 2 to 3 days old.  Likely the patient bumped against something without recollection.  I have no concerns for underlying compartment syndrome or bony injury requiring any imaging.  I discussed and reviewed patient's concerns about a DVT and explained why I did not feel this was a concern.  Shared decision making to discharge home.    Problems Addressed:  Bruise: acute illness or injury        Final diagnoses:   Bruise       ED Disposition  ED Disposition       ED Disposition   Discharge    Condition   Stable    Comment   --               Catherine Kolb, APRN  [address] [address]  [phone]      As needed         Medication List      No changes were made to your prescriptions during this visit.

## 2025-06-24 ENCOUNTER — OFFICE VISIT (OUTPATIENT)
Dept: INTERNAL MEDICINE | Facility: CLINIC | Age: 75
End: 2025-06-24
Payer: MEDICARE

## 2025-06-24 VITALS
WEIGHT: 147.2 LBS | HEIGHT: 61 IN | DIASTOLIC BLOOD PRESSURE: 80 MMHG | SYSTOLIC BLOOD PRESSURE: 130 MMHG | BODY MASS INDEX: 27.79 KG/M2 | OXYGEN SATURATION: 96 % | HEART RATE: 77 BPM

## 2025-06-24 DIAGNOSIS — K21.9 GASTROESOPHAGEAL REFLUX DISEASE WITHOUT ESOPHAGITIS: ICD-10-CM

## 2025-06-24 DIAGNOSIS — R06.2 WHEEZE: Primary | ICD-10-CM

## 2025-06-24 DIAGNOSIS — J45.20 MILD INTERMITTENT ASTHMA WITHOUT COMPLICATION: ICD-10-CM

## 2025-06-24 RX ORDER — METHYLPREDNISOLONE 4 MG/1
TABLET ORAL
Qty: 21 EACH | Refills: 0 | Status: SHIPPED | OUTPATIENT
Start: 2025-06-24

## 2025-06-24 RX ORDER — BENZONATATE 100 MG/1
100 CAPSULE ORAL 3 TIMES DAILY PRN
Qty: 30 CAPSULE | Refills: 0 | Status: SHIPPED | OUTPATIENT
Start: 2025-06-24

## 2025-06-24 NOTE — PROGRESS NOTES
Chief Complaint  Cough (Started on the 18th ), Asthma (Had an asthma attack last night ), and Fatigue (Started on the 18th )     Subjective:      History of Present Illness {CC  Problem List  Visit  Diagnosis   Encounters  Notes  Medications  Labs  Result Review Imaging  Media :23}     Sara Gary is a patient of Catherine Kolb APRN and presents to [facility] for       Cough  Chronicity:  Chronic  Onset:  In the past 7 days  Cough characteristics:  Dry  Associated symptoms: wheezing    Associated symptoms: no myalgias, no shortness of breath and no sore throat    Exacerbated by: weather.  Treatments tried:  A beta-agonist inhaler  Improvement on treatment:  Moderate  PMH includes: asthma and environmental allergies    States yesterday - started coughing and snowballed - self reported asthma attack that lasted several mins but relieved by albuterol.   She had read coffee helped.      Denies reflux symptoms. Continues voquezna.   NO fever or chills.     Lives in senior housing  States has indoor cat  Has allergist and states was not allergic to cats.     Drinks a lot of tea and coffee.   States had coffee last night.     I have reviewed patient's medical history, any new submitted information provided by patient or medical assistant and updated medical record.      Objective:      Physical Exam  Constitutional:       Appearance: Normal appearance. She is not ill-appearing.   HENT:      Mouth/Throat:      Mouth: Mucous membranes are moist.      Pharynx: No posterior oropharyngeal erythema.   Cardiovascular:      Rate and Rhythm: Normal rate and regular rhythm.   Pulmonary:      Effort: Pulmonary effort is normal.      Breath sounds: Wheezing (mild exp) present. No rhonchi.   Abdominal:      Tenderness: There is no abdominal tenderness.   Lymphadenopathy:      Cervical: No cervical adenopathy.        Result Review  Data Reviewed:{ Labs  Result Review  Imaging   "Med Tab  Media :23}                Vital Signs:   /80 (BP Location: Left arm, Patient Position: Sitting, Cuff Size: Adult)   Pulse 77   Ht 154.9 cm (61\")   Wt 66.8 kg (147 lb 3.2 oz)   SpO2 96%   BMI 27.81 kg/m²         Requested Prescriptions     Signed Prescriptions Disp Refills    methylPREDNISolone (MEDROL) 4 MG dose pack 21 each 0     Sig: Take as directed on package instructions.    benzonatate (Tessalon Perles) 100 MG capsule 30 capsule 0     Sig: Take 1 capsule by mouth 3 (Three) Times a Day As Needed for Cough.       Routine medications provided by this office will also be refilled via pharmacy request.       Current Outpatient Medications:     albuterol sulfate HFA (Ventolin HFA) 108 (90 Base) MCG/ACT inhaler, Inhale 2 puffs Every 4 (Four) Hours As Needed for Wheezing., Disp: 18 g, Rfl: 3    busPIRone (BUSPAR) 10 MG tablet, Take 1 tablet by mouth once daily, Disp: 90 tablet, Rfl: 0    cetirizine (zyrTEC) 10 MG tablet, Take 1 tablet by mouth Daily., Disp: 90 tablet, Rfl: 2    fluticasone (FLONASE) 50 MCG/ACT nasal spray, Administer 2 sprays into the nostril(s) as directed by provider Daily., Disp: 16 g, Rfl: 3    liothyronine (CYTOMEL) 5 MCG tablet, Take 1 tablet by mouth Daily., Disp: 90 tablet, Rfl: 3    rimegepant sulfate ODT (Nurtec) 75 MG disintegrating tablet, Place 1 tablet under the tongue Daily As Needed (migraine headache) for up to 10 doses., Disp: 10 tablet, Rfl: 0    sertraline (ZOLOFT) 100 MG tablet, Take 1 tablet by mouth once daily, Disp: 90 tablet, Rfl: 1    SUMAtriptan (IMITREX) 100 MG tablet, TAKE 1 TABLET BY MOUTH AT ONSET OF HEADACHE; MAY REPEAT DOSE ONE TIME IN 2 HOURS IF HEADACHE NOT RELIEVED, Disp: 9 tablet, Rfl: 2    Synthroid 75 MCG tablet, Take 1 tablet by mouth once daily, Disp: 90 tablet, Rfl: 1    valACYclovir (Valtrex) 500 MG tablet, Take 1 tablet by mouth 2 (Two) Times a Day. Take 1 tablet po BID x 5 days when experiencing fever blister symptoms., Disp: 60 tablet, " Rfl: 2    Vonoprazan Fumarate (Voquezna) 10 MG tablet, Take 1 tablet by mouth Daily., Disp: 90 tablet, Rfl: 1    benzonatate (Tessalon Perles) 100 MG capsule, Take 1 capsule by mouth 3 (Three) Times a Day As Needed for Cough., Disp: 30 capsule, Rfl: 0    Evolocumab (REPATHA) solution prefilled syringe injection, Inject 1 mL under the skin into the appropriate area as directed Every 14 (Fourteen) Days. (Patient not taking: Reported on 6/24/2025), Disp: 2 mL, Rfl: 11    losartan (Cozaar) 25 MG tablet, Take 1 tablet by mouth Daily. (Patient not taking: Reported on 6/24/2025), Disp: 90 tablet, Rfl: 3    methylPREDNISolone (MEDROL) 4 MG dose pack, Take as directed on package instructions., Disp: 21 each, Rfl: 0    Repatha SureClick solution auto-injector SureClick injection, INJECT 1ML INTO THE SKIN EVERY 14 DAYS (Patient not taking: Reported on 6/24/2025), Disp: , Rfl:      Assessment and Plan:      Assessment and Plan {CC Problem List  Visit Diagnosis  ROS  Review (Popup)  Health Maintenance  Quality  BestPractice  Medications  SmartSets  SnapShot Encounters  Media :23}     Problem List Items Addressed This Visit          Gastrointestinal Abdominal     Gastroesophageal reflux disease    Current Assessment & Plan   Follow antireflux/GERD precautions:     Avoiding eating within 3 to 4 hours of bedtime.    Avoid foods that can trigger symptoms which may include:  citrus fruits  spicy foods  tomatoes  red sauces  chocolate  coffee/tea  caffeinated or carbonated beverages  alcohol               Pulmonary and Pneumonias    Mild intermittent asthma without complication    Current Assessment & Plan   ? Asthma attack?     Not taking routine treatment and she can't recall last flare up.   Continue albuterol for now - could consider singulair of steroid inhaler if reoccurs.     Limit caffeine, advised uncontrolled GERD could cause exacerbation.                 Other Visit Diagnoses         Wheeze    -  Primary     Relevant Medications    methylPREDNISolone (MEDROL) 4 MG dose pack    benzonatate (Tessalon Perles) 100 MG capsule            Advised her to limit caffeine/ tea as could be worsening.   Will treat with medrol dose pack.   Tessalon for cough: she will continue over-the-counter antihistamine.     Follow Up {Instructions Charge Capture  Follow-up Communications :23}     Return if symptoms worsen or fail to improve.    Follow up with PCP as scheduled.     Patient was given instructions and counseling regarding her condition or for health maintenance advice. Please see specific information pulled into the AVS if appropriate.    Dragon disclaimer:   Much of this encounter note is an electronic transcription/translation of spoken language to printed text. The electronic translation of spoken language may permit erroneous, or at times, nonsensical words or phrases to be inadvertently transcribed; Although I have reviewed the note for such errors, some may still exist.     Additional Patient Counseling:       There are no Patient Instructions on file for this visit.

## 2025-06-26 NOTE — ASSESSMENT & PLAN NOTE
? Asthma attack?     Not taking routine treatment and she can't recall last flare up.   Continue albuterol for now - could consider singulair of steroid inhaler if reoccurs.     Limit caffeine, advised uncontrolled GERD could cause exacerbation.

## 2025-06-27 ENCOUNTER — TELEPHONE (OUTPATIENT)
Dept: INTERNAL MEDICINE | Facility: CLINIC | Age: 75
End: 2025-06-27
Payer: MEDICARE

## 2025-06-27 ENCOUNTER — APPOINTMENT (OUTPATIENT)
Dept: GENERAL RADIOLOGY | Facility: HOSPITAL | Age: 75
End: 2025-06-27
Payer: MEDICARE

## 2025-06-27 ENCOUNTER — HOSPITAL ENCOUNTER (EMERGENCY)
Facility: HOSPITAL | Age: 75
Discharge: HOME | End: 2025-06-27
Attending: EMERGENCY MEDICINE
Payer: MEDICARE

## 2025-06-27 VITALS
RESPIRATION RATE: 18 BRPM | TEMPERATURE: 98.1 F | DIASTOLIC BLOOD PRESSURE: 85 MMHG | WEIGHT: 147 LBS | SYSTOLIC BLOOD PRESSURE: 169 MMHG | OXYGEN SATURATION: 99 % | BODY MASS INDEX: 27.75 KG/M2 | HEART RATE: 63 BPM | HEIGHT: 61 IN

## 2025-06-27 VITALS
SYSTOLIC BLOOD PRESSURE: 177 MMHG | DIASTOLIC BLOOD PRESSURE: 71 MMHG | HEART RATE: 64 BPM | OXYGEN SATURATION: 99 % | TEMPERATURE: 98.5 F | RESPIRATION RATE: 18 BRPM

## 2025-06-27 DIAGNOSIS — R05.2 SUBACUTE COUGH: Primary | ICD-10-CM

## 2025-06-27 DIAGNOSIS — R42 EPISODE OF DIZZINESS: ICD-10-CM

## 2025-06-27 DIAGNOSIS — I10 HYPERTENSION NOT AT GOAL: ICD-10-CM

## 2025-06-27 DIAGNOSIS — R53.1 GENERALIZED WEAKNESS: Primary | ICD-10-CM

## 2025-06-27 LAB
ALBUMIN SERPL-MCNC: 3.8 G/DL (ref 3.5–5.2)
ALBUMIN/GLOB SERPL: 1.2 G/DL
ALP SERPL-CCNC: 159 U/L (ref 39–117)
ALT SERPL W P-5'-P-CCNC: 14 U/L (ref 1–33)
ANION GAP SERPL CALCULATED.3IONS-SCNC: 9.9 MMOL/L (ref 5–15)
AST SERPL-CCNC: 20 U/L (ref 1–32)
BASOPHILS # BLD AUTO: 0.02 10*3/MM3 (ref 0–0.2)
BASOPHILS NFR BLD AUTO: 0.3 % (ref 0–1.5)
BILIRUB SERPL-MCNC: 0.4 MG/DL (ref 0–1.2)
BUN SERPL-MCNC: 7 MG/DL (ref 8–23)
BUN/CREAT SERPL: 10.4 (ref 7–25)
CALCIUM SPEC-SCNC: 9.3 MG/DL (ref 8.6–10.5)
CHLORIDE SERPL-SCNC: 104 MMOL/L (ref 98–107)
CO2 SERPL-SCNC: 26.1 MMOL/L (ref 22–29)
CREAT SERPL-MCNC: 0.67 MG/DL (ref 0.57–1)
DEPRECATED RDW RBC AUTO: 40.3 FL (ref 37–54)
EGFRCR SERPLBLD CKD-EPI 2021: 90.7 ML/MIN/1.73
EOSINOPHIL # BLD AUTO: 0.11 10*3/MM3 (ref 0–0.4)
EOSINOPHIL NFR BLD AUTO: 1.7 % (ref 0.3–6.2)
ERYTHROCYTE [DISTWIDTH] IN BLOOD BY AUTOMATED COUNT: 12.5 % (ref 12.3–15.4)
GEN 5 1HR TROPONIN T REFLEX: 11 NG/L
GLOBULIN UR ELPH-MCNC: 3.2 GM/DL
GLUCOSE SERPL-MCNC: 136 MG/DL (ref 65–99)
HAV IGM SERPL QL IA: NORMAL
HBV CORE IGM SERPL QL IA: NORMAL
HBV SURFACE AG SERPL QL IA: NORMAL
HCT VFR BLD AUTO: 35.1 % (ref 34–46.6)
HCV AB SER QL: NORMAL
HGB BLD-MCNC: 11.9 G/DL (ref 12–15.9)
HIV 1+2 AB+HIV1 P24 AG SERPL QL IA: NORMAL
IMM GRANULOCYTES # BLD AUTO: 0.03 10*3/MM3 (ref 0–0.05)
IMM GRANULOCYTES NFR BLD AUTO: 0.5 % (ref 0–0.5)
LYMPHOCYTES # BLD AUTO: 1.05 10*3/MM3 (ref 0.7–3.1)
LYMPHOCYTES NFR BLD AUTO: 16.5 % (ref 19.6–45.3)
MCH RBC QN AUTO: 29.9 PG (ref 26.6–33)
MCHC RBC AUTO-ENTMCNC: 33.9 G/DL (ref 31.5–35.7)
MCV RBC AUTO: 88.2 FL (ref 79–97)
MONOCYTES # BLD AUTO: 0.5 10*3/MM3 (ref 0.1–0.9)
MONOCYTES NFR BLD AUTO: 7.9 % (ref 5–12)
NEUTROPHILS NFR BLD AUTO: 4.65 10*3/MM3 (ref 1.7–7)
NEUTROPHILS NFR BLD AUTO: 73.1 % (ref 42.7–76)
NRBC BLD AUTO-RTO: 0 /100 WBC (ref 0–0.2)
NT-PROBNP SERPL-MCNC: 1756 PG/ML (ref 0–1800)
PLATELET # BLD AUTO: 261 10*3/MM3 (ref 140–450)
PMV BLD AUTO: 9.6 FL (ref 6–12)
POTASSIUM SERPL-SCNC: 3.9 MMOL/L (ref 3.5–5.2)
PROT SERPL-MCNC: 7 G/DL (ref 6–8.5)
RBC # BLD AUTO: 3.98 10*6/MM3 (ref 3.77–5.28)
SODIUM SERPL-SCNC: 140 MMOL/L (ref 136–145)
TROPONIN T NUMERIC DELTA: 0 NG/L
TROPONIN T SERPL HS-MCNC: 11 NG/L
WBC NRBC COR # BLD AUTO: 6.36 10*3/MM3 (ref 3.4–10.8)

## 2025-06-27 PROCEDURE — 99283 EMERGENCY DEPT VISIT LOW MDM: CPT

## 2025-06-27 PROCEDURE — 85025 COMPLETE CBC W/AUTO DIFF WBC: CPT | Performed by: EMERGENCY MEDICINE

## 2025-06-27 PROCEDURE — 80074 ACUTE HEPATITIS PANEL: CPT | Performed by: EMERGENCY MEDICINE

## 2025-06-27 PROCEDURE — 84484 ASSAY OF TROPONIN QUANT: CPT | Performed by: EMERGENCY MEDICINE

## 2025-06-27 PROCEDURE — 80053 COMPREHEN METABOLIC PANEL: CPT | Performed by: EMERGENCY MEDICINE

## 2025-06-27 PROCEDURE — 93005 ELECTROCARDIOGRAM TRACING: CPT | Performed by: EMERGENCY MEDICINE

## 2025-06-27 PROCEDURE — 71046 X-RAY EXAM CHEST 2 VIEWS: CPT

## 2025-06-27 PROCEDURE — 83880 ASSAY OF NATRIURETIC PEPTIDE: CPT | Performed by: EMERGENCY MEDICINE

## 2025-06-27 PROCEDURE — 36415 COLL VENOUS BLD VENIPUNCTURE: CPT

## 2025-06-27 PROCEDURE — G0432 EIA HIV-1/HIV-2 SCREEN: HCPCS | Performed by: EMERGENCY MEDICINE

## 2025-06-27 RX ORDER — SODIUM CHLORIDE 0.9 % (FLUSH) 0.9 %
10 SYRINGE (ML) INJECTION AS NEEDED
Status: DISCONTINUED | OUTPATIENT
Start: 2025-06-27 | End: 2025-06-28 | Stop reason: HOSPADM

## 2025-06-27 RX ORDER — SUCRALFATE ORAL 1 G/10ML
1 SUSPENSION ORAL
Qty: 473 ML | Refills: 0 | Status: SHIPPED | OUTPATIENT
Start: 2025-06-27 | End: 2025-07-02

## 2025-06-27 NOTE — ED NOTES
Pt arrived to ER via EMS from home, c/o of weakness and cough x 1 week, pt was seen here yesterday for same issues.

## 2025-06-27 NOTE — TELEPHONE ENCOUNTER
Called patient back. Patient has been dealing with her Allergies and Asthma. Patient does not know what to do, is dealing with her fiber myalgia, and couging all the time. Patient states the perles do not help, and is still taking the Medrol dose pack.     From the hospital, they said the X-ray was clear but her heart is enlarged. Patient has been trying to reach her cardiologist but has not been able to. Patient was also prescribed carafate from the hospital to help with her esophagitis     Is their anything else the patient can do? Patient believes she was told she has some inflammation with her ears. Will the medrol dose pack help with this?    Patient was taking Mucinex but stopped after seeing Jt due to the medicines she as prescribed and did not know if it will interact with them. Is also taking Tylenol, using flonase nasal spray and cough drops

## 2025-06-27 NOTE — TELEPHONE ENCOUNTER
I would take the medrol pack as prescribed.   She can try coricidin cough and cold medication OTC for cough relief  Continue on cough drops, zyrtec, and flonase  I would take the carafate that ED recommended  She should follow up with both her cardiologist and her GI specialist   It sounds like she may have picked up some sort of viral resp illness that started on the 18th and she is experiencing residual cough.   I would continue to use the albuterol as needed for wheezing/SOA if needed  If she did have cold symptoms/URI symptoms back on the 18th-- residual cough can last up to 4 weeks.

## 2025-06-27 NOTE — TELEPHONE ENCOUNTER
Called and advised patient. Informed patient I will send her a Anipipo message with this information

## 2025-06-27 NOTE — TELEPHONE ENCOUNTER
Caller: Sara Gary    Relationship: Self    Best call back number: [phone]      What was the call regarding: PATIENT CALLING WAS SEEN IN OFFICE 6/24/25 BY MAR ALLEN    PATIENT ISN'T FEELING ANY BETTER WENT TO EMERGENCY ROOM LAST NIGHT DUE TO SINUS AND WANTED TO SPEAK TO NURSE IN OFFICE     PLEASE GIVE CALLBACK

## 2025-06-27 NOTE — CONSULTS
Chp visited Pt. Chp and Pt discussed her time in the hospital and how she is feeling. Chp and Pt discussed her spirituality and how it has provided comfort for her. Chp offered supportive presence and active listening. Chp remains available.

## 2025-06-27 NOTE — ED PROVIDER NOTES
"EMERGENCY DEPARTMENT ENCOUNTER    History  Chief Complaint   Patient presents with    Cough       History provided by: Patient and Relative     HPI:  Context: Sara Gary is a 74 y.o. female with a medical history of asthma, GERD, hyperlipidemia, hypertension who presents to the ED c/o acute cough.  Symptoms have been present for about a week.  She initially noted more of a productive cough, but this has subsequently \"dried out.\"  She was seen by primary care and diagnosed with a possible asthma exacerbation.  She has been started on  a Medrol Dosepak.  She is also been taking Tessalon Perles.  She continues on cetirizine and Flonase daily.  She also has a history of esophagitis and is on voquezna.  She had some difficulty with low blood pressure and self discontinued losartan.  No ACE inhibitor's.      Past Medical History:  Active Ambulatory Problems     Diagnosis Date Noted    Diverticulosis of large intestine without hemorrhage 06/29/2017    Hydronephrosis, left 06/29/2017    Residual ASD (atrial septal defect) following repair 05/11/2018    Elevated liver enzymes 07/25/2018    Primary hypertension 07/25/2018    MVP (mitral valve prolapse) 02/28/2023    Postoperative hypothyroidism 03/06/2023    Anxiety and depression 03/06/2023    Gastroesophageal reflux disease without esophagitis 03/06/2023    Mixed hyperlipidemia 03/06/2023    Prediabetes 03/06/2023    Dry skin dermatitis 04/21/2023    Seasonal allergic rhinitis due to pollen 04/21/2023    Vertigo 04/21/2023    Chronic illness 05/03/2023    Healthcare maintenance 08/24/2023    Migraine 08/30/2023    Acute recurrent cystitis 11/12/2019    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    AK (actinic keratosis) 08/24/2022    Allergic rhinitis 05/06/2019    Acute pain of right shoulder 09/06/2023    Atypical chest pain 04/12/2024    Chronic right-sided low back pain with right-sided sciatica 05/30/2024    Chronic right shoulder pain 05/30/2024    Gastroesophageal reflux " disease 08/27/2024    Annual physical exam 09/25/2024    Uses emotional support animal 03/06/2025    Mild intermittent asthma without complication 03/27/2025    Acute diverticulitis 05/14/2025    Sigmoid diverticulosis 05/14/2025    Lower abdominal pain 05/14/2025     Resolved Ambulatory Problems     Diagnosis Date Noted    Diverticulitis large intestine 06/29/2017    Kidney stone on left side 06/29/2017    Diverticulitis 06/29/2017    Abnormal electrocardiogram 02/28/2023    Multiple joint pain 03/06/2023    Acute URI 10/24/2023    COVID-19 03/08/2024    Chest pain 04/12/2024     Past Medical History:   Diagnosis Date    Colon polyp 2013    Depression     Diverticulitis of colon 7/2017    Environmental allergies     GERD (gastroesophageal reflux disease) 2017    History of colon polyps     Hypertension     Kidney stone        Past Surgical History:  Past Surgical History:   Procedure Laterality Date    APPENDECTOMY      ASD REPAIR, SECUNDUM      CATARACT EXTRACTION      COLONOSCOPY      CYSTOSCOPY BLADDER STONE LITHOTRIPSY      CYSTOSCOPY W/ URETERAL STENT PLACEMENT Left 06/29/2017    Procedure: CYSTOSCOPY LEFT STENT INSERTION & STONE REMOVAL;  Surgeon: Raheem Franklin MD;  Location: [facility] MAIN OR;  Service:     ENDOSCOPY N/A 11/19/2024    Procedure: ESOPHAGOGASTRODUODENOSCOPY WITH DILATATION;  Surgeon: Luis Enrique Murrell MD;  Location: [facility] MAIN OR;  Service: Gastroenterology;  Laterality: N/A;  Dilated with balloon to 20mm, Esophagitis,    FLEXIBLE SIGMOIDOSCOPY  2016    HYSTERECTOMY      NEPHRECTOMY RADICAL      THYROIDECTOMY      TUBAL ABDOMINAL LIGATION  1982    UPPER GASTROINTESTINAL ENDOSCOPY           Family History:  Family History   Problem Relation Age of Onset    Heart disease Mother     Hypertension Mother     Heart disease Father     Hypertension Father     Colon cancer Neg Hx     Colon polyps Neg Hx     Crohn's disease Neg Hx     Irritable bowel syndrome Neg Hx     Ulcerative colitis Neg Hx           Social History:  Social History     Socioeconomic History    Marital status: Single   Tobacco Use    Smoking status: Never    Smokeless tobacco: Never   Vaping Use    Vaping status: Never Used   Substance and Sexual Activity    Alcohol use: No    Drug use: No    Sexual activity: Not Currently     Birth control/protection: Post-menopausal         Allergies:  Sulfamethoxazole-trimethoprim, Statins, Sulfa antibiotics, and Zetia [ezetimibe]        Physical Exam  ED Triage Vitals [06/27/25 0328]   Temp Heart Rate Resp BP SpO2   98.1 °F (36.7 °C) 80 18 172/92 94 %      Temp src Heart Rate Source Patient Position BP Location FiO2 (%)   -- -- -- -- --     Physical Exam  Constitutional:       Appearance: Normal appearance.   HENT:      Head: Normocephalic and atraumatic.   Eyes:      Pupils: Pupils are equal, round, and reactive to light.   Cardiovascular:      Rate and Rhythm: Normal rate and regular rhythm.      Heart sounds: Murmur heard.   Pulmonary:      Effort: Pulmonary effort is normal. No respiratory distress.      Breath sounds: Normal breath sounds.   Skin:     General: Skin is warm.   Neurological:      Mental Status: She is alert and oriented to person, place, and time.         Medications Given in ER:   Medications - No data to display      Orders Placed:  Orders Placed This Encounter   Procedures    XR Chest 2 View         Outpatient Medication Management:   No current Epic-ordered facility-administered medications on file.     Current Outpatient Medications Ordered in Epic   Medication Sig Dispense Refill    albuterol sulfate HFA (Ventolin HFA) 108 (90 Base) MCG/ACT inhaler Inhale 2 puffs Every 4 (Four) Hours As Needed for Wheezing. 18 g 3    benzonatate (Tessalon Perles) 100 MG capsule Take 1 capsule by mouth 3 (Three) Times a Day As Needed for Cough. 30 capsule 0    busPIRone (BUSPAR) 10 MG tablet Take 1 tablet by mouth once daily 90 tablet 0    cetirizine (zyrTEC) 10 MG tablet Take 1 tablet by  mouth Daily. 90 tablet 2    Evolocumab (REPATHA) solution prefilled syringe injection Inject 1 mL under the skin into the appropriate area as directed Every 14 (Fourteen) Days. (Patient not taking: Reported on 6/24/2025) 2 mL 11    fluticasone (FLONASE) 50 MCG/ACT nasal spray Administer 2 sprays into the nostril(s) as directed by provider Daily. 16 g 3    liothyronine (CYTOMEL) 5 MCG tablet Take 1 tablet by mouth Daily. 90 tablet 3    losartan (Cozaar) 25 MG tablet Take 1 tablet by mouth Daily. (Patient not taking: Reported on 6/24/2025) 90 tablet 3    methylPREDNISolone (MEDROL) 4 MG dose pack Take as directed on package instructions. 21 each 0    Repatha SureClick solution auto-injector SureClick injection INJECT 1ML INTO THE SKIN EVERY 14 DAYS (Patient not taking: Reported on 6/24/2025)      rimegepant sulfate ODT (Nurtec) 75 MG disintegrating tablet Place 1 tablet under the tongue Daily As Needed (migraine headache) for up to 10 doses. 10 tablet 0    sertraline (ZOLOFT) 100 MG tablet Take 1 tablet by mouth once daily 90 tablet 1    sucralfate (CARAFATE) 1 GM/10ML suspension Take 10 mL by mouth 4 (Four) Times a Day With Meals & at Bedtime. 473 mL 0    SUMAtriptan (IMITREX) 100 MG tablet TAKE 1 TABLET BY MOUTH AT ONSET OF HEADACHE; MAY REPEAT DOSE ONE TIME IN 2 HOURS IF HEADACHE NOT RELIEVED 9 tablet 2    Synthroid 75 MCG tablet Take 1 tablet by mouth once daily 90 tablet 1    valACYclovir (Valtrex) 500 MG tablet Take 1 tablet by mouth 2 (Two) Times a Day. Take 1 tablet po BID x 5 days when experiencing fever blister symptoms. 60 tablet 2    Vonoprazan Fumarate (Voquezna) 10 MG tablet Take 1 tablet by mouth Daily. 90 tablet 1           Medical Decision Making:  All labs have been independently interpreted by me.  All radiology studies have been reviewed by me. All EKGs have been independently viewed and interpreted by me.  Discussion below represents my analysis of pertinent findings related to patient's  condition, differential diagnosis, treatment plan and final disposition.    Differential Diagnosis includes but not limited to: GERD, postnasal drip, allergic rhinitis, pneumonia, lung mass    Independent Historian Required Due To: Contributes additional details of history    Review of prior external notes (non-ED) -and- Review of prior external test results outside of this encounter: I reviewed the internal medicine office visit note from 6/24/2025.  Patient was seen for GERD, possible asthma attack.      Radiology:  XR Chest 2 View  Result Date: 6/27/2025  PA AND LATERAL CHEST RADIOGRAPH  HISTORY: Cough  COMPARISON: April 12, 2024  FINDINGS: There is cardiomegaly. There is no vascular congestion. Patient status post median sternotomy. No pneumothorax, pleural effusion, or acute infiltrate is seen.      No acute findings.  This report was finalized on 6/27/2025 4:40 AM by Dr. Imani Greer M.D on Workstation: [id number]      Radiology Comments:  I ordered the above imaging and reviewed the results.    My independent interpretation of the cxr: No acute process        Rationale:  This is an overall well-appearing 74-year-old patient who is presenting with concerns for a cough.  Breath symptoms have been present for about a week.  On my evaluation, she overall looks well.  She presents afebrile with unremarkable vital signs.  Her cardiopulmonary exam is at baseline and she has no abnormal breath sounds.    Chest x-ray did not demonstrate any acute cardiopulmonary process as emergent cause of symptoms.  Lungs are clear bilaterally.    I discussed with the patient common triggers of cough including allergic rhinitis/postnasal drip, untreated asthma, GERD.  Given that she has not had improvement with steroids and bronchodilators, I feel like this is more of a combination of postnasal drip and acid reflux.  We are going to add Carafate to see if this helps her symptoms.  She is already on Voquezna.     If this fails  to improve her symptoms, may require further workup such as additional chest imaging, PFTs, etc.   Clinical Scores:                                   Progress Notes:  5:27 AM EDT: I spoke with the patient about her ED work up, diagnosis and the plan for discharge. I discussed the importance of following up with her PCP. I instructed her to return to the Emergency Room for new or worsening symptoms. All of the pt's questions were answered. The patient is stable at time of discharge.         Complexity of Care:  Admission was considered but after careful review of the patient's presentation, physical examination, diagnostic results, and response to treatment the patient may be safely discharged with outpatient follow-up.    Diagnosis:  Final diagnoses:   Subacute cough       Follow Up:  Catherine Kolb APRN  [address]  [address]  [facility] [address]  [phone]    Call in 2 days  For reevaluation    [facility] EMERGENCY DEPARTMENT  [address] [facility] [address] [phone]    As needed, If symptoms worsen      Rx:  Current Discharge Medication List        START taking these medications    Details   sucralfate (CARAFATE) 1 GM/10ML suspension Take 10 mL by mouth 4 (Four) Times a Day With Meals & at Bedtime.  Qty: 473 mL, Refills: 0                 Parts of this note may be an electronic transcription/translation of spoken language to printed text using the Dragon dictation system        Provider Note Signed by:     Hollie Kennedy MD  06/27/25 0541       Hollie Kennedy MD  06/27/25 0541

## 2025-06-27 NOTE — ED PROVIDER NOTES
EMERGENCY DEPARTMENT ENCOUNTER  Room Number:  10/10  PCP: Catherine Kolb APRN  Independent Historians: Patient      HPI:  Chief Complaint: had concerns including Weakness - Generalized.     A complete HPI/ROS/PMH/PSH/SH/FH are unobtainable due to: Nothing      Context: Sara Gary is a 74 y.o. female with a medical history of mitral valve prolapse, hypertension, hyperlipidemia, who presents to the ED c/o acute feeling dizzy.  She states that she was here earlier this ER this morning with complaints of shortness of breath.  She states that she began having an asthma attack on Tuesday and was prescribed a Medrol Dosepak.  She is currently on day 3 of the steroids.  She states that this evening she felt a little dizzy and lightheaded when she stood up.  She did not lose consciousness.  She goes on to describe multiple medical problems that she has had over the last couple of years.  She also states that she is urinating very dilute appearing urine but denies urinary frequency or dysuria.  She denies fever or chills.  She denies shortness of breath.  She states that she has had associated indigestion and belching.  No chest pain.      Review of prior external notes (non-ED) -and- Review of prior external test results outside of this encounter: See ED course below        PAST MEDICAL HISTORY  Active Ambulatory Problems     Diagnosis Date Noted    Diverticulosis of large intestine without hemorrhage 06/29/2017    Hydronephrosis, left 06/29/2017    Residual ASD (atrial septal defect) following repair 05/11/2018    Elevated liver enzymes 07/25/2018    Primary hypertension 07/25/2018    MVP (mitral valve prolapse) 02/28/2023    Postoperative hypothyroidism 03/06/2023    Anxiety and depression 03/06/2023    Gastroesophageal reflux disease without esophagitis 03/06/2023    Mixed hyperlipidemia 03/06/2023    Prediabetes 03/06/2023    Dry skin dermatitis 04/21/2023    Seasonal allergic rhinitis due to pollen 04/21/2023     Vertigo 04/21/2023    Chronic illness 05/03/2023    Healthcare maintenance 08/24/2023    Migraine 08/30/2023    Acute recurrent cystitis 11/12/2019    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    AK (actinic keratosis) 08/24/2022    Allergic rhinitis 05/06/2019    Acute pain of right shoulder 09/06/2023    Atypical chest pain 04/12/2024    Chronic right-sided low back pain with right-sided sciatica 05/30/2024    Chronic right shoulder pain 05/30/2024    Gastroesophageal reflux disease 08/27/2024    Annual physical exam 09/25/2024    Uses emotional support animal 03/06/2025    Mild intermittent asthma without complication 03/27/2025    Acute diverticulitis 05/14/2025    Sigmoid diverticulosis 05/14/2025    Lower abdominal pain 05/14/2025     Resolved Ambulatory Problems     Diagnosis Date Noted    Diverticulitis large intestine 06/29/2017    Kidney stone on left side 06/29/2017    Diverticulitis 06/29/2017    Abnormal electrocardiogram 02/28/2023    Multiple joint pain 03/06/2023    Acute URI 10/24/2023    COVID-19 03/08/2024    Chest pain 04/12/2024     Past Medical History:   Diagnosis Date    Colon polyp 2013    Depression     Diverticulitis of colon 7/2017    Environmental allergies     GERD (gastroesophageal reflux disease) 2017    History of colon polyps     Hypertension     Kidney stone          PAST SURGICAL HISTORY  Past Surgical History:   Procedure Laterality Date    APPENDECTOMY      ASD REPAIR, SECUNDUM      CATARACT EXTRACTION      COLONOSCOPY      CYSTOSCOPY BLADDER STONE LITHOTRIPSY      CYSTOSCOPY W/ URETERAL STENT PLACEMENT Left 06/29/2017    Procedure: CYSTOSCOPY LEFT STENT INSERTION & STONE REMOVAL;  Surgeon: Raheem Franklin MD;  Location: [facility] MAIN OR;  Service:     ENDOSCOPY N/A 11/19/2024    Procedure: ESOPHAGOGASTRODUODENOSCOPY WITH DILATATION;  Surgeon: Luis Enrique Murrell MD;  Location: [facility] OR;  Service: Gastroenterology;  Laterality: N/A;  Dilated with balloon to 20mm, Esophagitis,     FLEXIBLE SIGMOIDOSCOPY  2016    HYSTERECTOMY      NEPHRECTOMY RADICAL      THYROIDECTOMY      TUBAL ABDOMINAL LIGATION  1982    UPPER GASTROINTESTINAL ENDOSCOPY           FAMILY HISTORY  Family History   Problem Relation Age of Onset    Heart disease Mother     Hypertension Mother     Heart disease Father     Hypertension Father     Colon cancer Neg Hx     Colon polyps Neg Hx     Crohn's disease Neg Hx     Irritable bowel syndrome Neg Hx     Ulcerative colitis Neg Hx          SOCIAL HISTORY  Social History     Socioeconomic History    Marital status: Single   Tobacco Use    Smoking status: Never    Smokeless tobacco: Never   Vaping Use    Vaping status: Never Used   Substance and Sexual Activity    Alcohol use: No    Drug use: No    Sexual activity: Not Currently     Birth control/protection: Post-menopausal         ALLERGIES  Sulfamethoxazole-trimethoprim, Statins, Sulfa antibiotics, and Zetia [ezetimibe]      REVIEW OF SYSTEMS  Review of all 14 systems is negative other than stated in the HPI above.      PHYSICAL EXAM    I have reviewed the triage vital signs and nursing notes.    ED Triage Vitals [06/27/25 1709]   Temp Heart Rate Resp BP SpO2   98.5 °F (36.9 °C) 70 18 (!) 183/87 97 %      Temp src Heart Rate Source Patient Position BP Location FiO2 (%)   Oral -- -- Right arm --         GENERAL: awake and alert, patient appears anxious with pressured speech  HENT: Normocephalic, atraumatic  EYES: no scleral icterus, EOMI  CV: regular rhythm, regular rate, murmur present, no peripheral edema  RESPIRATORY: normal effort, clear breath sounds bilaterally  ABDOMEN: soft, nondistended, nontender throughout  MUSCULOSKELETAL: no deformity  NEURO: alert, moves all extremities, follows commands, cranial nerves II through XII grossly intact with speech unclear  PSYCH: calm, cooperative  SKIN: Warm, dry          LAB RESULTS  Recent Results (from the past 24 hours)   ECG 12 Lead Other; dizziness    Collection Time: 06/27/25   6:58 PM   Result Value Ref Range    QT Interval 430 ms    QTC Interval 453 ms   Comprehensive Metabolic Panel    Collection Time: 06/27/25  7:42 PM    Specimen: Blood   Result Value Ref Range    Glucose 136 (H) 65 - 99 mg/dL    BUN 7.0 (L) 8.0 - 23.0 mg/dL    Creatinine 0.67 0.57 - 1.00 mg/dL    Sodium 140 136 - 145 mmol/L    Potassium 3.9 3.5 - 5.2 mmol/L    Chloride 104 98 - 107 mmol/L    CO2 26.1 22.0 - 29.0 mmol/L    Calcium 9.3 8.6 - 10.5 mg/dL    Total Protein 7.0 6.0 - 8.5 g/dL    Albumin 3.8 3.5 - 5.2 g/dL    ALT (SGPT) 14 1 - 33 U/L    AST (SGOT) 20 1 - 32 U/L    Alkaline Phosphatase 159 (H) 39 - 117 U/L    Total Bilirubin 0.4 0.0 - 1.2 mg/dL    Globulin 3.2 gm/dL    A/G Ratio 1.2 g/dL    BUN/Creatinine Ratio 10.4 7.0 - 25.0    Anion Gap 9.9 5.0 - 15.0 mmol/L    eGFR 90.7 >60.0 mL/min/1.73   BNP    Collection Time: 06/27/25  7:42 PM    Specimen: Blood   Result Value Ref Range    proBNP 1,756.0 0.0 - 1,800.0 pg/mL   High Sensitivity Troponin T    Collection Time: 06/27/25  7:42 PM    Specimen: Blood   Result Value Ref Range    HS Troponin T 11 <14 ng/L   CBC Auto Differential    Collection Time: 06/27/25  7:42 PM    Specimen: Blood   Result Value Ref Range    WBC 6.36 3.40 - 10.80 10*3/mm3    RBC 3.98 3.77 - 5.28 10*6/mm3    Hemoglobin 11.9 (L) 12.0 - 15.9 g/dL    Hematocrit 35.1 34.0 - 46.6 %    MCV 88.2 79.0 - 97.0 fL    MCH 29.9 26.6 - 33.0 pg    MCHC 33.9 31.5 - 35.7 g/dL    RDW 12.5 12.3 - 15.4 %    RDW-SD 40.3 37.0 - 54.0 fl    MPV 9.6 6.0 - 12.0 fL    Platelets 261 140 - 450 10*3/mm3    Neutrophil % 73.1 42.7 - 76.0 %    Lymphocyte % 16.5 (L) 19.6 - 45.3 %    Monocyte % 7.9 5.0 - 12.0 %    Eosinophil % 1.7 0.3 - 6.2 %    Basophil % 0.3 0.0 - 1.5 %    Immature Grans % 0.5 0.0 - 0.5 %    Neutrophils, Absolute 4.65 1.70 - 7.00 10*3/mm3    Lymphocytes, Absolute 1.05 0.70 - 3.10 10*3/mm3    Monocytes, Absolute 0.50 0.10 - 0.90 10*3/mm3    Eosinophils, Absolute 0.11 0.00 - 0.40 10*3/mm3    Basophils,  Absolute 0.02 0.00 - 0.20 10*3/mm3    Immature Grans, Absolute 0.03 0.00 - 0.05 10*3/mm3    nRBC 0.0 0.0 - 0.2 /100 WBC   High Sensitivity Troponin T 1Hr    Collection Time: 06/27/25  9:02 PM    Specimen: Arm, Right; Blood   Result Value Ref Range    HS Troponin T 11 <14 ng/L    Troponin T Numeric Delta 0 Abnormal if >/=3 ng/L       The above labs were ordered by me and independently reviewed by me.     RADIOLOGY  XR Chest 2 View  Result Date: 6/27/2025  PA AND LATERAL CHEST RADIOGRAPH  HISTORY: Cough  COMPARISON: April 12, 2024  FINDINGS: There is cardiomegaly. There is no vascular congestion. Patient status post median sternotomy. No pneumothorax, pleural effusion, or acute infiltrate is seen.      No acute findings.  This report was finalized on 6/27/2025 4:40 AM by Dr. Imani Greer M.D on Workstation: [employer]        The above radiology studies were ordered by me.  See ED course for independent interpretations.     MEDICATIONS GIVEN IN ER  Medications   sodium chloride 0.9 % flush 10 mL (has no administration in time range)         ORDERS PLACED DURING THIS VISIT:  Orders Placed This Encounter   Procedures    Comprehensive Metabolic Panel    BNP    High Sensitivity Troponin T    CBC Auto Differential    High Sensitivity Troponin T 1Hr    Hepatitis Panel, Acute    HIV-1 & HIV-2 Antibodies    HIV-1 / O / 2 Ag / Antibody    Monitor Blood Pressure    ECG 12 Lead Other; dizziness    Insert Peripheral IV    CBC & Differential         OUTPATIENT MEDICATION MANAGEMENT:  Current Facility-Administered Medications Ordered in Epic   Medication Dose Route Frequency Provider Last Rate Last Admin    sodium chloride 0.9 % flush 10 mL  10 mL Intravenous PRN Dayton Guzman MD         Current Outpatient Medications Ordered in Epic   Medication Sig Dispense Refill    albuterol sulfate HFA (Ventolin HFA) 108 (90 Base) MCG/ACT inhaler Inhale 2 puffs Every 4 (Four) Hours As Needed for Wheezing. 18 g 3    benzonatate  (Tessalon Perles) 100 MG capsule Take 1 capsule by mouth 3 (Three) Times a Day As Needed for Cough. 30 capsule 0    busPIRone (BUSPAR) 10 MG tablet Take 1 tablet by mouth once daily 90 tablet 0    cetirizine (zyrTEC) 10 MG tablet Take 1 tablet by mouth Daily. 90 tablet 2    Evolocumab (REPATHA) solution prefilled syringe injection Inject 1 mL under the skin into the appropriate area as directed Every 14 (Fourteen) Days. (Patient not taking: Reported on 6/24/2025) 2 mL 11    fluticasone (FLONASE) 50 MCG/ACT nasal spray Administer 2 sprays into the nostril(s) as directed by provider Daily. 16 g 3    liothyronine (CYTOMEL) 5 MCG tablet Take 1 tablet by mouth Daily. 90 tablet 3    losartan (Cozaar) 25 MG tablet Take 1 tablet by mouth Daily. (Patient not taking: Reported on 6/24/2025) 90 tablet 3    methylPREDNISolone (MEDROL) 4 MG dose pack Take as directed on package instructions. 21 each 0    Repatha SureClick solution auto-injector SureClick injection INJECT 1ML INTO THE SKIN EVERY 14 DAYS (Patient not taking: Reported on 6/24/2025)      rimegepant sulfate ODT (Nurtec) 75 MG disintegrating tablet Place 1 tablet under the tongue Daily As Needed (migraine headache) for up to 10 doses. 10 tablet 0    sertraline (ZOLOFT) 100 MG tablet Take 1 tablet by mouth once daily 90 tablet 1    sucralfate (CARAFATE) 1 GM/10ML suspension Take 10 mL by mouth 4 (Four) Times a Day With Meals & at Bedtime. 473 mL 0    SUMAtriptan (IMITREX) 100 MG tablet TAKE 1 TABLET BY MOUTH AT ONSET OF HEADACHE; MAY REPEAT DOSE ONE TIME IN 2 HOURS IF HEADACHE NOT RELIEVED 9 tablet 2    Synthroid 75 MCG tablet Take 1 tablet by mouth once daily 90 tablet 1    valACYclovir (Valtrex) 500 MG tablet Take 1 tablet by mouth 2 (Two) Times a Day. Take 1 tablet po BID x 5 days when experiencing fever blister symptoms. 60 tablet 2    Vonoprazan Fumarate (Voquezna) 10 MG tablet Take 1 tablet by mouth Daily. 90 tablet 1          PROCEDURES  Procedures            PROGRESS, DATA ANALYSIS, CONSULTS, AND MEDICAL DECISION MAKING  All labs have been independently interpreted by me.  All radiology studies have been reviewed by me. All EKG's have been independently viewed and interpreted by me.  Discussion below represents my analysis of pertinent findings related to patient's condition, differential diagnosis, treatment plan and final disposition.    Differential diagnosis includes but is not limited to:  Adverse effects of steroids  Hypertensive urgency  Cardiac arrhythmia  Acute coronary syndrome  Electrolyte abnormality      Clinical Scores:                  ED Course as of 06/27/25 2149 Fri Jun 27, 2025 1822 I reviewed ED visit from this morning at 3:48 AM.  Patient has a history of asthma, GERD, hyperlipidemia and was complaining of cough for 1 week.  She had been on a Medrol Dosepak recently and also Tessalon Perles.  She had a chest x-ray performed that showed no acute abnormality. [JR]   1919 EKG          EKG time: 1858  Rhythm/Rate: Sinus rhythm, 67  P waves and MD: Normal  QRS, axis: Borderline left axis  ST and T waves: No acute ischemic changes, nonspecific T wave inversions anteriorly    Interpreted Contemporaneously by me, independently viewed  Similar compared to prior 4/13/2024       [JR]   2026 Hemoglobin(!): 11.9 [JR]   2026 WBC: 6.36 [JR]   2136 HS Troponin T: 11 [JR]   2136 Troponin T Numeric Delta: 0 [JR]   2148 Patient's laboratory evaluation is reassuring today as is her EKG.  Her blood pressure has been elevated here however I explained that this may be related to the steroid that she has been taking.  She does seem mildly anxious as well.  I advised her to follow-up closely with her primary care provider for recheck of her blood pressure after she has completed the steroids and return precautions only were again discussed. [JR]      ED Course User Index  [JR] Dayton Guzman MD             AS OF 21:49  EDT VITALS:    BP - 176/96  HR - 80  TEMP - 98.5 °F (36.9 °C) (Oral)  O2 SATS - 99%    COMPLEXITY OF CARE        Chronic or social conditions impacting patient care (Social Determinants of Health):     DIAGNOSIS  Final diagnoses:   Generalized weakness   Hypertension not at goal   Episode of dizziness           DISPOSITION  DISCHARGE    Patient discharged in stable condition.    Reviewed implications of results, diagnosis, meds, responsibility to follow up, warning signs and symptoms of possible worsening, potential complications and reasons to return to ER.    Patient/Family voiced understanding of above instructions.    Discussed plan for discharge, as there is no emergent indication for admission. Patient referred to primary care provider for BP management due to today's BP. Pt/family is agreeable and understands need for follow up and repeat testing.  Pt is aware that discharge does not mean that nothing is wrong but it indicates no emergency is present that requires admission and they must continue care with follow-up as given below or physician of their choice.     FOLLOW-UP  Catherine Kolb APRN  [address] [facility]  [address] [place] [address]  [phone]      As needed         Medication List      No changes were made to your prescriptions during this visit.             Prescription drug monitoring program review:           Please note that portions of this document were completed with a voice recognition program.    Note Disclaimer: At [facility], we believe that sharing information builds trust and better relationships. You are receiving this note because you recently visited [facility]. It is possible you will see health information before a provider has talked with you about it. This kind of information can be easy to misunderstand. To help you fully understand what it means for your health, we urge you to discuss this note with your provider.         Dayton Guzman MD  06/27/25  3525

## 2025-06-29 LAB
QT INTERVAL: 430 MS
QTC INTERVAL: 453 MS

## 2025-06-30 ENCOUNTER — TELEPHONE (OUTPATIENT)
Dept: CARDIOLOGY | Facility: CLINIC | Age: 75
End: 2025-06-30

## 2025-06-30 NOTE — TELEPHONE ENCOUNTER
Caller: Sara Gary    Relationship to patient: Self    Best call back number: [phone]    Chief complaint: N/A    Type of visit: F/U    Requested date: ASAP     If rescheduling, when is the original appointment: N/A     Additional notes:PT STATES THEY HAVE HAD TWO ER VISITS. STATED LAST SATURDAY HAD COLLAPSED AFTER HAVING A REACTION TO THEIR PREDNISONE PRESCRIPTION. PT STATES HAD A BLOODWORK TEST DONE FOR THEIR HEART VALVE WITH A SEVERE CATEGORY OF 1800, PT STATES THEIR READING WAS 1756. HAD ER DOCTOR TELL THEM THEY NEED TO GET AN APPT W/ THEIR CARDIOLOGIST ASAP. PLEASE CALL PT TO ADVISE.

## 2025-07-02 ENCOUNTER — OFFICE VISIT (OUTPATIENT)
Dept: INTERNAL MEDICINE | Facility: CLINIC | Age: 75
End: 2025-07-02
Payer: MEDICARE

## 2025-07-02 VITALS
OXYGEN SATURATION: 97 % | BODY MASS INDEX: 27.56 KG/M2 | HEART RATE: 74 BPM | WEIGHT: 146 LBS | DIASTOLIC BLOOD PRESSURE: 78 MMHG | HEIGHT: 61 IN | SYSTOLIC BLOOD PRESSURE: 130 MMHG | TEMPERATURE: 96.6 F

## 2025-07-02 DIAGNOSIS — J31.0 CHRONIC RHINITIS: Chronic | ICD-10-CM

## 2025-07-02 DIAGNOSIS — J45.20 MILD INTERMITTENT ASTHMA WITHOUT COMPLICATION: Primary | Chronic | ICD-10-CM

## 2025-07-02 DIAGNOSIS — I10 PRIMARY HYPERTENSION: Chronic | ICD-10-CM

## 2025-07-02 DIAGNOSIS — Q21.10 RESIDUAL ASD (ATRIAL SEPTAL DEFECT) FOLLOWING REPAIR: Chronic | ICD-10-CM

## 2025-07-02 DIAGNOSIS — J30.1 SEASONAL ALLERGIC RHINITIS DUE TO POLLEN: Chronic | ICD-10-CM

## 2025-07-02 RX ORDER — IPRATROPIUM BROMIDE 42 UG/1
2 SPRAY, METERED NASAL 4 TIMES DAILY
Qty: 15 ML | Refills: 12 | Status: SHIPPED | OUTPATIENT
Start: 2025-07-02

## 2025-07-02 RX ORDER — FLUTICASONE PROPIONATE AND SALMETEROL XINAFOATE 115; 21 UG/1; UG/1
2 AEROSOL, METERED RESPIRATORY (INHALATION)
Qty: 12 G | Refills: 11 | Status: SHIPPED | OUTPATIENT
Start: 2025-07-02

## 2025-07-02 NOTE — ASSESSMENT & PLAN NOTE
Continues following with cardiology-- next f/u 7/3  4/13/24  Interpretation Summary         Left ventricular systolic function is normal. Left ventricular ejection fraction appears to be 56 - 60%.    Left ventricular diastolic function is consistent with (grade I) impaired relaxation.    The left atrial cavity is moderate to severely dilated.    There is mild calcification of the aortic valve without evidence of stenosis..    Moderate to severe aortic valve regurgitation is present.  Mildly dilated ascending aorta measures 3.6 cm.    Estimated right ventricular systolic pressure from tricuspid regurgitation is mildly elevated (35-45 mmHg).    Mild dilation of the ascending aorta is present.

## 2025-07-02 NOTE — ASSESSMENT & PLAN NOTE
Reports she was diagnosed with asthma back in 2006 when she moved to the area  She reports she previously was following with allergist-- family allergy   She reports she had previously been on maintenance -- but cannot tolerate powders    Will start advair HFA BID for maintenance  Continue with zyrtec-- she can take this BID  Continue with albuterol PRN

## 2025-07-02 NOTE — PROGRESS NOTES
"Chief Complaint  Hospital Follow Up Visit (Doing better stopped prednisone that likely caused EMS visit ), Asthma, and Night Sweats    Subjective        Sara Gary presents to [facility]  History of Present Illness  This is a 73 y/o female presenting to office for ER f/u; patient has had 3 different ER visits on 6/21, and 2 visits on 6/27. She was noted to have had a reaction to her dosepak with concerns of dizziness, light headedness. She is mostly concerned today due to some increase activity with her asthma. She reports she was initially diagnosed in 2006 when she moved to the area. She had been following with an allergist through [facility] but has not been seen recently. She reports intolerances to powder MDI's previously. She reports bothersome PND as well. She reports her last asthma flare was 7/1; she reports using her albuterol nearly every day to help with her symptoms. She is currently on zyrtec, flonase daily. Denies any current SOA, NAILS, Wheezing. She is scheduled to see her cardiologist on 7/3 for further f/u regarding her reaction to the prednisone and concern for her hx of ASD, MVP. She does reports she stopped taking her losartan as she thinks this was causing her some dizziness.     Objective   Vital Signs:  /78 (BP Location: Left arm, Patient Position: Sitting, Cuff Size: Adult)   Pulse 74   Temp 96.6 °F (35.9 °C) (Temporal)   Ht 154.9 cm (60.98\")   Wt 66.2 kg (146 lb)   SpO2 97%   BMI 27.60 kg/m²   Estimated body mass index is 27.6 kg/m² as calculated from the following:    Height as of this encounter: 154.9 cm (60.98\").    Weight as of this encounter: 66.2 kg (146 lb).    BMI is >= 25 and <30. (Overweight) The following options were offered after discussion;: exercise counseling/recommendations and nutrition counseling/recommendations      Physical Exam  Constitutional:       General: She is awake.      Appearance: Normal appearance.   HENT: "      Head: Normocephalic and atraumatic.      Right Ear: Hearing and external ear normal.      Left Ear: Hearing and external ear normal.      Nose: Nose normal.      Mouth/Throat:      Lips: Pink.      Mouth: Mucous membranes are moist.      Pharynx: Oropharynx is clear.   Eyes:      Extraocular Movements: Extraocular movements intact.      Conjunctiva/sclera: Conjunctivae normal.      Pupils: Pupils are equal, round, and reactive to light.      Comments: +glasses   Cardiovascular:      Rate and Rhythm: Normal rate and regular rhythm.      Pulses: Normal pulses.      Heart sounds: Murmur heard.      Systolic murmur is present with a grade of 2/6.   Pulmonary:      Effort: Pulmonary effort is normal.      Breath sounds: Normal breath sounds.   Musculoskeletal:      Cervical back: Normal range of motion and neck supple.   Skin:     General: Skin is warm and dry.      Capillary Refill: Capillary refill takes less than 2 seconds.   Neurological:      General: No focal deficit present.      Mental Status: She is alert and oriented to person, place, and time. Mental status is at baseline.      Motor: Motor function is intact.      Coordination: Coordination is intact.      Gait: Gait is intact.      Deep Tendon Reflexes: Reflexes are normal and symmetric.   Psychiatric:         Attention and Perception: Attention normal.         Mood and Affect: Mood normal.         Speech: Speech normal.         Behavior: Behavior normal. Behavior is cooperative.         Thought Content: Thought content normal.         Cognition and Memory: Cognition normal.         Judgment: Judgment normal.        Result Review :  The following data was reviewed by: ALEJANDRO Miller on 07/02/2025:  Common labs          5/9/2025    16:41 5/14/2025    14:09 6/27/2025    19:42   Common Labs   Glucose 108  136  136    BUN 9  8  7.0    Creatinine 0.73  0.65  0.67    Sodium 135  143  140    Potassium 3.9  3.7  3.9    Chloride 101  107  104    Calcium 9.3   9.1  9.3    Albumin 4.3  4.0  3.8    Total Bilirubin 0.8  0.3  0.4    Alkaline Phosphatase 170  140     136  159    AST (SGOT) 14  11  20    ALT (SGPT) 8  6  14    WBC 9.31   6.36    Hemoglobin 13.4   11.9    Hematocrit 39.3   35.1    Platelets 236   261      Tobacco Use: Low Risk  (7/2/2025)    Patient History     Smoking Tobacco Use: Never     Smokeless Tobacco Use: Never     Passive Exposure: Not on file     Social History     Substance and Sexual Activity   Alcohol Use No     Family History   Problem Relation Age of Onset    Heart disease Mother     Hypertension Mother     Depression Mother     Heart disease Father     Hypertension Father     Hearing loss Father     Stroke Father     Colon cancer Neg Hx     Colon polyps Neg Hx     Crohn's disease Neg Hx     Irritable bowel syndrome Neg Hx     Ulcerative colitis Neg Hx       Adult Transthoracic Echo Complete W/ Cont if Necessary Per Protocol (04/13/2024 11:19 AM)          Assessment and Plan   Diagnoses and all orders for this visit:    1. Mild intermittent asthma without complication (Primary)  Assessment & Plan:  Reports she was diagnosed with asthma back in 2006 when she moved to the area  She reports she previously was following with allergist-- [facility]   She reports she had previously been on maintenance -- but cannot tolerate powders    Will start advair HFA BID for maintenance  Continue with zyrtec-- she can take this BID  Continue with albuterol PRN      Orders:  -     fluticasone-salmeterol (ADVAIR HFA) 115-21 MCG/ACT inhaler; Inhale 2 puffs 2 (Two) Times a Day.  Dispense: 12 g; Refill: 11  -     Complete PFT - Pre & Post Bronchodilator; Future  -     Hemoglobin; Future    2. Chronic rhinitis  -     ipratropium (ATROVENT) 0.06 % nasal spray; Administer 2 sprays into the nostril(s) as directed by provider 4 (Four) Times a Day.  Dispense: 15 mL; Refill: 12    3. Seasonal allergic rhinitis due to pollen  Assessment & Plan:  Continues on zyrtec--  recommend BID until allergies improve        4. Primary hypertension  Assessment & Plan:  Has been off losartan per her own accord  Has f/u with cardiology 7/3-- would recommend further discussion regarding this with cardiology      5. 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 Left ventricular ejection fraction appears to be 56 - 60%.    Left ventricular diastolic function is consistent with (grade I) impaired relaxation.    The left atrial cavity is moderate to severely dilated.    There is mild calcification of the aortic valve without evidence of stenosis..    Moderate to severe aortic valve regurgitation is present.  Mildly dilated ascending aorta measures 3.6 cm.    Estimated right ventricular systolic pressure from tricuspid regurgitation is mildly elevated (35-45 mmHg).    Mild dilation of the ascending aorta is present.               Follow Up   Return in about 4 weeks (around 7/30/2025) for Recheck asthma.  Patient was given instructions and counseling regarding her condition or for health maintenance advice. Please see specific information pulled into the AVS if appropriate.

## 2025-07-02 NOTE — ASSESSMENT & PLAN NOTE
Has been off losartan per her own accord  Has f/u with cardiology 7/3-- would recommend further discussion regarding this with cardiology

## 2025-07-03 ENCOUNTER — OFFICE VISIT (OUTPATIENT)
Dept: CARDIOLOGY | Facility: CLINIC | Age: 75
End: 2025-07-03
Payer: MEDICARE

## 2025-07-03 VITALS
HEART RATE: 59 BPM | DIASTOLIC BLOOD PRESSURE: 81 MMHG | BODY MASS INDEX: 21.62 KG/M2 | HEIGHT: 69 IN | SYSTOLIC BLOOD PRESSURE: 149 MMHG | WEIGHT: 146 LBS

## 2025-07-03 DIAGNOSIS — R94.31 ABNORMAL EKG: Primary | ICD-10-CM

## 2025-07-03 DIAGNOSIS — R55 NEAR SYNCOPE: ICD-10-CM

## 2025-07-03 DIAGNOSIS — R06.02 SHORTNESS OF BREATH: ICD-10-CM

## 2025-07-03 DIAGNOSIS — E78.2 MIXED HYPERLIPIDEMIA: ICD-10-CM

## 2025-07-03 DIAGNOSIS — I35.1 NONRHEUMATIC AORTIC VALVE INSUFFICIENCY: ICD-10-CM

## 2025-07-03 PROCEDURE — 99214 OFFICE O/P EST MOD 30 MIN: CPT | Performed by: INTERNAL MEDICINE

## 2025-07-03 PROCEDURE — 3077F SYST BP >= 140 MM HG: CPT | Performed by: INTERNAL MEDICINE

## 2025-07-03 PROCEDURE — 93000 ELECTROCARDIOGRAM COMPLETE: CPT | Performed by: INTERNAL MEDICINE

## 2025-07-03 PROCEDURE — 3079F DIAST BP 80-89 MM HG: CPT | Performed by: INTERNAL MEDICINE

## 2025-07-03 NOTE — PROGRESS NOTES
ER FOLLOW UP, SYNCOPE   Subjective:        Sara Gary is a 74 y.o. female who here for follow up    CC  ER WITH NEAR SYNCOPE    SOB    AI  HPI  74 years old female recently went to the emergency room with a syncopal episode has been complaining of shortness of breath is known to have the aortic regurgitation denies any chest pains or tightness in the chest     Problems Addressed this Visit          Cardiac and Vasculature    Abnormal EKG - Primary    Relevant Orders    Stress Test With Myocardial Perfusion One Day (Completed)    Adult Transthoracic Echo Complete W/ Cont if Necessary Per Protocol (Completed)    Mixed hyperlipidemia    Nonrheumatic aortic valve insufficiency       Pulmonary and Pneumonias    Shortness of breath       Symptoms and Signs    Near syncope     Diagnoses         Codes Comments      Abnormal EKG    -  Primary ICD-10-CM: R94.31  ICD-9-CM: 794.31       Mixed hyperlipidemia     ICD-10-CM: E78.2  ICD-9-CM: 272.2       Nonrheumatic aortic valve insufficiency     ICD-10-CM: I35.1  ICD-9-CM: 424.1       Near syncope     ICD-10-CM: R55  ICD-9-CM: 780.2       Shortness of breath     ICD-10-CM: R06.02  ICD-9-CM: 786.05           .    The following portions of the patient's history were reviewed and updated as appropriate: allergies, current medications, past family history, past medical history, past social history, past surgical history and problem list.    Past Medical History:   Diagnosis Date    Allergic Currently    Anxiety     Asthma     Currently    Colon polyp 2013    Depression     Diverticulitis     Diverticulitis of colon 7/2017    Diverticulosis 2016    Environmental allergies     Fibromyalgia, primary 2000    GERD (gastroesophageal reflux disease) 2017    Headache 1995    Heart murmur 1997    History of colon polyps     HL (hearing loss) 2018    Hyperlipidemia     Hypertension     Hypothyroidism 1995    Irritable bowel syndrome     Kidney stone     hx of 4 stones    Mitral valve  "prolapse     Urinary tract infection 1972    Visual impairment 1994     reports that she has never smoked. She has been exposed to tobacco smoke. She has never used smokeless tobacco. She reports that she does not drink alcohol and does not use drugs.   Family History   Problem Relation Age of Onset    Heart disease Mother     Hypertension Mother     Depression Mother     Heart disease Father     Hypertension Father     Hearing loss Father     Stroke Father     Colon cancer Neg Hx     Colon polyps Neg Hx     Crohn's disease Neg Hx     Irritable bowel syndrome Neg Hx     Ulcerative colitis Neg Hx        Review of Systems  Constitutional: No wt loss, fever, fatigue  Gastrointestinal: No nausea, abdominal pain  Behavioral/Psych: No insomnia or anxiety   Cardiovascular no chest pains or tightness in the chest  Objective:       Physical Exam  /81   Pulse 59   Ht 175.3 cm (69\")   Wt 66.2 kg (146 lb)   BMI 21.56 kg/m²   General appearance: No acute changes   Neck: Trachea midline; NECK, supple, no thyromegaly or lymphadenopathy   Lungs: Normal size and shape, normal breath sounds, equal distribution of air, no rales and rhonchi   CV: S1-S2 regular, no murmurs, no rub, no gallop   Abdomen: Soft, nontender; no masses , no abnormal abdominal sounds   Extremities: No deformity , normal color , no peripheral edema   Skin: Normal temperature, turgor and texture; no rash, ulcers            ECG 12 Lead    Date/Time: 7/3/2025 11:10 AM  Performed by: Maricarmen Verde MD    Authorized by: Maricarmen Verde MD  Comparison: compared with previous ECG   Similar to previous ECG  Rhythm: sinus rhythm    Clinical impression: non-specific ECG            Echocardiogram:    Results for orders placed during the hospital encounter of 11/08/24    Adult Transthoracic Echo Complete w/ Color, Spectral and Contrast if Necessary Per Protocol    Interpretation Summary    Left ventricular systolic function is normal. Calculated " left ventricular EF = 53.2%    Left ventricular diastolic function was indeterminate.    The left atrial cavity is moderately dilated.    Left atrial volume is mildly increased.    Mild to moderate aortic valve regurgitation is present.    Mild mitral valve regurgitation is present.    Mild tricuspid valve regurgitation is present.    Estimated right ventricular systolic pressure from tricuspid regurgitation is normal (<35 mmHg). Calculated right ventricular systolic pressure from tricuspid regurgitation is 21 mmHg.          Current Outpatient Medications:     albuterol sulfate HFA (Ventolin HFA) 108 (90 Base) MCG/ACT inhaler, Inhale 2 puffs Every 4 (Four) Hours As Needed for Wheezing., Disp: 18 g, Rfl: 3    busPIRone (BUSPAR) 10 MG tablet, Take 1 tablet by mouth once daily, Disp: 90 tablet, Rfl: 0    cetirizine (zyrTEC) 10 MG tablet, Take 1 tablet by mouth Daily., Disp: 90 tablet, Rfl: 2    fluticasone-salmeterol (ADVAIR HFA) 115-21 MCG/ACT inhaler, Inhale 2 puffs 2 (Two) Times a Day., Disp: 12 g, Rfl: 11    ipratropium (ATROVENT) 0.06 % nasal spray, Administer 2 sprays into the nostril(s) as directed by provider 4 (Four) Times a Day., Disp: 15 mL, Rfl: 12    liothyronine (CYTOMEL) 5 MCG tablet, Take 1 tablet by mouth Daily., Disp: 90 tablet, Rfl: 3    rimegepant sulfate ODT (Nurtec) 75 MG disintegrating tablet, Place 1 tablet under the tongue Daily As Needed (migraine headache) for up to 10 doses., Disp: 10 tablet, Rfl: 0    sertraline (ZOLOFT) 100 MG tablet, Take 1 tablet by mouth once daily, Disp: 90 tablet, Rfl: 1    SUMAtriptan (IMITREX) 100 MG tablet, TAKE 1 TABLET BY MOUTH AT ONSET OF HEADACHE; MAY REPEAT DOSE ONE TIME IN 2 HOURS IF HEADACHE NOT RELIEVED, Disp: 9 tablet, Rfl: 2    Synthroid 75 MCG tablet, Take 1 tablet by mouth once daily, Disp: 90 tablet, Rfl: 1    valACYclovir (Valtrex) 500 MG tablet, Take 1 tablet by mouth 2 (Two) Times a Day. Take 1 tablet po BID x 5 days when experiencing fever blister  symptoms., Disp: 60 tablet, Rfl: 2    amoxicillin (AMOXIL) 500 MG capsule, Take 2 capsules by mouth 2 (Two) Times a Day., Disp: 28 capsule, Rfl: 0    Evolocumab (Repatha) solution prefilled syringe injection, Inject 1 mL under the skin into the appropriate area as directed., Disp: , Rfl:     Voquezna 10 MG tablet, Take 1 tablet by mouth once daily, Disp: 90 tablet, Rfl: 0  No current facility-administered medications for this visit.   Assessment:                Plan:          ICD-10-CM ICD-9-CM   1. Abnormal EKG  R94.31 794.31   2. Mixed hyperlipidemia  E78.2 272.2   3. Nonrheumatic aortic valve insufficiency  I35.1 424.1   4. Near syncope  R55 780.2   5. Shortness of breath  R06.02 786.05     1. Abnormal EKG  Will need further evaluation    - Stress Test With Myocardial Perfusion One Day; Future  - Adult Transthoracic Echo Complete W/ Cont if Necessary Per Protocol    2. Mixed hyperlipidemia  Continue current treatment    3. Nonrheumatic aortic valve insufficiency  Considering patient's medical condition as well as the risk factors, patient will require echocardiogram for further evaluation for the LV function, four-chamber evaluation, including the pressures, valvular function and  pericardial disease and pericardial effusion      4. Near syncope  Considering patient's medical condition as well as the risk factors, patient will require echocardiogram for further evaluation for the LV function, four-chamber evaluation, including the pressures, valvular function and  pericardial disease and pericardial effusion      5. Shortness of breath  Will need further evaluation      SIG SOB    MODERATE SEVER AI    NEEDS ECHO, STRESS CARDIOLYTE    WILL NEED CATH    H/O ASD REPAIR  COUNSELING:    Sara Gilliland was given to patient for the following topics: diagnostic results, risk factor reductions, impressions, risks and benefits of treatment options and importance of treatment compliance .       SMOKING  COUNSELING:        Dictated using Dragon dictation

## 2025-07-06 RX ORDER — AMOXICILLIN 500 MG/1
1000 CAPSULE ORAL 2 TIMES DAILY
Qty: 28 CAPSULE | Refills: 0 | Status: SHIPPED | OUTPATIENT
Start: 2025-07-06

## 2025-07-06 NOTE — PROGRESS NOTES
"Ms. Gary called me with continued trouble with cough and asthma symptoms despite an \"upgrade\" in her medicine. She is not in respiratory distress and is afebrile.  We discussed options short of ED and she is scheduled for nuclear stress test on Thursday.   She had a prior reaction with prednisone and zpac.  I sent amoxil 500 gm two capsules bid for one week.   "

## 2025-07-10 ENCOUNTER — HOSPITAL ENCOUNTER (OUTPATIENT)
Dept: CARDIOLOGY | Facility: HOSPITAL | Age: 75
Discharge: HOME | End: 2025-07-10
Admitting: INTERNAL MEDICINE
Payer: MEDICARE

## 2025-07-10 ENCOUNTER — HOSPITAL ENCOUNTER (OUTPATIENT)
Dept: CARDIOLOGY | Facility: HOSPITAL | Age: 75
Discharge: HOME | End: 2025-07-10
Payer: MEDICARE

## 2025-07-10 VITALS
HEART RATE: 59 BPM | WEIGHT: 145.94 LBS | DIASTOLIC BLOOD PRESSURE: 76 MMHG | SYSTOLIC BLOOD PRESSURE: 158 MMHG | OXYGEN SATURATION: 96 % | HEIGHT: 69 IN | BODY MASS INDEX: 21.62 KG/M2

## 2025-07-10 VITALS
HEART RATE: 90 BPM | WEIGHT: 145 LBS | BODY MASS INDEX: 21.48 KG/M2 | DIASTOLIC BLOOD PRESSURE: 92 MMHG | HEIGHT: 69 IN | SYSTOLIC BLOOD PRESSURE: 148 MMHG | OXYGEN SATURATION: 98 %

## 2025-07-10 DIAGNOSIS — R94.31 ABNORMAL EKG: ICD-10-CM

## 2025-07-10 LAB
AORTIC DIMENSIONLESS INDEX: 0.71 (DI)
ASCENDING AORTA: 3.5 CM
AV MEAN PRESS GRAD SYS DOP V1V2: 3.3 MMHG
AV VMAX SYS DOP: 133.4 CM/SEC
BH CV ECHO MEAS - ACS: 1.89 CM
BH CV ECHO MEAS - AI P1/2T: 723.3 MSEC
BH CV ECHO MEAS - AO MAX PG: 7.1 MMHG
BH CV ECHO MEAS - AO V2 VTI: 29.7 CM
BH CV ECHO MEAS - AVA(I,D): 2.39 CM2
BH CV ECHO MEAS - EDV(CUBED): 108.2 ML
BH CV ECHO MEAS - EDV(MOD-SP2): 54 ML
BH CV ECHO MEAS - EDV(MOD-SP4): 84 ML
BH CV ECHO MEAS - EF(MOD-SP2): 63 %
BH CV ECHO MEAS - EF(MOD-SP4): 59.5 %
BH CV ECHO MEAS - ESV(CUBED): 35.8 ML
BH CV ECHO MEAS - ESV(MOD-SP2): 20 ML
BH CV ECHO MEAS - ESV(MOD-SP4): 34 ML
BH CV ECHO MEAS - FS: 30.8 %
BH CV ECHO MEAS - IVS/LVPW: 0.98 CM
BH CV ECHO MEAS - IVSD: 1.12 CM
BH CV ECHO MEAS - LAT PEAK E' VEL: 4.5 CM/SEC
BH CV ECHO MEAS - LV DIASTOLIC VOL/BSA (35-75): 46.5 CM2
BH CV ECHO MEAS - LV MASS(C)D: 200.5 GRAMS
BH CV ECHO MEAS - LV MAX PG: 4.2 MMHG
BH CV ECHO MEAS - LV MEAN PG: 2.13 MMHG
BH CV ECHO MEAS - LV SYSTOLIC VOL/BSA (12-30): 18.8 CM2
BH CV ECHO MEAS - LV V1 MAX: 102.8 CM/SEC
BH CV ECHO MEAS - LV V1 VTI: 21.2 CM
BH CV ECHO MEAS - LVIDD: 4.8 CM
BH CV ECHO MEAS - LVIDS: 3.3 CM
BH CV ECHO MEAS - LVOT AREA: 3.3 CM2
BH CV ECHO MEAS - LVOT DIAM: 2.06 CM
BH CV ECHO MEAS - LVPWD: 1.15 CM
BH CV ECHO MEAS - MED PEAK E' VEL: 4.8 CM/SEC
BH CV ECHO MEAS - MR MAX PG: 104.2 MMHG
BH CV ECHO MEAS - MR MAX VEL: 510.4 CM/SEC
BH CV ECHO MEAS - MV A DUR: 0.13 SEC
BH CV ECHO MEAS - MV A MAX VEL: 73.3 CM/SEC
BH CV ECHO MEAS - MV DEC SLOPE: 333 CM/SEC2
BH CV ECHO MEAS - MV DEC TIME: 0.24 SEC
BH CV ECHO MEAS - MV E MAX VEL: 36.4 CM/SEC
BH CV ECHO MEAS - MV E/A: 0.5
BH CV ECHO MEAS - MV MAX PG: 3.3 MMHG
BH CV ECHO MEAS - MV MEAN PG: 1.13 MMHG
BH CV ECHO MEAS - MV P1/2T: 65.4 MSEC
BH CV ECHO MEAS - MV V2 VTI: 30.9 CM
BH CV ECHO MEAS - MVA(P1/2T): 3.4 CM2
BH CV ECHO MEAS - MVA(VTI): 2.3 CM2
BH CV ECHO MEAS - PA ACC TIME: 0.14 SEC
BH CV ECHO MEAS - PA V2 MAX: 67.9 CM/SEC
BH CV ECHO MEAS - PULM A REVS DUR: 0.13 SEC
BH CV ECHO MEAS - PULM A REVS VEL: 23.6 CM/SEC
BH CV ECHO MEAS - PULM DIAS VEL: 27 CM/SEC
BH CV ECHO MEAS - PULM S/D: 1.32
BH CV ECHO MEAS - PULM SYS VEL: 35.6 CM/SEC
BH CV ECHO MEAS - QP/QS: 0.85
BH CV ECHO MEAS - RAP SYSTOLE: 3 MMHG
BH CV ECHO MEAS - RV MAX PG: 1.34 MMHG
BH CV ECHO MEAS - RV V1 MAX: 57.8 CM/SEC
BH CV ECHO MEAS - RV V1 VTI: 15.3 CM
BH CV ECHO MEAS - RVOT DIAM: 2.24 CM
BH CV ECHO MEAS - RVSP: 31 MMHG
BH CV ECHO MEAS - SV(LVOT): 70.9 ML
BH CV ECHO MEAS - SV(MOD-SP2): 34 ML
BH CV ECHO MEAS - SV(MOD-SP4): 50 ML
BH CV ECHO MEAS - SV(RVOT): 60.4 ML
BH CV ECHO MEAS - SVI(LVOT): 39.2 ML/M2
BH CV ECHO MEAS - SVI(MOD-SP2): 18.8 ML/M2
BH CV ECHO MEAS - SVI(MOD-SP4): 27.7 ML/M2
BH CV ECHO MEAS - TAPSE (>1.6): 1.65 CM
BH CV ECHO MEAS - TR MAX PG: 27.6 MMHG
BH CV ECHO MEAS - TR MAX VEL: 262.9 CM/SEC
BH CV ECHO MEASUREMENTS AVERAGE E/E' RATIO: 7.83
BH CV IMMEDIATE POST RECOVERY TECH DATA SYMPTOMS: NORMAL
BH CV IMMEDIATE POST TECH DATA BLOOD PRESSURE: NORMAL MMHG
BH CV IMMEDIATE POST TECH DATA HEART RATE: 85 BPM
BH CV IMMEDIATE POST TECH DATA OXYGEN SATS: 98 %
BH CV REST NUCLEAR ISOTOPE DOSE: 10.8 MCI
BH CV STRESS BP STAGE 1: NORMAL
BH CV STRESS COMMENTS STAGE 1: NORMAL
BH CV STRESS DOSE REGADENOSON STAGE 1: 0.4
BH CV STRESS DURATION MIN STAGE 1: 3
BH CV STRESS DURATION SEC STAGE 1: 0
BH CV STRESS GRADE STAGE 1: 0
BH CV STRESS HR STAGE 1: 58
BH CV STRESS METS STAGE 1: 1.8
BH CV STRESS NUCLEAR ISOTOPE DOSE: 32.9 MCI
BH CV STRESS PROTOCOL 1: NORMAL
BH CV STRESS RECOVERY BP: NORMAL MMHG
BH CV STRESS RECOVERY HR: 64 BPM
BH CV STRESS RECOVERY O2: 99 %
BH CV STRESS SPEED STAGE 1: 1
BH CV STRESS STAGE 1: 1
BH CV THREE MINUTE POST TECH DATA BLOOD PRESSURE: NORMAL MMHG
BH CV THREE MINUTE POST TECH DATA HEART RATE: 69 BPM
BH CV THREE MINUTE RECOVERY TECH DATA SYMPTOM: NORMAL
BH CV XLRA - RV BASE: 2.8 CM
BH CV XLRA - RV LENGTH: 6.1 CM
BH CV XLRA - RV MID: 2.29 CM
BH CV XLRA - TDI S': 9.2 CM/SEC
LEFT ATRIUM VOLUME INDEX: 58.6 ML/M2
LV EF BIPLANE MOD: 60.5 %
MAXIMAL PREDICTED HEART RATE: 144 BPM
PERCENT MAX PREDICTED HR: 40.28 %
SINUS: 3.4 CM
SPECT HRT GATED+EF W RNC IV: 70 %
STJ: 3.2 CM
STRESS BASELINE BP: NORMAL MMHG
STRESS BASELINE HR: 58 BPM
STRESS O2 SAT REST: 98 %
STRESS PERCENT HR: 47 %
STRESS POST ESTIMATED WORKLOAD: 1.8 METS
STRESS POST EXERCISE DUR MIN: 3 MIN
STRESS POST EXERCISE DUR SEC: 0 SEC
STRESS POST PEAK BP: NORMAL MMHG
STRESS POST PEAK HR: 58 BPM
STRESS TARGET HR: 122 BPM

## 2025-07-10 PROCEDURE — A9500 TC99M SESTAMIBI: HCPCS | Performed by: INTERNAL MEDICINE

## 2025-07-10 PROCEDURE — 93306 TTE W/DOPPLER COMPLETE: CPT

## 2025-07-10 PROCEDURE — 93306 TTE W/DOPPLER COMPLETE: CPT | Performed by: INTERNAL MEDICINE

## 2025-07-10 PROCEDURE — 93017 CV STRESS TEST TRACING ONLY: CPT

## 2025-07-10 PROCEDURE — 78452 HT MUSCLE IMAGE SPECT MULT: CPT

## 2025-07-10 PROCEDURE — 34310000005 TECHNETIUM SESTAMIBI: Performed by: INTERNAL MEDICINE

## 2025-07-10 PROCEDURE — 25010000002 REGADENOSON 0.4 MG/5ML SOLUTION: Performed by: INTERNAL MEDICINE

## 2025-07-10 RX ORDER — REGADENOSON 0.08 MG/ML
0.4 INJECTION, SOLUTION INTRAVENOUS
Status: COMPLETED | OUTPATIENT
Start: 2025-07-10 | End: 2025-07-10

## 2025-07-10 RX ORDER — EVOLOCUMAB 140 MG/ML
1 INJECTION, SOLUTION SUBCUTANEOUS
COMMUNITY
Start: 2025-02-05

## 2025-07-10 RX ADMIN — REGADENOSON 0.4 MG: 0.08 INJECTION, SOLUTION INTRAVENOUS at 08:54

## 2025-07-10 RX ADMIN — TECHNETIUM TC 99M SESTAMIBI 1 DOSE: 1 INJECTION INTRAVENOUS at 07:12

## 2025-07-10 RX ADMIN — TECHNETIUM TC 99M SESTAMIBI 1 DOSE: 1 INJECTION INTRAVENOUS at 08:54

## 2025-07-16 RX ORDER — VONOPRAZAN FUMARATE 13.36 MG/1
10 TABLET ORAL DAILY
Qty: 90 TABLET | Refills: 0 | Status: SHIPPED | OUTPATIENT
Start: 2025-07-16

## 2025-07-17 ENCOUNTER — OFFICE VISIT (OUTPATIENT)
Dept: CARDIOLOGY | Facility: CLINIC | Age: 75
End: 2025-07-17
Payer: MEDICARE

## 2025-07-17 VITALS
BODY MASS INDEX: 21.48 KG/M2 | SYSTOLIC BLOOD PRESSURE: 154 MMHG | DIASTOLIC BLOOD PRESSURE: 84 MMHG | HEIGHT: 69 IN | HEART RATE: 64 BPM | WEIGHT: 145 LBS

## 2025-07-17 DIAGNOSIS — I34.1 MVP (MITRAL VALVE PROLAPSE): Primary | ICD-10-CM

## 2025-07-17 DIAGNOSIS — R06.02 SHORTNESS OF BREATH: ICD-10-CM

## 2025-07-17 DIAGNOSIS — Z87.74 H/O CONGENITAL ATRIAL SEPTAL DEFECT (ASD) REPAIR: ICD-10-CM

## 2025-07-17 PROCEDURE — 3077F SYST BP >= 140 MM HG: CPT | Performed by: INTERNAL MEDICINE

## 2025-07-17 PROCEDURE — 99213 OFFICE O/P EST LOW 20 MIN: CPT | Performed by: INTERNAL MEDICINE

## 2025-07-17 PROCEDURE — 3079F DIAST BP 80-89 MM HG: CPT | Performed by: INTERNAL MEDICINE

## 2025-07-17 NOTE — PROGRESS NOTES
Test results   Subjective:        Sara Gary is a 74 y.o. female who here for follow up    CC  Follow-up shortness of breath mitral valve prolapse  HPI  74 years old female with mitral valve prolapse shortness of breath ASD repair here for the follow-up denies any chest pains or tightness in the chest     Problems Addressed this Visit          Cardiac and Vasculature    MVP (mitral valve prolapse) - Primary    Relevant Orders    Adult Transthoracic Echo Complete W/ Cont if Necessary Per Protocol       Pulmonary and Pneumonias    Shortness of breath     Other Visit Diagnoses         H/O congenital atrial septal defect (ASD) repair              Diagnoses         Codes Comments      MVP (mitral valve prolapse)    -  Primary ICD-10-CM: I34.1  ICD-9-CM: 424.0       Shortness of breath     ICD-10-CM: R06.02  ICD-9-CM: 786.05       H/O congenital atrial septal defect (ASD) repair     ICD-10-CM: Z87.74  ICD-9-CM: V13.65           .    The following portions of the patient's history were reviewed and updated as appropriate: allergies, current medications, past family history, past medical history, past social history, past surgical history and problem list.    Past Medical History:   Diagnosis Date    Allergic Currently    Anxiety     Asthma     Currently    Colon polyp 2013    Depression     Diverticulitis     Diverticulitis of colon 7/2017    Diverticulosis 2016    Environmental allergies     Fibromyalgia, primary 2000    GERD (gastroesophageal reflux disease) 2017    Headache 1995    Heart murmur 1997    History of colon polyps     HL (hearing loss) 2018    Hyperlipidemia     Hypertension     Hypothyroidism 1995    Irritable bowel syndrome     Kidney stone     hx of 4 stones    Mitral valve prolapse     Urinary tract infection 1972    Visual impairment 1994     reports that she has never smoked. She has been exposed to tobacco smoke. She has never used smokeless tobacco. She reports that she does not drink alcohol  "and does not use drugs.   Family History   Problem Relation Age of Onset    Heart disease Mother     Hypertension Mother     Depression Mother     Heart disease Father     Hypertension Father     Hearing loss Father     Stroke Father     Colon cancer Neg Hx     Colon polyps Neg Hx     Crohn's disease Neg Hx     Irritable bowel syndrome Neg Hx     Ulcerative colitis Neg Hx        Review of Systems  Constitutional: No wt loss, fever, fatigue  Gastrointestinal: No nausea, abdominal pain  Behavioral/Psych: No insomnia or anxiety   Cardiovascular no chest pains or tightness in the chest  Objective:       Physical Exam  /84   Pulse 64   Ht 175.3 cm (69.02\")   Wt 65.8 kg (145 lb)   BMI 21.40 kg/m²   General appearance: No acute changes   Neck: Trachea midline; NECK, supple, no thyromegaly or lymphadenopathy   Lungs: Normal size and shape, normal breath sounds, equal distribution of air, no rales and rhonchi   CV: S1-S2 regular, no murmurs, no rub, no gallop   Abdomen: Soft, nontender; no masses , no abnormal abdominal sounds   Extremities: No deformity , normal color , no peripheral edema   Skin: Normal temperature, turgor and texture; no rash, ulcers          Procedures      Echocardiogram:    Results for orders placed in visit on 07/03/25    Adult Transthoracic Echo Complete W/ Cont if Necessary Per Protocol    Interpretation Summary    Left ventricular ejection fraction appears to be 56 - 60%.    Left ventricular diastolic function was normal.    The left atrial cavity is mildly dilated.    Estimated right ventricular systolic pressure from tricuspid regurgitation is normal (<35 mmHg).      Interpretation Summary         Findings consistent with a normal ECG stress test.    Myocardial perfusion imaging indicates a normal myocardial perfusion study with no evidence of ischemia. Impressions are consistent with a low risk study.    Left ventricular ejection fraction is normal (Calculated EF = 70%).    Compared to " the prior study from 4/26/2018, No Change      Current Outpatient Medications:     albuterol sulfate HFA (Ventolin HFA) 108 (90 Base) MCG/ACT inhaler, Inhale 2 puffs Every 4 (Four) Hours As Needed for Wheezing., Disp: 18 g, Rfl: 3    amoxicillin (AMOXIL) 500 MG capsule, Take 2 capsules by mouth 2 (Two) Times a Day., Disp: 28 capsule, Rfl: 0    busPIRone (BUSPAR) 10 MG tablet, Take 1 tablet by mouth once daily, Disp: 90 tablet, Rfl: 0    cetirizine (zyrTEC) 10 MG tablet, Take 1 tablet by mouth Daily., Disp: 90 tablet, Rfl: 2    rimegepant sulfate ODT (Nurtec) 75 MG disintegrating tablet, Place 1 tablet under the tongue Daily As Needed (migraine headache) for up to 10 doses., Disp: 10 tablet, Rfl: 0    sertraline (ZOLOFT) 100 MG tablet, Take 1 tablet by mouth once daily, Disp: 90 tablet, Rfl: 1    SUMAtriptan (IMITREX) 100 MG tablet, TAKE 1 TABLET BY MOUTH AT ONSET OF HEADACHE; MAY REPEAT DOSE ONE TIME IN 2 HOURS IF HEADACHE NOT RELIEVED, Disp: 9 tablet, Rfl: 2    Synthroid 75 MCG tablet, Take 1 tablet by mouth once daily, Disp: 90 tablet, Rfl: 1    valACYclovir (Valtrex) 500 MG tablet, Take 1 tablet by mouth 2 (Two) Times a Day. Take 1 tablet po BID x 5 days when experiencing fever blister symptoms., Disp: 60 tablet, Rfl: 2    Voquezna 10 MG tablet, Take 1 tablet by mouth once daily, Disp: 90 tablet, Rfl: 0    Evolocumab (Repatha) solution prefilled syringe injection, Inject 1 mL under the skin into the appropriate area as directed., Disp: , Rfl:     fluticasone-salmeterol (ADVAIR HFA) 115-21 MCG/ACT inhaler, Inhale 2 puffs 2 (Two) Times a Day., Disp: 12 g, Rfl: 11    ipratropium (ATROVENT) 0.06 % nasal spray, Administer 2 sprays into the nostril(s) as directed by provider 4 (Four) Times a Day., Disp: 15 mL, Rfl: 12    liothyronine (CYTOMEL) 5 MCG tablet, Take 1 tablet by mouth once daily, Disp: 90 tablet, Rfl: 0   Assessment:                Plan:          ICD-10-CM ICD-9-CM   1. MVP (mitral valve prolapse)  I34.1  424.0   2. Shortness of breath  R06.02 786.05   3. H/O congenital atrial septal defect (ASD) repair  Z87.74 V13.65     1. MVP (mitral valve prolapse)  Considering patient's medical condition as well as the risk factors, patient will require echocardiogram for further evaluation for the LV function, four-chamber evaluation, including the pressures, valvular function and  pericardial disease and pericardial effusion    - Adult Transthoracic Echo Complete W/ Cont if Necessary Per Protocol; Future    2. Shortness of breath  Under control    3. H/O congenital atrial septal defect (ASD) repair  Considering patient's medical condition as well as the risk factors, patient will require echocardiogram for further evaluation for the LV function, four-chamber evaluation, including the pressures, valvular function and  pericardial disease and pericardial effusion        1 YR WITH ECHO  COUNSELING:    Sara Gilliland was given to patient for the following topics: diagnostic results, risk factor reductions, impressions, risks and benefits of treatment options and importance of treatment compliance .       SMOKING COUNSELING:        Dictated using Dragon dictation

## 2025-07-22 ENCOUNTER — HOSPITAL ENCOUNTER (OUTPATIENT)
Dept: RESPIRATORY THERAPY | Facility: HOSPITAL | Age: 75
Discharge: HOME | End: 2025-07-22
Admitting: NURSE PRACTITIONER
Payer: MEDICARE

## 2025-07-22 DIAGNOSIS — J45.20 MILD INTERMITTENT ASTHMA WITHOUT COMPLICATION: Chronic | ICD-10-CM

## 2025-07-22 PROBLEM — R55 NEAR SYNCOPE: Status: ACTIVE | Noted: 2025-07-22

## 2025-07-22 PROBLEM — I35.1 NONRHEUMATIC AORTIC VALVE INSUFFICIENCY: Status: ACTIVE | Noted: 2025-07-22

## 2025-07-22 PROBLEM — R06.02 SHORTNESS OF BREATH: Status: ACTIVE | Noted: 2025-07-22

## 2025-07-22 LAB
BDY SITE: NORMAL
HGB BLDA-MCNC: 12.4 G/DL (ref 12–18)

## 2025-07-22 PROCEDURE — 94060 EVALUATION OF WHEEZING: CPT

## 2025-07-22 PROCEDURE — 94640 AIRWAY INHALATION TREATMENT: CPT

## 2025-07-22 PROCEDURE — 82820 HEMOGLOBIN-OXYGEN AFFINITY: CPT | Performed by: NURSE PRACTITIONER

## 2025-07-22 PROCEDURE — 94729 DIFFUSING CAPACITY: CPT

## 2025-07-22 PROCEDURE — 94726 PLETHYSMOGRAPHY LUNG VOLUMES: CPT

## 2025-07-22 RX ORDER — ALBUTEROL SULFATE 0.83 MG/ML
2.5 SOLUTION RESPIRATORY (INHALATION) ONCE
Status: COMPLETED | OUTPATIENT
Start: 2025-07-22 | End: 2025-07-22

## 2025-07-22 RX ADMIN — ALBUTEROL SULFATE 2.5 MG: 2.5 SOLUTION RESPIRATORY (INHALATION) at 15:12

## 2025-07-24 ENCOUNTER — RESULTS FOLLOW-UP (OUTPATIENT)
Dept: INTERNAL MEDICINE | Facility: CLINIC | Age: 75
End: 2025-07-24
Payer: MEDICARE

## 2025-07-24 DIAGNOSIS — R06.2 WHEEZING: Primary | ICD-10-CM

## 2025-07-24 NOTE — PROGRESS NOTES
Her PFT came back not showing any evidence of asthma. I would like for her to meet with a lung specialist for further evaluation as this may not be asthma and could possibly be something different occurring. I have placed referral to pulmonary for further evaluation. Please ask if the additional inhaler prescribed was helping with her symptoms she reported to me on 7/2

## 2025-07-25 DIAGNOSIS — E03.8 OTHER SPECIFIED HYPOTHYROIDISM: ICD-10-CM

## 2025-07-25 RX ORDER — LIOTHYRONINE SODIUM 5 UG/1
5 TABLET ORAL DAILY
Qty: 90 TABLET | Refills: 0 | Status: SHIPPED | OUTPATIENT
Start: 2025-07-25

## 2025-07-30 ENCOUNTER — TELEPHONE (OUTPATIENT)
Dept: INTERNAL MEDICINE | Facility: CLINIC | Age: 75
End: 2025-07-30
Payer: MEDICARE

## 2025-08-06 ENCOUNTER — OFFICE VISIT (OUTPATIENT)
Dept: GASTROENTEROLOGY | Facility: CLINIC | Age: 75
End: 2025-08-06
Payer: MEDICARE

## 2025-08-06 ENCOUNTER — LAB (OUTPATIENT)
Dept: LAB | Facility: HOSPITAL | Age: 75
End: 2025-08-06
Payer: MEDICARE

## 2025-08-06 VITALS
OXYGEN SATURATION: 97 % | BODY MASS INDEX: 22.17 KG/M2 | SYSTOLIC BLOOD PRESSURE: 128 MMHG | DIASTOLIC BLOOD PRESSURE: 74 MMHG | HEART RATE: 57 BPM | TEMPERATURE: 97.5 F | WEIGHT: 149.7 LBS | HEIGHT: 69 IN

## 2025-08-06 DIAGNOSIS — Z12.12 ENCOUNTER FOR COLORECTAL CANCER SCREENING: ICD-10-CM

## 2025-08-06 DIAGNOSIS — R74.8 ELEVATED ALKALINE PHOSPHATASE LEVEL: ICD-10-CM

## 2025-08-06 DIAGNOSIS — Z86.0100 PERSONAL HISTORY OF COLON POLYPS, UNSPECIFIED: ICD-10-CM

## 2025-08-06 DIAGNOSIS — K76.0 FATTY LIVER: ICD-10-CM

## 2025-08-06 DIAGNOSIS — Z12.11 ENCOUNTER FOR COLORECTAL CANCER SCREENING: ICD-10-CM

## 2025-08-06 DIAGNOSIS — K57.92 ACUTE DIVERTICULITIS: Primary | ICD-10-CM

## 2025-08-06 DIAGNOSIS — R10.30 LOWER ABDOMINAL PAIN: ICD-10-CM

## 2025-08-06 PROCEDURE — 1159F MED LIST DOCD IN RCRD: CPT | Performed by: NURSE PRACTITIONER

## 2025-08-06 PROCEDURE — 84450 TRANSFERASE (AST) (SGOT): CPT | Performed by: NURSE PRACTITIONER

## 2025-08-06 PROCEDURE — 82947 ASSAY GLUCOSE BLOOD QUANT: CPT | Performed by: NURSE PRACTITIONER

## 2025-08-06 PROCEDURE — 1160F RVW MEDS BY RX/DR IN RCRD: CPT | Performed by: NURSE PRACTITIONER

## 2025-08-06 PROCEDURE — 3078F DIAST BP <80 MM HG: CPT | Performed by: NURSE PRACTITIONER

## 2025-08-06 PROCEDURE — 36415 COLL VENOUS BLD VENIPUNCTURE: CPT | Performed by: NURSE PRACTITIONER

## 2025-08-06 PROCEDURE — 83010 ASSAY OF HAPTOGLOBIN QUANT: CPT | Performed by: NURSE PRACTITIONER

## 2025-08-06 PROCEDURE — 84478 ASSAY OF TRIGLYCERIDES: CPT | Performed by: NURSE PRACTITIONER

## 2025-08-06 PROCEDURE — 99214 OFFICE O/P EST MOD 30 MIN: CPT | Performed by: NURSE PRACTITIONER

## 2025-08-06 PROCEDURE — 82465 ASSAY BLD/SERUM CHOLESTEROL: CPT | Performed by: NURSE PRACTITIONER

## 2025-08-06 PROCEDURE — 82977 ASSAY OF GGT: CPT | Performed by: NURSE PRACTITIONER

## 2025-08-06 PROCEDURE — 82172 ASSAY OF APOLIPOPROTEIN: CPT | Performed by: NURSE PRACTITIONER

## 2025-08-06 PROCEDURE — 84460 ALANINE AMINO (ALT) (SGPT): CPT | Performed by: NURSE PRACTITIONER

## 2025-08-06 PROCEDURE — 3074F SYST BP LT 130 MM HG: CPT | Performed by: NURSE PRACTITIONER

## 2025-08-06 PROCEDURE — 82247 BILIRUBIN TOTAL: CPT | Performed by: NURSE PRACTITIONER

## 2025-08-06 PROCEDURE — 83883 ASSAY NEPHELOMETRY NOT SPEC: CPT | Performed by: NURSE PRACTITIONER

## 2025-08-06 RX ORDER — FLUTICASONE PROPIONATE 50 MCG
SPRAY, SUSPENSION (ML) NASAL
COMMUNITY
Start: 2025-07-12

## 2025-08-09 LAB
A2 MACROGLOB SERPL-MCNC: 249 MG/DL (ref 110–276)
ALT SERPL W P-5'-P-CCNC: 9 IU/L (ref 0–40)
APO A-I SERPL-MCNC: 175 MG/DL (ref 116–209)
AST SERPL W P-5'-P-CCNC: 17 IU/L (ref 0–40)
BILIRUB SERPL-MCNC: 0.3 MG/DL (ref 0–1.2)
CHOLEST SERPL-MCNC: 267 MG/DL (ref 100–199)
FIBROSIS SCORING:: ABNORMAL
FIBROSIS STAGE SERPL QL: ABNORMAL
GGT SERPL-CCNC: 12 IU/L (ref 0–60)
GLUCOSE SERPL-MCNC: 94 MG/DL (ref 70–99)
HAPTOGLOB SERPL-MCNC: 128 MG/DL (ref 42–346)
LABORATORY COMMENT REPORT: ABNORMAL
LIVER FIBR SCORE SERPL CALC.FIBROSURE: 0.18 (ref 0–0.21)
LIVER STEATOSIS GRADE SERPL QL: ABNORMAL
LIVER STEATOSIS SCORE SERPL: 0.26 (ref 0–0.4)
NASH GRADE SERPL QL: ABNORMAL
NASH INTERPRETATION SERPL-IMP: ABNORMAL
NASH SCORE SERPL: 0 (ref 0–0.25)
NASH SCORING: ABNORMAL
STEATOSIS SCORING: ABNORMAL
TEST PERFORMANCE INFO SPEC: ABNORMAL
TEST PERFORMANCE INFO SPEC: ABNORMAL
TRIGL SERPL-MCNC: 110 MG/DL (ref 0–149)

## 2025-08-14 ENCOUNTER — OFFICE VISIT (OUTPATIENT)
Dept: INTERNAL MEDICINE | Facility: CLINIC | Age: 75
End: 2025-08-14
Payer: MEDICARE

## 2025-08-14 VITALS
OXYGEN SATURATION: 96 % | WEIGHT: 148 LBS | HEIGHT: 69 IN | BODY MASS INDEX: 21.92 KG/M2 | SYSTOLIC BLOOD PRESSURE: 125 MMHG | DIASTOLIC BLOOD PRESSURE: 80 MMHG | HEART RATE: 68 BPM

## 2025-08-14 DIAGNOSIS — R06.2 WHEEZING: Primary | ICD-10-CM

## 2025-08-14 DIAGNOSIS — K21.00 GASTROESOPHAGEAL REFLUX DISEASE WITH ESOPHAGITIS WITHOUT HEMORRHAGE: ICD-10-CM

## 2025-08-18 ENCOUNTER — HOSPITAL ENCOUNTER (OUTPATIENT)
Dept: ULTRASOUND IMAGING | Facility: HOSPITAL | Age: 75
Discharge: HOME | End: 2025-08-18
Admitting: NURSE PRACTITIONER
Payer: MEDICARE

## 2025-08-18 PROCEDURE — 76705 ECHO EXAM OF ABDOMEN: CPT

## 2025-08-18 PROCEDURE — 76981 USE PARENCHYMA: CPT

## 2025-08-18 PROCEDURE — 0690T QUAN US TIS CHARAC W/DX US: CPT

## 2025-08-26 ENCOUNTER — TELEPHONE (OUTPATIENT)
Dept: GASTROENTEROLOGY | Facility: CLINIC | Age: 75
End: 2025-08-26
Payer: MEDICARE

## 2025-08-27 ENCOUNTER — TELEPHONE (OUTPATIENT)
Dept: GASTROENTEROLOGY | Facility: CLINIC | Age: 75
End: 2025-08-27
Payer: MEDICARE

## 2025-08-28 ENCOUNTER — TELEPHONE (OUTPATIENT)
Dept: CARDIOLOGY | Facility: CLINIC | Age: 75
End: 2025-08-28
Payer: MEDICARE

## (undated) DEVICE — KT ORCA ORCAPOD DISP STRL

## (undated) DEVICE — DEV INFL ALLIANCE2 SYS

## (undated) DEVICE — VIAL FORMLN CAP 10PCT 20ML

## (undated) DEVICE — SYR LUERLOK 20CC

## (undated) DEVICE — TIDISHIELD UROLOGY DRAIN BAGS FROSTY VINYL STERILE FITS SIEMENS UROSKOP ACCESS 20 PER CASE: Brand: TIDISHIELD

## (undated) DEVICE — Device

## (undated) DEVICE — ESOPHAGEAL BALLOON DILATATION CATHETER: Brand: CRE FIXED WIRE

## (undated) DEVICE — GOWN ,SIRUS,NONREINFORCED 3XL: Brand: MEDLINE

## (undated) DEVICE — NITINOL WIRE WITH HYDROPHILIC TIP: Brand: SENSOR

## (undated) DEVICE — LOU CYSTO: Brand: MEDLINE INDUSTRIES, INC.

## (undated) DEVICE — GLV SURG BIOGEL LTX PF 7 1/2

## (undated) DEVICE — SYS IRR PUMP SGL ACTN VAC SYR 10CC

## (undated) DEVICE — SINGLE-USE BIOPSY FORCEPS: Brand: RADIAL JAW 4

## (undated) DEVICE — NITINOL STONE RETRIEVAL BASKET: Brand: ZERO TIP

## (undated) DEVICE — ADAPT CLN SCPE ENDO PORPOISE BX/50 DISP

## (undated) DEVICE — CANN O2 ETCO2 FITS ALL CONN CO2 SMPL A/ 7IN DISP LF

## (undated) DEVICE — BLCK/BITE BLOX W/DENTL/RIM W/STRAP 54F

## (undated) DEVICE — GOWN,NON-REINFORCED,SIRUS,SET IN SLV,XL: Brand: MEDLINE

## (undated) DEVICE — CATH URETRL 2 LUM 10FR